# Patient Record
Sex: MALE | Race: BLACK OR AFRICAN AMERICAN
[De-identification: names, ages, dates, MRNs, and addresses within clinical notes are randomized per-mention and may not be internally consistent; named-entity substitution may affect disease eponyms.]

---

## 2018-08-22 ENCOUNTER — HOSPITAL ENCOUNTER (OUTPATIENT)
Dept: HOSPITAL 47 - EC | Age: 43
Setting detail: OBSERVATION
LOS: 2 days | Discharge: LEFT BEFORE DISCHARGE | End: 2018-08-24
Attending: HOSPITALIST | Admitting: HOSPITALIST
Payer: COMMERCIAL

## 2018-08-22 DIAGNOSIS — Z86.03: ICD-10-CM

## 2018-08-22 DIAGNOSIS — Z79.899: ICD-10-CM

## 2018-08-22 DIAGNOSIS — Z88.5: ICD-10-CM

## 2018-08-22 DIAGNOSIS — B19.20: ICD-10-CM

## 2018-08-22 DIAGNOSIS — Z88.8: ICD-10-CM

## 2018-08-22 DIAGNOSIS — T45.516A: ICD-10-CM

## 2018-08-22 DIAGNOSIS — Z79.51: ICD-10-CM

## 2018-08-22 DIAGNOSIS — Z86.711: ICD-10-CM

## 2018-08-22 DIAGNOSIS — Z99.89: ICD-10-CM

## 2018-08-22 DIAGNOSIS — Z86.718: ICD-10-CM

## 2018-08-22 DIAGNOSIS — B18.1: ICD-10-CM

## 2018-08-22 DIAGNOSIS — R07.9: Primary | ICD-10-CM

## 2018-08-22 DIAGNOSIS — G47.33: ICD-10-CM

## 2018-08-22 DIAGNOSIS — Z79.1: ICD-10-CM

## 2018-08-22 DIAGNOSIS — G40.909: ICD-10-CM

## 2018-08-22 DIAGNOSIS — Z79.01: ICD-10-CM

## 2018-08-22 DIAGNOSIS — I25.2: ICD-10-CM

## 2018-08-22 DIAGNOSIS — Z79.02: ICD-10-CM

## 2018-08-22 DIAGNOSIS — Z79.84: ICD-10-CM

## 2018-08-22 DIAGNOSIS — J45.909: ICD-10-CM

## 2018-08-22 DIAGNOSIS — D50.9: ICD-10-CM

## 2018-08-22 DIAGNOSIS — Z79.891: ICD-10-CM

## 2018-08-22 DIAGNOSIS — I50.9: ICD-10-CM

## 2018-08-22 DIAGNOSIS — Z91.048: ICD-10-CM

## 2018-08-22 DIAGNOSIS — D68.51: ICD-10-CM

## 2018-08-22 DIAGNOSIS — Z91.018: ICD-10-CM

## 2018-08-22 DIAGNOSIS — Z92.21: ICD-10-CM

## 2018-08-22 DIAGNOSIS — J44.9: ICD-10-CM

## 2018-08-22 DIAGNOSIS — Z53.21: ICD-10-CM

## 2018-08-22 DIAGNOSIS — G89.4: ICD-10-CM

## 2018-08-22 DIAGNOSIS — Z21: ICD-10-CM

## 2018-08-22 DIAGNOSIS — Z82.49: ICD-10-CM

## 2018-08-22 DIAGNOSIS — J96.01: ICD-10-CM

## 2018-08-22 DIAGNOSIS — Z92.3: ICD-10-CM

## 2018-08-22 DIAGNOSIS — Z91.19: ICD-10-CM

## 2018-08-22 DIAGNOSIS — F41.9: ICD-10-CM

## 2018-08-22 DIAGNOSIS — E09.65: ICD-10-CM

## 2018-08-22 DIAGNOSIS — Z79.82: ICD-10-CM

## 2018-08-22 DIAGNOSIS — R29.6: ICD-10-CM

## 2018-08-22 DIAGNOSIS — Z88.2: ICD-10-CM

## 2018-08-22 DIAGNOSIS — Z91.041: ICD-10-CM

## 2018-08-22 LAB
ALBUMIN SERPL-MCNC: 3.7 G/DL (ref 3.5–5)
ALP SERPL-CCNC: 73 U/L (ref 38–126)
ALT SERPL-CCNC: 32 U/L (ref 21–72)
ANION GAP SERPL CALC-SCNC: 8 MMOL/L
AST SERPL-CCNC: 21 U/L (ref 17–59)
BASOPHILS # BLD AUTO: 0 K/UL (ref 0–0.2)
BASOPHILS NFR BLD AUTO: 0 %
BUN SERPL-SCNC: 16 MG/DL (ref 9–20)
CALCIUM SPEC-MCNC: 8.9 MG/DL (ref 8.4–10.2)
CHLORIDE SERPL-SCNC: 113 MMOL/L (ref 98–107)
CK SERPL-CCNC: 104 U/L (ref 55–170)
CO2 SERPL-SCNC: 21 MMOL/L (ref 22–30)
EOSINOPHIL # BLD AUTO: 0.2 K/UL (ref 0–0.7)
EOSINOPHIL NFR BLD AUTO: 2 %
ERYTHROCYTE [DISTWIDTH] IN BLOOD BY AUTOMATED COUNT: 5.29 M/UL (ref 4.3–5.9)
ERYTHROCYTE [DISTWIDTH] IN BLOOD: 17.4 % (ref 11.5–15.5)
GLUCOSE BLD-MCNC: 77 MG/DL (ref 75–99)
GLUCOSE SERPL-MCNC: 82 MG/DL (ref 74–99)
HCT VFR BLD AUTO: 38.2 % (ref 39–53)
HGB BLD-MCNC: 11.4 GM/DL (ref 13–17.5)
LYMPHOCYTES # SPEC AUTO: 1.5 K/UL (ref 1–4.8)
LYMPHOCYTES NFR SPEC AUTO: 23 %
MAGNESIUM SPEC-SCNC: 1.8 MG/DL (ref 1.6–2.3)
MCH RBC QN AUTO: 21.6 PG (ref 25–35)
MCHC RBC AUTO-ENTMCNC: 29.9 G/DL (ref 31–37)
MCV RBC AUTO: 72.3 FL (ref 80–100)
MONOCYTES # BLD AUTO: 0.3 K/UL (ref 0–1)
MONOCYTES NFR BLD AUTO: 5 %
NEUTROPHILS # BLD AUTO: 4.4 K/UL (ref 1.3–7.7)
NEUTROPHILS NFR BLD AUTO: 69 %
PLATELET # BLD AUTO: 208 K/UL (ref 150–450)
POTASSIUM SERPL-SCNC: 3.9 MMOL/L (ref 3.5–5.1)
PROT SERPL-MCNC: 6.4 G/DL (ref 6.3–8.2)
SODIUM SERPL-SCNC: 142 MMOL/L (ref 137–145)
TROPONIN I SERPL-MCNC: <0.012 NG/ML (ref 0–0.03)
WBC # BLD AUTO: 6.4 K/UL (ref 3.8–10.6)

## 2018-08-22 PROCEDURE — 80048 BASIC METABOLIC PNL TOTAL CA: CPT

## 2018-08-22 PROCEDURE — 85610 PROTHROMBIN TIME: CPT

## 2018-08-22 PROCEDURE — 80053 COMPREHEN METABOLIC PANEL: CPT

## 2018-08-22 PROCEDURE — 86001 ALLERGEN SPECIFIC IGG: CPT

## 2018-08-22 PROCEDURE — 86606 ASPERGILLUS ANTIBODY: CPT

## 2018-08-22 PROCEDURE — 5A09457 ASSISTANCE WITH RESPIRATORY VENTILATION, 24-96 CONSECUTIVE HOURS, CONTINUOUS POSITIVE AIRWAY PRESSURE: ICD-10-PCS

## 2018-08-22 PROCEDURE — 83540 ASSAY OF IRON: CPT

## 2018-08-22 PROCEDURE — 82607 VITAMIN B-12: CPT

## 2018-08-22 PROCEDURE — 85025 COMPLETE CBC W/AUTO DIFF WBC: CPT

## 2018-08-22 PROCEDURE — 87340 HEPATITIS B SURFACE AG IA: CPT

## 2018-08-22 PROCEDURE — 86003 ALLG SPEC IGE CRUDE XTRC EA: CPT

## 2018-08-22 PROCEDURE — 96376 TX/PRO/DX INJ SAME DRUG ADON: CPT

## 2018-08-22 PROCEDURE — 99291 CRITICAL CARE FIRST HOUR: CPT

## 2018-08-22 PROCEDURE — 82104 ALPHA-1-ANTITRYPSIN PHENO: CPT

## 2018-08-22 PROCEDURE — 82103 ALPHA-1-ANTITRYPSIN TOTAL: CPT

## 2018-08-22 PROCEDURE — 86704 HEP B CORE ANTIBODY TOTAL: CPT

## 2018-08-22 PROCEDURE — 96361 HYDRATE IV INFUSION ADD-ON: CPT

## 2018-08-22 PROCEDURE — 94640 AIRWAY INHALATION TREATMENT: CPT

## 2018-08-22 PROCEDURE — 87390 HIV-1 AG IA: CPT

## 2018-08-22 PROCEDURE — 36415 COLL VENOUS BLD VENIPUNCTURE: CPT

## 2018-08-22 PROCEDURE — 85730 THROMBOPLASTIN TIME PARTIAL: CPT

## 2018-08-22 PROCEDURE — 96374 THER/PROPH/DIAG INJ IV PUSH: CPT

## 2018-08-22 PROCEDURE — 83735 ASSAY OF MAGNESIUM: CPT

## 2018-08-22 PROCEDURE — 82785 ASSAY OF IGE: CPT

## 2018-08-22 PROCEDURE — 86609 BACTERIUM ANTIBODY: CPT

## 2018-08-22 PROCEDURE — 84484 ASSAY OF TROPONIN QUANT: CPT

## 2018-08-22 PROCEDURE — 71045 X-RAY EXAM CHEST 1 VIEW: CPT

## 2018-08-22 PROCEDURE — 82550 ASSAY OF CK (CPK): CPT

## 2018-08-22 PROCEDURE — 83036 HEMOGLOBIN GLYCOSYLATED A1C: CPT

## 2018-08-22 PROCEDURE — 83550 IRON BINDING TEST: CPT

## 2018-08-22 PROCEDURE — 96375 TX/PRO/DX INJ NEW DRUG ADDON: CPT

## 2018-08-22 PROCEDURE — 97163 PT EVAL HIGH COMPLEX 45 MIN: CPT

## 2018-08-22 PROCEDURE — 82553 CREATINE MB FRACTION: CPT

## 2018-08-22 PROCEDURE — 93005 ELECTROCARDIOGRAM TRACING: CPT

## 2018-08-22 PROCEDURE — 83880 ASSAY OF NATRIURETIC PEPTIDE: CPT

## 2018-08-22 PROCEDURE — 82746 ASSAY OF FOLIC ACID SERUM: CPT

## 2018-08-22 NOTE — XR
EXAMINATION: XR chest 1V portable

 

DATE AND TIME:  8/22/2018 8:19 PM

 

CLINICAL INDICATION: Pain

 

TECHNIQUE: AP upright portable

 

COMPARISON: None.

 

FINDINGS: The hemidiaphragms are prominently elevated consistent with low lung inflation at the momen
t of x-ray exposure. This precludes visualization of the lower lobes. However, the mediastinal and di
aphragmatic pleural reflections are not silhouetted.

 

The lungs appear to be clear. 

 

The pleural spaces are negative.

 

The cardiac silhouette appears mildly enlarged, stable.

 

The skeletal structures and soft tissues are negative for acute findings.

 

IMPRESSION:

NO ACUTE PROCESS.
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
## 2018-08-22 NOTE — HP
HISTORY AND PHYSICAL



DATE OF SERVICE:

08/22/2018



CHIEF COMPLAINT:

Shortness of breath.



HISTORY OF PRESENT ILLNESS:

This 43-year-old gentleman with a past medical history of multiple medical problems,

including asthma, COPD, history of CHF, hemodialysis, history of DVT, history of

pulmonary embolus, seizure disorder, sleep apnea, history of factor V Leiden

deficiency, history of pancreatic tumor with chemo and radiation, history of pulmonary

embolus, history of seizure disorder, history of cardiac catheterization, being

followed by a primary physician elsewhere, was recently relocated to the area,

according to him, and the patient was complaining of increasing shortness of breath and

chest pains.  The patient came to [facility] and was admitted for further

evaluation and treatment.  Patient was given a BiPAP at 10/5, 50% FiO2.  Patient was

admitted for further evaluation treatment. There is no history of any fever or rigors.

No history of headache, loss of consciousness. Initial chest x-ray did not show any

acute abnormality.



PAST MEDICAL HISTORY:

1. History of asthma.

2. History of CHF.

3. History of hemodialysis.

4. History of DVT.

5. Pulmonary embolus.

6. History of sleep apnea.

7. Seizure disorder.

8. Factor V Leiden deficiency.

9. History of pancreatic tumor apparently with chemoradiation.

10.History of seizure disorder.

11.History of anxiety.



HOME MEDICATIONS:

1. Oxycodone 30 mg p.o. t.i.d.

2. Benadryl 25 mg p.o. daily.

3. Catapres 0.1 mg at bedtime.

4. Coumadin 5 mg at bedtime.

5. Omeprazole 40 mg b.i.d.

6. Multivitamins 1 p.o. daily.

7. Singulair 10 mg at bedtime.

8. Dulera 2 puffs b.i.d.

9. Claritin 10 mg p.o. daily.

10.Breo Ellipta 1 puff daily.

11.Colace 100 mg p.o. b.i.d.

12.Bentyl 10 mg p.o. t.i.d.

13.Colchicine 0.6 daily.

14.Plavix 75 mg p.o. daily.

15.Lipitor 10 mg daily.

16.Aspirin 81 mg p.o. daily.

17.Lac-Hydrin 1 application daily.

18.Proventil 1 puff q.4 p.r.n.

19.Ventolin 2.5 q.4 p.r.n.



ALLERGIES:

MULTIPLE ALLERGIES:

1. APIXABAN.

2. ASPARAGUS.

3. CAT DANDER.

4. CATS CLAWS.

5. CELEXA.

6. PRADAXA.

7. FRAGMIN.

8. LOVENOX.

9. FLUPHENAZINE.

10.ARIXTRA.

11.HALDOL.

12.ATARAX.

13.MOTRIN.

14.ATROVENT.

15.TORADOL.

16.LANOLIN.

17.SKELAXIN.

18.ROBAXIN.

19.ANTIBIOTICS.

20.SULFA.

21.TRAMADOL.



FAMILY HISTORY:

History of myocardial infarction, coronary artery disease in the family.



SOCIAL HISTORY:

No history of smoking.  No history of alcohol intake.



REVIEW OF SYSTEMS:

ENT: No diminished hearing. No diminished vision.

CARDIOVASCULAR SYSTEM: As mentioned earlier.

RESPIRATORY SYSTEM: As mentioned earlier.

GI: As mentioned earlier.

: No dysuria or retention.

NERVOUS SYSTEM: No numbness, weakness.

ALLERGY/IMMUNOLOGY: As mentioned earlier.

MUSCULOSKELETAL: As mentioned earlier.

HEMATOLOGY/ONCOLOGY: No history of anemia.

ENDOCRINE: As mentioned earlier.

CONSTITUTIONAL: As mentioned earlier.

DERMATOLOGY: Negative.

RHEUMATOLOGY: Negative.

PSYCHIATRY: As mentioned earlier.



PHYSICAL EXAMINATION:

Patient is alert and oriented x3. The pulse is 81, blood pressure 120/75, respiration

20, temperature 97.9, pulse ox 100% on BiPAP.  Patient is extremely short of breath.

HEENT: Conjunctivae normal. Oral mucosa moist.

NECK: No jugular venous distention. No carotid bruit. No lymph node enlargement.

CARDIOVASCULAR SYSTEM:  S1, S2 muffled.

RESPIRATORY SYSTEM: Breath sounds diminished at the bases.  A few scattered rhonchi and

crackles. Expiratory wheezing also present.

ABDOMEN: Soft, non-tender. No mass palpable.

LEGS: No edema. No swelling.

NERVOUS SYSTEM: Higher functions as mentioned earlier. Moves all 4 limbs. No focal

motor or sensory deficit.

LYMPHATICS: No lymph node palpable in neck, axillae or groin.

SKIN: No ulcer, rash, bleeding.



LABS:

WBC 6.2, hemoglobin 11.4. Sodium 142, potassium 3.9.



ASSESSMENT:

1. Shortness of breath; possibly asthma or chronic obstructive pulmonary disease,

    acute exacerbation, with acute hypoxic respiratory failure, on BiPAP.

2. Chest pain for evaluation.

3. Anemia, microcytic.

4. History of congestive heart failure.

5. History of hemodialysis.

6. History of deep venous thrombosis.

7. History of pulmonary embolism.

8. History of seizures.

9. History of sleep apnea.

10.History of Factor V Leiden deficiency.

11.History of pancreatic tumor with chemoradiation apparently.

12.History of cardiac catheterization.

13.History of anxiety.



RECOMMENDATIONS AND DISCUSSION:

In this 43-year-old gentleman who presented with multiple complex medical issues, we

will monitor the patient closely, continue the current medications, continue with

symptomatic treatment. Will obtain pulmonary consultations. Continue the patient with

BiPAP, empiric antibiotics, bronchodilators, steroids. Continue the rest of the home

medications.  Guarded prognosis because of multiple complex medical issues. Further

recommendations to follow. I also recommend that the patient follow up with the primary

physician closely.





MMJIML / JOANNAN: [id number] / Job#: [id number]

## 2018-08-23 VITALS — RESPIRATION RATE: 16 BRPM

## 2018-08-23 LAB
ANION GAP SERPL CALC-SCNC: 13 MMOL/L
APTT BLD: 21.4 SEC (ref 22–30)
BASOPHILS # BLD AUTO: 0 K/UL (ref 0–0.2)
BASOPHILS NFR BLD AUTO: 0 %
BUN SERPL-SCNC: 13 MG/DL (ref 9–20)
CALCIUM SPEC-MCNC: 9.1 MG/DL (ref 8.4–10.2)
CHLORIDE SERPL-SCNC: 109 MMOL/L (ref 98–107)
CO2 SERPL-SCNC: 19 MMOL/L (ref 22–30)
EOSINOPHIL # BLD AUTO: 0 K/UL (ref 0–0.7)
EOSINOPHIL NFR BLD AUTO: 1 %
ERYTHROCYTE [DISTWIDTH] IN BLOOD BY AUTOMATED COUNT: 5.08 M/UL (ref 4.3–5.9)
ERYTHROCYTE [DISTWIDTH] IN BLOOD: 17.6 % (ref 11.5–15.5)
GLUCOSE BLD-MCNC: 172 MG/DL (ref 75–99)
GLUCOSE BLD-MCNC: 256 MG/DL (ref 75–99)
GLUCOSE SERPL-MCNC: 365 MG/DL (ref 74–99)
HBA1C MFR BLD: 6.4 % (ref 4–6)
HCT VFR BLD AUTO: 38 % (ref 39–53)
HGB BLD-MCNC: 11.3 GM/DL (ref 13–17.5)
INR PPP: 1.1 (ref ?–1.2)
LYMPHOCYTES # SPEC AUTO: 0.3 K/UL (ref 1–4.8)
LYMPHOCYTES NFR SPEC AUTO: 6 %
MCH RBC QN AUTO: 22.1 PG (ref 25–35)
MCHC RBC AUTO-ENTMCNC: 29.6 G/DL (ref 31–37)
MCV RBC AUTO: 74.8 FL (ref 80–100)
MONOCYTES # BLD AUTO: 0.1 K/UL (ref 0–1)
MONOCYTES NFR BLD AUTO: 2 %
NEUTROPHILS # BLD AUTO: 4.6 K/UL (ref 1.3–7.7)
NEUTROPHILS NFR BLD AUTO: 92 %
PLATELET # BLD AUTO: 251 K/UL (ref 150–450)
POTASSIUM SERPL-SCNC: 4 MMOL/L (ref 3.5–5.1)
PT BLD: 10.5 SEC (ref 9–12)
SODIUM SERPL-SCNC: 141 MMOL/L (ref 137–145)
WBC # BLD AUTO: 5 K/UL (ref 3.8–10.6)

## 2018-08-23 RX ADMIN — DIVALPROEX SODIUM SCH MG: 500 TABLET, FILM COATED, EXTENDED RELEASE ORAL at 16:44

## 2018-08-23 RX ADMIN — PANTOPRAZOLE SODIUM SCH MG: 40 TABLET, DELAYED RELEASE ORAL at 08:01

## 2018-08-23 RX ADMIN — INSULIN ASPART SCH UNIT: 100 INJECTION, SOLUTION INTRAVENOUS; SUBCUTANEOUS at 22:11

## 2018-08-23 RX ADMIN — INSULIN ASPART SCH UNIT: 100 INJECTION, SOLUTION INTRAVENOUS; SUBCUTANEOUS at 18:03

## 2018-08-23 RX ADMIN — METHYLPREDNISOLONE SODIUM SUCCINATE SCH MG: 125 INJECTION, POWDER, FOR SOLUTION INTRAMUSCULAR; INTRAVENOUS at 00:09

## 2018-08-23 RX ADMIN — MORPHINE SULFATE PRN MG: 2 INJECTION, SOLUTION INTRAMUSCULAR; INTRAVENOUS at 00:10

## 2018-08-23 RX ADMIN — IPRATROPIUM BROMIDE AND ALBUTEROL SULFATE SCH: .5; 3 SOLUTION RESPIRATORY (INHALATION) at 23:32

## 2018-08-23 RX ADMIN — DICYCLOMINE HYDROCHLORIDE SCH MG: 10 CAPSULE ORAL at 16:44

## 2018-08-23 RX ADMIN — BUDESONIDE SCH MG: 1 SUSPENSION RESPIRATORY (INHALATION) at 19:35

## 2018-08-23 RX ADMIN — MORPHINE SULFATE PRN MG: 2 INJECTION, SOLUTION INTRAMUSCULAR; INTRAVENOUS at 12:30

## 2018-08-23 RX ADMIN — MORPHINE SULFATE PRN MG: 2 INJECTION, SOLUTION INTRAMUSCULAR; INTRAVENOUS at 16:42

## 2018-08-23 RX ADMIN — IPRATROPIUM BROMIDE AND ALBUTEROL SULFATE SCH ML: .5; 3 SOLUTION RESPIRATORY (INHALATION) at 19:35

## 2018-08-23 RX ADMIN — CEFTRIAXONE SODIUM SCH: 1 INJECTION, POWDER, FOR SOLUTION INTRAMUSCULAR; INTRAVENOUS at 22:06

## 2018-08-23 RX ADMIN — METHYLPREDNISOLONE SODIUM SUCCINATE SCH MG: 125 INJECTION, POWDER, FOR SOLUTION INTRAMUSCULAR; INTRAVENOUS at 05:09

## 2018-08-23 RX ADMIN — DICYCLOMINE HYDROCHLORIDE SCH MG: 10 CAPSULE ORAL at 08:01

## 2018-08-23 RX ADMIN — ATORVASTATIN CALCIUM SCH MG: 10 TABLET, FILM COATED ORAL at 08:01

## 2018-08-23 RX ADMIN — GABAPENTIN SCH: 400 CAPSULE ORAL at 16:44

## 2018-08-23 RX ADMIN — DIVALPROEX SODIUM SCH MG: 500 TABLET, FILM COATED, EXTENDED RELEASE ORAL at 22:05

## 2018-08-23 RX ADMIN — DICYCLOMINE HYDROCHLORIDE SCH MG: 10 CAPSULE ORAL at 22:04

## 2018-08-23 RX ADMIN — DOCUSATE SODIUM SCH MG: 100 CAPSULE, LIQUID FILLED ORAL at 08:01

## 2018-08-23 RX ADMIN — DOCUSATE SODIUM SCH MG: 100 CAPSULE, LIQUID FILLED ORAL at 22:04

## 2018-08-23 RX ADMIN — MORPHINE SULFATE PRN MG: 2 INJECTION, SOLUTION INTRAMUSCULAR; INTRAVENOUS at 03:55

## 2018-08-23 RX ADMIN — ISODIUM CHLORIDE SCH MG: 0.03 SOLUTION RESPIRATORY (INHALATION) at 11:11

## 2018-08-23 RX ADMIN — ASPIRIN 81 MG CHEWABLE TABLET SCH MG: 81 TABLET CHEWABLE at 08:01

## 2018-08-23 RX ADMIN — METHYLPREDNISOLONE SODIUM SUCCINATE SCH MG: 125 INJECTION, POWDER, FOR SOLUTION INTRAMUSCULAR; INTRAVENOUS at 18:03

## 2018-08-23 RX ADMIN — GABAPENTIN SCH MG: 400 CAPSULE ORAL at 22:05

## 2018-08-23 RX ADMIN — MORPHINE SULFATE PRN MG: 2 INJECTION, SOLUTION INTRAMUSCULAR; INTRAVENOUS at 08:05

## 2018-08-23 RX ADMIN — METHYLPREDNISOLONE SODIUM SUCCINATE SCH MG: 125 INJECTION, POWDER, FOR SOLUTION INTRAMUSCULAR; INTRAVENOUS at 13:13

## 2018-08-23 RX ADMIN — ISODIUM CHLORIDE SCH MG: 0.03 SOLUTION RESPIRATORY (INHALATION) at 07:24

## 2018-08-23 RX ADMIN — IPRATROPIUM BROMIDE AND ALBUTEROL SULFATE SCH ML: .5; 3 SOLUTION RESPIRATORY (INHALATION) at 15:51

## 2018-08-23 RX ADMIN — BUDESONIDE SCH MG: 1 SUSPENSION RESPIRATORY (INHALATION) at 07:24

## 2018-08-23 RX ADMIN — HEPARIN SODIUM SCH: 5000 INJECTION, SOLUTION INTRAVENOUS; SUBCUTANEOUS at 16:50

## 2018-08-23 RX ADMIN — CEFTRIAXONE SODIUM SCH MG: 1 INJECTION, POWDER, FOR SOLUTION INTRAMUSCULAR; INTRAVENOUS at 00:10
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
## 2018-08-24 VITALS — DIASTOLIC BLOOD PRESSURE: 62 MMHG | SYSTOLIC BLOOD PRESSURE: 103 MMHG | TEMPERATURE: 97.9 F | HEART RATE: 91 BPM

## 2018-08-24 LAB
GLUCOSE BLD-MCNC: 149 MG/DL (ref 75–99)
VIT B12 SERPL-MCNC: 512 PG/ML (ref 200–944)

## 2018-08-24 RX ADMIN — INSULIN ASPART SCH UNIT: 100 INJECTION, SOLUTION INTRAVENOUS; SUBCUTANEOUS at 07:59

## 2018-08-24 RX ADMIN — ATORVASTATIN CALCIUM SCH MG: 10 TABLET, FILM COATED ORAL at 07:58

## 2018-08-24 RX ADMIN — BUDESONIDE SCH: 1 SUSPENSION RESPIRATORY (INHALATION) at 08:32

## 2018-08-24 RX ADMIN — PANTOPRAZOLE SODIUM SCH MG: 40 TABLET, DELAYED RELEASE ORAL at 07:58

## 2018-08-24 RX ADMIN — IPRATROPIUM BROMIDE AND ALBUTEROL SULFATE SCH: .5; 3 SOLUTION RESPIRATORY (INHALATION) at 08:33

## 2018-08-24 RX ADMIN — ASPIRIN 81 MG CHEWABLE TABLET SCH MG: 81 TABLET CHEWABLE at 07:59

## 2018-08-24 RX ADMIN — IPRATROPIUM BROMIDE AND ALBUTEROL SULFATE SCH: .5; 3 SOLUTION RESPIRATORY (INHALATION) at 02:46

## 2018-08-24 RX ADMIN — METHYLPREDNISOLONE SODIUM SUCCINATE SCH: 125 INJECTION, POWDER, FOR SOLUTION INTRAMUSCULAR; INTRAVENOUS at 05:25

## 2018-08-24 RX ADMIN — METHYLPREDNISOLONE SODIUM SUCCINATE SCH: 125 INJECTION, POWDER, FOR SOLUTION INTRAMUSCULAR; INTRAVENOUS at 01:12

## 2018-08-24 RX ADMIN — HEPARIN SODIUM SCH: 5000 INJECTION, SOLUTION INTRAVENOUS; SUBCUTANEOUS at 01:12

## 2018-08-24 NOTE — DS
DISCHARGE SUMMARY



FINAL DIAGNOSES:

1. Shortness of breath, possible chronic obstructive pulmonary disease acute

    exacerbation with acute ventricular bronchitis, acute hypoxic respiratory failure,

    status post BiPAP.

2. Chest pain for evaluation.

3. Chronic pain syndrome.

4. Diabetes mellitus type 2, steroid induced.

5. Anemia, microcytic.

6. HIV.

7. Hepatitis.

8. History of congestive heart failure.

9. History of multiple medical problems.



DISCHARGE DISPOSITION:

The patient left the hospital AGAINST MEDICAL ADVICE.



HISTORY OF PRESENT ILLNESS:

This 43-year-old gentleman admitted with multiple medical problems as mentioned

earlier.  The patient is being evaluated by Pulmonology but however the patient left

the hospital AGAINST MEDICAL ADVICE.  Prognosis is extremely guarded.  Please refer to

the previous history and physical dictation and staff notes for further information.





MMODL / IJN: [id number] / Job#: [id number]

## 2018-08-24 NOTE — CONS
CONSULTATION



REASON FOR CONSULTATION:

1. HIV.

2. Hepatitis B, chronic.



HISTORY OF PRESENT ILLNESS:

The patient is a 43-year-old,  male who has been brought into the ER

for the chief complaints of difficulty breathing.  These symptom has been going on for

2 days prior to presentation to hospital.  The patient has been complaining of

shortness of breath with exertion.  Denies having any chest pain, orthopnea or PND or

any active wheezing.  The patient denies having any URI symptoms.  No high-grade fever,

rigors or any chills.  The patient has been complaining of having frequent falls as

well.  With these symptoms, the patient was evaluated by the ER physician.  The patient

on arrival to the ER has been afebrile.  The patient's white count was normal at 5.0.

His liver enzymes are normal.  Lymphocyte count is normal.  The patient did have a

chest x-ray, which was negative for any acute pulmonary process.  The patient has been

to the hospital for possible asthma/COPD exacerbation.  The patient also reports a

history of HIV and chronic hepatitis B for which the patient is currently on anti-

retroviral medication.  However, looking at the medication profile does not make sense

as the patient is on ______ in addition ______ when asked specifically for who is his

HIV physician he states that he is in [place], but is not able to give me a clear

name of it.  When asked specifically for how long he has HIV, he 16 years.  Apparently,

the patient was admitted to this facility in 2016 and there was no mention of HIV in

any of the consults or the admitting physician notes.  The patient is not very clear

when he is asked about his HIV status, his CD4 count or viral load.  He has no idea.

The patient seems to be confabulating and ______ when I asked specifically about his

HIV and hepatitis B status.  Likely this history to be unreliable at the moment.



REVIEW OF SYSTEMS:

CONSTITUTIONAL: Positive for weakness, no fever.

EYES:  No complaint.

ENT:  No complaint.

RESPIRATORY:  As per HPI.

CARDIOVASCULAR: No complaint.

GENITOURINARY:  No complaint.

GASTROINTESTINAL: No complaint.

MUSCULOSKELETAL: No complaint.

INTEGUMENTARY: No complaint.

PSYCHOLOGICAL: No complaint.

ENDOCRINE:  No complaint.

NEUROLOGIC:  No complaint.



PAST MEDICAL HISTORY:

MI, pulmonary embolism, seizure disorder, sleep apnea, DVT, COPD, heart failure, factor

V Leiden deficiency, history of pancreatic tumor with chemoradiation, PE _______, sleep

apnea, history of chronic hepatitis B and HIV, which I am not very clear about.



PAST SURGICAL HISTORY:

Appendectomy, heart catheterization, tonsillectomy, left leg fasciotomy, spinal cord

stimulator placement and removal, ______ repair surgery on the left leg.



SOCIAL HISTORY:

No history of smoking, drinking or drug use.



FAMILY HISTORY:

Father has colon disease and MI.



ALLERGIES:

To multiple medication including APIXABAN _______.



MEDICATIONS:

Currently include, the patient is on Tylenol, Ventolin, DuoNeb, Xanax, aspirin,

Lipitor, Pulmicort, Rocephin, Thorazine, Catapres, Plavix, colchicine, Bentyl,

Benadryl, Depakote, Colace, Truvada, Solu-Medrol _______ and Norvasc.



EXAMINATION:

Blood pressure 141/76 with a pulse of 84, temperature 97.1.  He has been 100% ______

when he presented to the hospital.  General description is a middle-aged male lying in

bed in no distress.  No tachypnea or accessory muscle of respiration use.

HEENT:  Shows slight pallor.  No scleral icterus.  Oral mucous membranes moist. No

pharyngeal erythema or thrush.

NECK:  Trachea central, no thyromegaly.

LUNGS: Unlabored breathing, clear to auscultation anteriorly.  No wheeze or crackle.

HEART:  S1, S2.  Regular rate and rhythm.

ABDOMEN:  Soft, no tenderness.  No guarding or rigidity.

EXTREMITIES:  No edema of the feet.

SKIN EXAMINATION:  No rash or mass palpable.

NEUROLOGICAL:  The patient is awake, alert, oriented.  Mood and affect normal.



LABS:

Hemoglobin is 11.8, white count 5.0.  INR was 1.1 with a BUN of 13, creatinine 0.91.

Liver enzymes are normal.  Chest x-ray report negative for pneumonia.



DIAGNOSTIC IMPRESSION AND PLAN:

1. Patient was currently giving a history of HIV,  however, the patient not very clear

    about the status of his HIV or the physician was prescribing these medication and

    medication and the patient is currently on does not make sense.  We will try to

    contact his ID physician in [place].  However, when the RN went to the room to

    get his signature on release of his medical record, the patient apparently freaked

    out and would not sign it.  We will have to confirm his HIV status before

    recommending any specific treatment.

2. The patient with chronic hepatitis B.  The diagnosis still not full in view of

    normal liver enzymes and the patient's ______ history.



PLAN:

1. We will check an HIV 1 and 2 antibodies on this patient for antigen.

2. Check _______ and reason.

3. Recommend to hold anti-retroviral therapy at this point until we have a

    confirmation of his HIV and hepatitis B status and get records from his ID

    physician in [place].

4. We will follow on his clinical condition.  Further adjust medication if needed.

    Thank you for this consultation.  We will follow this patient along with you.





MMJIML / IJN: [id number] / Job#: [id number]

## 2018-08-27 LAB
A ALTERNATA IGE QN: <0.35 KU/L (ref ?–0.35)
A ALTERNATA IGE RAST: (no result)
A FUMIGATUS IGE QN: <0.35 KU/L (ref ?–0.35)
A FUMIGATUS IGE RAST: (no result)
C HERBARUM IGE QN: <0.35 KU/L (ref ?–0.35)
CAT DANDER IGE QN: (no result)
CAT DANDER IGE QN: <0.35 KU/L (ref ?–0.35)
D FARINAE IGE QN: (no result)
D FARINAE IGE QN: <0.35 KU/L (ref ?–0.35)
D PTERONYSS IGE QN: (no result) KU/L
MAPLE IGE RAST: (no result)
MARSH ELDER IGE QN: (no result)
MARSH ELDER IGE QN: (no result) KU/L
MOUSE URINE PROT IGE RAST: (no result)
MT JUNIPER IGE QN: (no result)
NETTLE IGE RAST: (no result)
P NOTATUM IGE QN: (no result)
SILVER BIRCH IGE QN: <0.35 KU/L (ref ?–0.35)
SILVER BIRCH IGE RAST: (no result)
WHITE ASH IGE RAST: (no result)

## 2018-08-28 LAB — A1AT SERPL-MCNC: 148 MG/DL (ref 90–200)

## 2018-08-29 LAB
A ALTERNATA IGE QN: < 2 MCG/ML (ref ?–13.6)
C HERBARUM IGG SER-MCNC: < 2 MCG/ML (ref ?–14.7)
PHOMA IGG QN: < 2 MCG/ML (ref ?–6.6)

## 2019-01-10 ENCOUNTER — HOSPITAL ENCOUNTER (INPATIENT)
Dept: HOSPITAL 47 - EC | Age: 44
LOS: 3 days | Discharge: LEFT BEFORE DISCHARGE | DRG: 190 | End: 2019-01-13
Attending: INTERNAL MEDICINE | Admitting: INTERNAL MEDICINE
Payer: COMMERCIAL

## 2019-01-10 VITALS — BODY MASS INDEX: 28.8 KG/M2

## 2019-01-10 DIAGNOSIS — Z66: ICD-10-CM

## 2019-01-10 DIAGNOSIS — Z99.81: ICD-10-CM

## 2019-01-10 DIAGNOSIS — I11.0: ICD-10-CM

## 2019-01-10 DIAGNOSIS — J44.1: Primary | ICD-10-CM

## 2019-01-10 DIAGNOSIS — G40.909: ICD-10-CM

## 2019-01-10 DIAGNOSIS — Z92.21: ICD-10-CM

## 2019-01-10 DIAGNOSIS — J96.01: ICD-10-CM

## 2019-01-10 DIAGNOSIS — G47.33: ICD-10-CM

## 2019-01-10 DIAGNOSIS — E87.6: ICD-10-CM

## 2019-01-10 DIAGNOSIS — Z79.899: ICD-10-CM

## 2019-01-10 DIAGNOSIS — J45.51: ICD-10-CM

## 2019-01-10 DIAGNOSIS — Z87.891: ICD-10-CM

## 2019-01-10 DIAGNOSIS — Z91.19: ICD-10-CM

## 2019-01-10 DIAGNOSIS — D68.51: ICD-10-CM

## 2019-01-10 DIAGNOSIS — Z21: ICD-10-CM

## 2019-01-10 DIAGNOSIS — T45.515A: ICD-10-CM

## 2019-01-10 DIAGNOSIS — Z88.6: ICD-10-CM

## 2019-01-10 DIAGNOSIS — B19.10: ICD-10-CM

## 2019-01-10 DIAGNOSIS — I25.2: ICD-10-CM

## 2019-01-10 DIAGNOSIS — F41.9: ICD-10-CM

## 2019-01-10 DIAGNOSIS — Z88.2: ICD-10-CM

## 2019-01-10 DIAGNOSIS — Z91.041: ICD-10-CM

## 2019-01-10 DIAGNOSIS — Z79.51: ICD-10-CM

## 2019-01-10 DIAGNOSIS — Z79.01: ICD-10-CM

## 2019-01-10 DIAGNOSIS — Z79.82: ICD-10-CM

## 2019-01-10 DIAGNOSIS — Z79.02: ICD-10-CM

## 2019-01-10 DIAGNOSIS — Z86.711: ICD-10-CM

## 2019-01-10 DIAGNOSIS — Z82.49: ICD-10-CM

## 2019-01-10 DIAGNOSIS — Z88.8: ICD-10-CM

## 2019-01-10 DIAGNOSIS — Z86.718: ICD-10-CM

## 2019-01-10 DIAGNOSIS — Z85.07: ICD-10-CM

## 2019-01-10 DIAGNOSIS — Z92.3: ICD-10-CM

## 2019-01-10 DIAGNOSIS — I50.9: ICD-10-CM

## 2019-01-10 LAB
ALBUMIN SERPL-MCNC: 3.8 G/DL (ref 3.5–5)
ALP SERPL-CCNC: 66 U/L (ref 38–126)
ALT SERPL-CCNC: 27 U/L (ref 21–72)
ANION GAP SERPL CALC-SCNC: 5 MMOL/L
APTT BLD: 20.8 SEC (ref 22–30)
AST SERPL-CCNC: 21 U/L (ref 17–59)
BASOPHILS # BLD AUTO: 0 K/UL (ref 0–0.2)
BASOPHILS NFR BLD AUTO: 0 %
BUN SERPL-SCNC: 15 MG/DL (ref 9–20)
CALCIUM SPEC-MCNC: 8.9 MG/DL (ref 8.4–10.2)
CHLORIDE SERPL-SCNC: 109 MMOL/L (ref 98–107)
CK SERPL-CCNC: 89 U/L (ref 55–170)
CO2 SERPL-SCNC: 25 MMOL/L (ref 22–30)
EOSINOPHIL # BLD AUTO: 0.1 K/UL (ref 0–0.7)
EOSINOPHIL NFR BLD AUTO: 2 %
ERYTHROCYTE [DISTWIDTH] IN BLOOD BY AUTOMATED COUNT: 5.07 M/UL (ref 4.3–5.9)
ERYTHROCYTE [DISTWIDTH] IN BLOOD: 17.4 % (ref 11.5–15.5)
GLUCOSE SERPL-MCNC: 207 MG/DL (ref 74–99)
HCT VFR BLD AUTO: 38.8 % (ref 39–53)
HGB BLD-MCNC: 12.6 GM/DL (ref 13–17.5)
INR PPP: 1.1 (ref ?–1.2)
LYMPHOCYTES # SPEC AUTO: 1.5 K/UL (ref 1–4.8)
LYMPHOCYTES NFR SPEC AUTO: 34 %
MAGNESIUM SPEC-SCNC: 1.6 MG/DL (ref 1.6–2.3)
MCH RBC QN AUTO: 24.8 PG (ref 25–35)
MCHC RBC AUTO-ENTMCNC: 32.5 G/DL (ref 31–37)
MCV RBC AUTO: 76.5 FL (ref 80–100)
MONOCYTES # BLD AUTO: 0.3 K/UL (ref 0–1)
MONOCYTES NFR BLD AUTO: 7 %
NEUTROPHILS # BLD AUTO: 2.5 K/UL (ref 1.3–7.7)
NEUTROPHILS NFR BLD AUTO: 55 %
PLATELET # BLD AUTO: 215 K/UL (ref 150–450)
POTASSIUM SERPL-SCNC: 3.3 MMOL/L (ref 3.5–5.1)
PROT SERPL-MCNC: 6.1 G/DL (ref 6.3–8.2)
PT BLD: 11.5 SEC (ref 9–12)
SODIUM SERPL-SCNC: 139 MMOL/L (ref 137–145)
TROPONIN I SERPL-MCNC: 0.02 NG/ML (ref 0–0.03)
WBC # BLD AUTO: 4.6 K/UL (ref 3.8–10.6)

## 2019-01-10 PROCEDURE — 71046 X-RAY EXAM CHEST 2 VIEWS: CPT

## 2019-01-10 PROCEDURE — 85027 COMPLETE CBC AUTOMATED: CPT

## 2019-01-10 PROCEDURE — 96375 TX/PRO/DX INJ NEW DRUG ADDON: CPT

## 2019-01-10 PROCEDURE — 84484 ASSAY OF TROPONIN QUANT: CPT

## 2019-01-10 PROCEDURE — 96372 THER/PROPH/DIAG INJ SC/IM: CPT

## 2019-01-10 PROCEDURE — 85730 THROMBOPLASTIN TIME PARTIAL: CPT

## 2019-01-10 PROCEDURE — 83036 HEMOGLOBIN GLYCOSYLATED A1C: CPT

## 2019-01-10 PROCEDURE — 82553 CREATINE MB FRACTION: CPT

## 2019-01-10 PROCEDURE — 96361 HYDRATE IV INFUSION ADD-ON: CPT

## 2019-01-10 PROCEDURE — 82550 ASSAY OF CK (CPK): CPT

## 2019-01-10 PROCEDURE — 96374 THER/PROPH/DIAG INJ IV PUSH: CPT

## 2019-01-10 PROCEDURE — 86001 ALLERGEN SPECIFIC IGG: CPT

## 2019-01-10 PROCEDURE — 82785 ASSAY OF IGE: CPT

## 2019-01-10 PROCEDURE — 80053 COMPREHEN METABOLIC PANEL: CPT

## 2019-01-10 PROCEDURE — 83735 ASSAY OF MAGNESIUM: CPT

## 2019-01-10 PROCEDURE — 85025 COMPLETE CBC W/AUTO DIFF WBC: CPT

## 2019-01-10 PROCEDURE — 99291 CRITICAL CARE FIRST HOUR: CPT

## 2019-01-10 PROCEDURE — 86609 BACTERIUM ANTIBODY: CPT

## 2019-01-10 PROCEDURE — 93970 EXTREMITY STUDY: CPT

## 2019-01-10 PROCEDURE — 94644 CONT INHLJ TX 1ST HOUR: CPT

## 2019-01-10 PROCEDURE — 82103 ALPHA-1-ANTITRYPSIN TOTAL: CPT

## 2019-01-10 PROCEDURE — 93005 ELECTROCARDIOGRAM TRACING: CPT

## 2019-01-10 PROCEDURE — 82104 ALPHA-1-ANTITRYPSIN PHENO: CPT

## 2019-01-10 PROCEDURE — 80048 BASIC METABOLIC PNL TOTAL CA: CPT

## 2019-01-10 PROCEDURE — 85610 PROTHROMBIN TIME: CPT

## 2019-01-10 PROCEDURE — 86606 ASPERGILLUS ANTIBODY: CPT

## 2019-01-10 PROCEDURE — 36415 COLL VENOUS BLD VENIPUNCTURE: CPT

## 2019-01-10 PROCEDURE — 94640 AIRWAY INHALATION TREATMENT: CPT

## 2019-01-10 PROCEDURE — 86003 ALLG SPEC IGE CRUDE XTRC EA: CPT

## 2019-01-10 RX ADMIN — WARFARIN SODIUM SCH MG: 5 TABLET ORAL at 22:23

## 2019-01-10 RX ADMIN — DIVALPROEX SODIUM SCH: 500 TABLET, FILM COATED, EXTENDED RELEASE ORAL at 22:12

## 2019-01-10 RX ADMIN — GABAPENTIN SCH MG: 400 CAPSULE ORAL at 22:10
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
## 2019-01-10 NOTE — XR
EXAMINATION TYPE: XR chest 2V

 

DATE OF EXAM: 1/10/2019

 

COMPARISON: 8/22/2018

 

HISTORY: Difficulty breathing

 

TECHNIQUE:  Frontal and lateral views of the chest are obtained.

 

FINDINGS:  Heart and mediastinum are normal. Lungs are clear of consolidation. There is no pleural ef
fusion. There is slight coarsening of the lung markings in the lower lobes.

 

IMPRESSION:  Increased lung markings in the lower lobes mostly on the left side compared to old exam.
 Poor inspiration. No pulmonary consolidation.

## 2019-01-11 LAB
ANION GAP SERPL CALC-SCNC: 16 MMOL/L
BUN SERPL-SCNC: 12 MG/DL (ref 9–20)
CALCIUM SPEC-MCNC: 9.1 MG/DL (ref 8.4–10.2)
CHLORIDE SERPL-SCNC: 105 MMOL/L (ref 98–107)
CO2 SERPL-SCNC: 18 MMOL/L (ref 22–30)
ERYTHROCYTE [DISTWIDTH] IN BLOOD BY AUTOMATED COUNT: 4.82 M/UL (ref 4.3–5.9)
ERYTHROCYTE [DISTWIDTH] IN BLOOD: 17.4 % (ref 11.5–15.5)
GLUCOSE BLD-MCNC: 200 MG/DL (ref 75–99)
GLUCOSE BLD-MCNC: 219 MG/DL (ref 75–99)
GLUCOSE BLD-MCNC: 277 MG/DL (ref 75–99)
GLUCOSE BLD-MCNC: 356 MG/DL (ref 75–99)
GLUCOSE BLD-MCNC: 367 MG/DL (ref 75–99)
GLUCOSE SERPL-MCNC: 400 MG/DL (ref 74–99)
HCT VFR BLD AUTO: 36.8 % (ref 39–53)
HGB BLD-MCNC: 11.9 GM/DL (ref 13–17.5)
INR PPP: 1.1 (ref ?–1.2)
MCH RBC QN AUTO: 24.7 PG (ref 25–35)
MCHC RBC AUTO-ENTMCNC: 32.4 G/DL (ref 31–37)
MCV RBC AUTO: 76.4 FL (ref 80–100)
PLATELET # BLD AUTO: 256 K/UL (ref 150–450)
POTASSIUM SERPL-SCNC: 4.4 MMOL/L (ref 3.5–5.1)
PT BLD: 11.4 SEC (ref 9–12)
SODIUM SERPL-SCNC: 139 MMOL/L (ref 137–145)
WBC # BLD AUTO: 8.4 K/UL (ref 3.8–10.6)

## 2019-01-11 RX ADMIN — CLOPIDOGREL BISULFATE SCH MG: 75 TABLET ORAL at 09:49

## 2019-01-11 RX ADMIN — RITONAVIR SCH: 100 CAPSULE ORAL at 12:49

## 2019-01-11 RX ADMIN — HEPARIN SODIUM SCH MLS/HR: 10000 INJECTION, SOLUTION INTRAVENOUS at 18:00

## 2019-01-11 RX ADMIN — LORATADINE SCH MG: 10 TABLET ORAL at 09:49

## 2019-01-11 RX ADMIN — RITONAVIR SCH MG: 100 CAPSULE ORAL at 09:51

## 2019-01-11 RX ADMIN — GABAPENTIN SCH MG: 400 CAPSULE ORAL at 16:44

## 2019-01-11 RX ADMIN — METHYLPREDNISOLONE SODIUM SUCCINATE SCH MG: 125 INJECTION, POWDER, FOR SOLUTION INTRAMUSCULAR; INTRAVENOUS at 05:56

## 2019-01-11 RX ADMIN — GABAPENTIN SCH MG: 400 CAPSULE ORAL at 09:48

## 2019-01-11 RX ADMIN — COLCHICINE SCH: 0.6 TABLET, FILM COATED ORAL at 12:49

## 2019-01-11 RX ADMIN — METHYLPREDNISOLONE SODIUM SUCCINATE SCH MG: 125 INJECTION, POWDER, FOR SOLUTION INTRAMUSCULAR; INTRAVENOUS at 01:34

## 2019-01-11 RX ADMIN — INSULIN ASPART SCH: 100 INJECTION, SOLUTION INTRAVENOUS; SUBCUTANEOUS at 12:50

## 2019-01-11 RX ADMIN — GABAPENTIN SCH MG: 400 CAPSULE ORAL at 20:37

## 2019-01-11 RX ADMIN — ASPIRIN SCH: 325 TABLET ORAL at 20:41

## 2019-01-11 RX ADMIN — DICYCLOMINE HYDROCHLORIDE SCH MG: 10 CAPSULE ORAL at 20:38

## 2019-01-11 RX ADMIN — ATORVASTATIN CALCIUM SCH MG: 10 TABLET, FILM COATED ORAL at 09:49

## 2019-01-11 RX ADMIN — DOCUSATE SODIUM SCH MG: 100 CAPSULE, LIQUID FILLED ORAL at 20:36

## 2019-01-11 RX ADMIN — MONTELUKAST SODIUM SCH MG: 10 TABLET, FILM COATED ORAL at 20:37

## 2019-01-11 RX ADMIN — DIVALPROEX SODIUM SCH MG: 500 TABLET, FILM COATED, EXTENDED RELEASE ORAL at 20:37

## 2019-01-11 RX ADMIN — IPRATROPIUM BROMIDE AND ALBUTEROL SULFATE PRN ML: .5; 3 SOLUTION RESPIRATORY (INHALATION) at 11:05

## 2019-01-11 RX ADMIN — PANTOPRAZOLE SODIUM SCH MG: 40 TABLET, DELAYED RELEASE ORAL at 18:19

## 2019-01-11 RX ADMIN — INSULIN ASPART SCH UNIT: 100 INJECTION, SOLUTION INTRAVENOUS; SUBCUTANEOUS at 05:55

## 2019-01-11 RX ADMIN — IPRATROPIUM BROMIDE AND ALBUTEROL SULFATE SCH ML: .5; 3 SOLUTION RESPIRATORY (INHALATION) at 21:41

## 2019-01-11 RX ADMIN — ATORVASTATIN CALCIUM SCH: 10 TABLET, FILM COATED ORAL at 10:04

## 2019-01-11 RX ADMIN — DOCUSATE SODIUM SCH MG: 100 CAPSULE, LIQUID FILLED ORAL at 09:49

## 2019-01-11 RX ADMIN — COLCHICINE SCH MG: 0.6 TABLET, FILM COATED ORAL at 09:51

## 2019-01-11 RX ADMIN — WARFARIN SODIUM SCH MG: 5 TABLET ORAL at 18:19

## 2019-01-11 RX ADMIN — CLOPIDOGREL BISULFATE SCH: 75 TABLET ORAL at 12:49

## 2019-01-11 RX ADMIN — PANTOPRAZOLE SODIUM SCH MG: 40 TABLET, DELAYED RELEASE ORAL at 12:49

## 2019-01-11 RX ADMIN — IPRATROPIUM BROMIDE AND ALBUTEROL SULFATE SCH ML: .5; 3 SOLUTION RESPIRATORY (INHALATION) at 16:53

## 2019-01-11 RX ADMIN — METHYLPREDNISOLONE SODIUM SUCCINATE SCH MG: 125 INJECTION, POWDER, FOR SOLUTION INTRAMUSCULAR; INTRAVENOUS at 18:19

## 2019-01-11 RX ADMIN — POTASSIUM CHLORIDE SCH MEQ: 20 TABLET, EXTENDED RELEASE ORAL at 04:58

## 2019-01-11 RX ADMIN — METHYLPREDNISOLONE SODIUM SUCCINATE SCH MG: 125 INJECTION, POWDER, FOR SOLUTION INTRAMUSCULAR; INTRAVENOUS at 12:50

## 2019-01-11 RX ADMIN — INSULIN ASPART SCH UNIT: 100 INJECTION, SOLUTION INTRAVENOUS; SUBCUTANEOUS at 21:09

## 2019-01-11 RX ADMIN — POTASSIUM CHLORIDE SCH MEQ: 20 TABLET, EXTENDED RELEASE ORAL at 03:32

## 2019-01-11 RX ADMIN — INSULIN ASPART SCH UNIT: 100 INJECTION, SOLUTION INTRAVENOUS; SUBCUTANEOUS at 18:18

## 2019-01-11 RX ADMIN — MELOXICAM SCH MG: 7.5 TABLET ORAL at 09:49

## 2019-01-11 RX ADMIN — THEOPHYLLINE ANHYDROUS SCH MG: 300 CAPSULE, EXTENDED RELEASE ORAL at 09:51

## 2019-01-11 RX ADMIN — DIVALPROEX SODIUM SCH MG: 500 TABLET, FILM COATED, EXTENDED RELEASE ORAL at 09:49

## 2019-01-11 RX ADMIN — IPRATROPIUM BROMIDE AND ALBUTEROL SULFATE PRN ML: .5; 3 SOLUTION RESPIRATORY (INHALATION) at 07:07

## 2019-01-11 RX ADMIN — DICYCLOMINE HYDROCHLORIDE SCH MG: 10 CAPSULE ORAL at 09:49

## 2019-01-11 RX ADMIN — DICYCLOMINE HYDROCHLORIDE SCH MG: 10 CAPSULE ORAL at 16:43

## 2019-01-11 RX ADMIN — MAGNESIUM SULFATE IN DEXTROSE SCH MLS/HR: 10 INJECTION, SOLUTION INTRAVENOUS at 03:32

## 2019-01-11 RX ADMIN — MAGNESIUM SULFATE IN DEXTROSE SCH MLS/HR: 10 INJECTION, SOLUTION INTRAVENOUS at 04:58

## 2019-01-11 RX ADMIN — IPRATROPIUM BROMIDE AND ALBUTEROL SULFATE PRN ML: .5; 3 SOLUTION RESPIRATORY (INHALATION) at 03:51

## 2019-01-11 RX ADMIN — METHYLPREDNISOLONE SODIUM SUCCINATE SCH MG: 125 INJECTION, POWDER, FOR SOLUTION INTRAMUSCULAR; INTRAVENOUS at 23:33

## 2019-01-11 RX ADMIN — DIVALPROEX SODIUM SCH MG: 500 TABLET, FILM COATED, EXTENDED RELEASE ORAL at 16:44

## 2019-01-11 NOTE — US
EXAMINATION TYPE: US venous doppler duplex LE 

 

DATE OF EXAM: 1/11/2019 5:43 PM

 

COMPARISON: NONE

 

CLINICAL HISTORY: DVT. Cramping and edema bilateral legs, worse on left. SOB, history of PE. Patient 
states he is on Coumadin. History of factor V. Left leg faschiotomy. Nerve repair surgery left leg 

 

SIDE PERFORMED: bilateral   

 

TECHNIQUE:  The lower extremity deep venous system is examined utilizing real time linear array sonog
tapan with graded compression, doppler sonography and color-flow sonography.

 

VESSELS IMAGED:

External Iliac Vein (EIV)

Common Femoral Vein

Deep Femoral Vein

Greater Saphenous Vein *

Femoral Vein

Popliteal Vein

Small Saphenous Vein *

Proximal Calf Veins

(* superficial vessels)

 

**Technical limitations, patient unable to tolerate exam, unable to hold still

 

Right Leg:  No evidence of DVT

 

Left Leg:  **Extreme technical limitations due to post surgical changes - large scar/indentation left
 upper and mid thigh making it difficult to evaluate vessels in this area. Unable to evaluate poplite
al vein due to patient  refusing to continue with exam. No evidence of DVT as visualized  

 

 

 

IMPRESSION:

1. No evidence of deep venous thrombosis.

2. Left leg is extremely limited given physical changes in the examination region. Please see above.
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
## 2019-01-12 LAB
ANION GAP SERPL CALC-SCNC: 14 MMOL/L
BASOPHILS # BLD AUTO: 0 K/UL (ref 0–0.2)
BASOPHILS NFR BLD AUTO: 0 %
BUN SERPL-SCNC: 16 MG/DL (ref 9–20)
CALCIUM SPEC-MCNC: 9.6 MG/DL (ref 8.4–10.2)
CHLORIDE SERPL-SCNC: 107 MMOL/L (ref 98–107)
CO2 SERPL-SCNC: 19 MMOL/L (ref 22–30)
EOSINOPHIL # BLD AUTO: 0.1 K/UL (ref 0–0.7)
EOSINOPHIL NFR BLD AUTO: 0 %
ERYTHROCYTE [DISTWIDTH] IN BLOOD BY AUTOMATED COUNT: 5.04 M/UL (ref 4.3–5.9)
ERYTHROCYTE [DISTWIDTH] IN BLOOD: 17.6 % (ref 11.5–15.5)
GLUCOSE BLD-MCNC: 164 MG/DL (ref 75–99)
GLUCOSE BLD-MCNC: 192 MG/DL (ref 75–99)
GLUCOSE BLD-MCNC: 205 MG/DL (ref 75–99)
GLUCOSE BLD-MCNC: 295 MG/DL (ref 75–99)
GLUCOSE BLD-MCNC: 306 MG/DL (ref 75–99)
GLUCOSE BLD-MCNC: 316 MG/DL (ref 75–99)
GLUCOSE BLD-MCNC: 340 MG/DL (ref 75–99)
GLUCOSE SERPL-MCNC: 296 MG/DL (ref 74–99)
HBA1C MFR BLD: 7 % (ref 4–6)
HCT VFR BLD AUTO: 39.1 % (ref 39–53)
HGB BLD-MCNC: 12.3 GM/DL (ref 13–17.5)
INR PPP: 1.1 (ref ?–1.2)
LYMPHOCYTES # SPEC AUTO: 0.5 K/UL (ref 1–4.8)
LYMPHOCYTES NFR SPEC AUTO: 3 %
MAGNESIUM SPEC-SCNC: 2 MG/DL (ref 1.6–2.3)
MCH RBC QN AUTO: 24.4 PG (ref 25–35)
MCHC RBC AUTO-ENTMCNC: 31.3 G/DL (ref 31–37)
MCV RBC AUTO: 77.7 FL (ref 80–100)
MONOCYTES # BLD AUTO: 0.5 K/UL (ref 0–1)
MONOCYTES NFR BLD AUTO: 3 %
NEUTROPHILS # BLD AUTO: 17.4 K/UL (ref 1.3–7.7)
NEUTROPHILS NFR BLD AUTO: 94 %
PLATELET # BLD AUTO: 242 K/UL (ref 150–450)
POTASSIUM SERPL-SCNC: 4.6 MMOL/L (ref 3.5–5.1)
PT BLD: 11.7 SEC (ref 9–12)
SODIUM SERPL-SCNC: 140 MMOL/L (ref 137–145)
WBC # BLD AUTO: 18.5 K/UL (ref 3.8–10.6)

## 2019-01-12 RX ADMIN — GABAPENTIN SCH MG: 400 CAPSULE ORAL at 21:54

## 2019-01-12 RX ADMIN — INSULIN HUMAN SCH MLS/HR: 100 INJECTION, SOLUTION PARENTERAL at 16:49

## 2019-01-12 RX ADMIN — HEPARIN SODIUM PRN UNIT: 5000 INJECTION, SOLUTION INTRAVENOUS; SUBCUTANEOUS at 01:04

## 2019-01-12 RX ADMIN — DOCUSATE SODIUM SCH MG: 100 CAPSULE, LIQUID FILLED ORAL at 20:22

## 2019-01-12 RX ADMIN — IPRATROPIUM BROMIDE AND ALBUTEROL SULFATE SCH: .5; 3 SOLUTION RESPIRATORY (INHALATION) at 12:23

## 2019-01-12 RX ADMIN — IPRATROPIUM BROMIDE AND ALBUTEROL SULFATE SCH: .5; 3 SOLUTION RESPIRATORY (INHALATION) at 16:20

## 2019-01-12 RX ADMIN — PANTOPRAZOLE SODIUM SCH MG: 40 TABLET, DELAYED RELEASE ORAL at 06:57

## 2019-01-12 RX ADMIN — THERA TABS SCH EACH: TAB at 08:52

## 2019-01-12 RX ADMIN — METHYLPREDNISOLONE SODIUM SUCCINATE SCH MG: 125 INJECTION, POWDER, FOR SOLUTION INTRAMUSCULAR; INTRAVENOUS at 13:57

## 2019-01-12 RX ADMIN — CLOPIDOGREL BISULFATE SCH MG: 75 TABLET ORAL at 08:51

## 2019-01-12 RX ADMIN — AMMONIUM LACTATE SCH APPLIC: 12 LOTION TOPICAL at 08:55

## 2019-01-12 RX ADMIN — PANTOPRAZOLE SODIUM SCH MG: 40 TABLET, DELAYED RELEASE ORAL at 17:59

## 2019-01-12 RX ADMIN — GABAPENTIN SCH MG: 400 CAPSULE ORAL at 08:52

## 2019-01-12 RX ADMIN — METHYLPREDNISOLONE SODIUM SUCCINATE SCH MG: 125 INJECTION, POWDER, FOR SOLUTION INTRAMUSCULAR; INTRAVENOUS at 17:58

## 2019-01-12 RX ADMIN — BUDESONIDE SCH MG: 0.5 INHALANT ORAL at 20:26

## 2019-01-12 RX ADMIN — INSULIN ASPART SCH UNIT: 100 INJECTION, SOLUTION INTRAVENOUS; SUBCUTANEOUS at 13:57

## 2019-01-12 RX ADMIN — ASPIRIN 81 MG CHEWABLE TABLET SCH MG: 81 TABLET CHEWABLE at 08:52

## 2019-01-12 RX ADMIN — ASPIRIN SCH: 325 TABLET ORAL at 08:54

## 2019-01-12 RX ADMIN — HEPARIN SODIUM SCH MLS/HR: 10000 INJECTION, SOLUTION INTRAVENOUS at 20:30

## 2019-01-12 RX ADMIN — RITONAVIR SCH MG: 100 CAPSULE ORAL at 08:54

## 2019-01-12 RX ADMIN — GABAPENTIN SCH MG: 400 CAPSULE ORAL at 17:58

## 2019-01-12 RX ADMIN — HEPARIN SODIUM PRN UNIT: 5000 INJECTION, SOLUTION INTRAVENOUS; SUBCUTANEOUS at 08:59

## 2019-01-12 RX ADMIN — INSULIN ASPART SCH UNIT: 100 INJECTION, SOLUTION INTRAVENOUS; SUBCUTANEOUS at 06:58

## 2019-01-12 RX ADMIN — COLCHICINE SCH MG: 0.6 TABLET, FILM COATED ORAL at 08:53

## 2019-01-12 RX ADMIN — THEOPHYLLINE ANHYDROUS SCH: 300 CAPSULE, EXTENDED RELEASE ORAL at 08:54

## 2019-01-12 RX ADMIN — DICYCLOMINE HYDROCHLORIDE SCH MG: 10 CAPSULE ORAL at 17:58

## 2019-01-12 RX ADMIN — WARFARIN SODIUM SCH MG: 5 TABLET ORAL at 17:59

## 2019-01-12 RX ADMIN — ATORVASTATIN CALCIUM SCH: 10 TABLET, FILM COATED ORAL at 08:52

## 2019-01-12 RX ADMIN — DICYCLOMINE HYDROCHLORIDE SCH MG: 10 CAPSULE ORAL at 08:55

## 2019-01-12 RX ADMIN — DIVALPROEX SODIUM SCH MG: 500 TABLET, FILM COATED, EXTENDED RELEASE ORAL at 08:52

## 2019-01-12 RX ADMIN — DICYCLOMINE HYDROCHLORIDE SCH MG: 10 CAPSULE ORAL at 21:53

## 2019-01-12 RX ADMIN — HEPARIN SODIUM SCH: 10000 INJECTION, SOLUTION INTRAVENOUS at 16:57

## 2019-01-12 RX ADMIN — IPRATROPIUM BROMIDE AND ALBUTEROL SULFATE SCH ML: .5; 3 SOLUTION RESPIRATORY (INHALATION) at 07:24

## 2019-01-12 RX ADMIN — MELOXICAM SCH MG: 7.5 TABLET ORAL at 08:53

## 2019-01-12 RX ADMIN — IPRATROPIUM BROMIDE AND ALBUTEROL SULFATE SCH ML: .5; 3 SOLUTION RESPIRATORY (INHALATION) at 20:26

## 2019-01-12 RX ADMIN — LORATADINE SCH MG: 10 TABLET ORAL at 08:51

## 2019-01-12 RX ADMIN — ASPIRIN SCH: 325 TABLET ORAL at 08:55

## 2019-01-12 RX ADMIN — DOCUSATE SODIUM SCH MG: 100 CAPSULE, LIQUID FILLED ORAL at 08:51

## 2019-01-12 RX ADMIN — DIVALPROEX SODIUM SCH MG: 500 TABLET, FILM COATED, EXTENDED RELEASE ORAL at 17:58

## 2019-01-12 RX ADMIN — MONTELUKAST SODIUM SCH MG: 10 TABLET, FILM COATED ORAL at 20:22

## 2019-01-12 RX ADMIN — ASPIRIN SCH: 325 TABLET ORAL at 21:51

## 2019-01-12 RX ADMIN — METHYLPREDNISOLONE SODIUM SUCCINATE SCH MG: 125 INJECTION, POWDER, FOR SOLUTION INTRAMUSCULAR; INTRAVENOUS at 06:57

## 2019-01-12 RX ADMIN — DIVALPROEX SODIUM SCH MG: 500 TABLET, FILM COATED, EXTENDED RELEASE ORAL at 21:54

## 2019-01-12 NOTE — P.PN
Subjective


Progress Note Date: 01/12/19


Principal diagnosis: 





Acute hypoxic respiratory failure requiring supplemental oxygen


Acute exacerbation of asthma and COPD, severe persistent


Hypoxia rule out PE/ subtherapeutic anticoagulation











This is a 43 year old male who presented to the emergency department 

complaining of headache and shortness of breath.  The patient states he was 

also having intermittent chest pains.  He notes a history of PE and DVT.  He 

states he taking Coumadin at home and has missed some doses.  INR is  1.1. He 

has a history of asthma since childhood.  He is on albuterol nebulized, pro-air

, Advair, spiriva and prednisone. He states his pulmonologist retired last 

month out of [place].  However he states he hardly ever uses these 

medications.   He denies coughing, fevers, chills.  He has multiple drug 

ALLERGIES.   The patient states he was also diagnosed with COPD several years 

ago.  He notes that he used to smoke 3 packs per day since the age of 13 years 

old.  He did quit 3 years ago.  He also has a history of obstructive sleep 

apnea and is supposed to wear CPAP.  On examination he is resting up in bed on 

3 L of supplemental oxygen via nasal cannula.  The patient denies any home 

oxygen use.





01/12/2019


Patient is seen and evaluated in the room at bedside; patient relates that he 

is not improving and wants to be transferred to [facility] 

under care of his primary care physician Dr. Wiley; patient has been refusing 

CTA chest to rule out PE; remains on IV heparin drip for possible PE and 

bridged to Coumadin; I personally called and spoke to patient's primary care 

physician who is agreeable to accept the patient in transfer; case management 

relates receiving is not going to be able to except patient today due to 

multiple patient hold in the emergency department and also patient will need 

prior authorization from insurance prior to transfer; case management is 

working with the patient to make transfer possible





Objective





- Vital Signs


Vital signs: 


 Vital Signs











Temp  97.8 F   01/12/19 08:00


 


Pulse  94   01/12/19 08:00


 


Resp  22   01/12/19 08:00


 


BP  128/72   01/12/19 08:00


 


Pulse Ox  97   01/12/19 08:00








 Intake & Output











 01/11/19 01/12/19 01/12/19





 18:59 06:59 18:59


 


Intake Total 350 1166.167 99.513


 


Balance 350 1166.167 99.513


 


Weight  91.4 kg 


 


Intake:   


 


    


 


    Magnesium Sulfate-D5w Pmx 100  





    1 gm In Dextrose/Water 1   





    100ml.bag @ 100 mls/hr   





    IVPB Q1H LEEANNE Rx#:   





    [id number]   


 


  Intake, IV Titration  71.167 99.513





  Amount   


 


    Heparin Sod,Pork in 0.45%  71.167 99.513





    NaCl 25,000 unit In 0.45   





    % NaCl 1 250ml.bag @ 11.3   





    UNITS/KG/HR 10 mls/hr IV   





    .Q24H LEEANNE Rx#:[id number]   


 


  Oral 250 1095 


 


Other:   


 


  Voiding Method Toilet Toilet 


 


  # Voids 3 1 


 


  # Bowel Movements 2  














- Exam


- Constitutional


General appearance: Present: average body habitus, cooperative, no acute 

distress


- EENT


Eyes: Present: anicteric sclerae, EOMI, PERRLA, normal appearance


ENT: Present: hearing grossly normal, normal oropharynx


Ears: bilateral: normal


- Neck


Neck: Present: normal ROM.  Absent: lymphadenopathy, rigidity, thyromegaly


Carotids: negative: bruit present


Thyroid: bilateral: normal size, negative: enlarged, nodule


- Respiratory


Respiratory: bilateral: CTA, negative: rales, rhonchi, wheezing


- Cardiovascular


Rhythm: regular


Heart sounds: normal: S1, S2


Abnormal Heart Sounds: Absent: systolic murmur, diastolic murmur


- Gastrointestinal


General gastrointestinal: Present: normal bowel sounds, soft.  Absent: distended

, organomegaly, tenderness


- Genitourinary


Genitourinary Comment(s): deferred


- Integumentary


Integumentary: Present: normal turgor.  Absent: jaundiced, rash, ulcer


- Neurologic


Neurologic: Present: CNII-XII intact.  Absent: focal deficits


- Musculoskeletal


Musculoskeletal: Present: gait normal, strength equal bilaterally


- Psychiatric


Psychiatric: Present: A&O x's 3, appropriate affect, intact judgment & insight











- Labs


CBC & Chem 7: 


 01/12/19 06:43





 01/12/19 06:43


Labs: 


 Abnormal Lab Results - Last 24 Hours (Table)











  01/11/19 01/11/19 01/12/19 Range/Units





  18:04 20:53 06:32 


 


WBC     (3.8-10.6)  k/uL


 


Hgb     (13.0-17.5)  gm/dL


 


MCV     (80.0-100.0)  fL


 


MCH     (25.0-35.0)  pg


 


RDW     (11.5-15.5)  %


 


Neutrophils #     (1.3-7.7)  k/uL


 


Lymphocytes #     (1.0-4.8)  k/uL


 


APTT     (22.0-30.0)  sec


 


Carbon Dioxide     (22-30)  mmol/L


 


Glucose     (74-99)  mg/dL


 


POC Glucose (mg/dL)  200 H  219 H  316 H  (75-99)  mg/dL














  01/12/19 01/12/19 01/12/19 Range/Units





  06:43 06:43 06:43 


 


WBC   18.5 H   (3.8-10.6)  k/uL


 


Hgb   12.3 L   (13.0-17.5)  gm/dL


 


MCV   77.7 L   (80.0-100.0)  fL


 


MCH   24.4 L   (25.0-35.0)  pg


 


RDW   17.6 H   (11.5-15.5)  %


 


Neutrophils #   17.4 H   (1.3-7.7)  k/uL


 


Lymphocytes #   0.5 L   (1.0-4.8)  k/uL


 


APTT    46.1 H  (22.0-30.0)  sec


 


Carbon Dioxide  19 L    (22-30)  mmol/L


 


Glucose  296 H    (74-99)  mg/dL


 


POC Glucose (mg/dL)     (75-99)  mg/dL














  01/12/19 Range/Units





  11:29 


 


WBC   (3.8-10.6)  k/uL


 


Hgb   (13.0-17.5)  gm/dL


 


MCV   (80.0-100.0)  fL


 


MCH   (25.0-35.0)  pg


 


RDW   (11.5-15.5)  %


 


Neutrophils #   (1.3-7.7)  k/uL


 


Lymphocytes #   (1.0-4.8)  k/uL


 


APTT   (22.0-30.0)  sec


 


Carbon Dioxide   (22-30)  mmol/L


 


Glucose   (74-99)  mg/dL


 


POC Glucose (mg/dL)  295 H  (75-99)  mg/dL














Assessment and Plan


Assessment: 


1.  Acute exacerbation COPD/ asthma


- Patient started on bronchodilator therapy as nebulizer


- IV Solu-Medrol continue at current dose and tapered to oral prednisone once 

clinically stable





2.  Electrolyte imbalance/hypokalemia


- Supplemented with oral potassium and monitor electrolytes closely





3.  History of DVT/PE


- Anticoagulation therapy with Coumadin; subtherapeutic secondary to 

noncompliance


- We will start patient on IV heparin drip as a bridge to Coumadin therapy; we 

will discontinue IV heparin once INR is therapeutic


- Restart home dose of Coumadin and monitor daily PT/INR


- Patient continues to refuse CTA chest recommended by pulmonary service





4.  Hypertension 





5.  Seizure disorder





6.  Sleep apnea/CPAP





7.  DVT prophylaxis; systemic anticoagulation with IV heparin





For all above mentioned chronic medical conditions home medications will be 

resumed





CODE STATUS; DO NOT RESUSCITATE


Time with Patient: Greater than 30

## 2019-01-12 NOTE — PN
PROGRESS NOTE



DATE OF SERVICE:

01/12/2019



He has been hemodynamically stable.  He refused his V/Q scan and lower extremity

duplex.  He continues to have some wheezing.



On physical examination, blood pressure 128/72, respiratory rate of 22, pulse of 94,

temperature 97.8, O2 SAT on room air is 97%.

HEENT is unremarkable.  Chest reveals stridorous sounds with decreased breath sounds.

Cardiovascular system reveals an S1, S2.  Abdomen is soft.  There is no edema.



Patient's labs were reviewed and meds were reviewed.



IMPRESSION:

1. Severe asthma with acute exacerbation.

2. History of deep venous thrombosis, pulmonary embolism, and factor 5 [place] for

    which his anticoagulation will need to be optimized on Coumadin.

3. HIV and history of hepatitis B, continue antiviral treatment.

Check an allergy profile.  Consider ENT evaluation for possible vocal cord dysfunction.

Will follow him closely during his hospital stay to follow his condition.





MMODL / JOANNAN: [id number] / Job#: [id number]

## 2019-01-13 VITALS — SYSTOLIC BLOOD PRESSURE: 128 MMHG | TEMPERATURE: 97.8 F | DIASTOLIC BLOOD PRESSURE: 80 MMHG | RESPIRATION RATE: 20 BRPM

## 2019-01-13 VITALS — HEART RATE: 84 BPM

## 2019-01-13 LAB
ANION GAP SERPL CALC-SCNC: 8 MMOL/L
APTT BLD: 77.2 SEC (ref 22–30)
BASOPHILS # BLD AUTO: 0 K/UL (ref 0–0.2)
BASOPHILS NFR BLD AUTO: 0 %
BUN SERPL-SCNC: 20 MG/DL (ref 9–20)
CALCIUM SPEC-MCNC: 9.9 MG/DL (ref 8.4–10.2)
CHLORIDE SERPL-SCNC: 120 MMOL/L (ref 98–107)
CO2 SERPL-SCNC: 18 MMOL/L (ref 22–30)
EOSINOPHIL # BLD AUTO: 0.1 K/UL (ref 0–0.7)
EOSINOPHIL NFR BLD AUTO: 1 %
ERYTHROCYTE [DISTWIDTH] IN BLOOD BY AUTOMATED COUNT: 4.39 M/UL (ref 4.3–5.9)
ERYTHROCYTE [DISTWIDTH] IN BLOOD: 17.6 % (ref 11.5–15.5)
GLUCOSE BLD-MCNC: 206 MG/DL (ref 75–99)
GLUCOSE BLD-MCNC: 225 MG/DL (ref 75–99)
GLUCOSE BLD-MCNC: 259 MG/DL (ref 75–99)
GLUCOSE BLD-MCNC: 280 MG/DL (ref 75–99)
GLUCOSE BLD-MCNC: 296 MG/DL (ref 75–99)
GLUCOSE BLD-MCNC: 312 MG/DL (ref 75–99)
GLUCOSE BLD-MCNC: 318 MG/DL (ref 75–99)
GLUCOSE SERPL-MCNC: 235 MG/DL (ref 74–99)
HCT VFR BLD AUTO: 35.2 % (ref 39–53)
HGB BLD-MCNC: 10.6 GM/DL (ref 13–17.5)
INR PPP: 1.6 (ref ?–1.2)
LYMPHOCYTES # SPEC AUTO: 0.3 K/UL (ref 1–4.8)
LYMPHOCYTES NFR SPEC AUTO: 3 %
MCH RBC QN AUTO: 24.2 PG (ref 25–35)
MCHC RBC AUTO-ENTMCNC: 30.2 G/DL (ref 31–37)
MCV RBC AUTO: 80.2 FL (ref 80–100)
MONOCYTES # BLD AUTO: 0.4 K/UL (ref 0–1)
MONOCYTES NFR BLD AUTO: 3 %
NEUTROPHILS # BLD AUTO: 11.6 K/UL (ref 1.3–7.7)
NEUTROPHILS NFR BLD AUTO: 94 %
PLATELET # BLD AUTO: 218 K/UL (ref 150–450)
POTASSIUM SERPL-SCNC: 4.4 MMOL/L (ref 3.5–5.1)
PT BLD: 15.8 SEC (ref 9–12)
SODIUM SERPL-SCNC: 146 MMOL/L (ref 137–145)
WBC # BLD AUTO: 12.4 K/UL (ref 3.8–10.6)

## 2019-01-13 RX ADMIN — MELOXICAM SCH MG: 7.5 TABLET ORAL at 08:42

## 2019-01-13 RX ADMIN — PANTOPRAZOLE SODIUM SCH MG: 40 TABLET, DELAYED RELEASE ORAL at 06:09

## 2019-01-13 RX ADMIN — ASPIRIN SCH: 325 TABLET ORAL at 08:43

## 2019-01-13 RX ADMIN — ATORVASTATIN CALCIUM SCH MG: 10 TABLET, FILM COATED ORAL at 08:43

## 2019-01-13 RX ADMIN — BUDESONIDE SCH: 0.5 INHALANT ORAL at 08:31

## 2019-01-13 RX ADMIN — ASPIRIN 81 MG CHEWABLE TABLET SCH MG: 81 TABLET CHEWABLE at 08:43

## 2019-01-13 RX ADMIN — IPRATROPIUM BROMIDE AND ALBUTEROL SULFATE SCH: .5; 3 SOLUTION RESPIRATORY (INHALATION) at 08:31

## 2019-01-13 RX ADMIN — ASPIRIN SCH: 325 TABLET ORAL at 08:42

## 2019-01-13 RX ADMIN — IPRATROPIUM BROMIDE AND ALBUTEROL SULFATE SCH ML: .5; 3 SOLUTION RESPIRATORY (INHALATION) at 12:05

## 2019-01-13 RX ADMIN — THEOPHYLLINE ANHYDROUS SCH MG: 300 CAPSULE, EXTENDED RELEASE ORAL at 08:42

## 2019-01-13 RX ADMIN — METHYLPREDNISOLONE SODIUM SUCCINATE SCH MG: 125 INJECTION, POWDER, FOR SOLUTION INTRAMUSCULAR; INTRAVENOUS at 00:10

## 2019-01-13 RX ADMIN — INSULIN HUMAN SCH MLS/HR: 100 INJECTION, SOLUTION PARENTERAL at 03:38

## 2019-01-13 RX ADMIN — DOCUSATE SODIUM SCH MG: 100 CAPSULE, LIQUID FILLED ORAL at 08:43

## 2019-01-13 RX ADMIN — AMMONIUM LACTATE SCH APPLIC: 12 LOTION TOPICAL at 08:42

## 2019-01-13 RX ADMIN — COLCHICINE SCH MG: 0.6 TABLET, FILM COATED ORAL at 08:43

## 2019-01-13 RX ADMIN — THERA TABS SCH EACH: TAB at 08:44

## 2019-01-13 RX ADMIN — LORATADINE SCH MG: 10 TABLET ORAL at 08:42

## 2019-01-13 RX ADMIN — DICYCLOMINE HYDROCHLORIDE SCH MG: 10 CAPSULE ORAL at 08:42

## 2019-01-13 RX ADMIN — GABAPENTIN SCH MG: 400 CAPSULE ORAL at 08:42

## 2019-01-13 RX ADMIN — CLOPIDOGREL BISULFATE SCH MG: 75 TABLET ORAL at 08:43

## 2019-01-13 RX ADMIN — METHYLPREDNISOLONE SODIUM SUCCINATE SCH MG: 125 INJECTION, POWDER, FOR SOLUTION INTRAMUSCULAR; INTRAVENOUS at 06:05

## 2019-01-13 RX ADMIN — RITONAVIR SCH MG: 100 CAPSULE ORAL at 08:42

## 2019-01-13 RX ADMIN — DIVALPROEX SODIUM SCH MG: 500 TABLET, FILM COATED, EXTENDED RELEASE ORAL at 08:42

## 2019-01-13 NOTE — P.PN
Subjective


Progress Note Date: 01/13/19


Principal diagnosis: 





Acute hypoxic respiratory failure requiring supplemental oxygen


Acute exacerbation of asthma and COPD, severe persistent


Hypoxia rule out PE/ subtherapeutic anticoagulation











This is a 43 year old male who presented to the emergency department 

complaining of headache and shortness of breath.  The patient states he was 

also having intermittent chest pains.  He notes a history of PE and DVT.  He 

states he taking Coumadin at home and has missed some doses.  INR is  1.1. He 

has a history of asthma since childhood.  He is on albuterol nebulized, pro-air

, Advair, spiriva and prednisone. He states his pulmonologist retired last 

month out of [place].  However he states he hardly ever uses these 

medications.   He denies coughing, fevers, chills.  He has multiple drug 

ALLERGIES.   The patient states he was also diagnosed with COPD several years 

ago.  He notes that he used to smoke 3 packs per day since the age of 13 years 

old.  He did quit 3 years ago.  He also has a history of obstructive sleep 

apnea and is supposed to wear CPAP.  On examination he is resting up in bed on 

3 L of supplemental oxygen via nasal cannula.  The patient denies any home 

oxygen use.





01/12/2019


Patient is seen and evaluated in the room at bedside; patient relates that he 

is not improving and wants to be transferred to [facility] 

under care of his primary care physician Dr. Wiley; patient has been refusing 

CTA chest to rule out PE; remains on IV heparin drip for possible PE and 

bridged to Coumadin; I personally called and spoke to patient's primary care 

physician who is agreeable to accept the patient in transfer; case management 

relates receiving is not going to be able to except patient today due to 

multiple patient hold in the emergency department and also patient will need 

prior authorization from insurance prior to transfer; case management is 

working with the patient to make transfer possible





01/13/2019


Patient is seen and evaluated in the room with nursing staff at bedside; 

patient demanding to have scheduled IV Dilaudid every 6 hours since oral 

Dilaudid as an effective; patient has had episodes of increased somnolence and 

decreased responsiveness which has improved with discontinuing IV Dilaudid; 

patient became verbally abusive and Dilaudid once refused IV Dilaudid; patient'

s INR is improved to 1.6 this morning and plan is to discontinue IV heparin in 

next 24-48 hours once INR is therapeutic


Patient's breathing is improved; we will plan to start Solu-Medrol taper to 40 

mg IV every 12 hours


Patient may be discharged home in next 24 hours once INR is therapeutic





Objective





- Vital Signs


Vital signs: 


 Vital Signs











Temp  97.7 F   01/13/19 08:40


 


Pulse  89   01/13/19 08:40


 


Resp  17   01/13/19 08:40


 


BP  141/74   01/13/19 08:40


 


Pulse Ox  97   01/13/19 08:40








 Intake & Output











 01/12/19 01/13/19 01/13/19





 18:59 06:59 18:59


 


Intake Total 176.827 7940.829 142.837


 


Balance 304.714 8808.829 142.837


 


Weight  93.5 kg 


 


Intake:   


 


  IV  220 


 


    normal saline  220 


 


  Intake, IV Titration 123.846 141.829 142.837





  Amount   


 


    Heparin Sod,Pork in 0.45% 99.513 79.32 142.837





    NaCl 25,000 unit In 0.45   





    % NaCl 1 250ml.bag @ 11.3   





    UNITS/KG/HR 10 mls/hr IV   





    .Q24H LEEANNE Rx#:[id number]   


 


    Insulin Regular 100 unit 24.333 62.509 





    In Sodium Chloride 0.9%   





    100 ml @ Titrate IV .Q0M   





    LEEANNE Rx#:[id number]   


 


  Oral 240 1000 


 


Other:   


 


  Voiding Method  Toilet 


 


  # Voids 1 2 














- Exam


- Constitutional


General appearance: Present: average body habitus, cooperative, no acute 

distress


- EENT


Eyes: Present: anicteric sclerae, EOMI, PERRLA, normal appearance


ENT: Present: hearing grossly normal, normal oropharynx


Ears: bilateral: normal


- Neck


Neck: Present: normal ROM.  Absent: lymphadenopathy, rigidity, thyromegaly


Carotids: negative: bruit present


Thyroid: bilateral: normal size, negative: enlarged, nodule


- Respiratory


Respiratory: bilateral: CTA, negative: rales, rhonchi, wheezing


- Cardiovascular


Rhythm: regular


Heart sounds: normal: S1, S2


Abnormal Heart Sounds: Absent: systolic murmur, diastolic murmur


- Gastrointestinal


General gastrointestinal: Present: normal bowel sounds, soft.  Absent: distended

, organomegaly, tenderness


- Genitourinary


Genitourinary Comment(s): deferred


- Integumentary


Integumentary: Present: normal turgor.  Absent: jaundiced, rash, ulcer


- Neurologic


Neurologic: Present: CNII-XII intact.  Absent: focal deficits


- Musculoskeletal


Musculoskeletal: Present: gait normal, strength equal bilaterally


- Psychiatric


Psychiatric: Present: A&O x's 3, appropriate affect, intact judgment & insight











- Labs


CBC & Chem 7: 


 01/13/19 06:09





 01/13/19 06:09


Labs: 


 Abnormal Lab Results - Last 24 Hours (Table)











  01/12/19 01/12/19 01/12/19 Range/Units





  06:43 11:29 14:09 


 


WBC     (3.8-10.6)  k/uL


 


Hgb     (13.0-17.5)  gm/dL


 


Hct     (39.0-53.0)  %


 


MCH     (25.0-35.0)  pg


 


MCHC     (31.0-37.0)  g/dL


 


RDW     (11.5-15.5)  %


 


Neutrophils #     (1.3-7.7)  k/uL


 


Lymphocytes #     (1.0-4.8)  k/uL


 


PT     (9.0-12.0)  sec


 


INR     (<1.2)  


 


APTT    113.1 H*  (22.0-30.0)  sec


 


Sodium     (137-145)  mmol/L


 


Chloride     ()  mmol/L


 


Carbon Dioxide     (22-30)  mmol/L


 


Glucose     (74-99)  mg/dL


 


POC Glucose (mg/dL)   295 H   (75-99)  mg/dL


 


Hemoglobin A1c  7.0 H    (4.0-6.0)  %














  01/12/19 01/12/19 01/12/19 Range/Units





  15:31 17:13 17:30 


 


WBC     (3.8-10.6)  k/uL


 


Hgb     (13.0-17.5)  gm/dL


 


Hct     (39.0-53.0)  %


 


MCH     (25.0-35.0)  pg


 


MCHC     (31.0-37.0)  g/dL


 


RDW     (11.5-15.5)  %


 


Neutrophils #     (1.3-7.7)  k/uL


 


Lymphocytes #     (1.0-4.8)  k/uL


 


PT     (9.0-12.0)  sec


 


INR     (<1.2)  


 


APTT    59.7 H  (22.0-30.0)  sec


 


Sodium     (137-145)  mmol/L


 


Chloride     ()  mmol/L


 


Carbon Dioxide     (22-30)  mmol/L


 


Glucose     (74-99)  mg/dL


 


POC Glucose (mg/dL)  340 H  306 H   (75-99)  mg/dL


 


Hemoglobin A1c     (4.0-6.0)  %














  01/12/19 01/12/19 01/12/19 Range/Units





  17:56 19:58 21:59 


 


WBC     (3.8-10.6)  k/uL


 


Hgb     (13.0-17.5)  gm/dL


 


Hct     (39.0-53.0)  %


 


MCH     (25.0-35.0)  pg


 


MCHC     (31.0-37.0)  g/dL


 


RDW     (11.5-15.5)  %


 


Neutrophils #     (1.3-7.7)  k/uL


 


Lymphocytes #     (1.0-4.8)  k/uL


 


PT     (9.0-12.0)  sec


 


INR     (<1.2)  


 


APTT     (22.0-30.0)  sec


 


Sodium     (137-145)  mmol/L


 


Chloride     ()  mmol/L


 


Carbon Dioxide     (22-30)  mmol/L


 


Glucose     (74-99)  mg/dL


 


POC Glucose (mg/dL)  205 H  192 H  164 H  (75-99)  mg/dL


 


Hemoglobin A1c     (4.0-6.0)  %














  01/12/19 01/13/19 01/13/19 Range/Units





  23:58 02:03 04:05 


 


WBC     (3.8-10.6)  k/uL


 


Hgb     (13.0-17.5)  gm/dL


 


Hct     (39.0-53.0)  %


 


MCH     (25.0-35.0)  pg


 


MCHC     (31.0-37.0)  g/dL


 


RDW     (11.5-15.5)  %


 


Neutrophils #     (1.3-7.7)  k/uL


 


Lymphocytes #     (1.0-4.8)  k/uL


 


PT     (9.0-12.0)  sec


 


INR     (<1.2)  


 


APTT     (22.0-30.0)  sec


 


Sodium     (137-145)  mmol/L


 


Chloride     ()  mmol/L


 


Carbon Dioxide     (22-30)  mmol/L


 


Glucose     (74-99)  mg/dL


 


POC Glucose (mg/dL)  259 H  296 H  318 H  (75-99)  mg/dL


 


Hemoglobin A1c     (4.0-6.0)  %














  01/13/19 01/13/19 01/13/19 Range/Units





  05:59 06:09 06:09 


 


WBC   12.4 H   (3.8-10.6)  k/uL


 


Hgb   10.6 L   (13.0-17.5)  gm/dL


 


Hct   35.2 L   (39.0-53.0)  %


 


MCH   24.2 L   (25.0-35.0)  pg


 


MCHC   30.2 L   (31.0-37.0)  g/dL


 


RDW   17.6 H   (11.5-15.5)  %


 


Neutrophils #   11.6 H   (1.3-7.7)  k/uL


 


Lymphocytes #   0.3 L   (1.0-4.8)  k/uL


 


PT    15.8 H  (9.0-12.0)  sec


 


INR    1.6 H  (<1.2)  


 


APTT    77.2 H  (22.0-30.0)  sec


 


Sodium     (137-145)  mmol/L


 


Chloride     ()  mmol/L


 


Carbon Dioxide     (22-30)  mmol/L


 


Glucose     (74-99)  mg/dL


 


POC Glucose (mg/dL)  225 H    (75-99)  mg/dL


 


Hemoglobin A1c     (4.0-6.0)  %














  01/13/19 01/13/19 Range/Units





  06:09 08:01 


 


WBC    (3.8-10.6)  k/uL


 


Hgb    (13.0-17.5)  gm/dL


 


Hct    (39.0-53.0)  %


 


MCH    (25.0-35.0)  pg


 


MCHC    (31.0-37.0)  g/dL


 


RDW    (11.5-15.5)  %


 


Neutrophils #    (1.3-7.7)  k/uL


 


Lymphocytes #    (1.0-4.8)  k/uL


 


PT    (9.0-12.0)  sec


 


INR    (<1.2)  


 


APTT    (22.0-30.0)  sec


 


Sodium  146 H   (137-145)  mmol/L


 


Chloride  120 H   ()  mmol/L


 


Carbon Dioxide  18 L   (22-30)  mmol/L


 


Glucose  235 H   (74-99)  mg/dL


 


POC Glucose (mg/dL)   206 H  (75-99)  mg/dL


 


Hemoglobin A1c    (4.0-6.0)  %














Assessment and Plan


Assessment: 


1.  Acute exacerbation COPD/ asthma


- Patient started on bronchodilator therapy as nebulizer


- IV Solu-Medrol continue at current dose and tapered to oral prednisone once 

clinically stable





2.  Electrolyte imbalance/hypokalemia


- Supplemented with oral potassium and monitor electrolytes closely





3.  History of DVT/PE


- Anticoagulation therapy with Coumadin; subtherapeutic secondary to 

noncompliance


- We will start patient on IV heparin drip as a bridge to Coumadin therapy; we 

will discontinue IV heparin once INR is therapeutic


- Restart home dose of Coumadin and monitor daily PT/INR


- Patient continues to refuse CTA chest recommended by pulmonary service





4.  Hypertension 





5.  Seizure disorder





6.  Sleep apnea/CPAP





7.  DVT prophylaxis; systemic anticoagulation with IV heparin





For all above mentioned chronic medical conditions home medications will be 

resumed





CODE STATUS; DO NOT RESUSCITATE


Time with Patient: Greater than 30

## 2019-01-13 NOTE — PN
PROGRESS NOTE



DATE OF SERVICE:

01/13/2019





He continues to have some shortness of breath, but seems to be doing somewhat better

overall.



On physical examination, vital signs are stable.  He is afebrile.  Chest reveals

decreased breath sounds with an expiratory wheeze.  Cardiovascular system reveals an

S1, S2.  Abdomen is soft.  There is no edema.



IMPRESSION:

1. Severe asthma with acute exacerbation.

2. Possible vocal cord dysfunction.

3. HIV positive.

4. Hepatitis B.

Continue IV steroids, bronchodilators.  Increase his activity levels.





MMODL / IJN: [id number] / Job#: [id number]

## 2019-01-15 LAB — A1AT SERPL-MCNC: 107 MG/DL (ref 90–200)

## 2019-03-04 ENCOUNTER — HOSPITAL ENCOUNTER (INPATIENT)
Dept: HOSPITAL 47 - EC | Age: 44
LOS: 4 days | Discharge: HOME HEALTH | DRG: 202 | End: 2019-03-08
Attending: HOSPITALIST | Admitting: HOSPITALIST
Payer: COMMERCIAL

## 2019-03-04 VITALS — BODY MASS INDEX: 32.1 KG/M2

## 2019-03-04 DIAGNOSIS — Z79.82: ICD-10-CM

## 2019-03-04 DIAGNOSIS — G40.909: ICD-10-CM

## 2019-03-04 DIAGNOSIS — M54.9: ICD-10-CM

## 2019-03-04 DIAGNOSIS — I25.2: ICD-10-CM

## 2019-03-04 DIAGNOSIS — Z91.041: ICD-10-CM

## 2019-03-04 DIAGNOSIS — D50.9: ICD-10-CM

## 2019-03-04 DIAGNOSIS — Z86.718: ICD-10-CM

## 2019-03-04 DIAGNOSIS — I50.9: ICD-10-CM

## 2019-03-04 DIAGNOSIS — Z79.51: ICD-10-CM

## 2019-03-04 DIAGNOSIS — Z87.891: ICD-10-CM

## 2019-03-04 DIAGNOSIS — F41.9: ICD-10-CM

## 2019-03-04 DIAGNOSIS — I48.91: ICD-10-CM

## 2019-03-04 DIAGNOSIS — M94.0: ICD-10-CM

## 2019-03-04 DIAGNOSIS — Z86.711: ICD-10-CM

## 2019-03-04 DIAGNOSIS — I69.954: ICD-10-CM

## 2019-03-04 DIAGNOSIS — Z88.5: ICD-10-CM

## 2019-03-04 DIAGNOSIS — Z79.01: ICD-10-CM

## 2019-03-04 DIAGNOSIS — Z92.3: ICD-10-CM

## 2019-03-04 DIAGNOSIS — Z91.19: ICD-10-CM

## 2019-03-04 DIAGNOSIS — Z79.02: ICD-10-CM

## 2019-03-04 DIAGNOSIS — G47.33: ICD-10-CM

## 2019-03-04 DIAGNOSIS — D68.2: ICD-10-CM

## 2019-03-04 DIAGNOSIS — B18.1: ICD-10-CM

## 2019-03-04 DIAGNOSIS — Z84.89: ICD-10-CM

## 2019-03-04 DIAGNOSIS — J44.1: ICD-10-CM

## 2019-03-04 DIAGNOSIS — Z80.3: ICD-10-CM

## 2019-03-04 DIAGNOSIS — J98.11: ICD-10-CM
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
## 2019-03-04 DIAGNOSIS — R79.1: ICD-10-CM

## 2019-03-04 DIAGNOSIS — Z80.1: ICD-10-CM

## 2019-03-04 DIAGNOSIS — Z90.49: ICD-10-CM

## 2019-03-04 DIAGNOSIS — Z88.2: ICD-10-CM

## 2019-03-04 DIAGNOSIS — I25.10: ICD-10-CM

## 2019-03-04 DIAGNOSIS — D72.829: ICD-10-CM

## 2019-03-04 DIAGNOSIS — E11.65: ICD-10-CM

## 2019-03-04 DIAGNOSIS — J45.51: Primary | ICD-10-CM

## 2019-03-04 DIAGNOSIS — Z79.899: ICD-10-CM

## 2019-03-04 DIAGNOSIS — Z82.49: ICD-10-CM

## 2019-03-04 DIAGNOSIS — Z85.9: ICD-10-CM

## 2019-03-04 DIAGNOSIS — I11.0: ICD-10-CM

## 2019-03-04 DIAGNOSIS — R26.9: ICD-10-CM

## 2019-03-04 DIAGNOSIS — T38.0X5A: ICD-10-CM

## 2019-03-04 DIAGNOSIS — Z88.8: ICD-10-CM

## 2019-03-04 DIAGNOSIS — Z92.21: ICD-10-CM

## 2019-03-04 LAB
ALBUMIN SERPL-MCNC: 3.8 G/DL (ref 3.5–5)
ALP SERPL-CCNC: 89 U/L (ref 38–126)
ALT SERPL-CCNC: 35 U/L (ref 21–72)
AMYLASE SERPL-CCNC: 70 U/L (ref 30–110)
ANION GAP SERPL CALC-SCNC: 9 MMOL/L
APTT BLD: 16.8 SEC (ref 22–30)
AST SERPL-CCNC: 22 U/L (ref 17–59)
BASOPHILS # BLD AUTO: 0 K/UL (ref 0–0.2)
BASOPHILS NFR BLD AUTO: 0 %
BUN SERPL-SCNC: 22 MG/DL (ref 9–20)
CALCIUM SPEC-MCNC: 9.4 MG/DL (ref 8.4–10.2)
CHLORIDE SERPL-SCNC: 104 MMOL/L (ref 98–107)
CO2 SERPL-SCNC: 23 MMOL/L (ref 22–30)
EOSINOPHIL # BLD AUTO: 0.1 K/UL (ref 0–0.7)
EOSINOPHIL NFR BLD AUTO: 1 %
ERYTHROCYTE [DISTWIDTH] IN BLOOD BY AUTOMATED COUNT: 5.03 M/UL (ref 4.3–5.9)
ERYTHROCYTE [DISTWIDTH] IN BLOOD: 17.4 % (ref 11.5–15.5)
GLUCOSE SERPL-MCNC: 390 MG/DL (ref 74–99)
HCT VFR BLD AUTO: 38.6 % (ref 39–53)
HGB BLD-MCNC: 12.1 GM/DL (ref 13–17.5)
INR PPP: 1 (ref ?–1.2)
LIPASE SERPL-CCNC: 408 U/L (ref 23–300)
LYMPHOCYTES # SPEC AUTO: 0.7 K/UL (ref 1–4.8)
LYMPHOCYTES NFR SPEC AUTO: 11 %
MAGNESIUM SPEC-SCNC: 1.5 MG/DL (ref 1.6–2.3)
MCH RBC QN AUTO: 24 PG (ref 25–35)
MCHC RBC AUTO-ENTMCNC: 31.3 G/DL (ref 31–37)
MCV RBC AUTO: 76.7 FL (ref 80–100)
MONOCYTES # BLD AUTO: 0.2 K/UL (ref 0–1)
MONOCYTES NFR BLD AUTO: 2 %
NEUTROPHILS # BLD AUTO: 5.9 K/UL (ref 1.3–7.7)
NEUTROPHILS NFR BLD AUTO: 85 %
PLATELET # BLD AUTO: 267 K/UL (ref 150–450)
POTASSIUM SERPL-SCNC: 4.7 MMOL/L (ref 3.5–5.1)
PROT SERPL-MCNC: 6.2 G/DL (ref 6.3–8.2)
PT BLD: 10.9 SEC (ref 9–12)
SODIUM SERPL-SCNC: 136 MMOL/L (ref 137–145)
WBC # BLD AUTO: 7 K/UL (ref 3.8–10.6)

## 2019-03-04 PROCEDURE — 36415 COLL VENOUS BLD VENIPUNCTURE: CPT

## 2019-03-04 PROCEDURE — 85730 THROMBOPLASTIN TIME PARTIAL: CPT

## 2019-03-04 PROCEDURE — 93005 ELECTROCARDIOGRAM TRACING: CPT

## 2019-03-04 PROCEDURE — 71250 CT THORAX DX C-: CPT

## 2019-03-04 PROCEDURE — 94760 N-INVAS EAR/PLS OXIMETRY 1: CPT

## 2019-03-04 PROCEDURE — 86803 HEPATITIS C AB TEST: CPT

## 2019-03-04 PROCEDURE — 87517 HEPATITIS B DNA QUANT: CPT

## 2019-03-04 PROCEDURE — 83735 ASSAY OF MAGNESIUM: CPT

## 2019-03-04 PROCEDURE — 80053 COMPREHEN METABOLIC PANEL: CPT

## 2019-03-04 PROCEDURE — 70450 CT HEAD/BRAIN W/O DYE: CPT

## 2019-03-04 PROCEDURE — 74176 CT ABD & PELVIS W/O CONTRAST: CPT

## 2019-03-04 PROCEDURE — 94640 AIRWAY INHALATION TREATMENT: CPT

## 2019-03-04 PROCEDURE — 71046 X-RAY EXAM CHEST 2 VIEWS: CPT

## 2019-03-04 PROCEDURE — 96375 TX/PRO/DX INJ NEW DRUG ADDON: CPT

## 2019-03-04 PROCEDURE — 85025 COMPLETE CBC W/AUTO DIFF WBC: CPT

## 2019-03-04 PROCEDURE — 87040 BLOOD CULTURE FOR BACTERIA: CPT

## 2019-03-04 PROCEDURE — 83036 HEMOGLOBIN GLYCOSYLATED A1C: CPT

## 2019-03-04 PROCEDURE — 83690 ASSAY OF LIPASE: CPT

## 2019-03-04 PROCEDURE — 81003 URINALYSIS AUTO W/O SCOPE: CPT

## 2019-03-04 PROCEDURE — 86704 HEP B CORE ANTIBODY TOTAL: CPT

## 2019-03-04 PROCEDURE — 87350 HEPATITIS BE AG IA: CPT

## 2019-03-04 PROCEDURE — 76937 US GUIDE VASCULAR ACCESS: CPT

## 2019-03-04 PROCEDURE — 86706 HEP B SURFACE ANTIBODY: CPT

## 2019-03-04 PROCEDURE — 96361 HYDRATE IV INFUSION ADD-ON: CPT

## 2019-03-04 PROCEDURE — 83880 ASSAY OF NATRIURETIC PEPTIDE: CPT

## 2019-03-04 PROCEDURE — 85610 PROTHROMBIN TIME: CPT

## 2019-03-04 PROCEDURE — 82150 ASSAY OF AMYLASE: CPT

## 2019-03-04 PROCEDURE — 96374 THER/PROPH/DIAG INJ IV PUSH: CPT

## 2019-03-04 PROCEDURE — 36410 VNPNXR 3YR/> PHY/QHP DX/THER: CPT

## 2019-03-04 PROCEDURE — 87340 HEPATITIS B SURFACE AG IA: CPT

## 2019-03-04 PROCEDURE — 99285 EMERGENCY DEPT VISIT HI MDM: CPT

## 2019-03-04 PROCEDURE — 87390 HIV-1 AG IA: CPT

## 2019-03-04 PROCEDURE — 83605 ASSAY OF LACTIC ACID: CPT

## 2019-03-04 PROCEDURE — 85379 FIBRIN DEGRADATION QUANT: CPT

## 2019-03-04 PROCEDURE — 84484 ASSAY OF TROPONIN QUANT: CPT

## 2019-03-04 NOTE — XR
EXAMINATION TYPE: XR chest 2V

 

DATE OF EXAM: 3/4/2019

 

COMPARISON: Chest x-ray January 10, 2019

 

HISTORY: History of pancreatic cancer with chest pain and shortness of breath

 

TECHNIQUE:  Frontal and lateral views of the chest are obtained.

 

FINDINGS:  There is redemonstration of low lung volumes with bibasilar opacities and central vascular
 congestion. No pleural effusion or pneumothorax is seen bilaterally The cardiac silhouette size is w
ithin normal limits.   The osseous structures are intact.

 

IMPRESSION:  Low lung volumes and cardiomegaly with mild to moderate central vascular congestion rede
monstrated. Correlate for fluid overload state and/or CHF exacerbation.

## 2019-03-04 NOTE — CT
EXAM:

  CT Head Without Intravenous Contrast

 

CLINICAL HISTORY:

  ITS.REASON CT Reason: Pain

 

TECHNIQUE:

  Axial computed tomography images of the head/brain without intravenous 

contrast.  DLP is 1099.4 mGy-cm.  This CT exam was performed using one or 

more of the following dose reduction techniques: automated exposure 

control, adjustment of the mA and/or kV according to patient size, and/or 

use of iterative reconstruction technique.

 

COMPARISON:

  CT head 3/11/2016.

 

FINDINGS:

  Brain:  Unremarkable.  No hemorrhage.  No significant white matter 

disease.  No edema.

  Ventricles:  Unremarkable.  No ventriculomegaly.

  Bones/joints:  Unremarkable.  No acute fracture.

  Soft tissues:  Unremarkable.

  Sinuses:  Unremarkable as visualized.  No acute sinusitis.

  Mastoid air cells:  Unremarkable as visualized.  No mastoid effusion.

 

IMPRESSION:     

  No intracranial hemorrhage or other acute intracranial abnormality.

## 2019-03-04 NOTE — CT
EXAM:

  CT Chest Without Intravenous Contrast

 

CLINICAL HISTORY:

  ITS.REASON CT Reason: Pain

 

TECHNIQUE:

  Axial computed tomography images of the chest without intravenous 

contrast.  DLP is 885.5 mGy-cm.  This CT exam was performed using one or 

more of the following dose reduction techniques: automated exposure 

control, adjustment of the mA and/or kV according to patient size, and/or 

use of iterative reconstruction technique.

 

COMPARISON:

  None.

 

FINDINGS:

  Lungs:  Bilateral dependent atelectasis.

  Pleural space:  Unremarkable.  No pneumothorax.  No significant 

effusion.

  Heart:  Trace pericardial effusion.

  Bones/joints:  Unremarkable.  No acute fracture.  No dislocation.

  Soft tissues:  Unremarkable.

  Vasculature:  Unremarkable.  No thoracic aortic aneurysm.

  Lymph nodes:  Unremarkable.  No enlarged lymph nodes.

 

IMPRESSION:     

  No acute traumatic abnormality in the chest.

 

_______________________________________________

 

EXAM:

  CT Abdomen and Pelvis Without Intravenous Contrast

 

CLINICAL HISTORY:

  ITS.REASON CT Reason: Pain

 

TECHNIQUE:

  Axial computed tomography images of the abdomen and pelvis without 

intravenous contrast.  DLP is 885.5 mGy-cm.  This CT exam was performed 

using one or more of the following dose reduction techniques: automated 

exposure control, adjustment of the mA and/or kV according to patient 

size, and/or use of iterative reconstruction technique.

 

COMPARISON:

  None.

 

FINDINGS:

  Lung bases:  Unremarkable.  No mass.  No consolidation.

 

 ABDOMEN:

  Liver:  Unremarkable.

  Gallbladder and bile ducts:  Unremarkable.  No calcified stones.  No 

ductal dilation.

  Pancreas:  Unremarkable.  No ductal dilation.

  Spleen:  Unremarkable.  No splenomegaly.

  Adrenals:  Unremarkable.  No mass.

  Kidneys and ureters:  Unremarkable.  No obstructing stones.  No 

hydronephrosis.

  Stomach and bowel:  Sigmoid colon diverticulosis without evidence of 

diverticulitis.  Moderate colonic stool burden.  No obstruction.

 

 PELVIS:

  Appendix:  No findings to suggest acute appendicitis.

  Bladder:  Urinary bladder is decompressed.  No stones.

  Reproductive:  Unremarkable as visualized.

 

 ABDOMEN and PELVIS:

  Intraperitoneal space:  Unremarkable.  No free air.  No significant 

fluid collection.

  Bones/joints:  Serpiginous sclerosis of the bilateral femoral heads 

suggestive of avascular necrosis.  No acute fracture.  No dislocation.

  Soft tissues:  Left iliopsoas tendon calcification.  Metallic density 

in the left abductor musculature may represent foreign body.  Recommend 

clinical correlation.

  Vasculature:  Infrarenal inferior vena cava filter.  No abdominal 

aortic aneurysm.

  Lymph nodes:  Unremarkable.  No enlarged lymph nodes.

 

IMPRESSION:     

1.  No acute traumatic abnormality in the abdomen or pelvis.

2.  Serpiginous sclerosis of the bilateral femoral heads suggestive of 

avascular necrosis.
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
## 2019-03-05 LAB
ALBUMIN SERPL-MCNC: 3.2 G/DL (ref 3.5–5)
ALP SERPL-CCNC: 52 U/L (ref 38–126)
ALT SERPL-CCNC: 31 U/L (ref 21–72)
ANION GAP SERPL CALC-SCNC: 5 MMOL/L
AST SERPL-CCNC: 19 U/L (ref 17–59)
BASOPHILS # BLD AUTO: 0 K/UL (ref 0–0.2)
BASOPHILS NFR BLD AUTO: 0 %
BUN SERPL-SCNC: 18 MG/DL (ref 9–20)
CALCIUM SPEC-MCNC: 8.8 MG/DL (ref 8.4–10.2)
CHLORIDE SERPL-SCNC: 107 MMOL/L (ref 98–107)
CO2 SERPL-SCNC: 24 MMOL/L (ref 22–30)
EOSINOPHIL # BLD AUTO: 0.1 K/UL (ref 0–0.7)
EOSINOPHIL NFR BLD AUTO: 1 %
ERYTHROCYTE [DISTWIDTH] IN BLOOD BY AUTOMATED COUNT: 4.69 M/UL (ref 4.3–5.9)
ERYTHROCYTE [DISTWIDTH] IN BLOOD: 17.4 % (ref 11.5–15.5)
GLUCOSE BLD-MCNC: 200 MG/DL (ref 75–99)
GLUCOSE BLD-MCNC: 249 MG/DL (ref 75–99)
GLUCOSE BLD-MCNC: 263 MG/DL (ref 75–99)
GLUCOSE BLD-MCNC: 273 MG/DL (ref 75–99)
GLUCOSE BLD-MCNC: 291 MG/DL (ref 75–99)
GLUCOSE BLD-MCNC: 294 MG/DL (ref 75–99)
GLUCOSE BLD-MCNC: 304 MG/DL (ref 75–99)
GLUCOSE SERPL-MCNC: 253 MG/DL (ref 74–99)
GLUCOSE UR QL: (no result)
HBA1C MFR BLD: 8.4 % (ref 4–6)
HCT VFR BLD AUTO: 35.4 % (ref 39–53)
HGB BLD-MCNC: 11.2 GM/DL (ref 13–17.5)
INR PPP: 1.1 (ref ?–1.2)
LYMPHOCYTES # SPEC AUTO: 0.6 K/UL (ref 1–4.8)
LYMPHOCYTES NFR SPEC AUTO: 6 %
MCH RBC QN AUTO: 23.8 PG (ref 25–35)
MCHC RBC AUTO-ENTMCNC: 31.6 G/DL (ref 31–37)
MCV RBC AUTO: 75.5 FL (ref 80–100)
MONOCYTES # BLD AUTO: 0.1 K/UL (ref 0–1)
MONOCYTES NFR BLD AUTO: 2 %
NEUTROPHILS # BLD AUTO: 8.6 K/UL (ref 1.3–7.7)
NEUTROPHILS NFR BLD AUTO: 91 %
PH UR: 5.5 [PH] (ref 5–8)
PLATELET # BLD AUTO: 162 K/UL (ref 150–450)
POTASSIUM SERPL-SCNC: 4.7 MMOL/L (ref 3.5–5.1)
PROT SERPL-MCNC: 5.5 G/DL (ref 6.3–8.2)
PT BLD: 11.8 SEC (ref 9–12)
SODIUM SERPL-SCNC: 136 MMOL/L (ref 137–145)
SP GR UR: 1.02 (ref 1–1.03)
UROBILINOGEN UR QL STRIP: <2 MG/DL (ref ?–2)
WBC # BLD AUTO: 9.4 K/UL (ref 3.8–10.6)

## 2019-03-05 RX ADMIN — DIVALPROEX SODIUM SCH MG: 500 TABLET, FILM COATED, EXTENDED RELEASE ORAL at 14:58

## 2019-03-05 RX ADMIN — ISODIUM CHLORIDE SCH MG: 0.03 SOLUTION RESPIRATORY (INHALATION) at 08:19

## 2019-03-05 RX ADMIN — INSULIN ASPART SCH UNIT: 100 INJECTION, SOLUTION INTRAVENOUS; SUBCUTANEOUS at 17:21

## 2019-03-05 RX ADMIN — HYDROMORPHONE HYDROCHLORIDE PRN MG: 1 INJECTION, SOLUTION INTRAMUSCULAR; INTRAVENOUS; SUBCUTANEOUS at 18:30

## 2019-03-05 RX ADMIN — HYDROMORPHONE HYDROCHLORIDE PRN MG: 1 INJECTION, SOLUTION INTRAMUSCULAR; INTRAVENOUS; SUBCUTANEOUS at 21:51

## 2019-03-05 RX ADMIN — ISODIUM CHLORIDE SCH MG: 0.03 SOLUTION RESPIRATORY (INHALATION) at 16:03

## 2019-03-05 RX ADMIN — DOCUSATE SODIUM SCH MG: 100 CAPSULE, LIQUID FILLED ORAL at 22:00

## 2019-03-05 RX ADMIN — METHYLPREDNISOLONE SODIUM SUCCINATE SCH MG: 125 INJECTION, POWDER, FOR SOLUTION INTRAMUSCULAR; INTRAVENOUS at 12:29

## 2019-03-05 RX ADMIN — INSULIN ASPART SCH UNIT: 100 INJECTION, SOLUTION INTRAVENOUS; SUBCUTANEOUS at 12:29

## 2019-03-05 RX ADMIN — MONTELUKAST SODIUM SCH MG: 10 TABLET, FILM COATED ORAL at 22:00

## 2019-03-05 RX ADMIN — ISODIUM CHLORIDE SCH MG: 0.03 SOLUTION RESPIRATORY (INHALATION) at 11:37

## 2019-03-05 RX ADMIN — ONDANSETRON PRN MG: 2 INJECTION INTRAMUSCULAR; INTRAVENOUS at 06:44

## 2019-03-05 RX ADMIN — ISODIUM CHLORIDE SCH MG: 0.03 SOLUTION RESPIRATORY (INHALATION) at 19:54

## 2019-03-05 RX ADMIN — CEFAZOLIN SCH MLS/HR: 330 INJECTION, POWDER, FOR SOLUTION INTRAMUSCULAR; INTRAVENOUS at 12:30

## 2019-03-05 RX ADMIN — DICYCLOMINE HYDROCHLORIDE SCH MG: 10 CAPSULE ORAL at 14:58

## 2019-03-05 RX ADMIN — CEFAZOLIN SCH: 330 INJECTION, POWDER, FOR SOLUTION INTRAMUSCULAR; INTRAVENOUS at 02:22

## 2019-03-05 RX ADMIN — HYDROMORPHONE HYDROCHLORIDE PRN MG: 1 INJECTION, SOLUTION INTRAMUSCULAR; INTRAVENOUS; SUBCUTANEOUS at 10:24

## 2019-03-05 RX ADMIN — HEPARIN SODIUM SCH MLS/HR: 10000 INJECTION, SOLUTION INTRAVENOUS at 01:18

## 2019-03-05 RX ADMIN — CLOPIDOGREL BISULFATE SCH MG: 75 TABLET ORAL at 15:00

## 2019-03-05 RX ADMIN — HYDROMORPHONE HYDROCHLORIDE PRN MG: 1 INJECTION, SOLUTION INTRAMUSCULAR; INTRAVENOUS; SUBCUTANEOUS at 14:57

## 2019-03-05 RX ADMIN — FORMOTEROL FUMARATE DIHYDRATE SCH MCG: 20 SOLUTION RESPIRATORY (INHALATION) at 20:02

## 2019-03-05 RX ADMIN — HEPARIN SODIUM SCH MLS/HR: 10000 INJECTION, SOLUTION INTRAVENOUS at 15:30

## 2019-03-05 RX ADMIN — METHYLPREDNISOLONE SODIUM SUCCINATE SCH MG: 125 INJECTION, POWDER, FOR SOLUTION INTRAMUSCULAR; INTRAVENOUS at 17:21

## 2019-03-05 RX ADMIN — ISODIUM CHLORIDE SCH MG: 0.03 SOLUTION RESPIRATORY (INHALATION) at 23:43

## 2019-03-05 RX ADMIN — ISODIUM CHLORIDE SCH MG: 0.03 SOLUTION RESPIRATORY (INHALATION) at 04:47

## 2019-03-05 RX ADMIN — DICYCLOMINE HYDROCHLORIDE SCH MG: 10 CAPSULE ORAL at 22:00

## 2019-03-05 RX ADMIN — DIVALPROEX SODIUM SCH MG: 500 TABLET, FILM COATED, EXTENDED RELEASE ORAL at 22:00

## 2019-03-05 RX ADMIN — HYDROMORPHONE HYDROCHLORIDE PRN MG: 1 INJECTION, SOLUTION INTRAMUSCULAR; INTRAVENOUS; SUBCUTANEOUS at 02:35

## 2019-03-05 RX ADMIN — PANTOPRAZOLE SODIUM SCH MG: 40 TABLET, DELAYED RELEASE ORAL at 14:58

## 2019-03-05 RX ADMIN — INSULIN ASPART SCH UNIT: 100 INJECTION, SOLUTION INTRAVENOUS; SUBCUTANEOUS at 06:33

## 2019-03-05 RX ADMIN — HYDROMORPHONE HYDROCHLORIDE PRN MG: 1 INJECTION, SOLUTION INTRAMUSCULAR; INTRAVENOUS; SUBCUTANEOUS at 06:31

## 2019-03-05 RX ADMIN — INSULIN ASPART SCH UNIT: 100 INJECTION, SOLUTION INTRAVENOUS; SUBCUTANEOUS at 22:00
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
## 2019-03-06 LAB
APTT BLD: 92.8 SEC (ref 22–30)
BASOPHILS # BLD AUTO: 0 K/UL (ref 0–0.2)
BASOPHILS NFR BLD AUTO: 0 %
EOSINOPHIL # BLD AUTO: 0 K/UL (ref 0–0.7)
EOSINOPHIL NFR BLD AUTO: 0 %
ERYTHROCYTE [DISTWIDTH] IN BLOOD BY AUTOMATED COUNT: 4.14 M/UL (ref 4.3–5.9)
ERYTHROCYTE [DISTWIDTH] IN BLOOD: 17 % (ref 11.5–15.5)
GLUCOSE BLD-MCNC: 286 MG/DL (ref 75–99)
GLUCOSE BLD-MCNC: 338 MG/DL (ref 75–99)
GLUCOSE BLD-MCNC: 387 MG/DL (ref 75–99)
GLUCOSE BLD-MCNC: 398 MG/DL (ref 75–99)
GLUCOSE BLD-MCNC: 409 MG/DL (ref 75–99)
HBV SURFACE AB SERPL IA-ACNC: 3.5 MIU/ML
HCT VFR BLD AUTO: 32.7 % (ref 39–53)
HGB BLD-MCNC: 10.1 GM/DL (ref 13–17.5)
INR PPP: 1.1 (ref ?–1.2)
LYMPHOCYTES # SPEC AUTO: 0.5 K/UL (ref 1–4.8)
LYMPHOCYTES NFR SPEC AUTO: 5 %
MCH RBC QN AUTO: 24.3 PG (ref 25–35)
MCHC RBC AUTO-ENTMCNC: 30.8 G/DL (ref 31–37)
MCV RBC AUTO: 79 FL (ref 80–100)
MONOCYTES # BLD AUTO: 0.3 K/UL (ref 0–1)
MONOCYTES NFR BLD AUTO: 3 %
NEUTROPHILS # BLD AUTO: 8.4 K/UL (ref 1.3–7.7)
NEUTROPHILS NFR BLD AUTO: 91 %
PLATELET # BLD AUTO: 216 K/UL (ref 150–450)
PT BLD: 11.8 SEC (ref 9–12)
WBC # BLD AUTO: 9.3 K/UL (ref 3.8–10.6)

## 2019-03-06 RX ADMIN — HEPARIN SODIUM SCH MLS/HR: 10000 INJECTION, SOLUTION INTRAVENOUS at 07:54

## 2019-03-06 RX ADMIN — ASPIRIN 81 MG CHEWABLE TABLET SCH MG: 81 TABLET CHEWABLE at 09:05

## 2019-03-06 RX ADMIN — MONTELUKAST SODIUM SCH MG: 10 TABLET, FILM COATED ORAL at 21:37

## 2019-03-06 RX ADMIN — METHYLPREDNISOLONE SODIUM SUCCINATE SCH MG: 125 INJECTION, POWDER, FOR SOLUTION INTRAMUSCULAR; INTRAVENOUS at 23:59

## 2019-03-06 RX ADMIN — DOCUSATE SODIUM SCH MG: 100 CAPSULE, LIQUID FILLED ORAL at 21:38

## 2019-03-06 RX ADMIN — AMMONIUM LACTATE SCH APPLIC: 12 LOTION TOPICAL at 11:26

## 2019-03-06 RX ADMIN — DICYCLOMINE HYDROCHLORIDE SCH MG: 10 CAPSULE ORAL at 18:30

## 2019-03-06 RX ADMIN — PANTOPRAZOLE SODIUM SCH MG: 40 TABLET, DELAYED RELEASE ORAL at 17:48

## 2019-03-06 RX ADMIN — ISODIUM CHLORIDE SCH MG: 0.03 SOLUTION RESPIRATORY (INHALATION) at 19:54

## 2019-03-06 RX ADMIN — ISODIUM CHLORIDE SCH: 0.03 SOLUTION RESPIRATORY (INHALATION) at 12:43

## 2019-03-06 RX ADMIN — INSULIN ASPART SCH: 100 INJECTION, SOLUTION INTRAVENOUS; SUBCUTANEOUS at 21:19

## 2019-03-06 RX ADMIN — METHYLPREDNISOLONE SODIUM SUCCINATE SCH MG: 125 INJECTION, POWDER, FOR SOLUTION INTRAMUSCULAR; INTRAVENOUS at 05:01

## 2019-03-06 RX ADMIN — INSULIN ASPART SCH UNIT: 100 INJECTION, SOLUTION INTRAVENOUS; SUBCUTANEOUS at 07:06

## 2019-03-06 RX ADMIN — LINAGLIPTIN SCH MG: 5 TABLET, FILM COATED ORAL at 11:27

## 2019-03-06 RX ADMIN — ONDANSETRON PRN MG: 2 INJECTION INTRAMUSCULAR; INTRAVENOUS at 05:06

## 2019-03-06 RX ADMIN — ISODIUM CHLORIDE SCH MG: 0.03 SOLUTION RESPIRATORY (INHALATION) at 08:53

## 2019-03-06 RX ADMIN — INSULIN ASPART SCH UNIT: 100 INJECTION, SOLUTION INTRAVENOUS; SUBCUTANEOUS at 17:52

## 2019-03-06 RX ADMIN — HEPARIN SODIUM SCH MLS/HR: 10000 INJECTION, SOLUTION INTRAVENOUS at 23:58

## 2019-03-06 RX ADMIN — ASPIRIN SCH: 325 TABLET ORAL at 11:36

## 2019-03-06 RX ADMIN — DIVALPROEX SODIUM SCH MG: 500 TABLET, FILM COATED, EXTENDED RELEASE ORAL at 21:36

## 2019-03-06 RX ADMIN — DOCUSATE SODIUM SCH MG: 100 CAPSULE, LIQUID FILLED ORAL at 09:05

## 2019-03-06 RX ADMIN — THERA TABS SCH EACH: TAB at 11:26

## 2019-03-06 RX ADMIN — ISODIUM CHLORIDE SCH MG: 0.03 SOLUTION RESPIRATORY (INHALATION) at 15:23

## 2019-03-06 RX ADMIN — DICYCLOMINE HYDROCHLORIDE SCH MG: 10 CAPSULE ORAL at 21:36

## 2019-03-06 RX ADMIN — DIVALPROEX SODIUM SCH MG: 500 TABLET, FILM COATED, EXTENDED RELEASE ORAL at 09:24

## 2019-03-06 RX ADMIN — LORATADINE SCH MG: 10 TABLET ORAL at 11:26

## 2019-03-06 RX ADMIN — METHYLPREDNISOLONE SODIUM SUCCINATE SCH MG: 125 INJECTION, POWDER, FOR SOLUTION INTRAMUSCULAR; INTRAVENOUS at 01:36

## 2019-03-06 RX ADMIN — DICYCLOMINE HYDROCHLORIDE SCH MG: 10 CAPSULE ORAL at 09:06

## 2019-03-06 RX ADMIN — HYDROMORPHONE HYDROCHLORIDE PRN MG: 1 INJECTION, SOLUTION INTRAMUSCULAR; INTRAVENOUS; SUBCUTANEOUS at 01:36

## 2019-03-06 RX ADMIN — FORMOTEROL FUMARATE DIHYDRATE SCH MCG: 20 SOLUTION RESPIRATORY (INHALATION) at 19:54

## 2019-03-06 RX ADMIN — METHYLPREDNISOLONE SODIUM SUCCINATE SCH MG: 125 INJECTION, POWDER, FOR SOLUTION INTRAMUSCULAR; INTRAVENOUS at 11:24

## 2019-03-06 RX ADMIN — CLOPIDOGREL BISULFATE SCH MG: 75 TABLET ORAL at 11:26

## 2019-03-06 RX ADMIN — PANTOPRAZOLE SODIUM SCH MG: 40 TABLET, DELAYED RELEASE ORAL at 07:06

## 2019-03-06 RX ADMIN — HYDROMORPHONE HYDROCHLORIDE PRN MG: 1 INJECTION, SOLUTION INTRAMUSCULAR; INTRAVENOUS; SUBCUTANEOUS at 20:13

## 2019-03-06 RX ADMIN — FORMOTEROL FUMARATE DIHYDRATE SCH MCG: 20 SOLUTION RESPIRATORY (INHALATION) at 08:53

## 2019-03-06 RX ADMIN — THEOPHYLLINE ANHYDROUS SCH MG: 300 CAPSULE, EXTENDED RELEASE ORAL at 09:05

## 2019-03-06 RX ADMIN — CEFAZOLIN SCH MLS/HR: 330 INJECTION, POWDER, FOR SOLUTION INTRAMUSCULAR; INTRAVENOUS at 21:42

## 2019-03-06 RX ADMIN — CEFAZOLIN SCH MLS/HR: 330 INJECTION, POWDER, FOR SOLUTION INTRAMUSCULAR; INTRAVENOUS at 11:28

## 2019-03-06 RX ADMIN — HYDROMORPHONE HYDROCHLORIDE PRN MG: 1 INJECTION, SOLUTION INTRAMUSCULAR; INTRAVENOUS; SUBCUTANEOUS at 12:57

## 2019-03-06 RX ADMIN — ISODIUM CHLORIDE SCH MG: 0.03 SOLUTION RESPIRATORY (INHALATION) at 03:46

## 2019-03-06 RX ADMIN — RITONAVIR SCH: 100 CAPSULE ORAL at 09:08

## 2019-03-06 RX ADMIN — HYDROMORPHONE HYDROCHLORIDE PRN MG: 1 INJECTION, SOLUTION INTRAMUSCULAR; INTRAVENOUS; SUBCUTANEOUS at 05:02

## 2019-03-06 RX ADMIN — DIVALPROEX SODIUM SCH MG: 500 TABLET, FILM COATED, EXTENDED RELEASE ORAL at 18:30

## 2019-03-06 RX ADMIN — METHYLPREDNISOLONE SODIUM SUCCINATE SCH MG: 125 INJECTION, POWDER, FOR SOLUTION INTRAMUSCULAR; INTRAVENOUS at 17:49

## 2019-03-06 RX ADMIN — CEFAZOLIN SCH MLS/HR: 330 INJECTION, POWDER, FOR SOLUTION INTRAMUSCULAR; INTRAVENOUS at 00:48

## 2019-03-06 RX ADMIN — INSULIN ASPART SCH UNIT: 100 INJECTION, SOLUTION INTRAVENOUS; SUBCUTANEOUS at 12:17
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
## 2019-03-06 NOTE — PN
PROGRESS NOTE



DATE OF SERVICE:

03/06/2019.



REASON FOR FOLLOWUP:

Chronic hepatitis B and question of HIV.



INTERVAL HISTORY:

The patient is afebrile.  The patient complains of shortness of breath and cough, not

bringing up any sputum.  No nausea, no vomiting.  No abdominal pain and no diarrhea.



PHYSICAL EXAMINATION:

Blood pressure 113/70 with a pulse of 103, temperature 97.7, he is 95% on room air.

GENERAL DESCRIPTION: A middle aged male up in the bed in no distress.

RESPIRATORY SYSTEM:  Unlabored breathing with decreased breath sounds in the bases,

with no wheeze.

HEART: S1, S2.  Regular rate and rhythm.

ABDOMEN: Soft.



LABS:

Hemoglobin 10.1, white count 9.3.  HIV test is pending.  Hepatitis B surface antigen

and core antibody positive, likely indicating chronic hepatitis B.



DIAGNOSTIC IMPRESSION AND PLAN:

Patient with a questionable history of HIV and hepatitis B, ______ has been confirmed.

HIV testing is pending. We will check hepatitis B DNA by PCR as well as _____ antigen.

Currently on _____ now which will be continued while monitoring his clinical course

closely.  Continue supportive care.





MMODL / IJN: [id number] / Job#: [id number]

## 2019-03-07 VITALS — RESPIRATION RATE: 16 BRPM

## 2019-03-07 LAB
BASOPHILS # BLD AUTO: 0 K/UL (ref 0–0.2)
BASOPHILS NFR BLD AUTO: 0 %
EOSINOPHIL # BLD AUTO: 0 K/UL (ref 0–0.7)
EOSINOPHIL NFR BLD AUTO: 0 %
ERYTHROCYTE [DISTWIDTH] IN BLOOD BY AUTOMATED COUNT: 4.4 M/UL (ref 4.3–5.9)
ERYTHROCYTE [DISTWIDTH] IN BLOOD: 17.5 % (ref 11.5–15.5)
GLUCOSE BLD-MCNC: 239 MG/DL (ref 75–99)
GLUCOSE BLD-MCNC: 305 MG/DL (ref 75–99)
GLUCOSE BLD-MCNC: 325 MG/DL (ref 75–99)
GLUCOSE BLD-MCNC: 340 MG/DL (ref 75–99)
GLUCOSE BLD-MCNC: 340 MG/DL (ref 75–99)
GLUCOSE BLD-MCNC: 362 MG/DL (ref 75–99)
HBV DNA SERPL NAA+PROBE-LOG IU: 3.68 {LOG_IU}/ML (ref ?–1)
HCT VFR BLD AUTO: 34.1 % (ref 39–53)
HGB BLD-MCNC: 10.7 GM/DL (ref 13–17.5)
INR PPP: 1.4 (ref ?–1.2)
LYMPHOCYTES # SPEC AUTO: 0.5 K/UL (ref 1–4.8)
LYMPHOCYTES NFR SPEC AUTO: 4 %
MCH RBC QN AUTO: 24.3 PG (ref 25–35)
MCHC RBC AUTO-ENTMCNC: 31.3 G/DL (ref 31–37)
MCV RBC AUTO: 77.5 FL (ref 80–100)
MONOCYTES # BLD AUTO: 0.5 K/UL (ref 0–1)
MONOCYTES NFR BLD AUTO: 3 %
NEUTROPHILS # BLD AUTO: 12.6 K/UL (ref 1.3–7.7)
NEUTROPHILS NFR BLD AUTO: 92 %
PLATELET # BLD AUTO: 221 K/UL (ref 150–450)
PT BLD: 13.8 SEC (ref 9–12)
WBC # BLD AUTO: 13.7 K/UL (ref 3.8–10.6)

## 2019-03-07 RX ADMIN — LORATADINE SCH MG: 10 TABLET ORAL at 08:11

## 2019-03-07 RX ADMIN — ASPIRIN 81 MG CHEWABLE TABLET SCH MG: 81 TABLET CHEWABLE at 08:11

## 2019-03-07 RX ADMIN — ISODIUM CHLORIDE SCH MG: 0.03 SOLUTION RESPIRATORY (INHALATION) at 20:05

## 2019-03-07 RX ADMIN — ONDANSETRON PRN MG: 2 INJECTION INTRAMUSCULAR; INTRAVENOUS at 00:33

## 2019-03-07 RX ADMIN — AMMONIUM LACTATE SCH APPLIC: 12 LOTION TOPICAL at 12:37

## 2019-03-07 RX ADMIN — CEFAZOLIN SCH MLS/HR: 330 INJECTION, POWDER, FOR SOLUTION INTRAMUSCULAR; INTRAVENOUS at 12:46

## 2019-03-07 RX ADMIN — ISODIUM CHLORIDE SCH MG: 0.03 SOLUTION RESPIRATORY (INHALATION) at 02:53

## 2019-03-07 RX ADMIN — DIVALPROEX SODIUM SCH MG: 500 TABLET, FILM COATED, EXTENDED RELEASE ORAL at 08:12

## 2019-03-07 RX ADMIN — INSULIN ASPART SCH UNIT: 100 INJECTION, SOLUTION INTRAVENOUS; SUBCUTANEOUS at 08:11

## 2019-03-07 RX ADMIN — ISODIUM CHLORIDE SCH MG: 0.03 SOLUTION RESPIRATORY (INHALATION) at 00:05

## 2019-03-07 RX ADMIN — HYDROMORPHONE HYDROCHLORIDE PRN MG: 1 INJECTION, SOLUTION INTRAMUSCULAR; INTRAVENOUS; SUBCUTANEOUS at 20:06

## 2019-03-07 RX ADMIN — INSULIN ASPART SCH UNIT: 100 INJECTION, SOLUTION INTRAVENOUS; SUBCUTANEOUS at 12:45

## 2019-03-07 RX ADMIN — DICYCLOMINE HYDROCHLORIDE SCH MG: 10 CAPSULE ORAL at 08:12

## 2019-03-07 RX ADMIN — PANTOPRAZOLE SODIUM SCH MG: 40 TABLET, DELAYED RELEASE ORAL at 17:20

## 2019-03-07 RX ADMIN — HYDROMORPHONE HYDROCHLORIDE PRN MG: 1 INJECTION, SOLUTION INTRAMUSCULAR; INTRAVENOUS; SUBCUTANEOUS at 08:23

## 2019-03-07 RX ADMIN — ISODIUM CHLORIDE SCH MG: 0.03 SOLUTION RESPIRATORY (INHALATION) at 07:34

## 2019-03-07 RX ADMIN — MONTELUKAST SODIUM SCH MG: 10 TABLET, FILM COATED ORAL at 21:30

## 2019-03-07 RX ADMIN — BUDESONIDE SCH MG: 0.5 INHALANT ORAL at 20:05

## 2019-03-07 RX ADMIN — METHYLPREDNISOLONE SODIUM SUCCINATE SCH: 125 INJECTION, POWDER, FOR SOLUTION INTRAMUSCULAR; INTRAVENOUS at 06:26

## 2019-03-07 RX ADMIN — DOCUSATE SODIUM SCH MG: 100 CAPSULE, LIQUID FILLED ORAL at 08:11

## 2019-03-07 RX ADMIN — CLOPIDOGREL BISULFATE SCH MG: 75 TABLET ORAL at 08:11

## 2019-03-07 RX ADMIN — DICYCLOMINE HYDROCHLORIDE SCH MG: 10 CAPSULE ORAL at 17:19

## 2019-03-07 RX ADMIN — HYDROMORPHONE HYDROCHLORIDE PRN MG: 1 INJECTION, SOLUTION INTRAMUSCULAR; INTRAVENOUS; SUBCUTANEOUS at 14:03

## 2019-03-07 RX ADMIN — RITONAVIR SCH MG: 100 CAPSULE ORAL at 08:12

## 2019-03-07 RX ADMIN — DIVALPROEX SODIUM SCH MG: 500 TABLET, FILM COATED, EXTENDED RELEASE ORAL at 17:19

## 2019-03-07 RX ADMIN — THEOPHYLLINE ANHYDROUS SCH MG: 300 CAPSULE, EXTENDED RELEASE ORAL at 08:12

## 2019-03-07 RX ADMIN — PANTOPRAZOLE SODIUM SCH MG: 40 TABLET, DELAYED RELEASE ORAL at 08:11

## 2019-03-07 RX ADMIN — DOCUSATE SODIUM SCH MG: 100 CAPSULE, LIQUID FILLED ORAL at 21:30

## 2019-03-07 RX ADMIN — INSULIN ASPART SCH UNIT: 100 INJECTION, SOLUTION INTRAVENOUS; SUBCUTANEOUS at 21:31

## 2019-03-07 RX ADMIN — INSULIN ASPART SCH UNIT: 100 INJECTION, SOLUTION INTRAVENOUS; SUBCUTANEOUS at 17:52

## 2019-03-07 RX ADMIN — ISODIUM CHLORIDE SCH MG: 0.03 SOLUTION RESPIRATORY (INHALATION) at 10:50

## 2019-03-07 RX ADMIN — THERA TABS SCH EACH: TAB at 08:11

## 2019-03-07 RX ADMIN — DIVALPROEX SODIUM SCH MG: 500 TABLET, FILM COATED, EXTENDED RELEASE ORAL at 21:30

## 2019-03-07 RX ADMIN — DICYCLOMINE HYDROCHLORIDE SCH MG: 10 CAPSULE ORAL at 21:30

## 2019-03-07 RX ADMIN — HEPARIN SODIUM SCH MLS/HR: 10000 INJECTION, SOLUTION INTRAVENOUS at 17:23

## 2019-03-07 RX ADMIN — FORMOTEROL FUMARATE DIHYDRATE SCH MCG: 20 SOLUTION RESPIRATORY (INHALATION) at 20:05

## 2019-03-07 RX ADMIN — ISODIUM CHLORIDE SCH MG: 0.03 SOLUTION RESPIRATORY (INHALATION) at 16:27

## 2019-03-07 RX ADMIN — HYDROMORPHONE HYDROCHLORIDE PRN MG: 1 INJECTION, SOLUTION INTRAMUSCULAR; INTRAVENOUS; SUBCUTANEOUS at 02:07

## 2019-03-07 RX ADMIN — FORMOTEROL FUMARATE DIHYDRATE SCH MCG: 20 SOLUTION RESPIRATORY (INHALATION) at 07:37

## 2019-03-07 RX ADMIN — LINAGLIPTIN SCH MG: 5 TABLET, FILM COATED ORAL at 08:12

## 2019-03-07 RX ADMIN — ASPIRIN SCH: 325 TABLET ORAL at 12:37

## 2019-03-07 RX ADMIN — ONDANSETRON PRN MG: 2 INJECTION INTRAMUSCULAR; INTRAVENOUS at 08:30
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
## 2019-03-07 NOTE — PN
PROGRESS NOTE



DATE OF SERVICE:

03/07/2019



REASON FOR FOLLOWUP:

Chronic hepatitis B.



INTERVAL HISTORY:

The patient is currently afebrile.  The patient is breathing comfortably.  The patient

denies having any chest pain or any cough.  No abdominal pain. No diarrhea.



PHYSICAL EXAMINATION:

On examination, blood pressure 120/73 with a pulse of 75,  temperature 97.3. He is 96%

on room air.

General description is a middle-aged male lying in bed in no distress.

RESPIRATORY SYSTEM:  Unlabored breathing with decreased breath sounds at the bases. No

wheeze.

HEART: S1, S2.  Regular rate and rhythm.

ABDOMEN:  Soft, no tenderness.



LABS:

Hemoglobin 10.7, white count 13.7.  HIV testing came back negative.



DIAGNOSTIC IMPRESSION AND PLAN:

Patient with chronic hepatitis B for which apparently the patient is on tenofovir for 6

months now. When asked specifically for the patient's previous ID physician so we can

get a baseline hepatitis B DNA levels and _____ status status the patient is not clear

about it.  The patient denies he has been on Norvir for his hepatitis B.  The patient

at this time will continue on the tenofovir, currently receiving at 300 mg daily.  We

are waiting for the hepatitis B DNA and the antigen and the patient will be monitored

in the outpatient setting closely. All his questions  and concerns were answered.





MMODL / IJN: [id number] / Job#: [id number]

## 2019-03-08 VITALS — HEART RATE: 84 BPM

## 2019-03-08 VITALS — SYSTOLIC BLOOD PRESSURE: 112 MMHG | TEMPERATURE: 97.6 F | DIASTOLIC BLOOD PRESSURE: 68 MMHG

## 2019-03-08 LAB
APTT BLD: 154.9 SEC (ref 22–30)
BASOPHILS # BLD AUTO: 0 K/UL (ref 0–0.2)
BASOPHILS NFR BLD AUTO: 0 %
EOSINOPHIL # BLD AUTO: 0.1 K/UL (ref 0–0.7)
EOSINOPHIL NFR BLD AUTO: 0 %
ERYTHROCYTE [DISTWIDTH] IN BLOOD BY AUTOMATED COUNT: 4.31 M/UL (ref 4.3–5.9)
ERYTHROCYTE [DISTWIDTH] IN BLOOD: 18.2 % (ref 11.5–15.5)
GLUCOSE BLD-MCNC: 181 MG/DL (ref 75–99)
GLUCOSE BLD-MCNC: 291 MG/DL (ref 75–99)
HCT VFR BLD AUTO: 32.3 % (ref 39–53)
HGB BLD-MCNC: 10.4 GM/DL (ref 13–17.5)
INR PPP: 2 (ref ?–1.2)
LYMPHOCYTES # SPEC AUTO: 1 K/UL (ref 1–4.8)
LYMPHOCYTES NFR SPEC AUTO: 7 %
MCH RBC QN AUTO: 24.1 PG (ref 25–35)
MCHC RBC AUTO-ENTMCNC: 32.1 G/DL (ref 31–37)
MCV RBC AUTO: 75 FL (ref 80–100)
MONOCYTES # BLD AUTO: 0.8 K/UL (ref 0–1)
MONOCYTES NFR BLD AUTO: 6 %
NEUTROPHILS # BLD AUTO: 11.7 K/UL (ref 1.3–7.7)
NEUTROPHILS NFR BLD AUTO: 86 %
PLATELET # BLD AUTO: 178 K/UL (ref 150–450)
PT BLD: 19.4 SEC (ref 9–12)
WBC # BLD AUTO: 13.6 K/UL (ref 3.8–10.6)

## 2019-03-08 RX ADMIN — ASPIRIN 81 MG CHEWABLE TABLET SCH: 81 TABLET CHEWABLE at 09:06

## 2019-03-08 RX ADMIN — ASPIRIN SCH: 325 TABLET ORAL at 08:13

## 2019-03-08 RX ADMIN — FORMOTEROL FUMARATE DIHYDRATE SCH MCG: 20 SOLUTION RESPIRATORY (INHALATION) at 08:16

## 2019-03-08 RX ADMIN — AMMONIUM LACTATE SCH APPLIC: 12 LOTION TOPICAL at 08:09

## 2019-03-08 RX ADMIN — INSULIN ASPART SCH UNIT: 100 INJECTION, SOLUTION INTRAVENOUS; SUBCUTANEOUS at 07:51

## 2019-03-08 RX ADMIN — HYDROMORPHONE HYDROCHLORIDE PRN MG: 1 INJECTION, SOLUTION INTRAMUSCULAR; INTRAVENOUS; SUBCUTANEOUS at 07:55

## 2019-03-08 RX ADMIN — LINAGLIPTIN SCH: 5 TABLET, FILM COATED ORAL at 09:06

## 2019-03-08 RX ADMIN — DIVALPROEX SODIUM SCH: 500 TABLET, FILM COATED, EXTENDED RELEASE ORAL at 09:06

## 2019-03-08 RX ADMIN — BUDESONIDE SCH MG: 0.5 INHALANT ORAL at 08:17

## 2019-03-08 RX ADMIN — DICYCLOMINE HYDROCHLORIDE SCH: 10 CAPSULE ORAL at 09:06

## 2019-03-08 RX ADMIN — ISODIUM CHLORIDE SCH: 0.03 SOLUTION RESPIRATORY (INHALATION) at 00:50

## 2019-03-08 RX ADMIN — DOCUSATE SODIUM SCH: 100 CAPSULE, LIQUID FILLED ORAL at 09:07

## 2019-03-08 RX ADMIN — PANTOPRAZOLE SODIUM SCH: 40 TABLET, DELAYED RELEASE ORAL at 09:06

## 2019-03-08 RX ADMIN — LINAGLIPTIN SCH MG: 5 TABLET, FILM COATED ORAL at 07:54

## 2019-03-08 RX ADMIN — DIVALPROEX SODIUM SCH MG: 500 TABLET, FILM COATED, EXTENDED RELEASE ORAL at 07:54

## 2019-03-08 RX ADMIN — HEPARIN SODIUM SCH MLS/HR: 10000 INJECTION, SOLUTION INTRAVENOUS at 07:51

## 2019-03-08 RX ADMIN — THERA TABS SCH: TAB at 11:34

## 2019-03-08 RX ADMIN — THEOPHYLLINE ANHYDROUS SCH MG: 300 CAPSULE, EXTENDED RELEASE ORAL at 07:54

## 2019-03-08 RX ADMIN — DOCUSATE SODIUM SCH MG: 100 CAPSULE, LIQUID FILLED ORAL at 07:53

## 2019-03-08 RX ADMIN — INSULIN ASPART SCH UNIT: 100 INJECTION, SOLUTION INTRAVENOUS; SUBCUTANEOUS at 12:31

## 2019-03-08 RX ADMIN — HYDROMORPHONE HYDROCHLORIDE PRN MG: 1 INJECTION, SOLUTION INTRAMUSCULAR; INTRAVENOUS; SUBCUTANEOUS at 02:15

## 2019-03-08 RX ADMIN — PANTOPRAZOLE SODIUM SCH MG: 40 TABLET, DELAYED RELEASE ORAL at 07:52

## 2019-03-08 RX ADMIN — CLOPIDOGREL BISULFATE SCH: 75 TABLET ORAL at 09:06

## 2019-03-08 RX ADMIN — CLOPIDOGREL BISULFATE SCH MG: 75 TABLET ORAL at 07:52

## 2019-03-08 RX ADMIN — DICYCLOMINE HYDROCHLORIDE SCH MG: 10 CAPSULE ORAL at 07:54

## 2019-03-08 RX ADMIN — ISODIUM CHLORIDE SCH MG: 0.03 SOLUTION RESPIRATORY (INHALATION) at 04:10

## 2019-03-08 RX ADMIN — ISODIUM CHLORIDE SCH MG: 0.03 SOLUTION RESPIRATORY (INHALATION) at 12:47

## 2019-03-08 RX ADMIN — CEFAZOLIN SCH: 330 INJECTION, POWDER, FOR SOLUTION INTRAMUSCULAR; INTRAVENOUS at 03:31

## 2019-03-08 RX ADMIN — ASPIRIN 81 MG CHEWABLE TABLET SCH MG: 81 TABLET CHEWABLE at 07:52

## 2019-03-08 RX ADMIN — ISODIUM CHLORIDE SCH MG: 0.03 SOLUTION RESPIRATORY (INHALATION) at 08:16

## 2019-03-08 RX ADMIN — LORATADINE SCH MG: 10 TABLET ORAL at 07:52

## 2019-03-08 NOTE — P.PN
Progress Note - Text





Issues with the prescription system and EMR.  Patient's Breo, Singulair, 

Albuterol, and Prednisone have been prescribed electronically through our office

to [facility].

## 2019-03-08 NOTE — PN
PROGRESS NOTE



DATE OF SERVICE:

03/08/2019



REASON FOR FOLLOWUP:

Chronic hepatitis B.



INTERVAL HISTORY:

The patient is currently afebrile.  The patient is breathing comfortably.  Denies

having any chest pain or any cough.  No abdominal pain, no diarrhea.



PHYSICAL EXAMINATION:

Blood pressure 112/68 with a pulse of 94, temperature is 97.6.  He is 98% on room air.

General description is a middle-aged male, lying in bed in no distress.

RESPIRATORY SYSTEM:  Unlabored breathing, clear to auscultation.

HEART:  S1, S2.  Regular rate and rhythm.

ABDOMEN:  Soft, no tenderness.



LABS:

Hemoglobin 10.4, white count 13.6.  Hepatitis B, DNA 4750, core antibody reactive. ENT

is currently pending.  HIV negative.  Hepatitis C negative.  ALT normal at 31.



DIAGNOSTIC IMPRESSION/PLAN:

1. Patient with chronic hepatitis B for the patient has been on _____ for more than 6

    months now.

2. To get a baseline, hepatitis B DNA has been repeated.  His ENT chest studies are

    pending.  The patient will be continued on 10 of over 25 mg daily prescription sent

    to pharmacy with close outpatient followup question:





HARRIETT / NITHYA: [id number] / Job#: [id number]
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
## 2019-07-13 ENCOUNTER — HOSPITAL ENCOUNTER (INPATIENT)
Dept: HOSPITAL 47 - EC | Age: 44
LOS: 3 days | Discharge: HOME | DRG: 202 | End: 2019-07-16
Attending: HOSPITALIST | Admitting: HOSPITALIST
Payer: COMMERCIAL

## 2019-07-13 VITALS — BODY MASS INDEX: 28.5 KG/M2

## 2019-07-13 DIAGNOSIS — Z99.89: ICD-10-CM

## 2019-07-13 DIAGNOSIS — Z99.81: ICD-10-CM

## 2019-07-13 DIAGNOSIS — B20: ICD-10-CM

## 2019-07-13 DIAGNOSIS — E83.42: ICD-10-CM

## 2019-07-13 DIAGNOSIS — Z88.5: ICD-10-CM

## 2019-07-13 DIAGNOSIS — Z86.718: ICD-10-CM
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
## 2019-07-13 DIAGNOSIS — A08.4: ICD-10-CM

## 2019-07-13 DIAGNOSIS — J98.11: ICD-10-CM

## 2019-07-13 DIAGNOSIS — Z86.711: ICD-10-CM

## 2019-07-13 DIAGNOSIS — G40.909: ICD-10-CM

## 2019-07-13 DIAGNOSIS — Z92.3: ICD-10-CM

## 2019-07-13 DIAGNOSIS — I48.2: ICD-10-CM

## 2019-07-13 DIAGNOSIS — Z91.81: ICD-10-CM

## 2019-07-13 DIAGNOSIS — Z79.82: ICD-10-CM

## 2019-07-13 DIAGNOSIS — G47.33: ICD-10-CM

## 2019-07-13 DIAGNOSIS — Z91.041: ICD-10-CM

## 2019-07-13 DIAGNOSIS — Z79.02: ICD-10-CM

## 2019-07-13 DIAGNOSIS — Z88.2: ICD-10-CM

## 2019-07-13 DIAGNOSIS — F41.9: ICD-10-CM

## 2019-07-13 DIAGNOSIS — I50.9: ICD-10-CM

## 2019-07-13 DIAGNOSIS — Z79.01: ICD-10-CM

## 2019-07-13 DIAGNOSIS — T38.0X5A: ICD-10-CM

## 2019-07-13 DIAGNOSIS — G89.4: ICD-10-CM

## 2019-07-13 DIAGNOSIS — Z80.1: ICD-10-CM

## 2019-07-13 DIAGNOSIS — Z88.8: ICD-10-CM

## 2019-07-13 DIAGNOSIS — D68.59: ICD-10-CM

## 2019-07-13 DIAGNOSIS — J45.51: Primary | ICD-10-CM

## 2019-07-13 DIAGNOSIS — J44.1: ICD-10-CM

## 2019-07-13 DIAGNOSIS — S09.90XA: ICD-10-CM

## 2019-07-13 DIAGNOSIS — Z83.2: ICD-10-CM

## 2019-07-13 DIAGNOSIS — Z92.21: ICD-10-CM

## 2019-07-13 DIAGNOSIS — Z87.891: ICD-10-CM

## 2019-07-13 DIAGNOSIS — Z80.3: ICD-10-CM

## 2019-07-13 DIAGNOSIS — B19.10: ICD-10-CM

## 2019-07-13 DIAGNOSIS — D49.0: ICD-10-CM

## 2019-07-13 DIAGNOSIS — D68.51: ICD-10-CM

## 2019-07-13 DIAGNOSIS — R09.02: ICD-10-CM

## 2019-07-13 DIAGNOSIS — Z82.49: ICD-10-CM

## 2019-07-13 DIAGNOSIS — F31.9: ICD-10-CM

## 2019-07-13 DIAGNOSIS — E11.65: ICD-10-CM

## 2019-07-13 DIAGNOSIS — Z79.899: ICD-10-CM

## 2019-07-13 DIAGNOSIS — I25.2: ICD-10-CM

## 2019-07-13 LAB
ANION GAP SERPL CALC-SCNC: 9 MMOL/L
BASOPHILS # BLD AUTO: 0 K/UL (ref 0–0.2)
BASOPHILS NFR BLD AUTO: 0 %
BUN SERPL-SCNC: 20 MG/DL (ref 9–20)
CALCIUM SPEC-MCNC: 8.7 MG/DL (ref 8.4–10.2)
CHLORIDE SERPL-SCNC: 105 MMOL/L (ref 98–107)
CO2 BLDA-SCNC: 23 MMOL/L (ref 19–24)
CO2 SERPL-SCNC: 23 MMOL/L (ref 22–30)
EOSINOPHIL # BLD AUTO: 0.1 K/UL (ref 0–0.7)
EOSINOPHIL NFR BLD AUTO: 1 %
ERYTHROCYTE [DISTWIDTH] IN BLOOD BY AUTOMATED COUNT: 4.78 M/UL (ref 4.3–5.9)
ERYTHROCYTE [DISTWIDTH] IN BLOOD: 18.3 % (ref 11.5–15.5)
GLUCOSE BLD-MCNC: 299 MG/DL (ref 75–99)
GLUCOSE SERPL-MCNC: 206 MG/DL (ref 74–99)
HCO3 BLDA-SCNC: 22 MMOL/L (ref 21–25)
HCT VFR BLD AUTO: 39.5 % (ref 39–53)
HGB BLD-MCNC: 12.9 GM/DL (ref 13–17.5)
INR PPP: 1 (ref ?–1.2)
LYMPHOCYTES # SPEC AUTO: 1.3 K/UL (ref 1–4.8)
LYMPHOCYTES NFR SPEC AUTO: 19 %
MAGNESIUM SPEC-SCNC: 1.5 MG/DL (ref 1.6–2.3)
MCH RBC QN AUTO: 27 PG (ref 25–35)
MCHC RBC AUTO-ENTMCNC: 32.7 G/DL (ref 31–37)
MCV RBC AUTO: 82.6 FL (ref 80–100)
MONOCYTES # BLD AUTO: 0.3 K/UL (ref 0–1)
MONOCYTES NFR BLD AUTO: 5 %
NEUTROPHILS # BLD AUTO: 5 K/UL (ref 1.3–7.7)
NEUTROPHILS NFR BLD AUTO: 74 %
PCO2 BLDA: 36 MMHG (ref 35–45)
PH BLDA: 7.4 [PH] (ref 7.35–7.45)
PLATELET # BLD AUTO: 199 K/UL (ref 150–450)
PO2 BLDA: 186 MMHG (ref 83–108)
POTASSIUM SERPL-SCNC: 4.3 MMOL/L (ref 3.5–5.1)
PT BLD: 10.4 SEC (ref 9–12)
SODIUM SERPL-SCNC: 137 MMOL/L (ref 137–145)
WBC # BLD AUTO: 6.8 K/UL (ref 3.8–10.6)

## 2019-07-13 PROCEDURE — 94645 CONT INHLJ TX EACH ADDL HOUR: CPT

## 2019-07-13 PROCEDURE — 85610 PROTHROMBIN TIME: CPT

## 2019-07-13 PROCEDURE — 99285 EMERGENCY DEPT VISIT HI MDM: CPT

## 2019-07-13 PROCEDURE — 36600 WITHDRAWAL OF ARTERIAL BLOOD: CPT

## 2019-07-13 PROCEDURE — 36415 COLL VENOUS BLD VENIPUNCTURE: CPT

## 2019-07-13 PROCEDURE — 96365 THER/PROPH/DIAG IV INF INIT: CPT

## 2019-07-13 PROCEDURE — 83036 HEMOGLOBIN GLYCOSYLATED A1C: CPT

## 2019-07-13 PROCEDURE — 94640 AIRWAY INHALATION TREATMENT: CPT

## 2019-07-13 PROCEDURE — 5A09357 ASSISTANCE WITH RESPIRATORY VENTILATION, LESS THAN 24 CONSECUTIVE HOURS, CONTINUOUS POSITIVE AIRWAY PRESSURE: ICD-10-PCS

## 2019-07-13 PROCEDURE — 83735 ASSAY OF MAGNESIUM: CPT

## 2019-07-13 PROCEDURE — 70450 CT HEAD/BRAIN W/O DYE: CPT

## 2019-07-13 PROCEDURE — 74018 RADEX ABDOMEN 1 VIEW: CPT

## 2019-07-13 PROCEDURE — 71046 X-RAY EXAM CHEST 2 VIEWS: CPT

## 2019-07-13 PROCEDURE — 96375 TX/PRO/DX INJ NEW DRUG ADDON: CPT

## 2019-07-13 PROCEDURE — 80048 BASIC METABOLIC PNL TOTAL CA: CPT

## 2019-07-13 PROCEDURE — 82805 BLOOD GASES W/O2 SATURATION: CPT

## 2019-07-13 PROCEDURE — 96366 THER/PROPH/DIAG IV INF ADDON: CPT

## 2019-07-13 PROCEDURE — 94660 CPAP INITIATION&MGMT: CPT

## 2019-07-13 PROCEDURE — 85025 COMPLETE CBC W/AUTO DIFF WBC: CPT

## 2019-07-13 RX ADMIN — IPRATROPIUM BROMIDE STA MG: 0.5 SOLUTION RESPIRATORY (INHALATION) at 18:14

## 2019-07-13 RX ADMIN — MAGNESIUM SULFATE IN DEXTROSE SCH MLS/HR: 10 INJECTION, SOLUTION INTRAVENOUS at 19:36

## 2019-07-13 RX ADMIN — INSULIN ASPART SCH UNIT: 100 INJECTION, SOLUTION INTRAVENOUS; SUBCUTANEOUS at 22:04

## 2019-07-13 RX ADMIN — MAGNESIUM SULFATE IN DEXTROSE SCH MLS/HR: 10 INJECTION, SOLUTION INTRAVENOUS at 18:09

## 2019-07-13 RX ADMIN — IPRATROPIUM BROMIDE STA MG: 0.5 SOLUTION RESPIRATORY (INHALATION) at 18:03

## 2019-07-13 RX ADMIN — ISODIUM CHLORIDE SCH: 0.03 SOLUTION RESPIRATORY (INHALATION) at 21:39

## 2019-07-13 RX ADMIN — DIVALPROEX SODIUM SCH MG: 500 TABLET, FILM COATED, EXTENDED RELEASE ORAL at 22:04
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
## 2019-07-13 NOTE — CT
EXAMINATION TYPE: CT brain wo con

 

DATE OF EXAM: 7/13/2019

 

COMPARISON: 3/4/2019

 

INDICATION: Patient poor historian

 

DLP: 1142.4 mGycm, Automated exposure control for dose reduction was used.

 

CONTRAST: None

 

CT of the brain is performed utilizing 3 mm thick sections through the posterior fossa and 3 mm thick
 sections through the remaining calvarium.  Study is performed within 24 hours of arrival to the hosp
ital. 

 

No abnormal hyperdensity is present to suggest an acute intracranial hemorrhage.

No mass lesion is evident.

No acute infarcts are evident.

Ventricles and sulci are appropriate for the patient age.  

Paranasal sinuses and mastoid air cells within the field-of-view are clear.

 

IMPRESSIONS:

1.   Normal CT Brain

## 2019-07-13 NOTE — XR
EXAMINATION TYPE: XR chest 2V

 

DATE OF EXAM: 7/13/2019

 

COMPARISON: 3/4/2019

 

INDICATION: Pain short of breath

 

TECHNIQUE:  Frontal and lateral views of the chest are obtained.

 

FINDINGS:  

The heart size is normal.  

The pulmonary vasculature is normal.

Mild bibasilar atelectasis appears to be present. This may be greater at the posterior left lung base
. This is exacerbated by poor inspiration on these images.

 

IMPRESSION:  

1. Left basilar subsegmental atelectasis may be due to limited inspiration.

## 2019-07-14 LAB
GLUCOSE BLD-MCNC: 276 MG/DL (ref 75–99)
GLUCOSE BLD-MCNC: 317 MG/DL (ref 75–99)
GLUCOSE BLD-MCNC: 331 MG/DL (ref 75–99)
GLUCOSE BLD-MCNC: 337 MG/DL (ref 75–99)
GLUCOSE BLD-MCNC: 367 MG/DL (ref 75–99)
GLUCOSE BLD-MCNC: 377 MG/DL (ref 75–99)
INR PPP: 1 (ref ?–1.2)
PT BLD: 10.8 SEC (ref 9–12)

## 2019-07-14 RX ADMIN — METHYLPREDNISOLONE SODIUM SUCCINATE SCH: 125 INJECTION, POWDER, FOR SOLUTION INTRAMUSCULAR; INTRAVENOUS at 18:29

## 2019-07-14 RX ADMIN — ISODIUM CHLORIDE PRN MG: 0.03 SOLUTION RESPIRATORY (INHALATION) at 23:24

## 2019-07-14 RX ADMIN — ASPIRIN 81 MG CHEWABLE TABLET SCH MG: 81 TABLET CHEWABLE at 07:16

## 2019-07-14 RX ADMIN — INSULIN ASPART SCH UNIT: 100 INJECTION, SOLUTION INTRAVENOUS; SUBCUTANEOUS at 21:28

## 2019-07-14 RX ADMIN — INSULIN ASPART SCH UNIT: 100 INJECTION, SOLUTION INTRAVENOUS; SUBCUTANEOUS at 07:13

## 2019-07-14 RX ADMIN — ISODIUM CHLORIDE SCH MG: 0.03 SOLUTION RESPIRATORY (INHALATION) at 09:00

## 2019-07-14 RX ADMIN — INSULIN ASPART SCH UNIT: 100 INJECTION, SOLUTION INTRAVENOUS; SUBCUTANEOUS at 17:50

## 2019-07-14 RX ADMIN — ISODIUM CHLORIDE SCH MG: 0.03 SOLUTION RESPIRATORY (INHALATION) at 16:48

## 2019-07-14 RX ADMIN — INSULIN ASPART SCH UNIT: 100 INJECTION, SOLUTION INTRAVENOUS; SUBCUTANEOUS at 12:49

## 2019-07-14 RX ADMIN — ISODIUM CHLORIDE PRN MG: 0.03 SOLUTION RESPIRATORY (INHALATION) at 00:43

## 2019-07-14 RX ADMIN — RITONAVIR SCH MG: 100 CAPSULE ORAL at 07:17

## 2019-07-14 RX ADMIN — MONTELUKAST SODIUM SCH MG: 10 TABLET, FILM COATED ORAL at 21:28

## 2019-07-14 RX ADMIN — MAGNESIUM SULFATE IN DEXTROSE SCH MLS/HR: 10 INJECTION, SOLUTION INTRAVENOUS at 15:10

## 2019-07-14 RX ADMIN — METHYLPREDNISOLONE SODIUM SUCCINATE SCH: 125 INJECTION, POWDER, FOR SOLUTION INTRAMUSCULAR; INTRAVENOUS at 12:31

## 2019-07-14 RX ADMIN — MAGNESIUM SULFATE IN DEXTROSE SCH MLS/HR: 10 INJECTION, SOLUTION INTRAVENOUS at 12:48

## 2019-07-14 RX ADMIN — DIVALPROEX SODIUM SCH MG: 500 TABLET, FILM COATED, EXTENDED RELEASE ORAL at 21:29

## 2019-07-14 RX ADMIN — DOCUSATE SODIUM SCH: 100 CAPSULE, LIQUID FILLED ORAL at 21:28

## 2019-07-14 RX ADMIN — DIVALPROEX SODIUM SCH MG: 500 TABLET, FILM COATED, EXTENDED RELEASE ORAL at 07:17

## 2019-07-14 RX ADMIN — ASPIRIN SCH: 325 TABLET ORAL at 12:29

## 2019-07-14 RX ADMIN — ISODIUM CHLORIDE SCH MG: 0.03 SOLUTION RESPIRATORY (INHALATION) at 20:16

## 2019-07-14 RX ADMIN — ISODIUM CHLORIDE PRN MG: 0.03 SOLUTION RESPIRATORY (INHALATION) at 03:57

## 2019-07-14 RX ADMIN — DIVALPROEX SODIUM SCH MG: 500 TABLET, FILM COATED, EXTENDED RELEASE ORAL at 18:17

## 2019-07-14 RX ADMIN — BUDESONIDE AND FORMOTEROL FUMARATE DIHYDRATE SCH: 160; 4.5 AEROSOL RESPIRATORY (INHALATION) at 20:19

## 2019-07-14 RX ADMIN — DICYCLOMINE HYDROCHLORIDE SCH MG: 10 CAPSULE ORAL at 21:29

## 2019-07-14 RX ADMIN — DICYCLOMINE HYDROCHLORIDE SCH MG: 10 CAPSULE ORAL at 18:17

## 2019-07-14 RX ADMIN — METHYLPREDNISOLONE SODIUM SUCCINATE SCH: 125 INJECTION, POWDER, FOR SOLUTION INTRAMUSCULAR; INTRAVENOUS at 12:46

## 2019-07-14 RX ADMIN — ISODIUM CHLORIDE SCH MG: 0.03 SOLUTION RESPIRATORY (INHALATION) at 12:21

## 2019-07-14 NOTE — P.PN
Subjective


Progress Note Date: 07/14/19





Patient here for acute asthma exacerbation that presented with conversational 

dyspnea and was on BiPAP overnight, now improved breathing is not as dyspneic.  

Serum magnesium 1.5 will be replaced today, no blood sugars continue to be 

elevated to correction scale coverage.  CT head normal without any acute 

intracranial pathology.  





Objective





- Vital Signs


Vital signs: 


                                   Vital Signs











Temp  97.9 F   07/14/19 07:00


 


Pulse  92   07/14/19 09:20


 


Resp  16   07/14/19 07:00


 


BP  132/75   07/14/19 07:00


 


Pulse Ox  98   07/14/19 07:00








                                 Intake & Output











 07/13/19 07/14/19 07/14/19





 18:59 06:59 18:59


 


Weight 90.265 kg  


 


Other:   


 


  Voiding Method  Urinal 


 


  # Voids  0 














- Exam





Constitutional: No acute distress, conversant, pleasant





Eyes: Anicteric sclerae, moist conjunctiva, no lid-lag, PERRLA





ENMT: NC/AT,Oropharynx clear, no erythema, exudates





Neck:Supple, FROM, no masses, or JVD, No carotid bruits; No thyromegaly





Lungs: Diminished in the bases, with mild expiratory wheezes





Cardiovascular: Heart regular in rate and rhythm,  No murmurs, gallops, or rubs 

no peripheral edema





Abdominal: Soft Nontender, nom distended, no guarding, no rebound or  rigidity, 

Normoactive bowel sounds No hepatomegaly, No splenomegaly,  No palpable mass No 

abdominal wall hernia noted 





Skin: Normal temperature, tone, texture, turgor, No induration No subcutaneous 

nodules, No rash, lesions, No ulcers





Extremities:No digital cyanosis No clubbing, Pedal pulses intact and  

symmetrical Radial pulses intact and symmetrical Normal gait and station, No 

calf tenderness


 


Psychiatric: Alert and oriented to person, place and time, Appropriate affect 

Intact judgement   


      


Neuro: Muscles Strength 5/5 in all 4 extremities, Sensation to light touch 

grossly present throughout, Cranial nerves II-XII grossly intact. No focal 

sensory deficits








- Labs


CBC & Chem 7: 


                                 07/13/19 17:25





                                 07/13/19 17:25


Labs: 


                  Abnormal Lab Results - Last 24 Hours (Table)











  07/13/19 07/13/19 07/13/19 Range/Units





  17:25 17:25 21:22 


 


Hgb   12.9 L   (13.0-17.5)  gm/dL


 


RDW   18.3 H   (11.5-15.5)  %


 


ABG pO2    186 H  ()  mmHg


 


ABG O2 Saturation    99.8 H  (94-97)  %


 


Glucose  206 H    (74-99)  mg/dL


 


POC Glucose (mg/dL)     (75-99)  mg/dL


 


Magnesium  1.5 L    (1.6-2.3)  mg/dL














  07/13/19 07/14/19 Range/Units





  21:25 06:48 


 


Hgb    (13.0-17.5)  gm/dL


 


RDW    (11.5-15.5)  %


 


ABG pO2    ()  mmHg


 


ABG O2 Saturation    (94-97)  %


 


Glucose    (74-99)  mg/dL


 


POC Glucose (mg/dL)  299 H  317 H  (75-99)  mg/dL


 


Magnesium    (1.6-2.3)  mg/dL














Assessment and Plan


Assessment: 





Acute asthma exacerbation


-C/w Albuterol, Daily peak-flows, Solu-Medrol, and Perforomist


-Bipap as needed will plan to consult pulmonary








Hypomagnesemia


- We'll replete and recheck labs in the morning





Factor V leiden, Afib, Hx of DVT, on Coumadin (held due to recent head trauma)


-CT of the head negative resumed home medications including Coumadin and Plavix


* INR currently subtherapeutic at 1.  Continue to monitor





DM


-JARVIS with FS


-Hold oral hypoglycemics





HIV, on therapy


-C/w home medications





Abdominal pain w/ nausea and vomiting, no diarrhea or fever, soft abdomen


-Possibly viral enteritis


-Anti-emetics and pain control prn





Seizure disorder


-C/w home meds





DVT prophylaxis


-Coumadin bridging therapy








Disposition


- Patient is breathing is much improved, anticipated discharge tomorrow
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
## 2019-07-15 LAB
GLUCOSE BLD-MCNC: 162 MG/DL (ref 75–99)
GLUCOSE BLD-MCNC: 211 MG/DL (ref 75–99)
GLUCOSE BLD-MCNC: 264 MG/DL (ref 75–99)
GLUCOSE BLD-MCNC: 268 MG/DL (ref 75–99)
GLUCOSE BLD-MCNC: 273 MG/DL (ref 75–99)
GLUCOSE BLD-MCNC: 275 MG/DL (ref 75–99)
GLUCOSE BLD-MCNC: 286 MG/DL (ref 75–99)
GLUCOSE BLD-MCNC: 338 MG/DL (ref 75–99)
GLUCOSE BLD-MCNC: 340 MG/DL (ref 75–99)
GLUCOSE BLD-MCNC: 341 MG/DL (ref 75–99)
GLUCOSE BLD-MCNC: 350 MG/DL (ref 75–99)
GLUCOSE BLD-MCNC: 353 MG/DL (ref 75–99)
GLUCOSE BLD-MCNC: 364 MG/DL (ref 75–99)
GLUCOSE BLD-MCNC: 409 MG/DL (ref 75–99)
HBA1C MFR BLD: 8 % (ref 4–6)
INR PPP: 1.5 (ref ?–1.2)
PT BLD: 15.2 SEC (ref 9–12)

## 2019-07-15 RX ADMIN — INSULIN HUMAN SCH MLS/HR: 100 INJECTION, SOLUTION PARENTERAL at 01:26

## 2019-07-15 RX ADMIN — INSULIN HUMAN SCH MLS/HR: 100 INJECTION, SOLUTION PARENTERAL at 21:19

## 2019-07-15 RX ADMIN — DICYCLOMINE HYDROCHLORIDE SCH MG: 10 CAPSULE ORAL at 08:35

## 2019-07-15 RX ADMIN — INSULIN HUMAN SCH MLS/HR: 100 INJECTION, SOLUTION PARENTERAL at 01:53

## 2019-07-15 RX ADMIN — LORATADINE SCH MG: 10 TABLET ORAL at 08:36

## 2019-07-15 RX ADMIN — ISODIUM CHLORIDE PRN MG: 0.03 SOLUTION RESPIRATORY (INHALATION) at 03:42

## 2019-07-15 RX ADMIN — ISODIUM CHLORIDE SCH: 0.03 SOLUTION RESPIRATORY (INHALATION) at 15:38

## 2019-07-15 RX ADMIN — METHYLPREDNISOLONE SODIUM SUCCINATE SCH MG: 125 INJECTION, POWDER, FOR SOLUTION INTRAMUSCULAR; INTRAVENOUS at 11:08

## 2019-07-15 RX ADMIN — THERA TABS SCH EACH: TAB at 08:35

## 2019-07-15 RX ADMIN — DOCUSATE SODIUM SCH: 100 CAPSULE, LIQUID FILLED ORAL at 20:32

## 2019-07-15 RX ADMIN — INSULIN HUMAN SCH MLS/HR: 100 INJECTION, SOLUTION PARENTERAL at 05:14

## 2019-07-15 RX ADMIN — ISODIUM CHLORIDE SCH MG: 0.03 SOLUTION RESPIRATORY (INHALATION) at 20:12

## 2019-07-15 RX ADMIN — BUDESONIDE AND FORMOTEROL FUMARATE DIHYDRATE SCH PUFF: 160; 4.5 AEROSOL RESPIRATORY (INHALATION) at 08:08

## 2019-07-15 RX ADMIN — METHYLPREDNISOLONE SODIUM SUCCINATE SCH MG: 125 INJECTION, POWDER, FOR SOLUTION INTRAMUSCULAR; INTRAVENOUS at 23:06

## 2019-07-15 RX ADMIN — BUDESONIDE AND FORMOTEROL FUMARATE DIHYDRATE SCH: 160; 4.5 AEROSOL RESPIRATORY (INHALATION) at 20:13

## 2019-07-15 RX ADMIN — CLOPIDOGREL BISULFATE SCH MG: 75 TABLET ORAL at 08:35

## 2019-07-15 RX ADMIN — METHYLPREDNISOLONE SODIUM SUCCINATE SCH MG: 125 INJECTION, POWDER, FOR SOLUTION INTRAMUSCULAR; INTRAVENOUS at 17:01

## 2019-07-15 RX ADMIN — MONTELUKAST SODIUM SCH MG: 10 TABLET, FILM COATED ORAL at 20:29

## 2019-07-15 RX ADMIN — METHYLPREDNISOLONE SODIUM SUCCINATE SCH MG: 125 INJECTION, POWDER, FOR SOLUTION INTRAMUSCULAR; INTRAVENOUS at 00:06

## 2019-07-15 RX ADMIN — INSULIN HUMAN SCH MLS/HR: 100 INJECTION, SOLUTION PARENTERAL at 03:02

## 2019-07-15 RX ADMIN — DOCUSATE SODIUM SCH: 100 CAPSULE, LIQUID FILLED ORAL at 08:35

## 2019-07-15 RX ADMIN — INSULIN HUMAN SCH MLS/HR: 100 INJECTION, SOLUTION PARENTERAL at 02:26

## 2019-07-15 RX ADMIN — DIVALPROEX SODIUM SCH MG: 500 TABLET, FILM COATED, EXTENDED RELEASE ORAL at 08:35

## 2019-07-15 RX ADMIN — ISODIUM CHLORIDE SCH MG: 0.03 SOLUTION RESPIRATORY (INHALATION) at 11:09

## 2019-07-15 RX ADMIN — DIVALPROEX SODIUM SCH MG: 500 TABLET, FILM COATED, EXTENDED RELEASE ORAL at 15:14

## 2019-07-15 RX ADMIN — ASPIRIN SCH: 325 TABLET ORAL at 08:37

## 2019-07-15 RX ADMIN — DIVALPROEX SODIUM SCH: 500 TABLET, FILM COATED, EXTENDED RELEASE ORAL at 21:21

## 2019-07-15 RX ADMIN — AMMONIUM LACTATE SCH APPLIC: 12 LOTION TOPICAL at 08:41

## 2019-07-15 RX ADMIN — RITONAVIR SCH: 100 CAPSULE ORAL at 08:36

## 2019-07-15 RX ADMIN — ISODIUM CHLORIDE SCH MG: 0.03 SOLUTION RESPIRATORY (INHALATION) at 08:08

## 2019-07-15 RX ADMIN — METHYLPREDNISOLONE SODIUM SUCCINATE SCH MG: 125 INJECTION, POWDER, FOR SOLUTION INTRAMUSCULAR; INTRAVENOUS at 05:12

## 2019-07-15 RX ADMIN — ASPIRIN 81 MG CHEWABLE TABLET SCH MG: 81 TABLET CHEWABLE at 08:36

## 2019-07-15 RX ADMIN — DICYCLOMINE HYDROCHLORIDE SCH: 10 CAPSULE ORAL at 21:21

## 2019-07-15 RX ADMIN — DICYCLOMINE HYDROCHLORIDE SCH MG: 10 CAPSULE ORAL at 15:14

## 2019-07-15 RX ADMIN — THEOPHYLLINE ANHYDROUS SCH MG: 300 CAPSULE, EXTENDED RELEASE ORAL at 08:35

## 2019-07-15 NOTE — P.PN
Subjective


Progress Note Date: 07/15/19


Principal diagnosis: 





Asthma exacerbation





Patient was seen and examined.  No acute events overnight.  Patient reports 

improvement in his breathing.  Continues to complain of chest tightness.  States

that he was able to ambulate to the bathroom and back without much difficulty 

today.  States that he needs a nebulizer machine at home.  Currently saturating 

91% on room air.  Vitals are stable except for tachycardia with heart rate 

greater than 100.  He denies any chest pain or palpitations.  No nausea or 

vomiting.  No fever or chills.





Objective





- Vital Signs


Vital signs: 


                                   Vital Signs











Temp  98.2 F   07/15/19 07:00


 


Pulse  94   07/15/19 11:20


 


Resp  16   07/15/19 07:00


 


BP  116/68   07/15/19 07:00


 


Pulse Ox  99   07/15/19 07:00








                                 Intake & Output











 07/14/19 07/15/19 07/15/19





 18:59 06:59 18:59


 


Intake Total  29.033 297.100


 


Output Total 700  


 


Balance -700 29.033 297.100


 


Weight 90.265 kg  


 


Intake:   


 


  Intake, IV Titration  29.033 47.100





  Amount   


 


    Insulin Regular 100 unit  29.033 47.100





    In Sodium Chloride 0.9%   





    100 ml @ Titrate IV .Q0M   





    [facility] Rx#:[id number]   


 


  Oral   250


 


Output:   


 


  Urine 700  


 


Other:   


 


  # Voids 2 1 2














- Exam





General: [non toxic], [no distress], [appears at stated age]


Derm: [warm], [dry]


Head: [atraumatic], [normocephalic], [symmetric]


Eyes: [EOMI], [no lid lag], [anicteric sclera]


Mouth: [no lip lesion], [mucus membranes moist]


Cardiovascular: [S1S2 reg], [tachycardia], [positive DP pulse bilateral], 


Lungs: [Decreased breath sounds bilateral], [no rhonchi, no rales] , [no 

accessory muscle use]


Abdominal: [soft], [ nontender to palpation], [no guarding], [no appreciable 

organomegaly]


Ext: [no gross muscle atrophy], [no edema], [no contractures]


Neuro:  [no focal neuro deficits]


Psych: [Alert], [oriented], [appropriate affect] 





- Labs


CBC & Chem 7: 


                                 07/13/19 17:25





                                 07/13/19 17:25


Labs: 


                  Abnormal Lab Results - Last 24 Hours (Table)











  07/14/19 07/14/19 07/14/19 Range/Units





  11:54 16:49 20:30 


 


PT     (9.0-12.0)  sec


 


INR     (<1.2)  


 


POC Glucose (mg/dL)  276 H  337 H  331 H  (75-99)  mg/dL














  07/14/19 07/14/19 07/15/19 Range/Units





  21:13 23:26 01:20 


 


PT     (9.0-12.0)  sec


 


INR     (<1.2)  


 


POC Glucose (mg/dL)  367 H  377 H  338 H  (75-99)  mg/dL














  07/15/19 07/15/19 07/15/19 Range/Units





  01:53 02:26 02:57 


 


PT     (9.0-12.0)  sec


 


INR     (<1.2)  


 


POC Glucose (mg/dL)  273 H  264 H  162 H  (75-99)  mg/dL














  07/15/19 07/15/19 07/15/19 Range/Units





  05:07 07:06 07:48 


 


PT    15.2 H  (9.0-12.0)  sec


 


INR    1.5 H  (<1.2)  


 


POC Glucose (mg/dL)  286 H  268 H   (75-99)  mg/dL














  07/15/19 07/15/19 Range/Units





  09:05 10:52 


 


PT    (9.0-12.0)  sec


 


INR    (<1.2)  


 


POC Glucose (mg/dL)  364 H  275 H  (75-99)  mg/dL














Assessment and Plan


Assessment: 





Acute asthma exacerbation


Steroid-induced hyperglycemia with history of diabetes mellitus


Factor V Leyden on Coumadin


Atrial fibrillation


Seizure disorder


Obstructive sleep apnea


HIV 


Bipolar disorder








Plans: Albuterol neb 4 times a day scheduled and as needed for shortness of 

breath and wheezing.  Continue Symbicort.  Continue Singulair.  Continue Solu-

Medrol 60 mg IV every 6 hours.  O2 per NC to maintain O2 saturation greater than

92%.  Follow pulmonology recommendations. Social work for nebulizer machine.





Point-of-care glucose 275.  Plans: Insulin drip.  Regular Accu-Cheks.  

Hypoglycemic precautions.  Diabetic diet.  Follow A1c.





CT of the brain shows no acute findings.  Plans:  Continue Coumadin dose per 

pharmacy to maintain INR 2-3.  Continue aspirin and Plavix.





Stable.  Plans: Continue theophylline.  Continue Coumadin dose per pharmacy to 

maintain INR 2-3.





Stable.  Plans: Resume Depakote.





Plans: Proceed BiPAP at nighttime.





Plans: Resume ritonavir, tenofovir.  Needs adequate outpatient follow-up.





Plans: Does not follow psychiatry on a regular basis.  Follow psychiatry 

consultation.  Needs adequate outpatient follow-up.





DVT prophylaxis: [SCD, Coumadin]


Discussed with: [Patient]


Anticipated discharge: [1-2 days]


Anticipated discharge place: [Home]


A total of [30] minutes was spent on the care of this complex patient more than 

50% of the time was spent in counseling and care coordination.

## 2019-07-15 NOTE — P.CN
Psychiatric Consult





- .


Consult date: 07/15/19


Consult:: 





07/15/19 15:48


Identification: Patient is a 44-year-old male who presented to the emergency 

room with complaint of shortness of breath





Reason for Consult: History of bipolar disorder





History of Present Illness: Patient's chart was reviewed the patient was seen 

and interviewed in his room no family members were present.  Patient states that

he's been treated for bipolar disorder, patient is currently taking Depakote for

both a seizure disorder and for his bipolar disorder per the patient.  Patient's

speech was quite pressured he was rambling and difficult to obtain history from.

 Patient states that he was in [facility] recently and at [facility] twice

this year and is supposed to follow with [facility] 

but has not followed up and is unaware of what his medication should be other 

than Depakote.  Patient states that in February was on Thorazine, Zyprexa and 

Effexor and has been on lithium in his teens.  Patient's speech was pressured, 

he was tangential and then asked me to leave the room because I did not answer a

question.  Patient was unable to complete the interview, he asked me to leave 

because he was getting agitated because I had not answered a question that he 

had asked.





Past Psychiatric History: Patient states he's been in [facility] twice this 

year was in [facility] recently and is to be followed by [facility] but doesn't go and is unsure of what his current psychiatric meds 

are.  Patient is currently on Depakote and also has a seizure disorder.  He 

states he was on lithium in the past.





Past Medical/Surgical History: Per the chart the patient has asthma, DVT, atrial

fibrillation, seizure disorder, status post pulmonary embolism, status post 

myocardial infarction, sleep apnea has had a pancreatic tumor and is status post

chemotherapy and radiation therapy as well has a clotting disorder and is status

post appendectomy.





Family History: Unable to obtain





Social History: Unable to obtain





Substance Use History: Unable to obtain





Legal History: Unable to obtain





Mental status: Appearance/Attitude: Patient is sitting in a hospital bed, he 

constantly clicks a pen during the entire interview and taps his foot, patient 

makes eye contact and is superficially cooperative


Behavior: Patient does not exhibit any psychomotor retardation, but is 

constantly moving his feet, clicking a pen during the interview and is easily 

agitated and irritable


Speech/Language: Patient's speech is pressured, I was unable to redirect him 

verbally


Thought Process: Patient exhibits racing thoughts


Thought Content: Patient has exhibited paranoid ideation, stating that I didn't 

answer a question that he asked, and so he refused to continue speaking with me 

because he wasn't sure what my motives were and asked me to leave the room and 

ended the interview


Suicidal/Homicidal Ideation: Unable to obtain


Sensorium/Cognition: Patient is alert and oriented to person further cognitive 

testing was not able to be performed


Mood/Affect: Patient's mood is irritable, guarded and his affect is appropriate 

to his mood


Insight/Judgment: Unable to assess





Assessment: Patient has a history of bipolar disorder and has recently been 

admitted twice to [facility] this year however the patient's unable to tell me 

any information regarding his admissions and states that he doesn't follow-up 

with care at [facility] after release from the hospital 

which is where he is supposed to be seen.  Patient is currently on Depakote for 

seizure disorder and states also when he stay clean to control his bipolar 

disorder.  Patient became upset during the interview stating that I hadn't 

answered a question, that he had not asked and asked me to leave the room and 

ended the interview.  Patient has been taking Depakote 500 mg extended release 3

times a day, stating for his seizure disorder and his bipolar disorder however 

the patient has been complaining of shortness of breath and asthma and received 

treatments in an urgent care center and is currently on Solu-Medrol and it is 

unclear to me how well controlled his bipolar disorder is on Depakote alone as 

the Solu-Medrol can certainly cause a manic episode as well as paranoid 

ideation.  I was not able to complete my evaluation of the patient as he became 

increasingly agitated during the interview and asked me to leave the room.





Diagnosis: Bipolar disorder unspecified 





Plan: Would continue the Depakote 500 mg extended-release 3 times a day and 

consider decreasing his steroids as soon as is medically feasible, should 

patient become increasingly agitated or paranoid an antipsychotic may need to be

used should he become agitated, threatening.  At this time I will not write for 

any antipsychotic medication as the patient has been compliant with his 

medications.  I will follow the patient to see if he is more amenable to being 

interviewed.  Will order a Depakote level to check if his Depakote is within 

therapeutic range.  I have no information regarding the patient's use of 

substances and/or alcohol and no drug screen is available for review.


07/15/19 15:55





07/15/19 15:57
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
## 2019-07-16 VITALS — RESPIRATION RATE: 16 BRPM

## 2019-07-16 VITALS — DIASTOLIC BLOOD PRESSURE: 71 MMHG | SYSTOLIC BLOOD PRESSURE: 114 MMHG | TEMPERATURE: 98.7 F

## 2019-07-16 VITALS — HEART RATE: 96 BPM

## 2019-07-16 LAB
GLUCOSE BLD-MCNC: 210 MG/DL (ref 75–99)
GLUCOSE BLD-MCNC: 237 MG/DL (ref 75–99)
GLUCOSE BLD-MCNC: 239 MG/DL (ref 75–99)
GLUCOSE BLD-MCNC: 241 MG/DL (ref 75–99)
GLUCOSE BLD-MCNC: 274 MG/DL (ref 75–99)
GLUCOSE BLD-MCNC: 278 MG/DL (ref 75–99)
GLUCOSE BLD-MCNC: 379 MG/DL (ref 75–99)
GLUCOSE BLD-MCNC: 380 MG/DL (ref 75–99)
GLUCOSE BLD-MCNC: 416 MG/DL (ref 75–99)
GLUCOSE BLD-MCNC: 440 MG/DL (ref 75–99)

## 2019-07-16 RX ADMIN — ISODIUM CHLORIDE SCH MG: 0.03 SOLUTION RESPIRATORY (INHALATION) at 11:11

## 2019-07-16 RX ADMIN — THERA TABS SCH EACH: TAB at 09:05

## 2019-07-16 RX ADMIN — RITONAVIR SCH: 100 CAPSULE ORAL at 08:55

## 2019-07-16 RX ADMIN — CLOPIDOGREL BISULFATE SCH MG: 75 TABLET ORAL at 09:05

## 2019-07-16 RX ADMIN — METHYLPREDNISOLONE SODIUM SUCCINATE SCH: 125 INJECTION, POWDER, FOR SOLUTION INTRAMUSCULAR; INTRAVENOUS at 11:43

## 2019-07-16 RX ADMIN — ISODIUM CHLORIDE SCH MG: 0.03 SOLUTION RESPIRATORY (INHALATION) at 08:09

## 2019-07-16 RX ADMIN — DIVALPROEX SODIUM SCH MG: 500 TABLET, FILM COATED, EXTENDED RELEASE ORAL at 09:05

## 2019-07-16 RX ADMIN — METHYLPREDNISOLONE SODIUM SUCCINATE SCH: 125 INJECTION, POWDER, FOR SOLUTION INTRAMUSCULAR; INTRAVENOUS at 05:12

## 2019-07-16 RX ADMIN — ISODIUM CHLORIDE SCH MG: 0.03 SOLUTION RESPIRATORY (INHALATION) at 15:53

## 2019-07-16 RX ADMIN — LORATADINE SCH MG: 10 TABLET ORAL at 09:05

## 2019-07-16 RX ADMIN — THEOPHYLLINE ANHYDROUS SCH MG: 300 CAPSULE, EXTENDED RELEASE ORAL at 09:06

## 2019-07-16 RX ADMIN — ASPIRIN SCH: 325 TABLET ORAL at 08:50

## 2019-07-16 RX ADMIN — INSULIN HUMAN SCH MLS/HR: 100 INJECTION, SOLUTION PARENTERAL at 13:36

## 2019-07-16 RX ADMIN — DOCUSATE SODIUM SCH MG: 100 CAPSULE, LIQUID FILLED ORAL at 09:05

## 2019-07-16 RX ADMIN — ASPIRIN 81 MG CHEWABLE TABLET SCH MG: 81 TABLET CHEWABLE at 09:06

## 2019-07-16 RX ADMIN — BUDESONIDE AND FORMOTEROL FUMARATE DIHYDRATE SCH: 160; 4.5 AEROSOL RESPIRATORY (INHALATION) at 08:09

## 2019-07-16 RX ADMIN — AMMONIUM LACTATE SCH APPLIC: 12 LOTION TOPICAL at 09:08

## 2019-07-16 RX ADMIN — DICYCLOMINE HYDROCHLORIDE SCH MG: 10 CAPSULE ORAL at 09:05
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
## 2019-07-16 NOTE — P.PN
Progress Note - Text


Progress Note Date: 07/16/19





I did not see the patient today is yesterday when I spoke with him he became 

quite agitated but he spoke with nursing staff who report that the patient has 

been compliant with most of his medications, he refused his IV steroids today 

due to his blood sugar being elevated.  Patient refused to speak with me 

yesterday and asked me to leave the room.  Patient has not had any episodes of 

agitation, assaultive behavior and so I will not see the patient today, patient 

may possibly be discharged today.  There are any further questions or concerns 

please don't hesitate to reconsult me otherwise I will sign off the case.

## 2019-07-16 NOTE — P.PN
Subjective


Progress Note Date: 07/16/19


Principal diagnosis: 





Abdominal pain





Patient was seen and examined.  No acute events overnight.  Patient reports 

abdominal pain that got exacerbated yesterday.  Pain was left-sided, squeezing 

in nature, 10 out of 10 in severity.  Patient reports having one episode of 

emesis, along with bowel movement.  He denies any chest pain, shortness of 

breath or palpitations.  No fever or chills.





Objective





- Vital Signs


Vital signs: 


                                   Vital Signs











Temp  98.0 F   07/16/19 07:00


 


Pulse  88   07/16/19 11:21


 


Resp  16   07/16/19 07:00


 


BP  134/70   07/16/19 07:00


 


Pulse Ox  96   07/16/19 07:00








                                 Intake & Output











 07/15/19 07/16/19 07/16/19





 18:59 06:59 18:59


 


Intake Total 920.500 552.140 741.360


 


Balance 920.500 552.140 741.360


 


Intake:   


 


  Intake, IV Titration 90.500 72.140 39.360





  Amount   


 


    Insulin Regular 100 unit 90.500 72.140 39.360





    In Sodium Chloride 0.9%   





    100 ml @ Titrate IV .Q0M   





    [facility] Rx#:[id number]   


 


  Oral [phone]


 


Other:   


 


  # Voids 3 2 














- Exam





General: [non toxic], [no distress], [appears at stated age]


Derm: [warm], [dry]


Head: [atraumatic], [normocephalic], [symmetric]


Eyes: [EOMI], [no lid lag], [anicteric sclera]


Mouth: [no lip lesion], [mucus membranes moist]


Cardiovascular: [S1S2 reg], [no murmur], [positive DP pulse bilateral], 


Lungs: [Decreased breath sounds bilateral], [no rhonchi, no rales] , [no 

accessory muscle use]


Abdominal: [soft], [tenderness to palpation left lower quadrant without 

rebound], [no guarding], [no appreciable organomegaly]


Ext: [no gross muscle atrophy], [no edema], [no contractures]


Neuro:  [no focal neuro deficits]


Psych: [Alert], [oriented], [appropriate affect] 





- Labs


CBC & Chem 7: 


                                 07/13/19 17:25





                                 07/13/19 17:25


Labs: 


                  Abnormal Lab Results - Last 24 Hours (Table)











  07/14/19 07/15/19 07/15/19 Range/Units





  06:40 15:10 16:43 


 


POC Glucose (mg/dL)   409 H  340 H  (75-99)  mg/dL


 


Hemoglobin A1c  8.0 H    (4.0-6.0)  %














  07/15/19 07/15/19 07/15/19 Range/Units





  19:01 21:14 23:04 


 


POC Glucose (mg/dL)  341 H  350 H  353 H  (75-99)  mg/dL


 


Hemoglobin A1c     (4.0-6.0)  %














  07/16/19 07/16/19 07/16/19 Range/Units





  00:58 03:02 05:04 


 


POC Glucose (mg/dL)  274 H  237 H  241 H  (75-99)  mg/dL


 


Hemoglobin A1c     (4.0-6.0)  %














  07/16/19 07/16/19 07/16/19 Range/Units





  07:03 09:00 09:36 


 


POC Glucose (mg/dL)  239 H  416 H  440 H  (75-99)  mg/dL


 


Hemoglobin A1c     (4.0-6.0)  %














  07/16/19 07/16/19 Range/Units





  11:12 12:55 


 


POC Glucose (mg/dL)  278 H  210 H  (75-99)  mg/dL


 


Hemoglobin A1c    (4.0-6.0)  %














Assessment and Plan


Assessment: 





Acute asthma exacerbation


Abdominal pain


Steroid-induced hyperglycemia with history of diabetes mellitus


Factor V [place] on Coumadin


Atrial fibrillation


Seizure disorder


Obstructive sleep apnea


HIV 


Bipolar disorder








Plans: Albuterol neb 4 times a day scheduled and as needed for shortness of 

breath and wheezing.  Continue Symbicort.  Continue Singulair.  Continue Solu-

Medrol 60 mg IV every 6 hours.  O2 per NC to maintain O2 saturation greater than

92%.  Follow pulmonology recommendations. Social work for nebulizer machine.





Concerns for obstruction given history of previous abdominal surgery.  States he

had bowel movement yesterday.  Plans: Morphine onetime injection.  Zofran as 

needed for nausea or vomiting.  Follow KUB.





Point-of-care glucose 210.  Plans: Insulin drip.  Regular Accu-Cheks.  

Hypoglycemic precautions.  Diabetic diet.  Follow A1c.





CT of the brain shows no acute findings.  Plans:  Continue Coumadin dose per 

pharmacy to maintain INR 2-3.  Continue aspirin and Plavix.





Stable.  Plans: Continue theophylline.  Continue Coumadin dose per pharmacy to 

maintain INR 2-3.





Stable.  Plans: Resume Depakote.





Plans: Proceed BiPAP at nighttime.





Plans: Resume ritonavir, tenofovir.  Needs adequate outpatient follow-up.





Plans: Does not follow psychiatry on a regular basis.  Follow psychiatry 

consultation.  Needs adequate outpatient follow-up.





Asthma exacerbation is greatly improved.  Patient complaining of abdominal pain,

workup underway.  DC later today if workup benign.
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
## 2019-11-05 NOTE — XR
EXAMINATION TYPE: XR chest 1V portable

 

DATE OF EXAM: 11/5/2019

 

COMPARISON: Chest x-ray July 13, 2019. CT chest March 4, 2019

 

HISTORY: Pain after fall injury.

 

TECHNIQUE: Single frontal view of the chest is obtained.

 

FINDINGS: Improved inspiration on current study. There is no focal air space opacity, pleural effusio
n, or pneumothorax seen.  The cardiac silhouette size is within normal limits.   The osseous structur
es are intact.

 

IMPRESSION:  No acute cardiopulmonary process.
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
## 2019-11-05 NOTE — CT
EXAMINATION TYPE: CT brain wo con

 

DATE OF EXAM: 11/5/2019

 

COMPARISON: 7/13/2019

 

HISTORY: Trauma, fall. Head pain.

 

CT DLP: 1090.4 mGycm.  Automated Exposure Control for Dose Reduction was Utilized.

 

 

TECHNIQUE: CT scan of the head is performed without contrast.

 

FINDINGS:   There is no acute intracranial hemorrhage, mass effect, or midline shift identified.  The
 ventricles and sulci are within normal limits in size. No suspicious extra-axial fluid collection.  
The globes are intact and the visualized sinuses are clear. Soft tissue contusion without hematoma is
 seen of the occiput scalp near the high vertex.

 

IMPRESSION: 

1. No acute intracranial hemorrhage, mass effect, or midline shift is seen.

2. Scalp contusion without well-formed hematoma of the soft tissues near the high vertex.

## 2019-11-05 NOTE — CT
EXAMINATION TYPE: CT lumbar spine wo con

 

DATE OF EXAM: 11/5/2019

 

COMPARISON: CT 3/4/2019

 

HISTORY: Trauma, weakness, fall

 

CT DLP: 842.1 mGycm

Automated exposure control for dose reduction was used.

 

An unenhanced CT of the lumbar spine was performed.  Bone and soft tissue window settings are submitt
ed as well as coronal and sagittal reconstructions. 

 

FINDINGS:

Loss of disc height with vacuum phenomenon L5-S1 is again noted, is endplate sclerosis consistent wit
h degenerative disc disease, associated spondylosis. Lumbar vertebral bodies show stable height, alig
nment, bone mineralization. Inferior vena cava filter is noted incidentally, structures are noted ext
raluminally of questionable clinical significance. Lumbar vertebral bodies are intact. Diverticular c
hanges noted incidentally in the sigmoid colon level.

 

L1-L2: Normal disc space height.  No disc herniation protrusion or central stenosis.  No facet joint 
arthropathy.  No evidence for foraminal encroachment.

 

L2-L3: Normal disc space height.  No disc herniation protrusion or central stenosis.  No facet joint 
arthropathy.  No evidence for foraminal encroachment.

 

L3-L4: Normal disc space height.  No disc herniation protrusion or central stenosis.  No facet joint 
arthropathy.  No evidence for foraminal encroachment.

 

L4-L5: Minimal posterior broad-based disc bulge causes only slight contact the anterior thecal sac. N
o significant spinal stenosis or foraminal encroachment.

 

L5-S1: Small right posterior paracentral disc herniation causes anterolateral mass effect on the thec
al sac and likely contact with the proximal right S1 nerve root. No significant spinal stenosis. Diff
erential extension endplate disc complex encroaches on the neural foramina.

 

IMPRESSION:

Mild degenerative disc disease. No acute fracture or subluxation. Inferior vena cava filter as descri
bed.
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
## 2019-11-06 NOTE — P.OP
Date of Procedure: 11/06/19


Preoperative Diagnosis: 


GI bleed


Postoperative Diagnosis: 


Mild antral gastritis





External hemorrhoids


Procedure(s) Performed: 


EGD





Colonoscopy


Anesthesia: MAC


Surgeon: Isaias Bravo


Pathology: other (Antrum)


Condition: stable


Disposition: PACU


Description of Procedure: 


The patient's placed on the endoscopy table lateral position.  He received IV 

sedation.  The gastric was placed oropharynx and passed in the esophagus and 

stomach.  Scope was then placed through the pylorus.  The first and second port

ion of duodenum appeared normal.  Scope was then brought back the antrum was 

mildly inflamed.  A biopsies performed.  Scope was unretroflexed and remainder 

of the stomach appeared normal.  The GE junction was at 47 is.  The distal 

esophagus appeared normal.  The proximal esophagus..  There is no evidence of 

upper GI bleed.  Scope was withdrawn for patient.





Next digital rectal exam was performed which revealed external hemorrhoids.  

Flexible colonoscope was then placed patient anus passed throughout the entire 

colon.  The ileocecal valve sutures.  The cecum, ascending and transverse colon 

appeared normal.  The descending and; appeared normal.  Scope was then brought 

back the rectum and this was normal.  Scope was brought back through the anus 

and external hemorrhoids are noted.





There is no incision any active GI bleed.  It was presumed that his rectal 

bleeding was due to his external hemorrhoids.

## 2019-11-07 NOTE — P.PN
Subjective


Progress Note Date: 11/07/19


Principal diagnosis: 





Lower and upper GI bleed is an episode





Doubt hemoptysis





History of DVT PE and factor V Leyden deficiency, on anticoagulation with 

Coumadin





Chronic paroxysmal atrial fibrillation





COPD stable not in exacerbation





Generalized weakness





Near syncopal episode due to above


11/7/2019,, patient seen eval examined during the rounds labs reviewed 

medications reviewed, he is status post endoscopy upper and lower no significant

source of bleeding has been identified, patient has some hemorrhoids bleeding 

have his stopped now, patient can be resumed on Coumadin was restarted, patient 

will be started on heparin drip to bridge because of his concern with recurrent 

DVT PE once INR in therapeutic range somewhere around 2 he can be discharged





Patient is a pleasant 44-year-old male, while he was in [place] at [facility] he has a

near syncopal episode probably lost his consciousness from 1-2 minutes bumped 

his head he refused to go to the emergency department for further evaluation he 

was and Route to [place] over here he had hematochezia and met Mrs. eventualariel

y presented to the emergency Department with several complaints.  Patient's main

complaint is rectal bleeding.  Patient has had 2 episodes today of gross blood 

when he thought he was going to have a bowel movement.  Patient does feel 

somewhat generally weak and short of breath, especially with exertion.  Patient 

does have history of COPD which appears to be stable.  





Patient adds that he had one episode today where he did cough up a little bit of

blood streaked with his sputum not clear if he is describing hematemesis.  





  Patient states he did also strike his lower back when he fell.  Patient denies

any confusion or speech problems.  No upper shoulder problems.  No history of 

previous back problems or leg weakness.  Patient does have history of pancreatic

and: Tumors.  Patient is on Coumadin for recurrent DVT and PE.








Objective





- Vital Signs


Vital signs: 


                                   Vital Signs











Temp  98.2 F   11/07/19 08:14


 


Pulse  68   11/07/19 08:14


 


Resp  14   11/07/19 08:14


 


BP  143/87   11/07/19 08:14


 


Pulse Ox  98   11/07/19 08:14








                                 Intake & Output











 11/06/19 11/07/19 11/07/19





 18:59 06:59 18:59


 


Intake Total 1000 1000 


 


Output Total 400 300 


 


Balance 600 700 


 


Weight 90.718 kg  


 


Intake:   


 


  IV 1000  


 


    Sodium Chloride 0.9% 1, 800  





    000 ml @ 100 mls/hr IV .   





    Q10H LEEANNE Rx#:[id number]   


 


  Oral  1000 


 


Output:   


 


  Urine 400 300 


 


Other:   


 


  # Voids  2 














- Exam


- Constitutional


General appearance: average body habitus, cooperative, disheveled





- EENT


Eyes: anicteric sclerae, EOMI, PERRLA, dentition normal, normal appearance


ENT: normal oropharynx


Ears: bilateral: normal





- Neck


Neck: normal ROM


Carotids: bilateral: upstroke normal


Thyroid: bilateral: normal size





- Respiratory


Respiratory: bilateral: CTA





- Cardiovascular


Rhythm: regular


Heart sounds: normal: S1, S2





- Gastrointestinal


General gastrointestinal: normal bowel sounds, soft





- Neurologic


Neurologic: CNII-XII intact





- Musculoskeletal


Musculoskeletal: gait normal, generalized weakness, strength equal bilaterally





- Psychiatric


Psychiatric: A&O x's 3, appropriate affect, intact judgment & insight








- Labs


CBC & Chem 7: 


                                 11/06/19 08:46





                                 11/05/19 09:49


Labs: 


                  Abnormal Lab Results - Last 24 Hours (Table)











  11/06/19 Range/Units





  08:46 


 


MCV  79.3 L  (80.0-100.0)  fL


 


Lymphocytes #  0.9 L  (1.0-4.8)  k/uL














Assessment and Plan


Assessment: 





Lower and upper GI bleed is an episode resolved now no evidence of active





Doubt hemoptysis





History of DVT PE and factor V Leyden deficiency, on anticoagulation with 

Coumadin





Chronic paroxysmal atrial fibrillation





COPD stable not in exacerbation





Generalized weakness





Near syncopal episode due to above





HIV status














Plan: 


Start heparin drip resumed Coumadin once INR is around 2 patient can be 

discharged home





Monitor hemoglobin closely





Gentle rehydration





Status post upper and lower endoscopy with findings as noted above





Agree with bronchodilator as needed





Further management and plan of care as per clinical response of the patient


Time with Patient: Greater than 30

## 2019-11-07 NOTE — P.PN
Subjective


Progress Note Date: 11/07/19





CHIEF COMPLAINT: Rectal bleeding





HISTORY OF PRESENT ILLNESS: Patient is status post EGD and colonoscopy revealing

mild antral gastritis and extra hemorrhoids.  Patient denies any further 

episodes of rectal bleeding.  Hemoglobin 12.5.  Patient's vital signs are st

able.  He is afebrile.  Tolerating diet.  





PHYSICAL EXAM: 


VITAL SIGNS: Reviewed.


GENERAL: Well-developed in no acute distress. 


HEENT:  No sclera icterus. Extraocular movements grossly intact.  Moist buccal 

mucosa. Head is atraumatic, normocephalic. 


ABDOMEN:  Soft.  Nondistended. Nontender. 


NEUROLOGIC: Alert and oriented. Cranial nerves II through XII grossly intact.





ASSESSMENT: 


1.  Rectal bleeding


2.  Recent fall from standing, imaging negative for fractures, CT brain reveals 

scalp contusion


3.  Positive toxicology screen, + for cocaine


4.  History of DVT, PE and factor V Leyden deficiency, on anticoagulation with 

Coumadin, INR subtherapeutic on admission


5.  History of HIV





PLAN: 


Continue diet as tolerated


Patient is stable for discharge from a surgical standpoint when cleared by 

admitting physician





Nurse practitioner note has been reviewed by physician. Signing provider agrees 

with the documented findings, assessment, and plan of care. 








Objective





- Vital Signs


Vital signs: 


                                   Vital Signs











Temp  97.4 F L  11/07/19 12:14


 


Pulse  84   11/07/19 12:14


 


Resp  14   11/07/19 12:14


 


BP  122/75   11/07/19 12:14


 


Pulse Ox  99   11/07/19 12:14








                                 Intake & Output











 11/06/19 11/07/19 11/07/19





 18:59 06:59 18:59


 


Intake Total 1000 1000 


 


Output Total 400 300 


 


Balance 600 700 


 


Weight 90.718 kg  


 


Intake:   


 


  IV 1000  


 


    Sodium Chloride 0.9% 1, 800  





    000 ml @ 100 mls/hr IV .   





    Q10H LEEANNE Rx#:[id number]   


 


  Oral  1000 


 


Output:   


 


  Urine 400 300 


 


Other:   


 


  # Voids  2 2














- Labs


CBC & Chem 7: 


                                 11/07/19 08:49





                                 11/05/19 09:49


Labs: 


                  Abnormal Lab Results - Last 24 Hours (Table)











  11/07/19 Range/Units





  08:49 


 


Hgb  12.5 L  (13.0-17.5)  gm/dL


 


Hct  37.8 L  (39.0-53.0)  %

## 2019-11-08 NOTE — P.PN
Subjective


Progress Note Date: 11/08/19


Principal diagnosis: 





Lower and upper GI bleed is an episode





Doubt hemoptysis





History of DVT PE and factor V Leyden deficiency, on anticoagulation with 

Coumadin





Chronic paroxysmal atrial fibrillation





COPD stable not in exacerbation





Generalized weakness





Near syncopal episode due to above


11/08/2019, patient seen and evaluated examined during the rounds labs reviewed 

medications reviewed patient has been heparin doing well have multiple pain 

related problem issues has been getting more Dilaudid every 3 hour denies any 

chest pain or shortness of breath





11/7/2019,, patient seen eval examined during the rounds labs reviewed 

medications reviewed, he is status post endoscopy upper and lower no significant

source of bleeding has been identified, patient has some hemorrhoids bleeding 

have his stopped now, patient can be resumed on Coumadin was restarted, patient 

will be started on heparin drip to bridge because of his concern with recurrent 

DVT PE once INR in therapeutic range somewhere around 2 he can be discharged





Patient is a pleasant 44-year-old male, while he was in [place] at [facility] he has a

near syncopal episode probably lost his consciousness from 1-2 minutes bumped 

his head he refused to go to the emergency department for further evaluation he 

was and Route to [place] over here he had hematochezia and met Mrs. 

eventually presented to the emergency Department with several complaints.  

Patient's main complaint is rectal bleeding.  Patient has had 2 episodes today 

of gross blood when he thought he was going to have a bowel movement.  Patient 

does feel somewhat generally weak and short of breath, especially with exertion.

 Patient does have history of COPD which appears to be stable.  





Patient adds that he had one episode today where he did cough up a little bit of

blood streaked with his sputum not clear if he is describing hematemesis.  





  Patient states he did also strike his lower back when he fell.  Patient denies

any confusion or speech problems.  No upper shoulder problems.  No history of 

previous back problems or leg weakness.  Patient does have history of pancreatic

and: Tumors.  Patient is on Coumadin for recurrent DVT and PE.








Objective





- Vital Signs


Vital signs: 


                                   Vital Signs











Temp  98.4 F   11/08/19 05:00


 


Pulse  94   11/08/19 05:00


 


Resp  18   11/08/19 05:00


 


BP  103/68   11/08/19 05:00


 


Pulse Ox  97   11/08/19 05:00








                                 Intake & Output











 11/07/19 11/08/19 11/08/19





 18:59 06:59 18:59


 


Intake Total 78.667 671.333 23.742


 


Balance 78.667 671.333 23.742


 


Weight 90.718 kg  


 


Intake:   


 


  Intake, IV Titration 78.667 171.333 23.742





  Amount   


 


    Heparin Sod,Pork in 0.45% 78.667 171.333 23.742





    NaCl 25,000 unit In 0.45   





    % NaCl 1 250ml.bag @ 11.   





    023 UNITS/KG/HR 10 mls/hr   





    IV .Q24H LEEANNE Rx#:   





    [id number]   


 


  Oral  500 


 


Other:   


 


  Voiding Method  Urinal 


 


  # Voids 1 2 2


 


  # Bowel Movements   0














- Exam


- Constitutional


General appearance: average body habitus, cooperative, disheveled





- EENT


Eyes: anicteric sclerae, EOMI, PERRLA, dentition normal, normal appearance


ENT: normal oropharynx


Ears: bilateral: normal





- Neck


Neck: normal ROM


Carotids: bilateral: upstroke normal


Thyroid: bilateral: normal size





- Respiratory


Respiratory: bilateral: CTA





- Cardiovascular


Rhythm: regular


Heart sounds: normal: S1, S2





- Gastrointestinal


General gastrointestinal: normal bowel sounds, soft





- Neurologic


Neurologic: CNII-XII intact





- Musculoskeletal


Musculoskeletal: gait normal, generalized weakness, strength equal bilaterally





- Psychiatric


Psychiatric: A&O x's 3, appropriate affect, intact judgment & insight








- Labs


CBC & Chem 7: 


                                 11/08/19 07:51





                                 11/05/19 09:49


Labs: 


                  Abnormal Lab Results - Last 24 Hours (Table)











  11/07/19 11/08/19 11/08/19 Range/Units





  16:01 01:12 07:51 


 


MCV    78.4 L  (80.0-100.0)  fL


 


Lymphocytes #    0.9 L  (1.0-4.8)  k/uL


 


APTT  37.2 H  73.9 H   (22.0-30.0)  sec














  11/08/19 Range/Units





  11:06 


 


MCV   (80.0-100.0)  fL


 


Lymphocytes #   (1.0-4.8)  k/uL


 


APTT  30.7 H  (22.0-30.0)  sec














Assessment and Plan


Assessment: 





Lower and upper GI bleed is an episode resolved now no evidence of active





Doubt hemoptysis





History of DVT PE and factor V Leyden deficiency, on anticoagulation with 

Coumadin





Chronic paroxysmal atrial fibrillation





COPD stable not in exacerbation





Generalized weakness





Near syncopal episode due to above





HIV status














Plan: 


Continue heparin drip resumed Coumadin once INR is around 2 patient can be 

discharged home





Monitor hemoglobin closely





Gentle rehydration





Status post upper and lower endoscopy with findings as noted above





Agree with bronchodilator as needed





Further management and plan of care as per clinical response of the patient


Time with Patient: Greater than 30
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
## 2020-03-08 NOTE — XR
EXAMINATION TYPE: XR chest 1V portable

 

DATE OF EXAM: 3/8/2020

 

COMPARISON: 11/5/2019

 

HISTORY: Difficulty breathing

 

TECHNIQUE:

 

FINDINGS: There is elevated right diaphragm. There is poor inspiration. There is no heart failure nor
 confluent pneumonic infiltrate. There are chest leads. Heart size is normal.

 

IMPRESSION: Poor inspiration that is decreased compared to last exam. Mild elevation of the right giovanny
phragm could relate to partial paralysis.

## 2020-03-09 NOTE — US
EXAMINATION TYPE: US venous doppler duplex UE BI

 

DATE OF EXAM: 3/9/2020

 

COMPARISON: None.

 

CLINICAL HISTORY: possible DVT. Pain bilateral arms, patient states bilateral DVT's upper arm from [facility] facility. Patient currently on Heparin. IV right neck 

 

SIDE PERFORMED: bilateral 

 

 

 

Right Arm: Unable to visualize right jugular vein due to IV in neck. appears to have DVT right axilla
ry vein. superficial thrombus right basilic vein  

 

Left Arm: appears to be non-occlusive DVT left brachial vein. superficial thrombus left basilic vein 
 

 

Grayscale, color doppler, spectral doppler imaging performed of the deep veins of the bilateral upper
 extremities.  

 

IMPRESSION: Images show acute superficial thrombus in the bilateral basilic veins with incomplete com
pressible right axillary vein consistent with acute DVT at this level as there is absent color flow. 
Additional partial occlusive acute DVT thought present in the left brachial vein where there is hyper
echoic material filling the lumen and incomplete compression noted .

 

 

 

 

A Yellow level critical message alert has been initiated for Yoana Miramontes MD via the [employer] Critical Results System on 3/9/2020 4:41 PM.  This message alert has been sent to Yoana Miramontes MD v
ia the preferences provided by the clinician for the receipt of Radiology Critical Findings. Message 
ID [id number].

## 2020-03-09 NOTE — HP
HISTORY AND PHYSICAL



DATE OF SERVICE:

03/09/2020



CHIEF COMPLAINT:

Shortness of breath and bilateral leg pain and possible DVT.



HISTORY OF PRESENT ILLNESS:

This 45-year-old gentleman with a past medical history of multiple medical problems

including atrial fibrillation, history of asthma, history of blood disorder, history of

CHF, COPD, DVT, history of myocardial infarction, pulmonary embolus, seizure disorder,

history of factor 5 Leiden deficiency, history of multiple DVTs in the bilateral legs,

history of multiple PE, CHF, HIV, hepatitis B, history of pancreatic cancer treated

with chemoradiation and his history of bilateral PE and thrombectomy in [facility] and multiple allergies to multiple anticoagulants, being followed by primary

physician elsewhere also seeing Dr. aLla. Currently the patient was having some

shortness of breath and bilateral arm pain.  The patient went to an urgent care center

and was found to have bilateral upper limb DVTs and the patient presented to [facility] a couple days later with complaints of shortness of breath.  The patient

apparently was off medications for last 3 days.  The patient also has some cough which

is nonproductive.  Patient admitted for further evaluation treatment.  There is no

history of fever, rigors.  No history of headache, loss of consciousness, seizures.



PAST MEDICAL HISTORY:

History of atrial fibrillation, history of asthma, history of COPD, CHF, history of

pulmonary embolism, history of seizure disorder, history of factor V Leiden, history of

multiple DVT, HIV, hepatitis B.



HOME MEDICATIONS:

1. Thorazine 50 mg p.o. t.i.d.

2. Coumadin 5 mg p.o. daily.

3. Spiriva 1 puff daily.

4. Rinku-24 300 mg.

5. Symbyax 12/25 mg p.o. b.i.d.

6. Crestor 40 mg p.o. daily.

7. Norvir 100 mg p.o. daily.

8. Trileptal 300 mg p.o. daily.

9. (  ).

10.Singulair 10 mg p.o. daily.

11.Lidocaine patch.

12.Atrovent 2 puffs q.6h p.r.n.

13.Iron sulfate 325 mg p.o. daily.

14.Pepcid 20 mg p.o. b.i.d.

15.Nexium 40 mg p.o. b.i.d.

16.Epzicom 300, 2 tablets p.o. daily.

17.Truvada 1 tablet p.o. daily.

18.Plavix 75 mg p.o. daily.

19.Ventolin 2.5 q.i.d. p.r.n.

20.Xanax 0 1 mg t.i.d. p.r.n.



ALLERGIES:

Multiple allergies including APIXABAN (  ), CELEXA PRODUCTS, FRAGMIN, LOVENOX, (  )

ARIXTRA, GRASS, POLLEN, HALDOL, ATARAX, MOTRIN, CONTRAST DYES, LANOLIN, SKELAXIN,

ROBAXIN, PORK, SULFA ANTIBIOTICS, ULTRAM.



FAMILY HISTORY:

History of CAD, history of myocardial infarction in the family.



SOCIAL HISTORY:

History of smoking.  No alcohol intake.



REVIEW OF SYSTEMS:

ENT No history of diminished hearing or vision.

CARDIOVASCULAR  As mentioned earlier.

RESPIRATORY As mentioned earlier.

GI No nausea, vomiting, or diarrhea.

 No dysuria.

NERVOUS  No numbness or weakness.

ALLERGY/IMMUNOLOGY No asthma or hayfever.

MUSCULOSKELETAL As mentioned earlier.

HEMATOLOGY/ONCOLOGY Negative.

ENDOCRINE No history of diabetes or hypothyroidism.

SKIN Negative.

CONSTITUTIONAL As mentioned earlier.

PSYCHIATRY As mentioned earlier.



PHYSICAL EXAM:

Patient is alert, oriented x3.  Pulse is 104, blood pressure 130/70, respirations 16,

temperature 98.4, pulse ox 97% on room air.

HEENT:  Conjunctivae normal. Oral mucosa moist.

NECK:  No jugular venous distention.  No lymph node enlargement.

CARDIOVASCULAR:  S1, S2.

RESPIRATORY: Diminished breath sounds at the bases. A few rhonchi, no crackles.

ABDOMEN: Soft, nontender.  No mass palpable.

LEGS: No edema, no swelling.

NERVOUS SYSTEM: Higher functions mentioned earlier. Moves all four limbs. No focal

deficits.

LYMPHATICS: No lymph node in neck or axilla.

SKIN: No rash.

JOINTS: No active deforming arthropathy.

Examination of the hand:  Some tenderness present, local tenderness.



LABS:

At this time shows WBC 4.2, hemoglobin 10.5.  Otherwise, platelets 300 and APTT 19, (

) 64.3.  Potassium 3.4 and creatinine 0.65.



ASSESSMENT:

1. Shortness of breath, rule out acute pulmonary embolism.

2. Bilateral upper limb pains and evidence of acute superficial thrombosis in the

    bilateral basilic veins with right axilla vein acute deep venous thrombosis and

    left brachial vein acute deep venous thrombosis.

3. Microcytic anemia.

4. Hypokalemia.

5. Multiple allergies.

6. History of atrial fibrillation, paroxysmal.

7. History of asthma.

8. Chronic obstructive pulmonary disease acute exacerbation.

9. History of chest pain.

10.History of congestive heart failure.

11.History of deep vein thrombosis.

12.Liver disease.

13.History of migraine.

14.History of pulmonary embolus.

15.Seizure disorder.

16.History of sleep apnea.

17.History of factor V Leiden deficiency.

18.History of multiple deep venous thromboses.

19.History of multiple pulmonary embolisms.

20.History of HIV.

21.History of hepatitis B.

22.History of pancreatic cancer, apparently on chemo radiation.

23.History of chronic hypoxic respiratory failure.

24.History of MRSA.

25.History of appendectomy.

26.History of myxoma, removed.

27.History of nicotine dependence.



RECOMMENDATIONS AND DISCUSSION:

In this 45-year-old gentleman who presented with multiple complex medical issues, we

will monitor the patient closely, continue the current management and symptomatic

treatment.  Continue the bronchodilators. Otherwise, resume the home medications. I

would also recommend a CT angio with preparation because of the patient's allergies.

Otherwise, we will consult Hematology/Oncology, and as well as Pulmonary.  We will

closely monitor.  Otherwise, continue the telemetry.  IV steroids are given.  Optimize

bronchodilators.  Prognosis guarded because of multiple complex medical issues.

Further recommendations to follow.





MMODL / JOANNAN: [id number] / Job#: [id number]

## 2020-03-10 NOTE — CONS
CONSULTATION



Leo Tellez is a 45-year-old male who presented to the ED with shortness of breath.  He

also was having some chest pain and swelling of his left upper extremity.  He said he

was seen at an urgent care where a venous Doppler was done of his extremity.  This

showed apparently evidence of blood clots.  Subsequent ultrasound done showed acute

superficial thrombus in bilateral basilic veins with incomplete compressible right

axillary vein consistent with an acute DVT.  There was also a partial occlusive acute

DVT in the left brachial vein as well.  The patient has a known history of Factor V

Leiden and has had recurrent DVTs.  He also has a history of asthma with COPD and ran

out of his inhalers as well as his Coumadin.  He was started on heparin in the ER and

admitted for further evaluation and management.



PAST MEDICAL HISTORY:

Positive for recurrent DVTs for which he had been on apixaban, but apparently ran out

of it, history of atrial fibrillation, asthma, history of congestive heart failure,

COPD, pancreatic cancer, obstructive sleep apnea with CPAP, HIV positivity, hepatitis

B.



REVIEW OF SYSTEMS:

Noncontributory.



SOCIAL HISTORY:

Patient is an ex-smoker.  Does not drink alcohol excessively.



FAMILY HISTORY:

Positive for coronary artery disease in his family.



MEDICATIONS:

Medications prior to admission were: Thorazine, Coumadin, Spiriva, Rinku-24, Symbyax,

Crestor, Norvir, Trileptal, multivitamin, montelukast, lidocaine cream, ipratropium,

ferrous sulfate, Pepcid, esomeprazole, Epzicom, Truvada, Plavix, Ventolin, and Xanax.



PHYSICAL EXAMINATION:

On physical examination, he was lying in bed.  He was anxious.  He was tachycardic with

a heart rate of 116, respiratory rate of 16, temperature 98.3, blood pressure 140/82,

O2 saturation on room air is 97%.

HEENT reveals pupils are equal.  No jugular venous distention.

Chest reveals decreased breath sounds.  No clear wheeze.

Cardiovascular system reveals an S1, S2.

Abdomen is soft.

There is no pedal edema.



Chest x-ray shows mild elevation of the right hemidiaphragm.



Venous Doppler showed evidence of bilateral basilic vein superficial thrombus along

with right axillary vein incomplete venous thrombosis and partial occlusive acute DVT

in the left brachial vein as well.



LABS:

Labs reveal a white count of 6.4, hemoglobin of 10.4.



IMPRESSION AT THIS TIME:

1. Upper extremity deep venous thrombosis.

2. Possible pulmonary embolism.

3. Asthma.

4. HIV positive.

At this point in time, would keep him on albuterol, IV heparin, IV steroids, Singulair,

and his retroviral medications.  Would add inhaled steroids to his regimen.  His

prognosis is guarded.  He was counseled regarding his condition and this approach.



I would like to thank you for allowing me to participate in his care.



The patient has several allergies which are documented in the chart and were reviewed

by me.





HARRIETT / NITHYA: [id number] / Job#: [id number]

## 2020-03-10 NOTE — PN
PROGRESS NOTE



DATE OF SERVICE:

03/10/2020



This patient has been hemodynamically stable.  He does not complain of chest pain.  He

is less short of breath.



On physical examination, respiratory rate is 18, pulse rate 107, temperature 97.9,

blood pressure 125/85, oxygen saturation on room air 97%.  HEENT is unremarkable.

Chest reveals decreased breath sounds; no clear wheeze. Cardiovascular system reveals

an S1, S2.  Abdomen is soft.  There is no pedal edema.



White count is 10.8, hemoglobin of 9.2.



IMPRESSION AT THIS TIME:

1. Upper extremity deep venous thrombosis.

2. Possible pulmonary embolus.  CT scan of the chest, if it were to be done with

    contrast, may not add to his overall plan of care. He is reluctant to have it, and

    we would agree with that at this time.

3. Asthma.

4. HIV positive.

Continue IV heparin, steroids, anti-retroviral medications, Singulair. Increase his

activity level.  His prognosis is fair.





MMODL / IJN: [id number] / Job#: [id number]

## 2020-03-10 NOTE — US
EXAMINATION TYPE: US venous doppler duplex LE 

 

DATE OF EXAM: 3/10/2020 11:17 AM

 

COMPARISON: US bilateral January 11, 2019

 

CLINICAL HISTORY: subtherapeutic on coumadin, Hx factor V. Bilateral leg cramps

 

SIDE PERFORMED: Bilateral  

 

TECHNIQUE:  The lower extremity deep venous system is examined utilizing real time linear array sonog
tapan with graded compression, doppler sonography and color-flow sonography.

 

VESSELS IMAGED:

External Iliac Vein (EIV)

Common Femoral Vein

Deep Femoral Vein

Greater Saphenous Vein *

Femoral Vein

Popliteal Vein

Small Saphenous Vein *

Proximal Calf Veins

(* superficial vessels)

 

 

 

Right Leg:  Appears negative for DVT

 

Left Leg:  Visualized portions appear negative for DVT, unable to visual mid and distal femoral vein 
due to large indenting scar along medial thigh 

 

Grayscale, color doppler, spectral doppler imaging performed of the deep veins of the bilateral lower
 extremities.  There is normal flow, compressibility, vascular waveforms in the visualized portions.

 

IMPRESSION:  Suboptimal evaluation of entire left lower extremity, visualized portions bilaterally sh
ow no ultrasound evidence for acute DVT.
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
## 2020-03-10 NOTE — PN
PROGRESS NOTE



DATE OF SERVICE:

03/10/2020



This 45-year-old gentleman admitted with shortness of breath and multiple medical

issues, apparently had bilateral left upper limb DVT.  The patient also had significant

left upper limb pain.  Patient is evaluated by PT.  Patient also had a factor V Leiden

deficiency.  The patient is being closely monitored.  The patient has shortness of

breath also.  Multiple consult are following the patient closely.  A venous Doppler was

done and the leg DVT showed normal flow.  The patient apparently had indeterminate V/Q

scan with this with multiple CT scans previously, so because of that reason we are

trying to get a CT angio to rule out the possibility of pulmonary embolism.  There is

no history of fevers or rigors.



PAST MEDICAL HISTORY:

Reviewed.



REVIEW OF SYSTEMS:

CARDIOVASCULAR SYSTEM: No angina.

RESPIRATORY:  As mentioned earlier.

GI:  As mentioned earlier.

:  No dysuria.

NERVOUS SYSTEM:  No numbness or weakness.



CURRENT MEDICATIONS:

1. Ventolin.

2. Lipitor.

3. Pulmicort.

4. Plavix.

5. Benadryl.

6. Iron sulfate.

7. Heparin.

8. Dilaudid.

9. Ativan.

10.Solu-Medrol.

11.Singulair.

12.Multivitamins.

13.Cymbalta.

14.Protonix.

15.Norvir.

16.Rinku-24.



PHYSICAL EXAM:

Patient is alert and oriented x3.  Pulse 107, blood pressure 120/82, respiration 18,

temperature 97.9, pulse ox 97% on room air.

HEENT:  Conjunctivae normal. Oral mucosa moist.

NECK:  No jugular venous distention.  No lymph node enlargement.

CARDIOVASCULAR:  S1, S2.

RESPIRATORY: Diminished breath sounds at the bases. A few scattered rhonchi.

ABDOMEN: Soft, nontender.

LEGS: No edema, no swelling.

NERVOUS SYSTEM: No focal deficits.



LABS:

At this time shows WBC 10.8, hemoglobin 9.2.  APTT noted.  Influenza negative.



ASSESSMENT:

1. Asthma, chronic obstructive pulmonary disease acute exacerbation.

2. Shortness of breath, rule out pulmonary embolism.

3. Bilateral upper limb pains as well as evidence of acute superficial

    thrombophlebitis of the bilateral basilic veins and also right axillary vein acute

    deep venous thrombosis and left brachial vein acute deep venous thrombosis.

4. Microcytic anemia.

5. Hypokalemia.

6. Multiple allergies.

7. History of atrial fibrillation, paroxysmal.

8. History of chest pain.

9. History of congestive heart failure.

10.History of deep vein thrombosis.

11.History of chronic liver disease.

12.History of migraine.

13.History of pulmonary embolism.

14.History of seizure disorder.

15.History of sleep apnea.

16.History of Factor 5 Leiden deficiency.

17.History of multiple pulmonary embolism.

18.History of HIV.

19.History hepatitis B.

20.History of pancreatic cancer, apparently status post chemoradiation.

21.History of chronic hypoxic respiratory failure.

22.History of MRSA.

23.History of appendectomy.

24.History of myxoma, removed.

25.History of nicotine dependence.



RECOMMENDATIONS AND DISCUSSION:

In this 45-year-old gentleman with multiple complex medical issues, we will monitor the

patient closely, continue current medical management, continue symptomatic treatments.

Otherwise, at this time I recommend continue with IV heparin and I would recommend a CT

angio if not possible PE.  The V/Q scan otherwise for PE.  Otherwise, continue rest of

medications.  Guarded prognosis.  Further recommendations to follow.  Will monitor PT,

INR closely.





MMODL / IJN: [id number] / Job#: [id number]

## 2020-03-11 NOTE — PN
PROGRESS NOTE



DATE OF SERVICE:

03/11/2020



This 45-year-old gentleman admitted with multiple complaints, including bilateral upper

leg DVT, also was suspected to have pulmonary embolism; however, the CT angio could not

be done and Dr. BEBE Alvarado recommended continued treatment without further evaluation.

The patient is being closely monitored at this time.  The patient was complaining of

severe pain also, on pain medications.  Please refer to staff notes for further

information.  Patient is being closely monitored. Past medical history reviewed.



REVIEW OF SYSTEMS:

CARDIOVASCULAR SYSTEM: No angina, palpitations.

RESPIRATORY SYSTEM: As mentioned earlier.

GI: As mentioned earlier.

MUSCULOSKELETAL: As mentioned earlier.



CURRENT MEDICATIONS:

Ventolin, Lipitor, Cepacol, Pulmicort, Plavix, iron sulfate, heparin, NovoLog, Ativan,

multivitamins, Norvir p.o., Coumadin.



PHYSICAL EXAMINATION:

Patient is alert and oriented x3.  The pulse is 91, blood pressure 161/84, respiration

18, temperature 97.8, pulse ox 98% on room air.

HEENT: Conjunctivae normal.

NECK: No jugular venous distention.

CARDIOVASCULAR SYSTEM:  S1, S2 muffled.

RESPIRATORY SYSTEM: Breath sounds diminished at the bases.  A few scattered rhonchi.

ABDOMEN: Soft, non-tender. No mass palpable.

LEGS: No edema. No swelling.

NERVOUS SYSTEM: Higher functions as mentioned earlier. Moves all 4 limbs. No focal

motor or sensory deficit.

LYMPHATICS: No lymph node palpable in neck, axillae or groin.

SKIN: No ulcer, rash, bleeding.

JOINTS: No active deforming arthropathy.



LABS:

WBC 10.4, hemoglobin 9.1. Sodium 132, potassium 4.6. Glucose 667 and then subsequently

568.



ASSESSMENT:

1. Asthma, chronic obstructive pulmonary disease, acute exacerbation.

2. Shortness of breath; rule out pulmonary embolism.

3. Bilateral upper limb pains as well as evidence of superficial thrombophlebitis of

    the bilateral basilic veins as well as right axillary vein acute deep venous

    thrombosis with left brachial acute deep venous thrombosis.

4. Microcytic anemia.

5. Hypokalemia.

6. Diabetes mellitus, type 2, uncontrolled.

7. Multiple allergies.

8. History of atrial fibrillation, paroxysmal.

9. History of chest pain.

10.History of congestive heart failure.

11.History of deep vein thrombosis.

12.History of chronic liver disease.

13.History of migraine.

14.History of pulmonary embolism.

15.Seizure disorder.

16.History of sleep apnea.

17.Factor V Leiden deficiency.

18.History of multiple pulmonary emboli.

19.History of HIV.

20.History of hepatitis B.

21.History of pancreatic cancer, apparently post chemoradiation.

22.History of chronic hypoxic respiratory failure.

23.History of methicillin-resistant Staphylococcus aeruginosa.

24.History of appendectomy.

25.History of myxoma, removed.

26.History of nicotine dependence.



RECOMMENDATIONS AND DISCUSSION:

I recommend to continue current medications, continue with the monitoring, symptomatic

treatment. Continue with IV heparin.  Monitor blood sugars closely.  If the blood

sugars are elevated, I recommend serum ketones and recommend insulin drip.  Will check

hemoglobin A1c also.  Guarded prognosis because of multiple complex medical issues.

Further recommendations to follow.





MMODL / IJN: [id number] / Job#: [id number]

## 2020-03-11 NOTE — PN
PROGRESS NOTE



DATE OF SERVICE:

03/11/2020



This patient has been hemodynamically stable.  He does not have chest pain.  He has

walked in the hallway.



On physical examination, his blood pressure is 167/84, respiratory rate of 18, pulse

rate of 108, temperature 97.8. Oxygen saturation on room air is 98%.  HEENT is

unremarkable.  Chest reveals prolonged exhalation.  No wheeze.  Cardiovascular system

reveals an S1, S2.  Abdomen is soft.  There is no edema.



IMPRESSION AT THIS TIME:

1. Pulmonary embolus is likely.

2. Upper extremity deep venous thrombosis.

3. Uncontrolled diabetes mellitus, in part due to steroids.

4. Asthma with exacerbation.

5. HIV disease.

At this point in time, would discontinue his steroids, increase his activity level.

Continue anticoagulation per Hematology.  Depending on how he does, we shall make

further changes to his care.





HARRIETT / NITHYA: [id number] / Job#: [id number]

## 2020-03-11 NOTE — ECHOF
Referral Reason:LV function



MEASUREMENTS

--------

HEIGHT: 172.7 cm

WEIGHT: 88.5 kg

BP: 163/93

IVSd:   0.9 cm     (0.6 - 1.1)

LVIDd:   3.6 cm     (3.9 - 5.3)

LVPWd:   1.0 cm     (0.6 - 1.1)

IVSs:   1.6 cm

LVIDs:   2.3 cm

LVPWs:   1.9 cm

LAESV Index (A-L):   23.80 ml/m

Ao Diam:   3.1 cm     (2.0 - 3.7)

AV Cusp:   1.8 cm     (1.5 - 2.6)

LA Diam:   4.0 cm     (2.7 - 3.8)

MV EXCURSION:   16.659 mm     (> 18.000)

MV EF SLOPE:   79 mm/s     (70 - 150)

EPSS:   0.4 cm

MV E Patrick:   0.74 m/s

MV DecT:   129 ms

MV A Patrick:   0.97 m/s

MV E/A Ratio:   0.76 

RAP:   15.00 mmHg

RVSP:   33.58 mmHg







FINDINGS

--------

Sinus rhythm.

This was a technically good study.

The left ventricular size is normal.   Left ventricular wall thickness is normal.   Overall left vent
ricular systolic function is normal with, an EF between 55 - 60 %.   The diastolic filling pattern is
 normal for the age of the patient 6.31.

The right ventricle is normal in size.

The left atrial size is normal.

The right atrial size is normal.

The aortic valve is trileaflet and appears structurally normal.

The mitral valve is normal.   There is trace mitral regurgitation.

The tricuspid valve appears structurally normal.   Mild tricuspid regurgitation present.   Right vent
ricular systolic pressure is normal at < 35 mmHg.

There is no pulmonic regurgitation present.

The aortic root size is normal.

The inferior vena cava is mildly dilated.

There is no pericardial effusion.



CONCLUSIONS

--------

1. Sinus rhythm.

2. This was a technically good study.

3. The left ventricular size is normal.

4. Left ventricular wall thickness is normal.

5. Overall left ventricular systolic function is normal with, an EF between 55 - 60 %.

6. The diastolic filling pattern is normal for the age of the patient 6.31

7. The right ventricle is normal in size.

8. The left atrial size is normal.

9. The right atrial size is normal.

10. The aortic valve is trileaflet and appears structurally normal.

11. The mitral valve is normal.

12. There is trace mitral regurgitation.

13. The tricuspid valve appears structurally normal.

14. Mild tricuspid regurgitation present.

15. Right ventricular systolic pressure is normal at < 35 mmHg.

16. There is no pulmonic regurgitation present.

17. The aortic root size is normal.

18. The inferior vena cava is mildly dilated.

19. There is no pericardial effusion.





SONOGRAPHER: Minnie Rush RDCS

## 2020-03-12 NOTE — PN
PROGRESS NOTE



DATE OF SERVICE:

03/12/2020



This 45-year-old gentleman who was admitted with multiple medical problems 
including

history of asthma, shortness of breath also had pulmonary embolism.  The patient
on IV

heparin and the patient is also on Coumadin also INR is improved to 1.6 today.

The patient had multiple other issues including acute on chronic pain syndrome 
and as

well as diabetes mellitus also.  The patient is off insulin drip.  The patient 
is

refusing the Accu-Cheks at this time.  A 2D echo with Doppler was done which was
read

by Cardiology and showed ejection fraction about 50-60 percent and otherwise 
normal

findings.



PAST MEDICAL HISTORY:

Reviewed.



REVIEW OF SYSTEMS:

CARDIOVASCULAR SYSTEM: No angina or palpitations.

RESPIRATIONS: As mentioned earlier.

GASTROINTESTINAL: As mentioned earlier.

Nervous system: As mentioned earlier.

Musculoskeletal: As mentioned earlier.



CURRENT MEDICATIONS:

Reviewed and include:

1. Ventolin p.r.n.

2. Lipitor 40 mg.

3. Cepacol. Pulmicort.

4. Plavix.

5. Benadryl.

6. Iron sulfate.

7. Heparin IV.

8. Dilaudid.

9. NovoLog.

10.Ativan.

11.P.r.n. medications.

12.Medication noted.



PHYSICAL EXAM:

Patient is alert, oriented x3.  Pulse 79. Blood pressure 124/80, respirations 
16,

temperature 98 degrees, pulse ox 92 percent on room air.

HEENT: Conjunctivae normal.

NECK: No JVD.

CARDIOVASCULAR: S1, S2 muffled.

RESPIRATIONS: Breath sounds diminished in the bases. A few scattered rhonchi and

crackles.

ABDOMEN:  Soft, nontender.  No mass palpable.

LEGS:  No edema. No swelling.

Nervous System:  No focal deficits.



LABS:

INR 1.7.  Otherwise Accu-Cheks are noted.



ASSESSMENT:

1. Asthma, chronic obstructive pulmonary disease acute exacerbation.

2. Shortness of breath, possible pulmonary embolism.

3. Bilateral upper limb pains as well as evidence of superficial 
thrombophlebitis of

    the bilateral and as well as the right axillary vein, acute deep vein

    thrombosis with left brachial DVT.

4. Microcytic anemia.

5. Hypokalemia.

6. Diabetes type 2 uncontrolled.

7. Multiple allergies.

8. History of atrial fibrillation, paroxysmal.

9. History of chest pain.

10.History of congestive heart failure.

11.History of deep vein thrombosis.

12.History of chronic liver disease.

13.History of migraine.

14.History of pulmonary embolism.

15.History of seizure disorder.

16.History of sleep apnea.

17.History of Factor V Leiden deficiency.

18.History of multiple pulmonary emboli.

19.History of HIV.

21.History of pancreatic cancer, apparently post chemo radiation.

22.History of chronic hypoxic respiratory failure.

23.History of MRSA.

24.History of appendectomy.

25.History of myxoma removed.

26.History of nicotine dependence.



RECOMMENDATIONS AND DISCUSSION:

Recommend to continue current medications, monitoring, management and 
symptomatic

treatment. Otherwise, at this time, I recommend to continue with IV heparin and 
monitor

PT, INR closely and continue with Accu-Cheks a.c. and q.h.s.  Closely follow 
with

multiple consultants and Coumadin 7.5 mg today.  Prognosis guarded because of 
multiple

complex medical issues.  Further recommendations to follow.



MMJIML / IJN: [id number] / Job#: [id number]

MTDD

## 2020-03-13 NOTE — DS
DISCHARGE SUMMARY



DATE OF SERVICE:

03/12/2020



FINAL DIAGNOSES:

1. Asthma, chronic obstructive pulmonary disease exacerbation.

2. Shortness of breath, possible acute pulmonary embolism.

3. Bilateral upper limb pain as well as evidence of superficial thrombophlebitis of

    the bilateral basilic veins as well as right axillary vein with acute DVT of the

    left brachial acute deep venous thrombosis.

4. Microcytic anemia.

5. Hypokalemia.

6. Diabetes type 2, uncontrolled.

7. Multiple allergies.

8. History of atrial fibrillation, paroxysmal.

9. History of chest pain.

10.History of congestive heart failure.

11.History of deep vein thrombosis.

12.History of chronic liver disease.

13.History of migraine.

14.History of pulmonary embolism.

15.History of seizure disorder.

16.History of sleep apnea.

17.History of factor V Leiden deficiency.

18.History of multiple pulmonary emboli.

19.History HIV.

20.History of hepatitis B.

21.History of pancreatic cancer, apparently for chemoradiation, currently post chemo

    radiation.

22.Chronic hypoxic respiratory failure.

23.History of MRSA.

24.History of myxoma, removed.

25.History of nicotine dependence.



DISCHARGE DISPOSITION:

The patient LEFT THE HOSPITAL AGAINST MEDICAL ADVICE.



HISTORY OF PRESENT ILLNESS:

This is a 45-year-old gentleman with the past medical history of multiple medical

problems was admitted with features of shortness of breath.  The patient was thought to

have acute pulmonary embolism.  The CT chest was not done because of various reasons.

Please refer to the progress notes and staff notes for further details.  Otherwise, the

patient was treated symptomatically. Patient was seen. The patient had some PVCs, but

however the patient is not willing to stay and the patient LEFT THE HOSPITAL AGAINST

MEDICAL ADVICE.  As mentioned earlier, the overall prognosis is extremely guarded.

Discussed this with the patient on multiple occasions.  Please refer to staff notes and

consultation for further details.





MMODL / IJN: [id number] / Job#: [id number]
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
## 2020-12-30 NOTE — ED
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  k/uL


 


Poikilocytosis     


 


PT     (9.0-12.0)  sec


 


INR     (<1.2)  


 


APTT     (22.0-30.0)  sec


 


Sodium     (137-145)  mmol/L


 


Potassium     (3.5-5.1)  mmol/L


 


Chloride     ()  mmol/L


 


Carbon Dioxide     (22-30)  mmol/L


 


Anion Gap     mmol/L


 


BUN     (9-20)  mg/dL


 


Creatinine     (0.66-1.25)  mg/dL


 


Est GFR (CKD-EPI)AfAm     (>60 ml/min/1.73 sqM)  


 


Est GFR (CKD-EPI)NonAf     (>60 ml/min/1.73 sqM)  


 


Glucose     (74-99)  mg/dL


 


POC Glucose (mg/dL)  338 H   149 H  (75-99)  mg/dL


 


POC Glu Operater Jyoti Simms Katelin  


 


Estimated Ave Glu mg/dL     


 


Hemoglobin A1c     (4.0-6.0)  %


 


Calcium     (8.4-10.2)  mg/dL


 


Total Bilirubin     (0.2-1.3)  mg/dL


 


AST     (17-59)  U/L


 


ALT     (4-49)  U/L


 


Alkaline Phosphatase     ()  U/L


 


Total Protein     (6.3-8.2)  g/dL


 


Albumin     (3.5-5.0)  g/dL


 


Urine Color   Light Yellow   


 


Urine Appearance   Clear   (Clear)  


 


Urine pH   5.5   (5.0-8.0)  


 


Ur Specific Gravity   1.013   (1.001-1.035)  


 


Urine Protein   Negative   (Negative)  


 


Urine Glucose (UA)   Negative   (Negative)  


 


Urine Ketones   Negative   (Negative)  


 


Urine Blood   Negative   (Negative)  


 


Urine Nitrite   Negative   (Negative)  


 


Urine Bilirubin   Negative   (Negative)  


 


Urine Urobilinogen   <2.0   (<2.0)  mg/dL


 


Ur Leukocyte Esterase   Negative   (Negative)  


 


Urine Opiates Screen   Not Detected   (NotDetected)  


 


Ur Oxycodone Screen   Not Detected   (NotDetected)  


 


Urine Methadone Screen   Not Detected   (NotDetected)  


 


Ur Propoxyphene Screen   Not Detected   (NotDetected)  


 


Ur Barbiturates Screen   Not Detected   (NotDetected)  


 


U Tricyclic Antidepress   Not Detected   (NotDetected)  


 


Ur Phencyclidine Scrn   Not Detected   (NotDetected)  


 


Ur Amphetamines Screen   Not Detected   (NotDetected)  


 


U Methamphetamines Scrn   Not Detected   (NotDetected)  


 


U Benzodiazepines Scrn   Not Detected   (NotDetected)  


 


Urine Cocaine Screen   Not Detected   (NotDetected)  


 


U Marijuana (THC) Screen   Not Detected   (NotDetected)  














Disposition


Is patient prescribed a controlled substance at d/c from ED?: No


Time of Disposition: 18:07





<Mitchell Zapata P - Last Filed: 20 18:03>





<Shobha Pepe - Last Filed: 21 20:43>


Clinical Impression: 


 History of stroke





Disposition: ADMITTED AS IP TO THIS HOSP
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
## 2022-03-08 PROBLEM — I82.401 DVT, LOWER EXTREMITY, RECURRENT, RIGHT (HCC): Status: ACTIVE | Noted: 2022-03-08

## 2022-03-13 PROBLEM — I26.09 ACUTE PULMONARY EMBOLISM WITH ACUTE COR PULMONALE (HCC): Status: ACTIVE | Noted: 2022-03-13

## 2022-04-04 PROBLEM — K52.9 GASTROENTERITIS: Status: ACTIVE | Noted: 2022-04-04

## 2022-04-09 ENCOUNTER — APPOINTMENT (OUTPATIENT)
Dept: NUCLEAR MEDICINE | Age: 47
DRG: 661 | End: 2022-04-09
Payer: MEDICAID

## 2022-04-09 ENCOUNTER — APPOINTMENT (OUTPATIENT)
Dept: GENERAL RADIOLOGY | Age: 47
DRG: 661 | End: 2022-04-09
Payer: MEDICAID

## 2022-04-09 ENCOUNTER — HOSPITAL ENCOUNTER (INPATIENT)
Age: 47
LOS: 5 days | Discharge: HOME | DRG: 661 | End: 2022-04-14
Attending: EMERGENCY MEDICINE | Admitting: INTERNAL MEDICINE
Payer: MEDICAID

## 2022-04-09 DIAGNOSIS — G89.29 CHRONIC PAIN OF BOTH HIPS: ICD-10-CM

## 2022-04-09 DIAGNOSIS — R07.9 CHEST PAIN, UNSPECIFIED TYPE: Primary | ICD-10-CM

## 2022-04-09 DIAGNOSIS — G62.9 NEUROPATHY: ICD-10-CM

## 2022-04-09 DIAGNOSIS — Z86.718 HISTORY OF DVT (DEEP VEIN THROMBOSIS): ICD-10-CM

## 2022-04-09 DIAGNOSIS — M25.552 CHRONIC PAIN OF BOTH HIPS: ICD-10-CM

## 2022-04-09 DIAGNOSIS — D68.59 HYPERCOAGULABLE STATE (HCC): ICD-10-CM

## 2022-04-09 DIAGNOSIS — M87.9 OSTEONECROSIS (HCC): ICD-10-CM

## 2022-04-09 DIAGNOSIS — M25.551 CHRONIC PAIN OF BOTH HIPS: ICD-10-CM

## 2022-04-09 LAB
ALBUMIN SERPL-MCNC: 4 G/DL (ref 3.5–4.6)
ALP BLD-CCNC: 77 U/L (ref 35–104)
ALT SERPL-CCNC: 30 U/L (ref 0–41)
ANION GAP SERPL CALCULATED.3IONS-SCNC: 15 MEQ/L (ref 9–15)
ANTI-XA UNFRAC HEPARIN: 0.36 IU/ML
APTT: 50.2 SEC (ref 24.4–36.8)
APTT: >150 SEC (ref 24.4–36.8)
AST SERPL-CCNC: 31 U/L (ref 0–40)
BASOPHILS ABSOLUTE: 0.1 K/UL (ref 0–0.2)
BASOPHILS RELATIVE PERCENT: 1 %
BILIRUB SERPL-MCNC: <0.2 MG/DL (ref 0.2–0.7)
BILIRUBIN URINE: NEGATIVE
BLOOD, URINE: NEGATIVE
BUN BLDV-MCNC: 17 MG/DL (ref 6–20)
CALCIUM SERPL-MCNC: 9.1 MG/DL (ref 8.5–9.9)
CHLORIDE BLD-SCNC: 103 MEQ/L (ref 95–107)
CLARITY: CLEAR
CO2: 22 MEQ/L (ref 20–31)
COLOR: YELLOW
CREAT SERPL-MCNC: 0.82 MG/DL (ref 0.7–1.2)
EOSINOPHILS ABSOLUTE: 0 K/UL (ref 0–0.7)
EOSINOPHILS RELATIVE PERCENT: 0 %
GFR AFRICAN AMERICAN: >60
GFR NON-AFRICAN AMERICAN: >60
GLOBULIN: 2.5 G/DL (ref 2.3–3.5)
GLUCOSE BLD-MCNC: 238 MG/DL (ref 70–99)
GLUCOSE URINE: 100 MG/DL
HCT VFR BLD CALC: 32.3 % (ref 42–52)
HEMOGLOBIN: 10.5 G/DL (ref 14–18)
INR BLD: 1.2
KETONES, URINE: ABNORMAL MG/DL
LACTIC ACID: 2.7 MMOL/L (ref 0.5–2.2)
LEUKOCYTE ESTERASE, URINE: NEGATIVE
LYMPHOCYTES ABSOLUTE: 1.1 K/UL (ref 1–4.8)
LYMPHOCYTES RELATIVE PERCENT: 11.8 %
MCH RBC QN AUTO: 24.4 PG (ref 27–31.3)
MCHC RBC AUTO-ENTMCNC: 32.5 % (ref 33–37)
MCV RBC AUTO: 75.1 FL (ref 80–100)
MONOCYTES ABSOLUTE: 0.8 K/UL (ref 0.2–0.8)
MONOCYTES RELATIVE PERCENT: 9.2 %
NEUTROPHILS ABSOLUTE: 7 K/UL (ref 1.4–6.5)
NEUTROPHILS RELATIVE PERCENT: 78 %
NITRITE, URINE: NEGATIVE
PDW BLD-RTO: 17.3 % (ref 11.5–14.5)
PH UA: 5.5 (ref 5–9)
PLATELET # BLD: 256 K/UL (ref 130–400)
POC CREATININE WHOLE BLOOD: 0.8
POTASSIUM SERPL-SCNC: 4.6 MEQ/L (ref 3.4–4.9)
PROTEIN UA: NEGATIVE MG/DL
PROTHROMBIN TIME: 15.5 SEC (ref 12.3–14.9)
RBC # BLD: 4.31 M/UL (ref 4.7–6.1)
REASON FOR REJECTION: NORMAL
REJECTED TEST: NORMAL
SODIUM BLD-SCNC: 140 MEQ/L (ref 135–144)
SPECIFIC GRAVITY UA: 1.02 (ref 1–1.03)
TOTAL PROTEIN: 6.5 G/DL (ref 6.3–8)
TROPONIN: <0.01 NG/ML (ref 0–0.01)
URINE REFLEX TO CULTURE: ABNORMAL
UROBILINOGEN, URINE: 0.2 E.U./DL
WBC # BLD: 9 K/UL (ref 4.8–10.8)

## 2022-04-09 PROCEDURE — 3430000000 HC RX DIAGNOSTIC RADIOPHARMACEUTICAL: Performed by: EMERGENCY MEDICINE

## 2022-04-09 PROCEDURE — 96374 THER/PROPH/DIAG INJ IV PUSH: CPT

## 2022-04-09 PROCEDURE — 85610 PROTHROMBIN TIME: CPT

## 2022-04-09 PROCEDURE — 6360000002 HC RX W HCPCS: Performed by: EMERGENCY MEDICINE

## 2022-04-09 PROCEDURE — A9540 TC99M MAA: HCPCS | Performed by: EMERGENCY MEDICINE

## 2022-04-09 PROCEDURE — 78582 LUNG VENTILAT&PERFUS IMAGING: CPT

## 2022-04-09 PROCEDURE — 96376 TX/PRO/DX INJ SAME DRUG ADON: CPT

## 2022-04-09 PROCEDURE — 94640 AIRWAY INHALATION TREATMENT: CPT

## 2022-04-09 PROCEDURE — 85025 COMPLETE CBC W/AUTO DIFF WBC: CPT

## 2022-04-09 PROCEDURE — 85730 THROMBOPLASTIN TIME PARTIAL: CPT

## 2022-04-09 PROCEDURE — 83605 ASSAY OF LACTIC ACID: CPT

## 2022-04-09 PROCEDURE — 96375 TX/PRO/DX INJ NEW DRUG ADDON: CPT

## 2022-04-09 PROCEDURE — 99284 EMERGENCY DEPT VISIT MOD MDM: CPT

## 2022-04-09 PROCEDURE — A9539 TC99M PENTETATE: HCPCS | Performed by: EMERGENCY MEDICINE

## 2022-04-09 PROCEDURE — 93005 ELECTROCARDIOGRAM TRACING: CPT | Performed by: EMERGENCY MEDICINE

## 2022-04-09 PROCEDURE — 81003 URINALYSIS AUTO W/O SCOPE: CPT

## 2022-04-09 PROCEDURE — 1210000000 HC MED SURG R&B

## 2022-04-09 PROCEDURE — 2580000003 HC RX 258: Performed by: EMERGENCY MEDICINE

## 2022-04-09 PROCEDURE — 71045 X-RAY EXAM CHEST 1 VIEW: CPT

## 2022-04-09 PROCEDURE — 6360000002 HC RX W HCPCS: Performed by: STUDENT IN AN ORGANIZED HEALTH CARE EDUCATION/TRAINING PROGRAM

## 2022-04-09 PROCEDURE — 99285 EMERGENCY DEPT VISIT HI MDM: CPT

## 2022-04-09 PROCEDURE — 36415 COLL VENOUS BLD VENIPUNCTURE: CPT

## 2022-04-09 PROCEDURE — 84484 ASSAY OF TROPONIN QUANT: CPT

## 2022-04-09 PROCEDURE — 80053 COMPREHEN METABOLIC PANEL: CPT

## 2022-04-09 PROCEDURE — 85520 HEPARIN ASSAY: CPT

## 2022-04-09 RX ORDER — MORPHINE SULFATE 4 MG/ML
4 INJECTION, SOLUTION INTRAMUSCULAR; INTRAVENOUS ONCE
Status: DISCONTINUED | OUTPATIENT
Start: 2022-04-09 | End: 2022-04-14 | Stop reason: HOSPADM

## 2022-04-09 RX ORDER — MORPHINE SULFATE 4 MG/ML
4 INJECTION, SOLUTION INTRAMUSCULAR; INTRAVENOUS
Status: COMPLETED | OUTPATIENT
Start: 2022-04-09 | End: 2022-04-09

## 2022-04-09 RX ORDER — DIPHENHYDRAMINE HYDROCHLORIDE 50 MG/ML
25 INJECTION INTRAMUSCULAR; INTRAVENOUS ONCE
Status: COMPLETED | OUTPATIENT
Start: 2022-04-09 | End: 2022-04-09

## 2022-04-09 RX ORDER — ONDANSETRON 2 MG/ML
4 INJECTION INTRAMUSCULAR; INTRAVENOUS ONCE
Status: COMPLETED | OUTPATIENT
Start: 2022-04-09 | End: 2022-04-09

## 2022-04-09 RX ORDER — HEPARIN SODIUM 1000 [USP'U]/ML
40 INJECTION, SOLUTION INTRAVENOUS; SUBCUTANEOUS PRN
Status: DISCONTINUED | OUTPATIENT
Start: 2022-04-09 | End: 2022-04-10 | Stop reason: SDUPTHER

## 2022-04-09 RX ORDER — ALBUTEROL SULFATE 2.5 MG/3ML
2.5 SOLUTION RESPIRATORY (INHALATION) EVERY 6 HOURS PRN
Status: DISCONTINUED | OUTPATIENT
Start: 2022-04-09 | End: 2022-04-10

## 2022-04-09 RX ORDER — SODIUM CHLORIDE 0.9 % (FLUSH) 0.9 %
10 SYRINGE (ML) INJECTION PRN
Status: DISCONTINUED | OUTPATIENT
Start: 2022-04-09 | End: 2022-04-10

## 2022-04-09 RX ORDER — KIT FOR THE PREPARATION OF TECHNETIUM TC 99M PENTETATE 20 MG/1
1 INJECTION, POWDER, LYOPHILIZED, FOR SOLUTION INTRAVENOUS; RESPIRATORY (INHALATION)
Status: COMPLETED | OUTPATIENT
Start: 2022-04-09 | End: 2022-04-09

## 2022-04-09 RX ORDER — SODIUM CHLORIDE 9 MG/ML
INJECTION, SOLUTION INTRAVENOUS CONTINUOUS
Status: DISCONTINUED | OUTPATIENT
Start: 2022-04-09 | End: 2022-04-10

## 2022-04-09 RX ORDER — HEPARIN SODIUM 1000 [USP'U]/ML
80 INJECTION, SOLUTION INTRAVENOUS; SUBCUTANEOUS ONCE
Status: COMPLETED | OUTPATIENT
Start: 2022-04-09 | End: 2022-04-09

## 2022-04-09 RX ORDER — DIPHENHYDRAMINE HCL 25 MG
50 TABLET ORAL ONCE
Status: DISCONTINUED | OUTPATIENT
Start: 2022-04-09 | End: 2022-04-09

## 2022-04-09 RX ORDER — HEPARIN SODIUM 1000 [USP'U]/ML
80 INJECTION, SOLUTION INTRAVENOUS; SUBCUTANEOUS PRN
Status: DISCONTINUED | OUTPATIENT
Start: 2022-04-09 | End: 2022-04-10 | Stop reason: SDUPTHER

## 2022-04-09 RX ADMIN — SODIUM CHLORIDE, PRESERVATIVE FREE 10 ML: 5 INJECTION INTRAVENOUS at 18:12

## 2022-04-09 RX ADMIN — HEPARIN SODIUM 7260 UNITS: 1000 INJECTION INTRAVENOUS; SUBCUTANEOUS at 15:46

## 2022-04-09 RX ADMIN — DIPHENHYDRAMINE HYDROCHLORIDE 25 MG: 50 INJECTION, SOLUTION INTRAMUSCULAR; INTRAVENOUS at 20:03

## 2022-04-09 RX ADMIN — HYDROMORPHONE HYDROCHLORIDE 0.5 MG: 1 INJECTION, SOLUTION INTRAMUSCULAR; INTRAVENOUS; SUBCUTANEOUS at 23:40

## 2022-04-09 RX ADMIN — HEPARIN SODIUM 18 UNITS/KG/HR: 10000 INJECTION INTRAVENOUS; SUBCUTANEOUS at 15:44

## 2022-04-09 RX ADMIN — MORPHINE SULFATE 4 MG: 4 INJECTION, SOLUTION INTRAMUSCULAR; INTRAVENOUS at 15:17

## 2022-04-09 RX ADMIN — KIT FOR THE PREPARATION OF TECHNETIUM TC 99M PENTETATE 1 MILLICURIE: 20 INJECTION, POWDER, LYOPHILIZED, FOR SOLUTION INTRAVENOUS; RESPIRATORY (INHALATION) at 17:50

## 2022-04-09 RX ADMIN — DIPHENHYDRAMINE HYDROCHLORIDE 25 MG: 50 INJECTION, SOLUTION INTRAMUSCULAR; INTRAVENOUS at 15:16

## 2022-04-09 RX ADMIN — SODIUM CHLORIDE: 9 INJECTION, SOLUTION INTRAVENOUS at 15:17

## 2022-04-09 RX ADMIN — HYDROMORPHONE HYDROCHLORIDE 0.5 MG: 1 INJECTION, SOLUTION INTRAMUSCULAR; INTRAVENOUS; SUBCUTANEOUS at 19:18

## 2022-04-09 RX ADMIN — ALBUTEROL SULFATE 2.5 MG: 2.5 SOLUTION RESPIRATORY (INHALATION) at 14:49

## 2022-04-09 RX ADMIN — MORPHINE SULFATE 4 MG: 4 INJECTION, SOLUTION INTRAMUSCULAR; INTRAVENOUS at 17:21

## 2022-04-09 RX ADMIN — ONDANSETRON 4 MG: 2 INJECTION INTRAMUSCULAR; INTRAVENOUS at 15:17

## 2022-04-09 RX ADMIN — Medication 4.9 MILLICURIE: at 18:12

## 2022-04-09 ASSESSMENT — PAIN DESCRIPTION - PAIN TYPE
TYPE: ACUTE PAIN

## 2022-04-09 ASSESSMENT — PAIN DESCRIPTION - LOCATION
LOCATION: CHEST
LOCATION: CHEST

## 2022-04-09 ASSESSMENT — ENCOUNTER SYMPTOMS
RHINORRHEA: 0
VOMITING: 0
WHEEZING: 0
CHEST TIGHTNESS: 1
ALLERGIC/IMMUNOLOGIC NEGATIVE: 1
TROUBLE SWALLOWING: 0
NAUSEA: 0
EYES NEGATIVE: 1
STRIDOR: 0
ABDOMINAL PAIN: 0
SHORTNESS OF BREATH: 1

## 2022-04-09 ASSESSMENT — PAIN SCALES - GENERAL
PAINLEVEL_OUTOF10: 7
PAINLEVEL_OUTOF10: 4
PAINLEVEL_OUTOF10: 8
PAINLEVEL_OUTOF10: 7
PAINLEVEL_OUTOF10: 9
PAINLEVEL_OUTOF10: 7
PAINLEVEL_OUTOF10: 5

## 2022-04-09 ASSESSMENT — PAIN - FUNCTIONAL ASSESSMENT: PAIN_FUNCTIONAL_ASSESSMENT: 0-10

## 2022-04-09 ASSESSMENT — PAIN DESCRIPTION - DESCRIPTORS
DESCRIPTORS: CRUSHING
DESCRIPTORS: CRUSHING

## 2022-04-09 ASSESSMENT — PAIN DESCRIPTION - FREQUENCY: FREQUENCY: CONTINUOUS

## 2022-04-09 NOTE — ED NOTES
Patient states that he was seen in a hospital in [facility] last night. States that he was diagnosed with both DVT's and PE's. Patient states that they attempted to transfer him here yesterday but no rooms were available. Patient states that they sent him home. States that he has been without his coumadin for 7 to 10 days.        Zan Peña RN  04/09/22 [id number]

## 2022-04-09 NOTE — ED NOTES
PTT greater than 150. Dr Donya Henderson notified. Verbal order to hold heparin drip for one and then redraw labs.       Dwaine Simpson RN  04/09/22 1911

## 2022-04-09 NOTE — ED NOTES
Patient medicated as ordered. EKG completed and given to Dr Patrick London. Radiology obtained portable chest x-ray. Call light in reach.       Dora Aguayo RN  04/09/22 [id number]

## 2022-04-09 NOTE — ED TRIAGE NOTES
Pt c/o chest pain and sob. Pt has known blood clots in legs and lungs and hasn't been taking his coumadin for 1 week.

## 2022-04-09 NOTE — ED NOTES
Pt placed on 3 liters nasal canula in triage. Pt tearful and audible wheezes noted during triage.       Kendra Johnson RN  04/09/22 [id number]

## 2022-04-09 NOTE — ED NOTES
IV placed and blood collected. Patient on bedside monitor. Patient complains of shortness of breath with exertion. Blood sent to lab.       Denise Cesar RN  04/09/22 2048

## 2022-04-09 NOTE — ED NOTES
Patient refusing PO Benadryl at this time. States he must receive IV Benadryl when receiving IV Heparin. States the only way he will take PO is if the Heparin gtt is stopped and he is given SQ Heparin. Patient aware of Heparin gtt is currently stopped due to increased PTT. MD Thakur aware and states will speak with patient.        Kobi Kirkpatrick RN  04/09/22 1947

## 2022-04-10 ENCOUNTER — APPOINTMENT (OUTPATIENT)
Dept: GENERAL RADIOLOGY | Age: 47
DRG: 661 | End: 2022-04-10
Payer: MEDICAID

## 2022-04-10 LAB
ANION GAP SERPL CALCULATED.3IONS-SCNC: 11 MEQ/L (ref 9–15)
ANTI-XA UNFRAC HEPARIN: 0.22 IU/ML
ANTI-XA UNFRAC HEPARIN: 0.39 IU/ML
ANTI-XA UNFRAC HEPARIN: 0.8 IU/ML
APTT: 33.1 SEC (ref 24.4–36.8)
BUN BLDV-MCNC: 18 MG/DL (ref 6–20)
CALCIUM SERPL-MCNC: 8.9 MG/DL (ref 8.5–9.9)
CHLORIDE BLD-SCNC: 107 MEQ/L (ref 95–107)
CO2: 25 MEQ/L (ref 20–31)
CREAT SERPL-MCNC: 0.87 MG/DL (ref 0.7–1.2)
FERRITIN: 17 NG/ML (ref 30–400)
GFR AFRICAN AMERICAN: >60
GFR AFRICAN AMERICAN: >60
GFR NON-AFRICAN AMERICAN: >60
GFR NON-AFRICAN AMERICAN: >60
GLUCOSE BLD-MCNC: 159 MG/DL (ref 70–99)
GLUCOSE BLD-MCNC: 190 MG/DL (ref 70–99)
GLUCOSE BLD-MCNC: 199 MG/DL (ref 70–99)
GLUCOSE BLD-MCNC: 258 MG/DL (ref 70–99)
HBA1C MFR BLD: 7 % (ref 4.8–5.9)
HCT VFR BLD CALC: 30.3 % (ref 42–52)
HEMOGLOBIN: 10.1 G/DL (ref 14–18)
INR BLD: 1
IRON SATURATION: 11 % (ref 20–55)
IRON: 38 UG/DL (ref 59–158)
LACTIC ACID: 2.1 MMOL/L (ref 0.5–2.2)
MCH RBC QN AUTO: 24.7 PG (ref 27–31.3)
MCHC RBC AUTO-ENTMCNC: 33.3 % (ref 33–37)
MCV RBC AUTO: 74.1 FL (ref 80–100)
PDW BLD-RTO: 17.4 % (ref 11.5–14.5)
PERFORMED ON: ABNORMAL
PERFORMED ON: NORMAL
PLATELET # BLD: 254 K/UL (ref 130–400)
POC CREATININE: 0.8 MG/DL (ref 0.8–1.3)
POC SAMPLE TYPE: NORMAL
POTASSIUM REFLEX MAGNESIUM: 4.1 MEQ/L (ref 3.4–4.9)
PROTHROMBIN TIME: 13.6 SEC (ref 12.3–14.9)
RBC # BLD: 4.08 M/UL (ref 4.7–6.1)
REASON FOR REJECTION: NORMAL
REJECTED TEST: NORMAL
SODIUM BLD-SCNC: 143 MEQ/L (ref 135–144)
TOTAL IRON BINDING CAPACITY: 361 UG/DL (ref 250–450)
TROPONIN: <0.01 NG/ML (ref 0–0.01)
TROPONIN: <0.01 NG/ML (ref 0–0.01)
UNSATURATED IRON BINDING CAPACITY: 323 UG/DL (ref 112–347)
WBC # BLD: 5.1 K/UL (ref 4.8–10.8)

## 2022-04-10 PROCEDURE — 85730 THROMBOPLASTIN TIME PARTIAL: CPT

## 2022-04-10 PROCEDURE — 85610 PROTHROMBIN TIME: CPT

## 2022-04-10 PROCEDURE — 2060000000 HC ICU INTERMEDIATE R&B

## 2022-04-10 PROCEDURE — 83605 ASSAY OF LACTIC ACID: CPT

## 2022-04-10 PROCEDURE — 6360000002 HC RX W HCPCS: Performed by: INTERNAL MEDICINE

## 2022-04-10 PROCEDURE — 2580000003 HC RX 258: Performed by: INTERNAL MEDICINE

## 2022-04-10 PROCEDURE — 6370000000 HC RX 637 (ALT 250 FOR IP): Performed by: INTERNAL MEDICINE

## 2022-04-10 PROCEDURE — 83540 ASSAY OF IRON: CPT

## 2022-04-10 PROCEDURE — 84484 ASSAY OF TROPONIN QUANT: CPT

## 2022-04-10 PROCEDURE — 82728 ASSAY OF FERRITIN: CPT

## 2022-04-10 PROCEDURE — 73610 X-RAY EXAM OF ANKLE: CPT

## 2022-04-10 PROCEDURE — 83036 HEMOGLOBIN GLYCOSYLATED A1C: CPT

## 2022-04-10 PROCEDURE — 73620 X-RAY EXAM OF FOOT: CPT

## 2022-04-10 PROCEDURE — 85027 COMPLETE CBC AUTOMATED: CPT

## 2022-04-10 PROCEDURE — 83550 IRON BINDING TEST: CPT

## 2022-04-10 PROCEDURE — 94664 DEMO&/EVAL PT USE INHALER: CPT

## 2022-04-10 PROCEDURE — 85520 HEPARIN ASSAY: CPT

## 2022-04-10 PROCEDURE — 1210000000 HC MED SURG R&B

## 2022-04-10 PROCEDURE — 36415 COLL VENOUS BLD VENIPUNCTURE: CPT

## 2022-04-10 PROCEDURE — 80048 BASIC METABOLIC PNL TOTAL CA: CPT

## 2022-04-10 RX ORDER — SODIUM CHLORIDE 0.9 % (FLUSH) 0.9 %
10 SYRINGE (ML) INJECTION EVERY 12 HOURS SCHEDULED
Status: DISCONTINUED | OUTPATIENT
Start: 2022-04-10 | End: 2022-04-14 | Stop reason: HOSPADM

## 2022-04-10 RX ORDER — HEPARIN SODIUM 1000 [USP'U]/ML
40 INJECTION, SOLUTION INTRAVENOUS; SUBCUTANEOUS PRN
Status: DISCONTINUED | OUTPATIENT
Start: 2022-04-10 | End: 2022-04-13

## 2022-04-10 RX ORDER — ALBUTEROL SULFATE 90 UG/1
2 AEROSOL, METERED RESPIRATORY (INHALATION) EVERY 4 HOURS PRN
Status: DISCONTINUED | OUTPATIENT
Start: 2022-04-10 | End: 2022-04-14 | Stop reason: HOSPADM

## 2022-04-10 RX ORDER — ONDANSETRON 2 MG/ML
4 INJECTION INTRAMUSCULAR; INTRAVENOUS EVERY 6 HOURS PRN
Status: DISCONTINUED | OUTPATIENT
Start: 2022-04-10 | End: 2022-04-14 | Stop reason: HOSPADM

## 2022-04-10 RX ORDER — NICOTINE POLACRILEX 4 MG
15 LOZENGE BUCCAL PRN
Status: DISCONTINUED | OUTPATIENT
Start: 2022-04-10 | End: 2022-04-14 | Stop reason: HOSPADM

## 2022-04-10 RX ORDER — DIPHENHYDRAMINE HYDROCHLORIDE 50 MG/ML
50 INJECTION INTRAMUSCULAR; INTRAVENOUS EVERY 6 HOURS PRN
Status: DISCONTINUED | OUTPATIENT
Start: 2022-04-10 | End: 2022-04-14 | Stop reason: HOSPADM

## 2022-04-10 RX ORDER — OMEPRAZOLE 20 MG/1
20 CAPSULE, DELAYED RELEASE ORAL DAILY
Status: DISCONTINUED | OUTPATIENT
Start: 2022-04-10 | End: 2022-04-10

## 2022-04-10 RX ORDER — ALBUTEROL SULFATE 2.5 MG/3ML
2.5 SOLUTION RESPIRATORY (INHALATION) EVERY 6 HOURS PRN
Status: DISCONTINUED | OUTPATIENT
Start: 2022-04-10 | End: 2022-04-14 | Stop reason: HOSPADM

## 2022-04-10 RX ORDER — MORPHINE SULFATE 4 MG/ML
4 INJECTION, SOLUTION INTRAMUSCULAR; INTRAVENOUS EVERY 4 HOURS PRN
Status: DISCONTINUED | OUTPATIENT
Start: 2022-04-10 | End: 2022-04-11

## 2022-04-10 RX ORDER — CLOPIDOGREL BISULFATE 75 MG/1
75 TABLET ORAL DAILY
Status: DISCONTINUED | OUTPATIENT
Start: 2022-04-10 | End: 2022-04-14 | Stop reason: HOSPADM

## 2022-04-10 RX ORDER — ASPIRIN 81 MG/1
81 TABLET, CHEWABLE ORAL DAILY
Status: DISCONTINUED | OUTPATIENT
Start: 2022-04-10 | End: 2022-04-14 | Stop reason: HOSPADM

## 2022-04-10 RX ORDER — INSULIN GLARGINE 100 [IU]/ML
40 INJECTION, SOLUTION SUBCUTANEOUS NIGHTLY
Status: DISCONTINUED | OUTPATIENT
Start: 2022-04-10 | End: 2022-04-14 | Stop reason: HOSPADM

## 2022-04-10 RX ORDER — ACETAMINOPHEN 325 MG/1
650 TABLET ORAL EVERY 6 HOURS PRN
Status: DISCONTINUED | OUTPATIENT
Start: 2022-04-10 | End: 2022-04-12

## 2022-04-10 RX ORDER — PANTOPRAZOLE SODIUM 40 MG/1
40 TABLET, DELAYED RELEASE ORAL
Status: DISCONTINUED | OUTPATIENT
Start: 2022-04-10 | End: 2022-04-14 | Stop reason: HOSPADM

## 2022-04-10 RX ORDER — ONDANSETRON 4 MG/1
4 TABLET, ORALLY DISINTEGRATING ORAL EVERY 8 HOURS PRN
Status: DISCONTINUED | OUTPATIENT
Start: 2022-04-10 | End: 2022-04-14 | Stop reason: HOSPADM

## 2022-04-10 RX ORDER — DIPHENHYDRAMINE HCL 25 MG
25 TABLET ORAL EVERY 6 HOURS PRN
Status: DISCONTINUED | OUTPATIENT
Start: 2022-04-10 | End: 2022-04-10

## 2022-04-10 RX ORDER — SODIUM CHLORIDE 0.9 % (FLUSH) 0.9 %
10 SYRINGE (ML) INJECTION PRN
Status: DISCONTINUED | OUTPATIENT
Start: 2022-04-10 | End: 2022-04-14 | Stop reason: HOSPADM

## 2022-04-10 RX ORDER — DIPHENHYDRAMINE HYDROCHLORIDE 50 MG/ML
25 INJECTION INTRAMUSCULAR; INTRAVENOUS ONCE
Status: COMPLETED | OUTPATIENT
Start: 2022-04-10 | End: 2022-04-10

## 2022-04-10 RX ORDER — DIVALPROEX SODIUM 500 MG/1
1 TABLET, DELAYED RELEASE ORAL 2 TIMES DAILY
COMMUNITY
Start: 2022-03-23 | End: 2022-05-17

## 2022-04-10 RX ORDER — SODIUM CHLORIDE 9 MG/ML
INJECTION, SOLUTION INTRAVENOUS PRN
Status: DISCONTINUED | OUTPATIENT
Start: 2022-04-10 | End: 2022-04-14 | Stop reason: HOSPADM

## 2022-04-10 RX ORDER — HEPARIN SODIUM 1000 [USP'U]/ML
80 INJECTION, SOLUTION INTRAVENOUS; SUBCUTANEOUS PRN
Status: DISCONTINUED | OUTPATIENT
Start: 2022-04-10 | End: 2022-04-13

## 2022-04-10 RX ORDER — ACETAMINOPHEN 650 MG/1
650 SUPPOSITORY RECTAL EVERY 6 HOURS PRN
Status: DISCONTINUED | OUTPATIENT
Start: 2022-04-10 | End: 2022-04-12

## 2022-04-10 RX ORDER — DIVALPROEX SODIUM 250 MG/1
500 TABLET, DELAYED RELEASE ORAL 2 TIMES DAILY
Status: DISCONTINUED | OUTPATIENT
Start: 2022-04-10 | End: 2022-04-14 | Stop reason: HOSPADM

## 2022-04-10 RX ORDER — DIPHENHYDRAMINE HYDROCHLORIDE 50 MG/ML
25 INJECTION INTRAMUSCULAR; INTRAVENOUS EVERY 6 HOURS PRN
Status: DISCONTINUED | OUTPATIENT
Start: 2022-04-10 | End: 2022-04-10

## 2022-04-10 RX ORDER — INSULIN GLARGINE 100 [IU]/ML
40 INJECTION, SOLUTION SUBCUTANEOUS NIGHTLY
Status: ON HOLD | COMMUNITY
End: 2022-04-13 | Stop reason: SDUPTHER

## 2022-04-10 RX ORDER — HEPARIN SODIUM 1000 [USP'U]/ML
80 INJECTION, SOLUTION INTRAVENOUS; SUBCUTANEOUS ONCE
Status: DISCONTINUED | OUTPATIENT
Start: 2022-04-10 | End: 2022-04-10

## 2022-04-10 RX ORDER — DEXTROSE MONOHYDRATE 50 MG/ML
100 INJECTION, SOLUTION INTRAVENOUS PRN
Status: DISCONTINUED | OUTPATIENT
Start: 2022-04-10 | End: 2022-04-14 | Stop reason: HOSPADM

## 2022-04-10 RX ORDER — LORATADINE 10 MG/1
10 TABLET ORAL DAILY
Status: DISCONTINUED | OUTPATIENT
Start: 2022-04-10 | End: 2022-04-10

## 2022-04-10 RX ORDER — MONTELUKAST SODIUM 10 MG/1
10 TABLET ORAL DAILY
Status: DISCONTINUED | OUTPATIENT
Start: 2022-04-10 | End: 2022-04-14 | Stop reason: HOSPADM

## 2022-04-10 RX ORDER — POLYETHYLENE GLYCOL 3350 17 G/17G
17 POWDER, FOR SOLUTION ORAL DAILY PRN
Status: DISCONTINUED | OUTPATIENT
Start: 2022-04-10 | End: 2022-04-14 | Stop reason: HOSPADM

## 2022-04-10 RX ORDER — CETIRIZINE HYDROCHLORIDE 10 MG/1
10 TABLET ORAL DAILY
Status: DISCONTINUED | OUTPATIENT
Start: 2022-04-10 | End: 2022-04-14 | Stop reason: HOSPADM

## 2022-04-10 RX ORDER — HYDROXYZINE HYDROCHLORIDE 25 MG/1
50 TABLET, FILM COATED ORAL ONCE
Status: DISCONTINUED | OUTPATIENT
Start: 2022-04-10 | End: 2022-04-10

## 2022-04-10 RX ORDER — WARFARIN SODIUM 2.5 MG/1
7.5 TABLET ORAL
Status: COMPLETED | OUTPATIENT
Start: 2022-04-10 | End: 2022-04-10

## 2022-04-10 RX ORDER — DEXTROSE MONOHYDRATE 25 G/50ML
12.5 INJECTION, SOLUTION INTRAVENOUS PRN
Status: DISCONTINUED | OUTPATIENT
Start: 2022-04-10 | End: 2022-04-14 | Stop reason: HOSPADM

## 2022-04-10 RX ORDER — WARFARIN SODIUM 5 MG/1
5 TABLET ORAL
Status: COMPLETED | OUTPATIENT
Start: 2022-04-10 | End: 2022-04-10

## 2022-04-10 RX ADMIN — DIPHENHYDRAMINE HYDROCHLORIDE 25 MG: 50 INJECTION, SOLUTION INTRAMUSCULAR; INTRAVENOUS at 10:29

## 2022-04-10 RX ADMIN — SODIUM CHLORIDE, PRESERVATIVE FREE 10 ML: 5 INJECTION INTRAVENOUS at 22:04

## 2022-04-10 RX ADMIN — WARFARIN SODIUM 7.5 MG: 2.5 TABLET ORAL at 18:35

## 2022-04-10 RX ADMIN — DIPHENHYDRAMINE HYDROCHLORIDE 25 MG: 50 INJECTION, SOLUTION INTRAMUSCULAR; INTRAVENOUS at 02:20

## 2022-04-10 RX ADMIN — CLOPIDOGREL BISULFATE 75 MG: 75 TABLET ORAL at 10:29

## 2022-04-10 RX ADMIN — DIVALPROEX SODIUM 500 MG: 250 TABLET, DELAYED RELEASE ORAL at 22:03

## 2022-04-10 RX ADMIN — INSULIN GLARGINE 40 UNITS: 100 INJECTION, SOLUTION SUBCUTANEOUS at 22:09

## 2022-04-10 RX ADMIN — HEPARIN SODIUM 17.97 UNITS/KG/HR: 10000 INJECTION INTRAVENOUS; SUBCUTANEOUS at 12:58

## 2022-04-10 RX ADMIN — DIPHENHYDRAMINE HYDROCHLORIDE 50 MG: 50 INJECTION, SOLUTION INTRAMUSCULAR; INTRAVENOUS at 22:04

## 2022-04-10 RX ADMIN — WARFARIN SODIUM 5 MG: 5 TABLET ORAL at 01:23

## 2022-04-10 RX ADMIN — MONTELUKAST 10 MG: 10 TABLET, FILM COATED ORAL at 10:29

## 2022-04-10 RX ADMIN — MORPHINE SULFATE 4 MG: 4 INJECTION INTRAVENOUS at 19:40

## 2022-04-10 RX ADMIN — DIPHENHYDRAMINE HYDROCHLORIDE 50 MG: 50 INJECTION, SOLUTION INTRAMUSCULAR; INTRAVENOUS at 15:16

## 2022-04-10 RX ADMIN — HEPARIN SODIUM 3630 UNITS: 1000 INJECTION INTRAVENOUS; SUBCUTANEOUS at 15:11

## 2022-04-10 RX ADMIN — MORPHINE SULFATE 4 MG: 4 INJECTION INTRAVENOUS at 13:54

## 2022-04-10 RX ADMIN — Medication 10 ML: at 19:41

## 2022-04-10 RX ADMIN — INSULIN LISPRO 3 UNITS: 100 INJECTION, SOLUTION INTRAVENOUS; SUBCUTANEOUS at 18:38

## 2022-04-10 RX ADMIN — Medication 10 ML: at 08:52

## 2022-04-10 RX ADMIN — DIVALPROEX SODIUM 500 MG: 250 TABLET, DELAYED RELEASE ORAL at 10:29

## 2022-04-10 RX ADMIN — DIPHENHYDRAMINE HYDROCHLORIDE 25 MG: 50 INJECTION, SOLUTION INTRAMUSCULAR; INTRAVENOUS at 08:46

## 2022-04-10 RX ADMIN — PANTOPRAZOLE SODIUM 40 MG: 40 TABLET, DELAYED RELEASE ORAL at 13:02

## 2022-04-10 RX ADMIN — ASPIRIN 81 MG 81 MG: 81 TABLET ORAL at 10:29

## 2022-04-10 ASSESSMENT — PAIN SCALES - GENERAL
PAINLEVEL_OUTOF10: 0
PAINLEVEL_OUTOF10: 4
PAINLEVEL_OUTOF10: 10
PAINLEVEL_OUTOF10: 9
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
## 2022-04-10 NOTE — ED NOTES
Bed assignment of 264 received. Nursing shift report given to [facility].      Beatriz Gutierrez RN  04/09/22 [id number]

## 2022-04-10 NOTE — PROGRESS NOTES
Pt noted in hallway calling out for nurse to bring him a snack. Pt noted unhooked from heparin drip. States that he unhooked himself to walk to door. Re-educated pt that he should not be unhooking his IV, that only the nurse or MD should be doing this. Pt again voiced understanding, but seems to need reinforcements. Pt also disconnected telemetry machine x5 this shift, was also re-educated that he should not disconnect telemetry box and to call for assistance prior to ambulation.

## 2022-04-10 NOTE — PROGRESS NOTES
Warfarin Dosing - Pharmacy Consult Note  Consulting Provider: Dr Carey Collado  Indication:  History of  VTE/PE  Warfarin Dose prior to admission: possibly 7.5mg daily (off > 1 week, multiple AMA from various facilities)   Concurrent anticoagulants/antiplatelets: heparin infusion (non-compliance for over 1 week)  Significant Drug Interactions: No obvious interactions  Recent Labs     04/09/22  1500 04/09/22  1610 04/09/22  1724 04/10/22  0829 04/10/22  1331   INR  --   --  1.2 1.0  --    HGB  --  10.5*  --   --  10.1*   PLT  --  256  --   --  254   LABALBU 4.0  --   --   --   --      Recent warfarin administrations                     warfarin (COUMADIN) tablet 5 mg (mg) 5 mg Given 04/10/22 0123                   Date   INR    Dose  04/9/22   1.2       5 mg  4/10/22   1.0       7.5 mg    Assessment/Plan  (Goal INR: 2 - 3)  INR is subtherapeutic for goal of 2-3 and has decreased since yesterday. Therefore, will administer a dose of 7.5 mg today. Active problem list reviewed. INR orders are placed. Chart reviewed for pertinent labs, drug/diet interactions, and past doses. Documentation of patient's clinical condition was reviewed. Pharmacy Dosing:  Pharmacy will continue to follow.      Mal PowersD  PGY-1 Pharmacy Resident  4/10/2022 4:30 PM

## 2022-04-10 NOTE — PROGRESS NOTES
Pt states that he has a PMH of HIV infection and Hep B. Not listed on PMH list will pass along for dayshift nurse to communicated with attending to add dx to medical record.
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
## 2022-04-10 NOTE — ONCOLOGY
Hematology/Oncology Consult  Encounter Date: 4/10/2022 7:54 AM    Mr. Damian Stephenson is a 49 y.o. male  : 1975  MRN: [id number]  Acct Number: [de-identified]  Requesting Provider: Justus Chong DO    Reason for request: thrombophilia      CONSULTANT: Eugenia Ortiz DO    HPI: Patient has a long history of DVT's, PE's, IVC placement, and thrombectomy. Patient now lives in [facility] and has been off Coumadin. Patient Active Problem List   Diagnosis    DVT, lower extremity, recurrent, right ([facility]    Chest pain    Acute pulmonary embolism with acute cor pulmonale ([facility]    Gastroenteritis    Hypercoagulable state ([facility]     Past Medical History:   Diagnosis Date    Asthma     COPD (chronic obstructive pulmonary disease) ([facility]     [facility]) [facility]     Factor V Leiden ([facility]     Protein S deficiency ([facility]     Pulmonary emboli ([facility]      @[facility]@  No family history on file. Social History     Socioeconomic History    Marital status: Single     Spouse name: Not on file    Number of children: Not on file    Years of education: Not on file    Highest education level: Not on file   Occupational History    Not on file   Tobacco Use    Smoking status: Former Smoker    Smokeless tobacco: Never Used   Vaping Use    Vaping Use: Never used   Substance and Sexual Activity    Alcohol use: Not Currently    Drug use: Never    Sexual activity: Not on file   Other Topics Concern    Not on file   Social History Narrative    Not on file     Social Determinants of Health     Financial Resource Strain:     Difficulty of Paying Living Expenses: Not on file   Food Insecurity:     Worried About [address] in the Last Year: Not on file    [address] in the Last Year: Not on file   Transportation Needs:     Lack of Transportation (Medical): Not on file    Lack of Transportation (Non-Medical):  Not on file   Physical Activity:     Days of Exercise per Week: Not on file    Minutes of Exercise per Session: Not on file   Stress:     Feeling of Stress : Not on file   Social Connections:     Frequency of Communication with Friends and Family: Not on file    Frequency of Social Gatherings with Friends and Family: Not on file    Attends Scientologist Services: Not on file    Active Member of Clubs or Organizations: Not on file    Attends Club or Organization Meetings: Not on file    Marital Status: Not on file   Intimate Partner Violence:     Fear of Current or Ex-Partner: Not on file    Emotionally Abused: Not on file    Physically Abused: Not on file    Sexually Abused: Not on file   Housing Stability:     Unable to Pay for Housing in the Last Year: Not on file    Number of Places Lived in the Last Year: Not on file    Unstable Housing in the Last Year: Not on file     Current Facility-Administered Medications   Medication Dose Route Frequency Provider Last Rate Last Admin    sodium chloride flush 0.9 % injection 10 mL  10 mL IntraVENous 2 times per day Chipper Lesches D Sedar, DO        sodium chloride flush 0.9 % injection 10 mL  10 mL IntraVENous PRN Rivas Juana Sedar, DO        0.9 % sodium chloride infusion   IntraVENous PRN Rivas Juana Sedar, DO        ondansetron (ZOFRAN-ODT) disintegrating tablet 4 mg  4 mg Oral Q8H PRN Rivas Juana Sedar, DO        Or    ondansetron (ZOFRAN) injection 4 mg  4 mg IntraVENous Q6H PRN Rivas Juana Sedar, DO        polyethylene glycol (GLYCOLAX) packet 17 g  17 g Oral Daily PRN Rivas Juana Sedar, DO        acetaminophen (TYLENOL) tablet 650 mg  650 mg Oral Q6H PRN Rivas Juana Sedar, DO        Or    acetaminophen (TYLENOL) suppository 650 mg  650 mg Rectal Q6H PRN Rivas Juana Sedar, DO        heparin (porcine) injection 7,260 Units  80 Units/kg IntraVENous PRN Rivas Juana Sedar, DO        heparin (porcine) injection 3,630 Units  40 Units/kg IntraVENous PRN Rivas Juana Sedar, DO        heparin (porcine) 25,000 Units in dextrose 5 % 250 mL infusion  5-30 Units/kg/hr IntraVENous Continuous Rivas Juana Sedar, DO 16.3 mL/hr at 04/10/22 0225 18 Units/kg/hr at 04/10/22 0225    warfarin placeholder: dosing by pharmacy   Other RX Placeholder Pop Mabry Sedar, DO        diphenhydrAMINE (BENADRYL) injection 25 mg  25 mg IntraVENous Q6H PRN Pop Mabry Janiar, DO   25 mg at 04/10/22 0220    0.9 % sodium chloride infusion   IntraVENous Continuous Dipeshebnandini Childs  mL/hr at 04/09/22 1517 New Bag at 04/09/22 1517    morphine sulfate (PF) injection 4 mg  4 mg IntraVENous Once Destinee Rushing MD         [unfilled]  Allergies   Allergen Reactions    Lovenox [Enoxaparin Sodium] Anaphylaxis     Patient reports anaphylaxis to Lovenox    Arixtra [Fondaparinux]     Dye [Iodides]     Eliquis [Apixaban]     Fragmin [Dalteparin]     Iodine     Ipratropium     Motrin [Ibuprofen]     Robaxin [Methocarbamol]     Skelaxin [Metaxalone]     Toradol [Ketorolac Tromethamine]     Tramadol     Xarelto [Rivaroxaban]       Review of Systems  SOB and pain are better. No nausea or emesis. No bleeding. Appetite is good. No SOB. No fever, chills, or night sweats. BM ok. Otherwise ROS are negative. PHYSICAL EXAMINATION:   VITAL SIGNS: /89   Pulse 82   Temp 97.4 °F (36.3 °C) (Oral)   Resp 18   Ht 5' 7.01\" (1.702 m)   Wt 203 lb (92.1 kg)   SpO2 96%   BMI 31.79 kg/m²     GENERAL: In no acute distress, well- nourished, well- developed,alert and oriented to person place and time. SKIN: Warm and dry, withoutjaundice, ecchymoses, or petechiae. HEENT: Normocephalic, sclera anicteric, oral mucosa moist without lesion or exudate in the visible oral cavity or oropharynx, tongue mid-line with good mobility and no deviation with extension. NODES: No palpable adenopathy in the neck Levels I-V, bilateral   Supraclavicular fossae, axillary chains, or inguinal regions. LUNGS: Good inspiratory effort, no accessory muscle use, clear bilaterally, no focal wheeze, rales or rhonchi.    CARDIAC: Regular rate and rhythm, without murmurs, rubs or gallops. ABDOMINAL: Normal bowel soundspresent, soft, non-tender, no mass or  organomegaly. MUSKL: Full ROM. EXTREMITIES:Defect left inner thigh and inner calf from remote trauma. NEUROLOGIC: Gait normal. No grossly apparent focal deficits.     LAB RESULTS:  Recent Results (from the past 24 hour(s))   Lactic Acid    Collection Time: 04/09/22  2:45 PM   Result Value Ref Range    Lactic Acid 2.7 (H) 0.5 - 2.2 mmol/L   Urinalysis with Reflex to Culture    Collection Time: 04/09/22  2:45 PM    Specimen: Urine   Result Value Ref Range    Color, UA Yellow Straw/Yellow    Clarity, UA Clear Clear    Glucose, Ur 100 (A) Negative mg/dL    Bilirubin Urine Negative Negative    Ketones, Urine TRACE (A) Negative mg/dL    Specific Gravity, UA 1.024 1.005 - 1.030    Blood, Urine Negative Negative    pH, UA 5.5 5.0 - 9.0    Protein, UA Negative Negative mg/dL    Urobilinogen, Urine 0.2 <2.0 E.U./dL    Nitrite, Urine Negative Negative    Leukocyte Esterase, Urine Negative Negative    Urine Reflex to Culture Not Indicated    Comprehensive Metabolic Panel    Collection Time: 04/09/22  3:00 PM   Result Value Ref Range    Sodium 140 135 - 144 mEq/L    Potassium 4.6 3.4 - 4.9 mEq/L    Chloride 103 95 - 107 mEq/L    CO2 22 20 - 31 mEq/L    Anion Gap 15 9 - 15 mEq/L    Glucose 238 (H) 70 - 99 mg/dL    BUN 17 6 - 20 mg/dL    CREATININE 0.82 0.70 - 1.20 mg/dL    GFR Non-African American >60.0 >60    GFR  >60.0 >60    Calcium 9.1 8.5 - 9.9 mg/dL    Total Protein 6.5 6.3 - 8.0 g/dL    Albumin 4.0 3.5 - 4.6 g/dL    Total Bilirubin <0.2 0.2 - 0.7 mg/dL    Alkaline Phosphatase 77 35 - 104 U/L    ALT 30 0 - 41 U/L    AST 31 0 - 40 U/L    Globulin 2.5 2.3 - 3.5 g/dL   Troponin    Collection Time: 04/09/22  3:00 PM   Result Value Ref Range    Troponin <0.010 0.000 - 0.010 ng/mL   EKG 12 Lead - Chest Pain    Collection Time: 04/09/22  3:24 PM   Result Value Ref Range    Ventricular Rate 102 BPM    Atrial Rate 102 BPM    P-R Interval 168 ms    QRS Duration 82 ms    Q-T Interval 350 ms    QTc Calculation (Bazett) 456 ms    P Axis 44 degrees    R Axis 3 degrees    T Axis 18 degrees   SPECIMEN REJECTION    Collection Time: 04/09/22  3:59 PM   Result Value Ref Range    Rejected Test CBCWD     Reason for Rejection see below    POCT Venous    Collection Time: 04/09/22  4:04 PM   Result Value Ref Range    POC Creatinine 0.8 0.8 - 1.3 mg/dL    GFR Non-African American >60 >60    GFR  >60 >60    Sample Type TEODORO     Performed on SEE BELOW    CBC with Auto Differential    Collection Time: 04/09/22  4:10 PM   Result Value Ref Range    WBC 9.0 4.8 - 10.8 K/uL    RBC 4.31 (L) 4.70 - 6.10 M/uL    Hemoglobin 10.5 (L) 14.0 - 18.0 g/dL    Hematocrit 32.3 (L) 42.0 - 52.0 %    MCV 75.1 (L) 80.0 - 100.0 fL    MCH 24.4 (L) 27.0 - 31.3 pg    MCHC 32.5 (L) 33.0 - 37.0 %    RDW 17.3 (H) 11.5 - 14.5 %    Platelets 989 712 - 163 K/uL    Neutrophils % 78.0 %    Lymphocytes % 11.8 %    Monocytes % 9.2 %    Eosinophils % 0.0 %    Basophils % 1.0 %    Neutrophils Absolute 7.0 (H) 1.4 - 6.5 K/uL    Lymphocytes Absolute 1.1 1.0 - 4.8 K/uL    Monocytes Absolute 0.8 0.2 - 0.8 K/uL    Eosinophils Absolute 0.0 0.0 - 0.7 K/uL    Basophils Absolute 0.1 0.0 - 0.2 K/uL   POCT CREATININE    Collection Time: 04/09/22  4:11 PM   Result Value Ref Range    POC CREATININE WHOLE BLOOD 0.8    Protime-INR    Collection Time: 04/09/22  5:24 PM   Result Value Ref Range    Protime 15.5 (H) 12.3 - 14.9 sec    INR 1.2    APTT    Collection Time: 04/09/22  5:24 PM   Result Value Ref Range    aPTT >150.0 (HH) 24.4 - 36.8 sec   APTT    Collection Time: 04/09/22  8:31 PM   Result Value Ref Range    aPTT 50.2 (H) 24.4 - 36.8 sec   Anti-Xa, Unfractionated Heparin    Collection Time: 04/09/22 10:02 PM   Result Value Ref Range    Anti-XA Unfrac Heparin 0.36 IU/mL   Lactic Acid    Collection Time: 04/10/22  2:38 AM   Result Value Ref Range    Lactic Acid 2.1 0.5 - 2.2 mmol/L SPECIMEN REJECTION    Collection Time: 04/10/22  3:12 AM   Result Value Ref Range    Rejected Test a1c CBCND     Reason for Rejection see below      Recent Labs     04/09/22  1445 04/09/22  1500   COLORU Yellow  --    PHUR 5.5  --    CLARITYU Clear  --    SPECGRAV 1.024  --    LEUKOCYTESUR Negative  --    UROBILINOGEN 0.2  --    BILIRUBINUR Negative  --    BLOODU Negative  --    GLUCOSE  --  238*       RADIOLOGY RESULTS:  NM LUNG VENT/PERFUSION (VQ)    Result Date: 4/9/2022  Patient: Li Bledsoe  Time Out: 19:46 Exam(s): NM VQ  EXAM:   NM Lung Perfusion and Ventilation Scan  FINDINGS:   Ventilation:  Unremarkable. No ventilation defects. Perfusion:  Unremarkable. No perfusion defects. Electronically signed by Celi Ureña D.O. on 04-09-22 at 1946      No findings to suggest pulmonary embolism. XR CHEST PORTABLE    Result Date: 4/9/2022  XR CHEST PORTABLE Clinical History:  Worsening shortness of breath. Comparison:  None  RESULT: Shallow inspiration. Within these limits, no distinct consolidation. No pleural effusion. No pneumothorax. Normal cardiomediastinal silhouette. No acute osseous findings. No acute radiographic abnormality within limits of shallow inspiration. XR CHEST PORTABLE    Result Date: 4/4/2022  Patient Name:                Li Bledsoe MRN:                         [id number] Acct#:                       [id number] Diagnostic Radiology ACCESSION                 EXAM [place] [facility] -[id number]             4/4/2022 03:27 EDT       CR Chest Portable         SANJUANITAJESPAMELA CPT code 64801 Reason For Exam (CR Chest Portable) chest pain Report CHEST SINGLE VIEW CLINICAL INFORMATION:  Chest pain A frontal view of the chest was obtained. No acute pulmonary disease is noted. The cardiovascular silhouette is within normal limits. IMPRESSION: No acute pulmonary disease with hypoinflation.  Report Dictated on Workstation: [id number] ---** Final ---** Dictated: 04/04/2022 3:46 am Dictating Physician: Raphael Chauhan MD Signed Date and Time: 04/04/2022 3:47 am Signed by: Raphael Chauhan MD Transcribed Date and Time: 04/04/2022 3:46    XR CHEST PORTABLE    Result Date: 3/13/2022  Patient Name:                Zoie Patel MRN:                         [id number] Acct#:                       [id number] Diagnostic Radiology ACCESSION                 [id number]             3/13/2022 11:53 EDT      CR Chest Portable         LEONARD ROBERTS CPT code 02155 Reason For Exam (CR Chest Portable) sob Report PORTABLE CHEST: INDICATION: Shortness of breath COMPARISON:  3/8/2022 Obtained at 1146 hours. A single portable AP radiograph of the chest was obtained. The heart is normal in size. The mediastinal silhouette is normal.  There is bibasal atelectasis. No effusions are seen. There is no pleural thickening. The osseous structures are unremarkable. IMPRESSION:  Bibasal atelectasis. No acute process. Report Dictated on Workstation: [id number] ---** Final ---** Dictated: 03/13/2022 12:26 pm Dictating Physician: Fariha Dent Signed Date and Time: 03/13/2022 12:37 pm Signed by: Fariha Dent Transcribed Date and Time: 03/13/2022 12:28    CT Abdomen Pelvis Wo Contrast    Result Date: 4/4/2022  Patient Name:                Zoie Patel MRN:                         [id number] Acct#:                       [id number] Computed Tomography ACCESSION                 EXAM DATE/TIME           PROCEDURE                 ORDERING PROVIDER -[id number]             4/4/2022 06:20 EDT       CT Abdomen/Pelvis (No     VORHIES, PAMELA PO, No IV) CPT code 63907 Reason For Exam (CT Abdomen/Pelvis (No PO, No IV)) Abdominal pain, elevated lactic acid, diarrhea Report CT ABDOMEN AND PELVIS WITHOUT IV CONTRAST CLINICAL INDICATION: Abdominal pain with elevated lactic acid. Diarrhea.  TECHNIQUE: IMPRESSION: 1. Few prominent loops of small bowel with air-fluid without evidence of obstruction. Findings could reflect low-grade ileus or low-grade enteritis though there is no small bowel wall thickening or adjacent stranding. Air and fluid is also noted in the colon. Rectal tube in place. No pneumatosis or pneumoperitoneum. 2. IVC filter in place. 3. Early osteonecrosis involving the right greater than left femoral head. Report Dictated on Workstation: [id number] ---** Final ---** Dictated: 04/04/2022 9:54 am Dictating Physician: Jacques Walker MD, PARVIZ Signed Date and Time: 04/04/2022 10:01 am Signed by: Jacques Walker MD, VLADIMIR Transcribed Date and Time: 04/04/2022 9:54    ASSESSMENT AND PLAN:  Patient with hereditary thrombophilia (factor 5 Leiden and protein S deficiency by report). Multiple DVT's, PE's, IVC filter, and thrombectomy previously. Apparently takes Coumadin 7.5mg daily for \"years\". Off treatment recently because moved to [facility]. Goal INR 2-3. Follow at cancer center.     Electronically signed by Carla Meyer DO on 4/10/2022 at 7:54 AM

## 2022-04-10 NOTE — PROGRESS NOTES
Pt states he is still having itching- order received for 1x dose 25mg IV benadryl and given. Pt complaining of R foot pain, agreeable to xray orders, transport up to get patient now and he is refusing. PCA went in to check patients blood sugar, he asked Ryder Oseguera is that for? \" When I explained to him according to his med rec he is supposed to take insulin at home so we need to check his blood sugar. Pt states \"this is what happens when you people don't listen, you get misinformation\" and proceeded to yell.  I said \"sir, you need to calm down\" he states \"you are so disrespectful\"

## 2022-04-10 NOTE — ED NOTES
Dr. Idalmis Landon aware of PTT result of 50.2. Per MD, heparin gtt can be restarted at previous rate.        Juan Dudley RN  04/09/22 5280

## 2022-04-10 NOTE — PROGRESS NOTES
[facility] EMERGENCY [facility] AT [facility] Respiratory Therapy Evaluation   Current Order:   Alb mdi / aero      Home Regimen: y      Ordering Physician: radha  Re-evaluation Date:  eval done     Diagnosis: pain      Patient Status: Stable / Unstable + Physician notified    The following MDI Criteria must be met in order to convert aerosol to MDI with spacer. If unable to meet, MDI will be converted to aerosol:  []  Patient able to demonstrate the ability to use MDI effectively  []  Patient alert and cooperative  []  Patient able to take deep breath with 5-10 second hold  []  Medication(s) available in this delivery method   []  Peak flow greater than or equal to 200 ml/min            Current Order Substituted To  (same drug, same frequency)   Aerosol to MDI [] Albuterol Sulfate 0.083% unit dose by aerosol Albuterol Sulfate MDI 2 puffs by inhalation with spacer    [] Levalbuterol 1.25 mg unit dose by aerosol Levalbuterol MDI 2 puffs by inhalation with spacer    [] Levalbuterol 0.63 mg unit dose by aerosol Levalbuterol MDI 2 puffs by inhalation with spacer    [] Ipratropium Bromide 0.02% unit dose by aerosol Ipratropium Bromide MDI 2 puffs by inhalation with spacer    [] Duoneb (Ipratropium + Albuterol) unit dose by aerosol Ipratropium MDI + Albuterol MDI 2 puffs by inhalation w/spacer   MDI to Aerosol [] Albuterol Sulfate MDI Albuterol Sulfate 0.083% unit dose by aerosol    [] Levalbuterol MDI 2 puffs by inhalation Levalbuterol 1.25 mg unit dose by aerosol    [] Ipratropium Bromide MDI by inhalation Ipratropium Bromide 0.02% unit dose by aerosol    [] Combivent (Ipratropium + Albuterol) MDI by inhalation Duoneb (Ipratropium + Albuterol) unit dose by aerosol       Treatment Assessment [Frequency/Schedule]:  Change frequency to: __________no change_______________________________________per Protocol, P&T, MEC      Points 0 1 2 3 4   Pulmonary Status  Non-Smoker  []   Smoking history   < 20 pack years  []   Smoking history  ?  20 pack years  [] Pulmonary Disorder  (acute or chronic)  [x]   Severe or Chronic w/ Exacerbation  []     Surgical Status No [x]   Surgeries     General []   Surgery Lower []   Abdominal Thoracic or []   Upper Abdominal Thoracic with  PulmonaryDisorder  []     Chest X-ray Clear/Not  Ordered     [x]  Chronic Changes  Results Pending  []  Infiltrates, atelectasis, pleural effusion, or edema  []  Infiltrates in more than one lobe []  Infiltrate + Atelectasis, &/or pleural effusion  []    Respiratory Pattern Regular,  RR = 12-20 [x]  Increased,  RR = 21-25 []  ACEVEDO, irregular,  or RR = 26-30 []  Decreased FEV1  or RR = 31-35 []  Severe SOB, use  of accessory muscles, or RR ? 35  []    Mental Status Alert, oriented,  Cooperative [x]  Confused but Follows commands []  Lethargic or unable to follow commands []  Obtunded  []  Comatose  []    Breath Sounds Clear to  auscultation  [x]  Decreased unilaterally or  in bases only []  Decreased  bilaterally  []  Crackles or intermittent wheezes []  Wheezes []    Cough Strong, Spontan., & nonproductive [x]  Strong,  spontaneous, &  productive []  Weak,  Nonproductive []  Weak, productive or  with wheezes []  No spontaneous  cough or may require suctioning []    Level of Activity Ambulatory [x]  Ambulatory w/ Assist  []  Non-ambulatory []  Paraplegic []  Quadriplegic []    Total    Score:____3  5___     Triage Score:________      Tri       Triage:     1. (>20) Freq: Q3    2. (16-20) Freq: Q4   3. (11-15) Freq: QID & Albuterol Q2 PRN    4. (6-10) Freq: TID & Albuterol Q2 PRN    5. (0-5) Freq Q4prn

## 2022-04-10 NOTE — PROGRESS NOTES
Patient was seen and examined. Discussed with RN. Agree with documentation as detailed in my colleagues H&P from today. Patient is complaining of right foot pain. He reports history of orthopedic surgery. Right foot x-ray and right ankle x-rays ordered. He is asking for his Benadryl to be increased due to itching with heparin. Benadryl increased to 50 mg IV every 6. I educated the patient on the importance of completing his treatment and having a therapeutic INR of 2-3 to prevent further thromboembolism I also educated him on the importance of following up with a primary care provider, he needs to establish one. He is already given a list of primary care providers. He also needs to establish care with anticoagulation clinic. I encouraged him not to leave before his treatment is completed. He verbalized understanding. During my interview with the patient, patient made a remark that he is being treated differently because \"I am is black\". I reassured him that all patients are treated equally regardless of the race, gender or age. He reports that he had a disagreement with the staff from overnight. While I was leaving the room, patient says \" I am about to leave this f** hospital\". Not clear to me why patient feels that he is being treated differently at this point. I discussed this with staff on the floor and there is no clear reason at this point where he feels this way. We will continue to monitor closely until his INR is therapeutic.     Keesha Altman, [facility]

## 2022-04-10 NOTE — PROGRESS NOTES
Spoke to the patient again about the importance of getting testing done (xray ordered, xa labs for heparin gtt). Pain meds ordered. Pt agreeable to testing.      Ned Yates, [facility]

## 2022-04-10 NOTE — PROGRESS NOTES
830 am Xa 0.39- no adjustment needed. Next Xa to be drawn at 230pm.    230pm xa was 0.22- Due to pt acuity drip was adjusted at 3pm. Next Xa to be drawn at 9pm. Will notify night shift nurse.
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
## 2022-04-10 NOTE — ED NOTES
6 hour Xa result 0.36. Per heparin protocol, no bolus or change needed at this time. Heparin gtt remains at 16.3 ml/hr.      Oni Townsend RN  04/09/22 [id number]

## 2022-04-11 LAB
ANION GAP SERPL CALCULATED.3IONS-SCNC: 10 MEQ/L (ref 9–15)
ANISOCYTOSIS: ABNORMAL
ANTI-XA UNFRAC HEPARIN: 0.13 IU/ML
ANTI-XA UNFRAC HEPARIN: 0.55 IU/ML
ANTI-XA UNFRAC HEPARIN: 0.62 IU/ML
ANTI-XA UNFRAC HEPARIN: 1.2 IU/ML
BANDED NEUTROPHILS RELATIVE PERCENT: 1 %
BASOPHILS ABSOLUTE: 0 K/UL (ref 0–0.2)
BASOPHILS RELATIVE PERCENT: 0.9 %
BUN BLDV-MCNC: 14 MG/DL (ref 6–20)
CALCIUM SERPL-MCNC: 8.3 MG/DL (ref 8.5–9.9)
CHLORIDE BLD-SCNC: 104 MEQ/L (ref 95–107)
CO2: 22 MEQ/L (ref 20–31)
CREAT SERPL-MCNC: 0.75 MG/DL (ref 0.7–1.2)
EKG ATRIAL RATE: 102 BPM
EKG P AXIS: 44 DEGREES
EKG P-R INTERVAL: 168 MS
EKG Q-T INTERVAL: 350 MS
EKG QRS DURATION: 82 MS
EKG QTC CALCULATION (BAZETT): 456 MS
EKG R AXIS: 3 DEGREES
EKG T AXIS: 18 DEGREES
EKG VENTRICULAR RATE: 102 BPM
EOSINOPHILS ABSOLUTE: 0.1 K/UL (ref 0–0.7)
EOSINOPHILS RELATIVE PERCENT: 3 %
GFR AFRICAN AMERICAN: >60
GFR NON-AFRICAN AMERICAN: >60
GLUCOSE BLD-MCNC: 132 MG/DL (ref 70–99)
GLUCOSE BLD-MCNC: 138 MG/DL (ref 70–99)
GLUCOSE BLD-MCNC: 151 MG/DL (ref 70–99)
GLUCOSE BLD-MCNC: 213 MG/DL (ref 70–99)
GLUCOSE BLD-MCNC: 88 MG/DL (ref 70–99)
HCT VFR BLD CALC: 30.7 % (ref 42–52)
HEMOGLOBIN: 10.2 G/DL (ref 14–18)
HEPATITIS B SURFACE ANTIGEN INTERPRETATION: REACTIVE
HEPATITIS C ANTIBODY: NONREACTIVE
INR BLD: 1.2
LYMPHOCYTES ABSOLUTE: 1.1 K/UL (ref 1–4.8)
LYMPHOCYTES RELATIVE PERCENT: 26 %
MCH RBC QN AUTO: 24.6 PG (ref 27–31.3)
MCHC RBC AUTO-ENTMCNC: 33.1 % (ref 33–37)
MCV RBC AUTO: 74.1 FL (ref 80–100)
METAMYELOCYTES RELATIVE PERCENT: 3 %
MONOCYTES ABSOLUTE: 0.3 K/UL (ref 0.2–0.8)
MONOCYTES RELATIVE PERCENT: 7.6 %
MYELOCYTE PERCENT: 1 %
NEUTROPHILS ABSOLUTE: 2.7 K/UL (ref 1.4–6.5)
NEUTROPHILS RELATIVE PERCENT: 59 %
PDW BLD-RTO: 17.3 % (ref 11.5–14.5)
PERFORMED ON: ABNORMAL
PERFORMED ON: NORMAL
PLATELET # BLD: 219 K/UL (ref 130–400)
PLATELET SLIDE REVIEW: NORMAL
POIKILOCYTES: ABNORMAL
POTASSIUM REFLEX MAGNESIUM: 4 MEQ/L (ref 3.4–4.9)
PROTHROMBIN TIME: 15.6 SEC (ref 12.3–14.9)
RAPID HIV 1&2: NEGATIVE
RBC # BLD: 4.14 M/UL (ref 4.7–6.1)
SCHISTOCYTES: ABNORMAL
SODIUM BLD-SCNC: 136 MEQ/L (ref 135–144)
TEAR DROP CELLS: ABNORMAL
WBC # BLD: 4.2 K/UL (ref 4.8–10.8)

## 2022-04-11 PROCEDURE — 36415 COLL VENOUS BLD VENIPUNCTURE: CPT

## 2022-04-11 PROCEDURE — 90792 PSYCH DIAG EVAL W/MED SRVCS: CPT | Performed by: PSYCHIATRY & NEUROLOGY

## 2022-04-11 PROCEDURE — 2060000000 HC ICU INTERMEDIATE R&B

## 2022-04-11 PROCEDURE — 1210000000 HC MED SURG R&B

## 2022-04-11 PROCEDURE — 85520 HEPARIN ASSAY: CPT

## 2022-04-11 PROCEDURE — 6370000000 HC RX 637 (ALT 250 FOR IP): Performed by: INTERNAL MEDICINE

## 2022-04-11 PROCEDURE — 80048 BASIC METABOLIC PNL TOTAL CA: CPT

## 2022-04-11 PROCEDURE — 85025 COMPLETE CBC W/AUTO DIFF WBC: CPT

## 2022-04-11 PROCEDURE — 99251 PR INITL INPATIENT CONSULT NEW/ESTAB PT 20 MIN: CPT | Performed by: ORTHOPAEDIC SURGERY

## 2022-04-11 PROCEDURE — 85610 PROTHROMBIN TIME: CPT

## 2022-04-11 PROCEDURE — 6360000002 HC RX W HCPCS: Performed by: INTERNAL MEDICINE

## 2022-04-11 PROCEDURE — 2580000003 HC RX 258: Performed by: INTERNAL MEDICINE

## 2022-04-11 RX ORDER — WARFARIN SODIUM 2.5 MG/1
10 TABLET ORAL
Status: COMPLETED | OUTPATIENT
Start: 2022-04-11 | End: 2022-04-11

## 2022-04-11 RX ORDER — OXYCODONE HYDROCHLORIDE 5 MG/1
10 TABLET ORAL EVERY 6 HOURS PRN
Status: DISCONTINUED | OUTPATIENT
Start: 2022-04-11 | End: 2022-04-12

## 2022-04-11 RX ORDER — DIPHENHYDRAMINE HCL 25 MG
50 TABLET ORAL 4 TIMES DAILY
Status: DISCONTINUED | OUTPATIENT
Start: 2022-04-11 | End: 2022-04-14 | Stop reason: HOSPADM

## 2022-04-11 RX ORDER — MORPHINE SULFATE 2 MG/ML
2 INJECTION, SOLUTION INTRAMUSCULAR; INTRAVENOUS EVERY 4 HOURS PRN
Status: DISCONTINUED | OUTPATIENT
Start: 2022-04-11 | End: 2022-04-11

## 2022-04-11 RX ADMIN — DIVALPROEX SODIUM 500 MG: 250 TABLET, DELAYED RELEASE ORAL at 07:58

## 2022-04-11 RX ADMIN — DIPHENHYDRAMINE HYDROCHLORIDE 50 MG: 50 INJECTION, SOLUTION INTRAMUSCULAR; INTRAVENOUS at 04:37

## 2022-04-11 RX ADMIN — Medication 10 ML: at 07:58

## 2022-04-11 RX ADMIN — DIPHENHYDRAMINE HCL 50 MG: 25 TABLET ORAL at 21:20

## 2022-04-11 RX ADMIN — MONTELUKAST 10 MG: 10 TABLET, FILM COATED ORAL at 07:58

## 2022-04-11 RX ADMIN — MORPHINE SULFATE 4 MG: 4 INJECTION INTRAVENOUS at 08:52

## 2022-04-11 RX ADMIN — HEPARIN SODIUM 7260 UNITS: 1000 INJECTION, SOLUTION INTRAVENOUS; SUBCUTANEOUS at 02:16

## 2022-04-11 RX ADMIN — HYDROMORPHONE HYDROCHLORIDE 0.5 MG: 1 INJECTION, SOLUTION INTRAMUSCULAR; INTRAVENOUS; SUBCUTANEOUS at 11:00

## 2022-04-11 RX ADMIN — DIPHENHYDRAMINE HCL 50 MG: 25 TABLET ORAL at 17:04

## 2022-04-11 RX ADMIN — HYDROMORPHONE HYDROCHLORIDE 0.5 MG: 1 INJECTION, SOLUTION INTRAMUSCULAR; INTRAVENOUS; SUBCUTANEOUS at 21:20

## 2022-04-11 RX ADMIN — MORPHINE SULFATE 4 MG: 4 INJECTION INTRAVENOUS at 04:37

## 2022-04-11 RX ADMIN — DIVALPROEX SODIUM 500 MG: 250 TABLET, DELAYED RELEASE ORAL at 21:19

## 2022-04-11 RX ADMIN — HYDROMORPHONE HYDROCHLORIDE 0.5 MG: 1 INJECTION, SOLUTION INTRAMUSCULAR; INTRAVENOUS; SUBCUTANEOUS at 17:04

## 2022-04-11 RX ADMIN — OXYCODONE 10 MG: 5 TABLET ORAL at 15:32

## 2022-04-11 RX ADMIN — DIPHENHYDRAMINE HYDROCHLORIDE 50 MG: 50 INJECTION, SOLUTION INTRAMUSCULAR; INTRAVENOUS at 23:32

## 2022-04-11 RX ADMIN — WARFARIN SODIUM 10 MG: 2.5 TABLET ORAL at 17:04

## 2022-04-11 RX ADMIN — INSULIN GLARGINE 40 UNITS: 100 INJECTION, SOLUTION SUBCUTANEOUS at 21:29

## 2022-04-11 RX ADMIN — DIPHENHYDRAMINE HYDROCHLORIDE 50 MG: 50 INJECTION, SOLUTION INTRAMUSCULAR; INTRAVENOUS at 11:00

## 2022-04-11 RX ADMIN — CETIRIZINE HYDROCHLORIDE 10 MG: 10 TABLET, FILM COATED ORAL at 07:58

## 2022-04-11 RX ADMIN — ASPIRIN 81 MG 81 MG: 81 TABLET ORAL at 07:58

## 2022-04-11 RX ADMIN — OXYCODONE 10 MG: 5 TABLET ORAL at 23:32

## 2022-04-11 RX ADMIN — Medication 10 ML: at 21:20

## 2022-04-11 RX ADMIN — CLOPIDOGREL BISULFATE 75 MG: 75 TABLET ORAL at 07:58

## 2022-04-11 RX ADMIN — MORPHINE SULFATE 4 MG: 4 INJECTION INTRAVENOUS at 00:29

## 2022-04-11 RX ADMIN — INSULIN LISPRO 2 UNITS: 100 INJECTION, SOLUTION INTRAVENOUS; SUBCUTANEOUS at 17:03

## 2022-04-11 ASSESSMENT — PAIN SCALES - GENERAL
PAINLEVEL_OUTOF10: 10
PAINLEVEL_OUTOF10: 10
PAINLEVEL_OUTOF10: 9
PAINLEVEL_OUTOF10: 10
PAINLEVEL_OUTOF10: 9

## 2022-04-11 ASSESSMENT — ENCOUNTER SYMPTOMS
DIARRHEA: 0
VOMITING: 0
SHORTNESS OF BREATH: 0
COUGH: 0
NAUSEA: 0

## 2022-04-11 NOTE — PROGRESS NOTES
Progress Note  Date:2022       Room:[facility]/W264-01  Patient Name:Pepe Brantley     YOB: 1975     Age:47 y.o. Subjective    Subjective:  Symptoms:  He reports malaise and weakness. No shortness of breath, cough, chest pain, headache, chest pressure, diarrhea or anxiety. Diet:  No nausea or vomiting. Pain:  He complains of pain that is severe. Review of Systems   Respiratory: Negative for cough and shortness of breath. Cardiovascular: Negative for chest pain. Gastrointestinal: Negative for diarrhea, nausea and vomiting. Neurological: Positive for weakness. Objective         Vitals Last 24 Hours:  TEMPERATURE:  Temp  Av.8 °F (36.6 °C)  Min: 97.5 °F (36.4 °C)  Max: 98.1 °F (36.7 °C)  RESPIRATIONS RANGE: Resp  Av  Min: 18  Max: 18  PULSE OXIMETRY RANGE: SpO2  Av.5 %  Min: 97 %  Max: 100 %  PULSE RANGE: Pulse  Av.3  Min: 77  Max: 90  BLOOD PRESSURE RANGE: Systolic (12CPE), CNA:176 , Min:120 , HH   ; Diastolic (35ELR), VMU:68, Min:67, Max:69    I/O (24Hr): Intake/Output Summary (Last 24 hours) at 2022 1231  Last data filed at 2022 0600  Gross per 24 hour   Intake 450 ml   Output --   Net 450 ml     Objective:  General Appearance:  Comfortable, well-appearing and in no acute distress. Vital signs: (most recent): Blood pressure 120/67, pulse 77, temperature 97.5 °F (36.4 °C), resp. rate 18, height 5' 7.01\" (1.702 m), weight 203 lb (92.1 kg), SpO2 97 %. HEENT: Normal HEENT exam.    Lungs: There are decreased breath sounds. Heart: Normal rate. S1 normal and S2 normal.    Abdomen: Abdomen is soft. Bowel sounds are normal.   There is no epigastric area or suprapubic area tenderness. Neurological: Patient is alert and oriented to person, place and time. Pupils:  Pupils are equal, round, and reactive to light. Skin:  Warm and dry.       Labs/Imaging/Diagnostics    Labs:  CBC:  Recent Labs     04/09/22  1610 04/10/22  1331 04/11/22  0927   WBC 9.0 5.1 4.2*   RBC 4.31* 4.08* 4.14*   HGB 10.5* 10.1* 10.2*   HCT 32.3* 30.3* 30.7*   MCV 75.1* 74.1* 74.1*   RDW 17.3* 17.4* 17.3*    254 219     CHEMISTRIES:  Recent Labs     04/09/22  1500 04/09/22  1604 04/10/22  1331 04/11/22  0926     --  143 136   K 4.6  --  4.1 4.0     --  107 104   CO2 22  --  25 22   BUN 17  --  18 14   CREATININE 0.82 0.8 0.87 0.75   GLUCOSE 238*  --  190* 138*     PT/INR:  Recent Labs     04/09/22  1724 04/10/22  0829   PROTIME 15.5* 13.6   INR 1.2 1.0     APTT:  Recent Labs     04/09/22  1724 04/09/22  2031 04/10/22  1331   APTT >150.0* 50.2* 33.1     LIVER PROFILE:  Recent Labs     04/09/22  1500   AST 31   ALT 30   BILITOT <0.2   ALKPHOS 77       Imaging Last 24 Hours:  XR ANKLE RIGHT (MIN 3 VIEWS)    Result Date: 4/10/2022  EXAMINATION/TECHNIQUE:  XR ANKLE RIGHT (MIN 3 VIEWS), XR FOOT RIGHT (2 VIEWS) HISTORY:  History of recent ankle surgery. Right ankle pain. COMPARISON: None RESULT: 2 partially corticated screws crossing the medial malleolus. 2 corticated screws within the mid foot medially. No acute fracture. No dislocation. Scattered degenerative changes. Vague areas of patchy increased density especially within the distal tibia and calcaneus, nonspecific, may relate to bone infarcts, could be postsurgical. Correlation with prior radiographs and surgical history would be helpful. Diffuse soft tissue edema. No other significant abnormality. No acute findings. XR FOOT RIGHT (2 VIEWS)    Result Date: 4/10/2022  EXAMINATION/TECHNIQUE:  XR ANKLE RIGHT (MIN 3 VIEWS), XR FOOT RIGHT (2 VIEWS) HISTORY:  History of recent ankle surgery. Right ankle pain. COMPARISON: None RESULT: 2 partially corticated screws crossing the medial malleolus. 2 corticated screws within the mid foot medially. No acute fracture. No dislocation. Scattered degenerative changes.  Vague areas of patchy increased density especially within the distal tibia and calcaneus, nonspecific, may relate to bone infarcts, could be postsurgical. Correlation with prior radiographs and surgical history would be helpful. Diffuse soft tissue edema. No other significant abnormality. No acute findings. NM LUNG VENT/PERFUSION (VQ)    Result Date: 4/9/2022  Patient: Jacque Soliz  Time Out: 19:46 Exam(s): NM VQ  EXAM:   NM Lung Perfusion and Ventilation Scan  FINDINGS:   Ventilation:  Unremarkable. No ventilation defects. Perfusion:  Unremarkable. No perfusion defects. Electronically signed by Cortez Lundborg, D.O. on 04-09-22 at 1946      No findings to suggest pulmonary embolism. XR CHEST PORTABLE    Result Date: 4/9/2022  XR CHEST PORTABLE Clinical History:  Worsening shortness of breath. Comparison:  None  RESULT: Shallow inspiration. Within these limits, no distinct consolidation. No pleural effusion. No pneumothorax. Normal cardiomediastinal silhouette. No acute osseous findings. No acute radiographic abnormality within limits of shallow inspiration. Assessment//Plan           Hospital Problems           Last Modified POA    * (Principal) Hypercoagulable state ([address] 4/9/2022 Yes        Assessment & Plan  4/11: Increase pain meds by adding Roxicodone for breakthrough pain. IV pain meds till tomorrow and will DC IV pains tomorrow patient is in agreement. Palliative care for pain management. Continue with monitoring INR and heparin drip. Once INR is greater than 2 patient can be discharged. PT understands that IV dilaudid will be discontinue tomorrow.   Electronically signed by Stefanie Brower MD on 4/11/22 at 12:31 PM EDT

## 2022-04-11 NOTE — PLAN OF CARE
Problem: Falls - Risk of:  Goal: Will remain free from falls  Description: Will remain free from falls  4/11/2022 1047 by Karyna Draper RN  Outcome: Ongoing  4/11/2022 0232 by Nelida Gastelum. Masha Muñoz RN  Outcome: Ongoing     Problem: Pain:  Goal: Pain level will decrease  Description: Pain level will decrease  4/11/2022 1047 by Karyna Draper RN  Outcome: Ongoing  4/11/2022 0232 by Nelida Gastelum.  Masha Muñoz RN  Outcome: Ongoing

## 2022-04-11 NOTE — PROGRESS NOTES
Warfarin Dosing - Pharmacy Consult Note  Consulting Provider: Dr Chey Goddard  Indication:  History of  VTE/PE  Warfarin Dose prior to admission: possibly 7.5mg daily (off > 1 week, multiple AMA from various facilities)   Concurrent anticoagulants/antiplatelets: heparin infusion (non-compliance for over 1 week)  Significant Drug Interactions: No obvious interactions  Recent Labs     04/09/22  1500 04/09/22  1610 04/09/22  1724 04/10/22  0829 04/10/22  1331 04/11/22  0927 04/11/22  1257   INR  --   --  1.2 1.0  --   --  1.2   HGB  --  10.5*  --   --  10.1* 10.2*  --    PLT  --  256  --   --  254 219  --    LABALBU 4.0  --   --   --   --   --   --      Recent warfarin administrations                     warfarin (COUMADIN) tablet 7.5 mg (mg) 7.5 mg Given 04/10/22 1835    warfarin (COUMADIN) tablet 5 mg (mg) 5 mg Given 04/10/22 0123                   Date   INR    Dose  04/9/22   1.2       5 mg  4/10/22   1.0       7.5 mg  4/11/22    1.2      10mg    Assessment/Plan  (Goal INR: 2 - 3)  INR is subtherapeutic for goal of 2-3 at 1.2 since yesterday. Therefore, will administer a dose of  Warfarin 10 mg today. Active problem list reviewed. INR orders are placed. Chart reviewed for pertinent labs, drug/diet interactions, and past doses. Documentation of patient's clinical condition was reviewed. Pharmacy Dosing:  Pharmacy will continue to follow.      [facility]  Staff PharmacistSharif  4/11/2022  3:49 PM

## 2022-04-11 NOTE — FLOWSHEET NOTE
Shift assessment done. A&Ox4 Patient c/o generalized pain 9/10 Morphine IV give. No other needs Call light in reach.  unable to obtain sample. Will have another tech attempt. No adjustment at this time. 1400 Xa was 1.20 Heparin placed on hold. Will resume in 1hr. as per protocol.      1500 Heparin restarted at 19.2ml/hr

## 2022-04-11 NOTE — CONSULTS
[address] [place] [address] Department of Psychiatry  Behavioral Health Consult    REASON FOR CONSULT: Agitation    CONSULTING PHYSICIAN:     History obtained from: patient    HISTORY OF PRESENT ILLNESS:      The patient is a 49 y.o. male with no significant past psychiatric history of mental illness who presents with chest pain and with recent PE and DVT. Pt is not aware about the reason for psych consult. Pt denies getting agitated but was uncomfortable last night which made him irritable with the doctor last night  Pt has been feeling anxious about his situation but believe that as long as he follow the instruction of his doctors he will be fine    Pt denies any suicidal thoughts or HI  Denies any depressive symptoms or manic symptoms  Sleep and appetite has been good  NO AH or VH  Not paranoid    Noticed to be having appropriate conversation through out the psych eval    The patient is not currently receiving care for the above psychiatric illness.       Psychiatric Review of Systems       Depression: denies     Roxanne or Hypomania:  no     Panic Attacks:  no     Phobias:  no     Obsessions and Compulsions:  no     PTSD : no     Hallucinations:  no     Delusions:  no      Substance Abuse History:  ETOH: no  Marijuana: no  Opiates: no  Other Drugs: no      Past Psychiatric History:  Denies any past psych issues    Past Medical History:        Diagnosis Date    Asthma     [facility]chronic [facility]     Deep vein thrombosis (DVT) ([facility]     [facility]iden [facility]     Protein S deficiency ([facility]     Pulmonary emboli ([facility])        Past Surgical History:        Procedure Laterality Date    APPENDECTOMY      VASCULAR SURGERY      july 2021       Medications Prior to Admission:   Medications Prior to Admission: divalproex (DEPAKOTE) 500 MG DR tablet, Take 1 tablet by mouth 2 times daily  insulin glargine (LANTUS) 100 UNIT/ML injection vial, Inject 40 Units into the skin at bedtime  insulin lispro (HUMALOG) 100 UNIT/ML injection vial, Inject 15 Units into the skin 3 times daily (before meals)  albuterol sulfate HFA (VENTOLIN HFA) 108 (90 Base) MCG/ACT inhaler, Inhale 2 puffs into the lungs every 4 hours as needed for Wheezing  montelukast (SINGULAIR) 10 MG tablet, Take 10 mg by mouth daily  omeprazole (PRILOSEC) 20 MG delayed release capsule, Take 20 mg by mouth daily  warfarin (COUMADIN) 5 MG tablet, Take 5 mg by mouth  loratadine (CLARITIN) 10 MG tablet, Take 10 mg by mouth daily  clopidogrel (PLAVIX) 75 MG tablet, Take 75 mg by mouth daily  aspirin 81 MG chewable tablet, Take 81 mg by mouth daily  albuterol (PROVENTIL) (2.5 MG/3ML) 0.083% nebulizer solution, Take 2.5 mg by nebulization every 6 hours as needed for Wheezing    Allergies:  Lovenox [enoxaparin sodium], Arixtra [fondaparinux], Dye [iodides], Eliquis [apixaban], Fragmin [dalteparin], Iodine, Ipratropium, Motrin [ibuprofen], Robaxin [methocarbamol], Skelaxin [metaxalone], Toradol [ketorolac tromethamine], Tramadol, and Xarelto [rivaroxaban]    FAMILY/SOCIAL HISTORY:  No family history on file.   Social History     Socioeconomic History    Marital status: Single     Spouse name: Not on file    Number of children: Not on file    Years of education: Not on file    Highest education level: Not on file   Occupational History    Not on file   Tobacco Use    Smoking status: Former Smoker    Smokeless tobacco: Never Used   Vaping Use    Vaping Use: Never used   Substance and Sexual Activity    Alcohol use: Not Currently    Drug use: Never    Sexual activity: Not on file   Other Topics Concern    Not on file   Social History Narrative    Not on file     Social Determinants of Health     Financial Resource Strain:     Difficulty of Paying Living Expenses: Not on file   Food Insecurity:     Worried About [address] in the Last Year: Not on file    [place] of Food in the Last Year: Not on file   Transportation Needs:     Lack of Transportation (Medical): Not on file    Lack of Transportation (Non-Medical): Not on file   Physical Activity:     Days of Exercise per Week: Not on file    Minutes of Exercise per Session: Not on file   Stress:     Feeling of Stress : Not on file   Social Connections:     Frequency of Communication with Friends and Family: Not on file    Frequency of Social Gatherings with Friends and Family: Not on file    Attends Synagogue Services: Not on file    Active Member of Clubs or Organizations: Not on file    Attends Club or Organization Meetings: Not on file    Marital Status: Not on file   Intimate Partner Violence:     Fear of Current or Ex-Partner: Not on file    Emotionally Abused: Not on file    Physically Abused: Not on file    Sexually Abused: Not on file   Housing Stability:     Unable to Pay for Housing in the Last Year: Not on file    Number of [place] in the Last Year: Not on file    Unstable Housing in the Last Year: Not on file       REVIEW OF SYSTEMS    Constitutional: [] fever  [] chills  [] weight loss  []weakness [] Other:  Eyes:  [] photophobia  [] discharge [] acuity change   [] Diplopia   [] Other:  HENT:  [] sore throat  [] ear pain [] Tinnitus   [] Other  Respiratory:  [] Cough  [] Shortness of breath   [] Sputum   [] Other:   Cardiac: []Chest pain   []Palpitations []Edema  []PND  [] Other:  GI:  []Abdominal pain   []Nausea  []Vomiting  []Diarrhea  [] Other:  :  [] Dysuria   []Frequency  []Hematuria  []Discharge  [] Other:  Possible Pregnancy: []Yes   []No   LMP:   Musculoskeletal:  []Back pain  []Neck pain  []Recent Injury   Skin:  []Rash  [] Itching  [] Other:  Neurologic:  [] Headache  [] Focal weakness  [] Sensory changes []Other:  Endocrine:  [] Polyuria  [] Polydipsia  [] Hair Loss  [] Other:  Lymphatic:   [] Swollen glands   Psychiatric:  As per HPI      All other systems negative except as marked or mentioned/indicated in the HPI. Jenifer Doll      PHYSICAL EXAM:  Vitals:  /67   Pulse 77   Temp 97.5 °F (36.4 °C)   Resp 18   Ht 5' 7.01\" (1.702 m)   Wt 203 lb (92.1 kg)   SpO2 97%   BMI 31.79 kg/m²      Neuro Exam:   Muscle Strength & Tone: full ROM  Gait: normal gait   Involuntary Movements: No    Mental Status Examination:    Level of consciousness:  within normal limits   Appearance:  well-appearing  Behavior/Motor:  no abnormalities noted  Attitude toward examiner:  cooperative  Speech:  normal volume   Mood: anxious  Affect:  mood congruent  Thought processes:  goal directed   Thought content:  Suicidal Ideation:  denies suicidal ideation  Cognition:  oriented to person, place, and time   Concentration intact  Memory intact  Mini Mental Status- normal  Insight good   Judgement fair   Fund of Knowledge adequate      DIAGNOSIS:     Adjustment disorder unspecified        LABS: REVIEWED TODAY:  Recent Labs     04/09/22  1610 04/10/22  1331 04/11/22  0927   WBC 9.0 5.1 4.2*   HGB 10.5* 10.1* 10.2*    254 219     Recent Labs     04/09/22  1500 04/09/22  1604 04/10/22  1331 04/11/22  0926     --  143 136   K 4.6  --  4.1 4.0     --  107 104   CO2 22  --  25 22   BUN 17  --  18 14   CREATININE 0.82 0.8 0.87 0.75   GLUCOSE 238*  --  190* 138*     Recent Labs     04/09/22  1500   BILITOT <0.2   ALKPHOS 77   AST 31   ALT 30     Lab Results   Component Value Date    COCAINESCRN Negative 03/13/2022     No results found for: TSH, FREET4  No results found for: LITHIUM  Lab Results   Component Value Date    VALPROATE <10 (L) 03/08/2022     Lab Results   Component Value Date    VALPROATE <10 03/08/2022       FURTHER LABS ORDERED :      Radiology   XR ANKLE RIGHT (MIN 3 VIEWS)    Result Date: 4/10/2022  EXAMINATION/TECHNIQUE:  XR ANKLE RIGHT (MIN 3 VIEWS), XR FOOT RIGHT (2 VIEWS) HISTORY:  History of recent ankle surgery. Right ankle pain. COMPARISON: None RESULT: 2 partially corticated screws crossing the medial malleolus.  2 corticated screws within the mid foot medially. No acute fracture. No dislocation. Scattered degenerative changes. Vague areas of patchy increased density especially within the distal tibia and calcaneus, nonspecific, may relate to bone infarcts, could be postsurgical. Correlation with prior radiographs and surgical history would be helpful. Diffuse soft tissue edema. No other significant abnormality. No acute findings. XR FOOT RIGHT (2 VIEWS)    Result Date: 4/10/2022  EXAMINATION/TECHNIQUE:  XR ANKLE RIGHT (MIN 3 VIEWS), XR FOOT RIGHT (2 VIEWS) HISTORY:  History of recent ankle surgery. Right ankle pain. COMPARISON: None RESULT: 2 partially corticated screws crossing the medial malleolus. 2 corticated screws within the mid foot medially. No acute fracture. No dislocation. Scattered degenerative changes. Vague areas of patchy increased density especially within the distal tibia and calcaneus, nonspecific, may relate to bone infarcts, could be postsurgical. Correlation with prior radiographs and surgical history would be helpful. Diffuse soft tissue edema. No other significant abnormality. No acute findings. NM LUNG VENT/PERFUSION (VQ)    Result Date: 4/9/2022  Patient: Paco Cesar  Time Out: 19:46 Exam(s): NM VQ  EXAM:   NM Lung Perfusion and Ventilation Scan  FINDINGS:   Ventilation:  Unremarkable. No ventilation defects. Perfusion:  Unremarkable. No perfusion defects. Electronically signed by Roberto Reyes D.O. on 04-09-22 at 1946      No findings to suggest pulmonary embolism. XR CHEST PORTABLE    Result Date: 4/9/2022  XR CHEST PORTABLE Clinical History:  Worsening shortness of breath. Comparison:  None  RESULT: Shallow inspiration. Within these limits, no distinct consolidation. No pleural effusion. No pneumothorax. Normal cardiomediastinal silhouette. No acute osseous findings. No acute radiographic abnormality within limits of shallow inspiration.      XR CHEST PORTABLE    Result Date: 4/4/2022  Patient Name:                Addison Olivares MRN:                         [id number] Acct#:                       [id number] Diagnostic Radiology ACCESSION                 EXAM [place] [address] -[id number]             4/4/2022 03:27 EDT       CR Chest Portable         PAMELA GR CPT code 80552 Reason For Exam (CR Chest Portable) chest pain Report CHEST SINGLE VIEW CLINICAL INFORMATION:  Chest pain A frontal view of the chest was obtained. No acute pulmonary disease is noted. The cardiovascular silhouette is within normal limits. IMPRESSION: No acute pulmonary disease with hypoinflation. Report Dictated on Workstation: [id number] ---** Final ---** Dictated: 04/04/2022 3:46 am Dictating Physician: Salome Emmanuel MD, Mary Edwards Signed Date and Time: 04/04/2022 3:47 am Signed by: Mary Larios MD Transcribed Date and Time: 04/04/2022 3:46    XR CHEST PORTABLE    Result Date: 3/13/2022  Patient Name:                Addison Olivares MRN:                         [id number] Acct#:                       [id number] Diagnostic Radiology ACCESSION                 EXAM [place] [address] [id number]             3/13/2022 11:53 EDT      CR Chest Portable         LEONARD ROBERTS CPT code 76237 Reason For Exam (CR Chest Portable) sob Report PORTABLE CHEST: INDICATION: Shortness of breath COMPARISON:  3/8/2022 Obtained at 1146 hours. A single portable AP radiograph of the chest was obtained. The heart is normal in size. The mediastinal silhouette is normal.  There is bibasal atelectasis. No effusions are seen. There is no pleural thickening. The osseous structures are unremarkable. IMPRESSION:  Bibasal atelectasis. No acute process.  Report Dictated on Workstation: [id number] ---** Final ---** Dictated: 03/13/2022 12:26 pm Dictating Physician: Marcel Barrios Signed Date and Time: 03/13/2022 12:37 pm Signed by: Marcel Barrios Transcribed Date and Time: 03/13/2022 12:28    CT Abdomen Pelvis Wo Contrast    Result Date: 4/4/2022  Patient Name:                Shelby Brody MRN:                         [id number] Acct#:                       [id number] Computed Tomography ACCESSION                 EXAM DATE/TIME           PROCEDURE                 ORDERING PROVIDER -[id number]             4/4/2022 06:20 EDT       CT Abdomen/Pelvis (No     VORHIES, PAMELA PO, No IV) CPT code 40161 Reason For Exam (CT Abdomen/Pelvis (No PO, No IV)) Abdominal pain, elevated lactic acid, diarrhea Report CT ABDOMEN AND PELVIS WITHOUT IV CONTRAST CLINICAL INDICATION: Abdominal pain with elevated lactic acid. Diarrhea. TECHNIQUE: Transaxial sequence through the abdomen and pelvis with 3 mm reconstruction without intravenous intravenous contrast media. Enteric contrast was not administered. Coronal and sagittal reconstructions included. Dose reduction was employed with automated exposure control. COMPARISON:  Correlation is made to chest radiographs obtained earlier the same day FINDINGS: Examination is somewhat limited in evaluation of solid organs and vascular structures due to the lack of intravenous contrast. Additional limitations: No significant Lung base: Mild atelectasis/scarring with possible rounded atelectasis involving the right lung base. Liver: Mild suspected hepatic steatosis. Calcified granuloma in the left hepatic lobe. Gallbladder/Biliary tree:  Gallbladder appears within normal limits. No intra or extrahepatic biliary ductal dilatation Spleen: No significant abnormality detected. Pancreas: Homogeneous without adjacent stranding or other significant abnormality. Adrenals: No discrete mass or nodule detected. Kidneys/pelvic organs: No obstructive uropathy. No nephrolithiasis. Urinary bladder appears within normal limits as does the prostate gland. GI tract: Tiny sliding type hiatal hernia.  A few prominent loops of small bowel with air-fluid without evidence of obstruction. Portions                                           Computed Tomography Report of a normal appendix are identified. A few colonic diverticula without CT evidence of acute diverticulitis. Rectal tube is in place. There is air and some fluid within the colon. Additional findings: No pneumatosis or pneumoperitoneum. Prominent mesenteric lymph nodes without bulky adenopathy. IVC filter in place. No ascites or focal fluid collection. Osseous structures/soft tissues: Degenerative spondylosis predominantly at L5-S1. Early osteonecrosis involving the right greater than left femoral head. Tiny fat-containing periumbilical hernia without bowel involvement IMPRESSION: 1. Few prominent loops of small bowel with air-fluid without evidence of obstruction. Findings could reflect low-grade ileus or low-grade enteritis though there is no small bowel wall thickening or adjacent stranding. Air and fluid is also noted in the colon. Rectal tube in place. No pneumatosis or pneumoperitoneum. 2. IVC filter in place. 3. Early osteonecrosis involving the right greater than left femoral head. Report Dictated on Workstation: [id number] ---** Final ---** Dictated: 04/04/2022 9:54 am Dictating Physician: Miriam Schultz MD, [facility] Signed Date and Time: 04/04/2022 10:01 am Signed by: Miriam Schultz MD, [facility] Transcribed Date and Time: 04/04/2022 9:54      EKG: TRACING REVIEWED    RECOMMENDATIONS    Risk Management:  routine:  no special precautions necessary    Based on my assessment today, patient does not seem to be presenting with any significant mental health symptoms other than being anxious and apprehensive about his physical condition which is understandable in the context of his medical issues. Pt denies any suicidal thoughts or psychotic. No HI.  Want to get better and go home, follow up with care    I am clearing him from psych standpoint for discharge when medically stable      Discussed with the treating physician/ team about the patient and treatment plan  Reviewed the chart    Thanks for the consult. Please call me if needed.     Electronically signed by Cindi Dowd MD on 4/11/2022 at 3:35 PM

## 2022-04-11 NOTE — CARE COORDINATION
PATIENT REFUSING TO SPEAK W/ CM FOR D/C PLANNING. PATIENT TO BE WBAT IN WALKING BOOT, PSYCH ON FOR EVAL. D/C PLAN PENDING PSYCH EVAL. FROM HOME W/ FRIENDS. CM TO FOLLOW FOR ANY D/C NEEDS.

## 2022-04-11 NOTE — CONSULTS
Consult Note  Patient: Coretta Kimble  Unit/Bed: Q615/V564-40  YOB: 1975  MRN: [id number]  Acct: [de-identified]   Admitting Diagnosis: Hypercoagulable state ([facility] [D68.59]  History of DVT (deep vein thrombosis) [Z86.718]  Chest pain, unspecified type [R07.9]  Date:  4/9/2022  Hospital Day: 2    Complaint:  Right ankle and right foot pain     Requesting Physician: Dr. Lawanda Frances     History of Present Illness:  Patient is a 49year old male patient with past medical history of asthma, COPD, DVT, Factor V Leiden, Protein S deficiency, PE admitted for hypercoagulable state and chest pain. During admission patient reports having right medial foot and ankle pain. He underwent ORIF February 21 with a Dr. Jhonatan Johnson from another organization that he did not name. He reports his last follow up with orthopod on 3/15/22 and was placed into a walking boot and instructed to wear boot with ambulation. He reports not having any pain or issues until a few days ago when he took the walking boot off and hit his foot on an object and has had pain since then. He describes the pain as an aching sensation that is waxing and waning that does not radiate, pain is exacerbated by palpation.      PMHx:  Past Medical History:   Diagnosis Date    Asthma     COPD [facility]chronic obstructive pulmonary disease) ([facility]     Deep vein thrombosis (DVT) (HCC)     Factor V Leiden ([facility]     Protein S deficiency ([facility]     Pulmonary emboli (HCC)        PSHx:  Past Surgical History:   Procedure Laterality Date    APPENDECTOMY      VASCULAR SURGERY      july 2021       Social Hx:  Social History     Socioeconomic History    Marital status: Single     Spouse name: None    Number of children: None    Years of education: None    Highest education level: None   Occupational History    None   Tobacco Use    Smoking status: Former Smoker    Smokeless tobacco: Never Used   Vaping Use    Vaping Use: Never used   Substance and Sexual Activity    Alcohol use: Not Currently    Drug use: Never    Sexual activity: None   Other Topics Concern    None   Social History Narrative    None     Social Determinants of Health     Financial Resource Strain:     Difficulty of Paying Living Expenses: Not on file   Food Insecurity:     Worried About Running Out of Food in the Last Year: Not on file    [place] of Food in the Last Year: Not on file   Transportation Needs:     Lack of Transportation (Medical): Not on file    Lack of Transportation (Non-Medical): Not on file   Physical Activity:     Days of Exercise per Week: Not on file    Minutes of Exercise per Session: Not on file   Stress:     Feeling of Stress : Not on file   Social Connections:     Frequency of Communication with Friends and Family: Not on file    Frequency of Social Gatherings with Friends and Family: Not on file    Attends [employer] Services: Not on file    Active Member of [address] [place] [address] [email or web address] or Organizations: Not on file    Attends Club or Organization Meetings: Not on file    Marital Status: Not on file   Intimate Partner Violence:     Fear of Current or Ex-Partner: Not on file    Emotionally Abused: Not on file    Physically Abused: Not on file    Sexually Abused: Not on file   Housing Stability:     Unable to Pay for Housing in the Last Year: Not on file    Number of [place] in the Last Year: Not on file    Unstable Housing in the Last Year: Not on file       Family Hx:  No family history on file. Review of Systems:   Review of Systems      Physical Examination:    /67   Pulse 77   Temp 97.5 °F (36.4 °C)   Resp 18   Ht 5' 7.01\" (1.702 m)   Wt 203 lb (92.1 kg)   SpO2 97%   BMI 31.79 kg/m²    Physical Exam  Vitals and nursing note reviewed. Constitutional:       General: He is not in acute distress. Appearance: Normal appearance. He is well-developed. HENT:      Head: Normocephalic and atraumatic.       Nose: Nose normal.      Mouth/Throat:      Mouth: Mucous membranes are moist.   Eyes:      General:         Right eye: No discharge. Left eye: No discharge. Conjunctiva/sclera: Conjunctivae normal.   Neck:      Vascular: No JVD. Trachea: No tracheal deviation. Cardiovascular:      Rate and Rhythm: Normal rate. Pulmonary:      Effort: Pulmonary effort is normal.   Musculoskeletal:         General: Tenderness (right medial foot) present. No swelling. Normal range of motion. Cervical back: Normal range of motion and neck supple. No rigidity. No muscular tenderness. Lymphadenopathy:      Cervical: No cervical adenopathy. Skin:     General: Skin is warm and dry. Capillary Refill: Capillary refill takes less than 2 seconds. Neurological:      Mental Status: He is alert and oriented to person, place, and time.    Psychiatric:         Mood and Affect: Mood normal.         Behavior: Behavior normal.         LABS:  CBC:   Lab Results   Component Value Date    WBC 4.2 04/11/2022    RBC 4.14 04/11/2022    RBC 5.28 04/04/2022    HGB 10.2 04/11/2022    HCT 30.7 04/11/2022    MCV 74.1 04/11/2022    MCH 24.6 04/11/2022    MCHC 33.1 04/11/2022    RDW 17.3 04/11/2022     04/11/2022    MPV 7.5 04/04/2022     CBC with Differential:    Lab Results   Component Value Date    WBC 4.2 04/11/2022    RBC 4.14 04/11/2022    RBC 5.28 04/04/2022    HGB 10.2 04/11/2022    HCT 30.7 04/11/2022     04/11/2022    MCV 74.1 04/11/2022    MCH 24.6 04/11/2022    MCHC 33.1 04/11/2022    RDW 17.3 04/11/2022    BANDSPCT 1 04/11/2022    METASPCT 3 04/11/2022    LYMPHOPCT 26.0 04/11/2022    LYMPHOPCT 6.6 04/04/2022    MONOPCT 7.6 04/11/2022    MYELOPCT 1 04/11/2022    BASOPCT 0.9 04/11/2022    MONOSABS 0.3 04/11/2022    LYMPHSABS 1.1 04/11/2022    EOSABS 0.1 04/11/2022    BASOSABS 0.0 04/11/2022     CMP:    Lab Results   Component Value Date     04/11/2022    K 4.0 04/11/2022     04/11/2022    CO2 22 04/11/2022    BUN 14 04/11/2022    CREATININE 0.75 04/11/2022 GFRAA >60.0 04/11/2022    LABGLOM >60.0 04/11/2022    GLUCOSE 138 04/11/2022    PROT 6.5 04/09/2022    LABALBU 4.0 04/09/2022    CALCIUM 8.3 04/11/2022    BILITOT <0.2 04/09/2022    ALKPHOS 77 04/09/2022    AST 31 04/09/2022    ALT 30 04/09/2022     BMP:    Lab Results   Component Value Date     04/11/2022    K 4.0 04/11/2022     04/11/2022    CO2 22 04/11/2022    BUN 14 04/11/2022    LABALBU 4.0 04/09/2022    CREATININE 0.75 04/11/2022    CALCIUM 8.3 04/11/2022    GFRAA >60.0 04/11/2022    LABGLOM >60.0 04/11/2022    GLUCOSE 138 04/11/2022     Magnesium:  Lab Results   Component Value Date    MG 1.5 04/04/2022     Troponin:    Lab Results   Component Value Date    TROPONINI <0.010 04/10/2022           Assessment:    Active Hospital Problems    Diagnosis Date Noted    Hypercoagulable state [facility]) [D68.59] 04/09/2022     Patient underwent ORIF 2/21/22 by Dr. Toya Hickey for broken ankle. He reports bumping the inside of his right foot when having his walking boot off a few days ago which has caused him to have pain. He was not having any pain prior to this. Images reviewed showing no acute changes or acute fracture. His assessment is negative. He has some keloid scarring over the surgical incisions on both the medial right foot and medial right ankle. There is no open areas, drainage, warmth, or erythema appreciated. He does endorse pain with palpation to right medial foot. Patient reports to have been homeless but he is going to be staying in [facility] for the next few months and needs an orthopedic doctor for follow up with for further recommendations and evaluation during his healing process. Recommend patient be 50% partial weight bearing in walking boot. He can remove boot when in bed. Elevate and ice to help optimize pain control. Patint    Thank you for this consultation and allowing us to be a part of this patient's care. Plan:  1. PT/OT: WBAT in walking boot   2.  Elevate right lower extremity and apply ice per protocol  3. Follow up with orthopedic surgeon in 3-4 weeks. Continue walking boot until patient is seen by orthopedic surgeon.        Electronically signed by Luther Schaumann, APRN - CNP on 4/11/2022 at 12:40 PM

## 2022-04-11 NOTE — PROGRESS NOTES
On arrival patient was agitated due to asking for pain medication prior and had not received it yet. Introduced myself to patient and medicated per MAR. Reports pain in his ankle from prior surgery. MSPs intact. Patient calm and cooperative after allowing him to express his concerns. A&Ox4. Denies chest pain or SOB.

## 2022-04-11 NOTE — FLOWSHEET NOTE
Awaiting Xa result from lab. Previous delay as phlebotomy had multiple attempts to draw patient without success.
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
## 2022-04-12 LAB
ANION GAP SERPL CALCULATED.3IONS-SCNC: 9 MEQ/L (ref 9–15)
ANISOCYTOSIS: ABNORMAL
ANTI-XA UNFRAC HEPARIN: 0.25 IU/ML
ANTI-XA UNFRAC HEPARIN: 0.31 IU/ML
ANTI-XA UNFRAC HEPARIN: 0.7 IU/ML
ANTI-XA UNFRAC HEPARIN: 1.01 IU/ML
BASOPHILS ABSOLUTE: 0 K/UL (ref 0–0.2)
BASOPHILS RELATIVE PERCENT: 0.4 %
BUN BLDV-MCNC: 17 MG/DL (ref 6–20)
CALCIUM SERPL-MCNC: 8.3 MG/DL (ref 8.5–9.9)
CHLORIDE BLD-SCNC: 99 MEQ/L (ref 95–107)
CO2: 27 MEQ/L (ref 20–31)
CREAT SERPL-MCNC: 0.81 MG/DL (ref 0.7–1.2)
EOSINOPHILS ABSOLUTE: 0.3 K/UL (ref 0–0.7)
EOSINOPHILS RELATIVE PERCENT: 5.6 %
GFR AFRICAN AMERICAN: >60
GFR NON-AFRICAN AMERICAN: >60
GLUCOSE BLD-MCNC: 116 MG/DL (ref 70–99)
GLUCOSE BLD-MCNC: 143 MG/DL (ref 70–99)
GLUCOSE BLD-MCNC: 152 MG/DL (ref 70–99)
GLUCOSE BLD-MCNC: 168 MG/DL (ref 70–99)
GLUCOSE BLD-MCNC: 191 MG/DL (ref 70–99)
HCT VFR BLD CALC: 32 % (ref 42–52)
HEMOGLOBIN: 10.3 G/DL (ref 14–18)
HEPATITIS B SURFACE ANTIGEN CONFIRMATION: POSITIVE
HYPOCHROMIA: ABNORMAL
INR BLD: 1.5
LYMPHOCYTES ABSOLUTE: 1 K/UL (ref 1–4.8)
LYMPHOCYTES RELATIVE PERCENT: 21.7 %
MCH RBC QN AUTO: 24.3 PG (ref 27–31.3)
MCHC RBC AUTO-ENTMCNC: 32.2 % (ref 33–37)
MCV RBC AUTO: 75.6 FL (ref 80–100)
MONOCYTES ABSOLUTE: 0.5 K/UL (ref 0.2–0.8)
MONOCYTES RELATIVE PERCENT: 10.2 %
NEUTROPHILS ABSOLUTE: 3 K/UL (ref 1.4–6.5)
NEUTROPHILS RELATIVE PERCENT: 62.1 %
OVALOCYTES: ABNORMAL
PDW BLD-RTO: 16.9 % (ref 11.5–14.5)
PERFORMED ON: ABNORMAL
PLATELET # BLD: 232 K/UL (ref 130–400)
PLATELET SLIDE REVIEW: NORMAL
POIKILOCYTES: ABNORMAL
POLYCHROMASIA: ABNORMAL
POTASSIUM SERPL-SCNC: 4.1 MEQ/L (ref 3.4–4.9)
PROTHROMBIN TIME: 18 SEC (ref 12.3–14.9)
RBC # BLD: 4.23 M/UL (ref 4.7–6.1)
SCHISTOCYTES: ABNORMAL
SLIDE REVIEW: ABNORMAL
SODIUM BLD-SCNC: 135 MEQ/L (ref 135–144)
TEAR DROP CELLS: ABNORMAL
WBC # BLD: 4.8 K/UL (ref 4.8–10.8)

## 2022-04-12 PROCEDURE — 6370000000 HC RX 637 (ALT 250 FOR IP): Performed by: INTERNAL MEDICINE

## 2022-04-12 PROCEDURE — 6360000002 HC RX W HCPCS: Performed by: INTERNAL MEDICINE

## 2022-04-12 PROCEDURE — 6370000000 HC RX 637 (ALT 250 FOR IP): Performed by: NURSE PRACTITIONER

## 2022-04-12 PROCEDURE — 85610 PROTHROMBIN TIME: CPT

## 2022-04-12 PROCEDURE — 99253 IP/OBS CNSLTJ NEW/EST LOW 45: CPT | Performed by: NURSE PRACTITIONER

## 2022-04-12 PROCEDURE — 2580000003 HC RX 258: Performed by: INTERNAL MEDICINE

## 2022-04-12 PROCEDURE — 2060000000 HC ICU INTERMEDIATE R&B

## 2022-04-12 PROCEDURE — 6370000000 HC RX 637 (ALT 250 FOR IP)

## 2022-04-12 PROCEDURE — 85025 COMPLETE CBC W/AUTO DIFF WBC: CPT

## 2022-04-12 PROCEDURE — 85520 HEPARIN ASSAY: CPT

## 2022-04-12 PROCEDURE — 80048 BASIC METABOLIC PNL TOTAL CA: CPT

## 2022-04-12 PROCEDURE — 36415 COLL VENOUS BLD VENIPUNCTURE: CPT

## 2022-04-12 RX ORDER — OXYCODONE HYDROCHLORIDE 5 MG/1
10 TABLET ORAL ONCE
Status: DISCONTINUED | OUTPATIENT
Start: 2022-04-12 | End: 2022-04-12

## 2022-04-12 RX ORDER — WARFARIN SODIUM 2.5 MG/1
7.5 TABLET ORAL
Status: COMPLETED | OUTPATIENT
Start: 2022-04-12 | End: 2022-04-12

## 2022-04-12 RX ORDER — ACETAMINOPHEN 650 MG/1
650 SUPPOSITORY RECTAL EVERY 6 HOURS
Status: DISCONTINUED | OUTPATIENT
Start: 2022-04-12 | End: 2022-04-14 | Stop reason: HOSPADM

## 2022-04-12 RX ORDER — OXYCODONE HYDROCHLORIDE 5 MG/1
10 TABLET ORAL EVERY 6 HOURS PRN
Status: DISCONTINUED | OUTPATIENT
Start: 2022-04-12 | End: 2022-04-14 | Stop reason: HOSPADM

## 2022-04-12 RX ORDER — OXYCODONE HYDROCHLORIDE 5 MG/1
10 TABLET ORAL EVERY 8 HOURS PRN
Status: DISCONTINUED | OUTPATIENT
Start: 2022-04-12 | End: 2022-04-12

## 2022-04-12 RX ORDER — OXYCODONE HYDROCHLORIDE 5 MG/1
10 TABLET ORAL 3 TIMES DAILY PRN
Status: DISCONTINUED | OUTPATIENT
Start: 2022-04-12 | End: 2022-04-12

## 2022-04-12 RX ORDER — PREGABALIN 75 MG/1
75 CAPSULE ORAL 2 TIMES DAILY
Status: DISCONTINUED | OUTPATIENT
Start: 2022-04-12 | End: 2022-04-14 | Stop reason: HOSPADM

## 2022-04-12 RX ORDER — ACETAMINOPHEN 325 MG/1
650 TABLET ORAL EVERY 6 HOURS
Status: DISCONTINUED | OUTPATIENT
Start: 2022-04-12 | End: 2022-04-14 | Stop reason: HOSPADM

## 2022-04-12 RX ADMIN — HEPARIN SODIUM 3630 UNITS: 1000 INJECTION INTRAVENOUS; SUBCUTANEOUS at 21:43

## 2022-04-12 RX ADMIN — CLOPIDOGREL BISULFATE 75 MG: 75 TABLET ORAL at 09:25

## 2022-04-12 RX ADMIN — DIPHENHYDRAMINE HCL 50 MG: 25 TABLET ORAL at 09:24

## 2022-04-12 RX ADMIN — INSULIN LISPRO 1 UNITS: 100 INJECTION, SOLUTION INTRAVENOUS; SUBCUTANEOUS at 12:34

## 2022-04-12 RX ADMIN — DIVALPROEX SODIUM 500 MG: 250 TABLET, DELAYED RELEASE ORAL at 09:25

## 2022-04-12 RX ADMIN — HYDROMORPHONE HYDROCHLORIDE 1 MG: 1 INJECTION, SOLUTION INTRAMUSCULAR; INTRAVENOUS; SUBCUTANEOUS at 12:35

## 2022-04-12 RX ADMIN — HEPARIN SODIUM 18.19 UNITS/KG/HR: 10000 INJECTION INTRAVENOUS; SUBCUTANEOUS at 12:02

## 2022-04-12 RX ADMIN — ASPIRIN 81 MG 81 MG: 81 TABLET ORAL at 09:25

## 2022-04-12 RX ADMIN — CETIRIZINE HYDROCHLORIDE 10 MG: 10 TABLET, FILM COATED ORAL at 09:25

## 2022-04-12 RX ADMIN — WARFARIN SODIUM 7.5 MG: 2.5 TABLET ORAL at 17:59

## 2022-04-12 RX ADMIN — INSULIN LISPRO 1 UNITS: 100 INJECTION, SOLUTION INTRAVENOUS; SUBCUTANEOUS at 18:00

## 2022-04-12 RX ADMIN — DIPHENHYDRAMINE HYDROCHLORIDE 50 MG: 50 INJECTION, SOLUTION INTRAMUSCULAR; INTRAVENOUS at 05:51

## 2022-04-12 RX ADMIN — HYDROMORPHONE HYDROCHLORIDE 0.5 MG: 1 INJECTION, SOLUTION INTRAMUSCULAR; INTRAVENOUS; SUBCUTANEOUS at 06:57

## 2022-04-12 RX ADMIN — PREGABALIN 75 MG: 75 CAPSULE ORAL at 21:30

## 2022-04-12 RX ADMIN — OXYCODONE 10 MG: 5 TABLET ORAL at 21:36

## 2022-04-12 RX ADMIN — DIPHENHYDRAMINE HCL 50 MG: 25 TABLET ORAL at 21:36

## 2022-04-12 RX ADMIN — INSULIN GLARGINE 40 UNITS: 100 INJECTION, SOLUTION SUBCUTANEOUS at 21:52

## 2022-04-12 RX ADMIN — ACETAMINOPHEN 650 MG: 325 TABLET ORAL at 12:43

## 2022-04-12 RX ADMIN — DIVALPROEX SODIUM 500 MG: 250 TABLET, DELAYED RELEASE ORAL at 21:30

## 2022-04-12 RX ADMIN — DIPHENHYDRAMINE HYDROCHLORIDE 50 MG: 50 INJECTION, SOLUTION INTRAMUSCULAR; INTRAVENOUS at 13:47

## 2022-04-12 RX ADMIN — DIPHENHYDRAMINE HCL 50 MG: 25 TABLET ORAL at 17:59

## 2022-04-12 RX ADMIN — MONTELUKAST 10 MG: 10 TABLET, FILM COATED ORAL at 09:25

## 2022-04-12 RX ADMIN — ACETAMINOPHEN 650 MG: 325 TABLET ORAL at 17:59

## 2022-04-12 RX ADMIN — ACETAMINOPHEN 650 MG: 325 TABLET ORAL at 23:35

## 2022-04-12 RX ADMIN — PREGABALIN 75 MG: 75 CAPSULE ORAL at 12:33

## 2022-04-12 RX ADMIN — HYDROMORPHONE HYDROCHLORIDE 0.5 MG: 1 INJECTION, SOLUTION INTRAMUSCULAR; INTRAVENOUS; SUBCUTANEOUS at 01:47

## 2022-04-12 RX ADMIN — DIPHENHYDRAMINE HYDROCHLORIDE 50 MG: 50 INJECTION, SOLUTION INTRAMUSCULAR; INTRAVENOUS at 05:53

## 2022-04-12 RX ADMIN — OXYCODONE 10 MG: 5 TABLET ORAL at 05:50

## 2022-04-12 RX ADMIN — Medication 10 ML: at 09:28

## 2022-04-12 RX ADMIN — INSULIN LISPRO 1 UNITS: 100 INJECTION, SOLUTION INTRAVENOUS; SUBCUTANEOUS at 09:25

## 2022-04-12 ASSESSMENT — ENCOUNTER SYMPTOMS
ABDOMINAL PAIN: 0
ABDOMINAL DISTENTION: 0
SORE THROAT: 0
SHORTNESS OF BREATH: 0
STRIDOR: 0
APNEA: 0
EYE DISCHARGE: 0
VOICE CHANGE: 0
BLOOD IN STOOL: 0
CHOKING: 0
ANAL BLEEDING: 0
CHEST TIGHTNESS: 0
WHEEZING: 0
TROUBLE SWALLOWING: 0
VOMITING: 0
RECTAL PAIN: 0
COUGH: 0
BACK PAIN: 1
DIARRHEA: 0
NAUSEA: 0
COLOR CHANGE: 0
CONSTIPATION: 0

## 2022-04-12 ASSESSMENT — PAIN SCALES - GENERAL
PAINLEVEL_OUTOF10: 9
PAINLEVEL_OUTOF10: 9
PAINLEVEL_OUTOF10: 5
PAINLEVEL_OUTOF10: 7
PAINLEVEL_OUTOF10: 9
PAINLEVEL_OUTOF10: 6
PAINLEVEL_OUTOF10: 10

## 2022-04-12 ASSESSMENT — PAIN DESCRIPTION - PAIN TYPE: TYPE: ACUTE PAIN;CHRONIC PAIN

## 2022-04-12 ASSESSMENT — PAIN DESCRIPTION - LOCATION: LOCATION: GENERALIZED

## 2022-04-12 NOTE — PROGRESS NOTES
Progress Note  Date:2022       Room:[facility]/W264-01  Patient Name:Pepe Coleman     YOB: 1975     Age:47 y.o. Subjective    Subjective:  Symptoms:  He reports malaise and weakness. No shortness of breath, cough, chest pain, headache, chest pressure, diarrhea or anxiety. Diet:  No nausea or vomiting. Pain:  He complains of pain that is severe. Review of Systems   Respiratory: Negative for cough and shortness of breath. Cardiovascular: Negative for chest pain. Gastrointestinal: Negative for diarrhea, nausea and vomiting. Neurological: Positive for weakness. Objective         Vitals Last 24 Hours:  TEMPERATURE:  Temp  Av.4 °F (36.3 °C)  Min: 97.2 °F (36.2 °C)  Max: 97.5 °F (36.4 °C)  RESPIRATIONS RANGE: Resp  Av  Min: 18  Max: 18  PULSE OXIMETRY RANGE: SpO2  Av %  Min: 98 %  Max: 100 %  PULSE RANGE: Pulse  Av  Min: 86  Max: 90  BLOOD PRESSURE RANGE: Systolic (83WFO), BXD:189 , Min:126 , CTD:303   ; Diastolic (94QBA), XMF:19, Min:53, Max:74    I/O (24Hr): Intake/Output Summary (Last 24 hours) at 2022 1143  Last data filed at 2022 1730  Gross per 24 hour   Intake 760 ml   Output 400 ml   Net 360 ml     Objective:  General Appearance:  Comfortable, well-appearing and in no acute distress. Vital signs: (most recent): Blood pressure (!) 134/53, pulse 86, temperature 97.2 °F (36.2 °C), temperature source Oral, resp. rate 18, height 5' 7.01\" (1.702 m), weight 203 lb (92.1 kg), SpO2 100 %. HEENT: Normal HEENT exam.    Lungs: There are decreased breath sounds. Heart: Normal rate. S1 normal and S2 normal.    Abdomen: Abdomen is soft. Bowel sounds are normal.   There is no epigastric area or suprapubic area tenderness. Neurological: Patient is alert and oriented to person, place and time. Pupils:  Pupils are equal, round, and reactive to light. Skin:  Warm and dry.       Labs/Imaging/Diagnostics    Labs:  CBC:  Recent Labs 04/10/22  1331 04/11/22  0927 04/12/22  0810   WBC 5.1 4.2* 4.8   RBC 4.08* 4.14* 4.23*   HGB 10.1* 10.2* 10.3*   HCT 30.3* 30.7* 32.0*   MCV 74.1* 74.1* 75.6*   RDW 17.4* 17.3* 16.9*    219 232     CHEMISTRIES:  Recent Labs     04/10/22  1331 04/11/22  0926 04/12/22  0810    136 135   K 4.1 4.0 4.1    104 99   CO2 25 22 27   BUN 18 14 17   CREATININE 0.87 0.75 0.81   GLUCOSE 190* 138* 116*     PT/INR:  Recent Labs     04/10/22  0829 04/11/22  1257 04/12/22  0227   PROTIME 13.6 15.6* 18.0*   INR 1.0 1.2 1.5     APTT:  Recent Labs     04/09/22  1724 04/09/22  2031 04/10/22  1331   APTT >150.0* 50.2* 33.1     LIVER PROFILE:  Recent Labs     04/09/22  1500   AST 31   ALT 30   BILITOT <0.2   ALKPHOS 77       Imaging Last 24 Hours:  XR ANKLE RIGHT (MIN 3 VIEWS)    Result Date: 4/10/2022  EXAMINATION/TECHNIQUE:  XR ANKLE RIGHT (MIN 3 VIEWS), XR FOOT RIGHT (2 VIEWS) HISTORY:  History of recent ankle surgery. Right ankle pain. COMPARISON: None RESULT: 2 partially corticated screws crossing the medial malleolus. 2 corticated screws within the mid foot medially. No acute fracture. No dislocation. Scattered degenerative changes. Vague areas of patchy increased density especially within the distal tibia and calcaneus, nonspecific, may relate to bone infarcts, could be postsurgical. Correlation with prior radiographs and surgical history would be helpful. Diffuse soft tissue edema. No other significant abnormality. No acute findings. XR FOOT RIGHT (2 VIEWS)    Result Date: 4/10/2022  EXAMINATION/TECHNIQUE:  XR ANKLE RIGHT (MIN 3 VIEWS), XR FOOT RIGHT (2 VIEWS) HISTORY:  History of recent ankle surgery. Right ankle pain. COMPARISON: None RESULT: 2 partially corticated screws crossing the medial malleolus. 2 corticated screws within the mid foot medially. No acute fracture. No dislocation. Scattered degenerative changes.  Vague areas of patchy increased density especially within the distal tibia and calcaneus, nonspecific, may relate to bone infarcts, could be postsurgical. Correlation with prior radiographs and surgical history would be helpful. Diffuse soft tissue edema. No other significant abnormality. No acute findings. NM LUNG VENT/PERFUSION (VQ)    Result Date: 4/9/2022  Patient: Addison Olivares  Time Out: 19:46 Exam(s): NM VQ  EXAM:   NM Lung Perfusion and Ventilation Scan  FINDINGS:   Ventilation:  Unremarkable. No ventilation defects. Perfusion:  Unremarkable. No perfusion defects. Electronically signed by Gaurav Nicole D.O. on 04-09-22 at 1946      No findings to suggest pulmonary embolism. XR CHEST PORTABLE    Result Date: 4/9/2022  XR CHEST PORTABLE Clinical History:  Worsening shortness of breath. Comparison:  None  RESULT: Shallow inspiration. Within these limits, no distinct consolidation. No pleural effusion. No pneumothorax. Normal cardiomediastinal silhouette. No acute osseous findings. No acute radiographic abnormality within limits of shallow inspiration. Assessment//Plan           Hospital Problems           Last Modified POA    * (Principal) Hypercoagulable state ([address] 4/9/2022 Yes        Assessment & Plan    4/11: Increase pain meds by adding Roxicodone for breakthrough pain. IV pain meds till tomorrow and will DC IV pains tomorrow patient is in agreement. Palliative care for pain management. Continue with monitoring INR and heparin drip. Once INR is greater than 2 patient can be discharged. PT understands that IV dilaudid will be discontinue tomorrow. 4/12: INR is improving. Continue with Coumadin. Taper down pain meds as tolerated, no overnight events. Appreciate psych input.   Electronically signed by Kenrick Larkin MD on 4/11/22 at 12:31 PM EDT

## 2022-04-12 NOTE — PROGRESS NOTES
Warfarin Dosing - Pharmacy Consult Note  Consulting Provider: Dr. Davian Youngblood  Indication:   Recurrent VTE. 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   Warfarin Dose prior to admission:  possibly 7.5mg daily (off > 1 week, multiple AMA from various facilities  Concurrent anticoagulants/antiplatelets: UFH drip, ASA/Plavix  Significant Drug Interactions: No obvious interactions  Recent Labs     04/09/22  1500 04/09/22  1610 04/10/22  0829 04/10/22  1331 04/11/22  0927 04/11/22  1257 04/12/22  0227 04/12/22  0810   INR  --    < > 1.0  --   --  1.2 1.5  --    HGB  --    < >  --  10.1* 10.2*  --   --  10.3*   PLT  --    < >  --  254 219  --   --  232   LABALBU 4.0  --   --   --   --   --   --   --     < > = values in this interval not displayed. Recent warfarin administrations                     warfarin (COUMADIN) tablet 10 mg (mg) 10 mg Given 04/11/22 1704    warfarin (COUMADIN) tablet 7.5 mg (mg) 7.5 mg Given 04/10/22 1835    warfarin (COUMADIN) tablet 5 mg (mg) 5 mg Given 04/10/22 0123                   Date      INR       Dose  04/9/22   1.2       5 mg  4/10/22   1.0       7.5 mg  4/11/22   1.2       10mg  4/12/22   1.5       7.5 mg    Assessment/Plan  (Goal INR: 2 - 3)  INR not yet within therapeutic range after three doses, continues on UFH drip. Will give 7.5 mg today    Active problem list reviewed. INR orders are placed. Chart reviewed for pertinent labs, drug/diet interactions, and past doses. Documentation of patient's clinical condition was reviewed. Pharmacy Dosing:  Pharmacy will continue to follow.
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
## 2022-04-12 NOTE — FLOWSHEET NOTE
Perfect message sent to MD in regards to patient wanting his benadryl order changed. Awaiting response. 36 Perfect serve message sent to MD in request of patient wanting a one time dose of Dialuadid and PT/OT treatment. Awaiting response. [phone] Call back from MD Orders placed for PT/OT treat and eval. Ordered to contact Piero Blackwell from palliative care for pain management    [phone] Call out to Piero Blackwell from palliative care Awaiting call back.

## 2022-04-13 LAB
ANION GAP SERPL CALCULATED.3IONS-SCNC: 11 MEQ/L (ref 9–15)
ANTI-XA UNFRAC HEPARIN: 0.25 IU/ML
ANTI-XA UNFRAC HEPARIN: 0.68 IU/ML
BASOPHILS ABSOLUTE: 0 K/UL (ref 0–0.2)
BASOPHILS RELATIVE PERCENT: 0.7 %
BUN BLDV-MCNC: 17 MG/DL (ref 6–20)
CALCIUM SERPL-MCNC: 8.8 MG/DL (ref 8.5–9.9)
CHLORIDE BLD-SCNC: 100 MEQ/L (ref 95–107)
CO2: 26 MEQ/L (ref 20–31)
CREAT SERPL-MCNC: 0.82 MG/DL (ref 0.7–1.2)
EOSINOPHILS ABSOLUTE: 0.2 K/UL (ref 0–0.7)
EOSINOPHILS RELATIVE PERCENT: 5.3 %
GFR AFRICAN AMERICAN: >60
GFR NON-AFRICAN AMERICAN: >60
GLUCOSE BLD-MCNC: 115 MG/DL (ref 70–99)
GLUCOSE BLD-MCNC: 157 MG/DL (ref 70–99)
GLUCOSE BLD-MCNC: 162 MG/DL (ref 70–99)
GLUCOSE BLD-MCNC: 163 MG/DL (ref 70–99)
GLUCOSE BLD-MCNC: 190 MG/DL (ref 70–99)
HCT VFR BLD CALC: 30.6 % (ref 42–52)
HEMOGLOBIN: 9.8 G/DL (ref 14–18)
INR BLD: 1.8
LYMPHOCYTES ABSOLUTE: 0.9 K/UL (ref 1–4.8)
LYMPHOCYTES RELATIVE PERCENT: 24.9 %
MCH RBC QN AUTO: 24.3 PG (ref 27–31.3)
MCHC RBC AUTO-ENTMCNC: 32.1 % (ref 33–37)
MCV RBC AUTO: 75.7 FL (ref 80–100)
MONOCYTES ABSOLUTE: 0.4 K/UL (ref 0.2–0.8)
MONOCYTES RELATIVE PERCENT: 10.6 %
NEUTROPHILS ABSOLUTE: 2.1 K/UL (ref 1.4–6.5)
NEUTROPHILS RELATIVE PERCENT: 58.5 %
PDW BLD-RTO: 17.6 % (ref 11.5–14.5)
PERFORMED ON: ABNORMAL
PLATELET # BLD: 210 K/UL (ref 130–400)
POTASSIUM REFLEX MAGNESIUM: 3.9 MEQ/L (ref 3.4–4.9)
PROTHROMBIN TIME: 20.8 SEC (ref 12.3–14.9)
RBC # BLD: 4.04 M/UL (ref 4.7–6.1)
SODIUM BLD-SCNC: 137 MEQ/L (ref 135–144)
WBC # BLD: 3.6 K/UL (ref 4.8–10.8)

## 2022-04-13 PROCEDURE — 2060000000 HC ICU INTERMEDIATE R&B

## 2022-04-13 PROCEDURE — 85025 COMPLETE CBC W/AUTO DIFF WBC: CPT

## 2022-04-13 PROCEDURE — 6370000000 HC RX 637 (ALT 250 FOR IP): Performed by: INTERNAL MEDICINE

## 2022-04-13 PROCEDURE — 2580000003 HC RX 258: Performed by: INTERNAL MEDICINE

## 2022-04-13 PROCEDURE — 99232 SBSQ HOSP IP/OBS MODERATE 35: CPT | Performed by: NURSE PRACTITIONER

## 2022-04-13 PROCEDURE — 6370000000 HC RX 637 (ALT 250 FOR IP): Performed by: NURSE PRACTITIONER

## 2022-04-13 PROCEDURE — 85610 PROTHROMBIN TIME: CPT

## 2022-04-13 PROCEDURE — 6370000000 HC RX 637 (ALT 250 FOR IP)

## 2022-04-13 PROCEDURE — 6360000002 HC RX W HCPCS: Performed by: INTERNAL MEDICINE

## 2022-04-13 PROCEDURE — 97162 PT EVAL MOD COMPLEX 30 MIN: CPT

## 2022-04-13 PROCEDURE — 85520 HEPARIN ASSAY: CPT

## 2022-04-13 PROCEDURE — 36415 COLL VENOUS BLD VENIPUNCTURE: CPT

## 2022-04-13 PROCEDURE — 6360000002 HC RX W HCPCS: Performed by: NURSE PRACTITIONER

## 2022-04-13 PROCEDURE — 97166 OT EVAL MOD COMPLEX 45 MIN: CPT

## 2022-04-13 PROCEDURE — 80048 BASIC METABOLIC PNL TOTAL CA: CPT

## 2022-04-13 RX ORDER — ONDANSETRON 4 MG/1
4 TABLET, FILM COATED ORAL DAILY PRN
Qty: 30 TABLET | Refills: 0 | Status: SHIPPED | OUTPATIENT
Start: 2022-04-13

## 2022-04-13 RX ORDER — WARFARIN SODIUM 2.5 MG/1
7.5 TABLET ORAL
Status: COMPLETED | OUTPATIENT
Start: 2022-04-13 | End: 2022-04-13

## 2022-04-13 RX ORDER — ALBUTEROL SULFATE 90 UG/1
2 AEROSOL, METERED RESPIRATORY (INHALATION) EVERY 4 HOURS PRN
Qty: 18 G | Refills: 3 | Status: SHIPPED | OUTPATIENT
Start: 2022-04-13

## 2022-04-13 RX ORDER — FERROUS SULFATE 325(65) MG
325 TABLET ORAL 2 TIMES DAILY WITH MEALS
Status: DISCONTINUED | OUTPATIENT
Start: 2022-04-13 | End: 2022-04-14 | Stop reason: HOSPADM

## 2022-04-13 RX ORDER — LORATADINE 10 MG/1
10 TABLET ORAL DAILY
Qty: 30 TABLET | Refills: 0 | Status: SHIPPED | OUTPATIENT
Start: 2022-04-13

## 2022-04-13 RX ORDER — INSULIN GLARGINE 100 [IU]/ML
40 INJECTION, SOLUTION SUBCUTANEOUS NIGHTLY
Qty: 10 ML | Refills: 3 | Status: SHIPPED | OUTPATIENT
Start: 2022-04-13 | End: 2022-05-17

## 2022-04-13 RX ORDER — OMEPRAZOLE 20 MG/1
20 CAPSULE, DELAYED RELEASE ORAL DAILY
Qty: 30 CAPSULE | Refills: 3 | Status: SHIPPED | OUTPATIENT
Start: 2022-04-13

## 2022-04-13 RX ORDER — ALBUTEROL SULFATE 2.5 MG/3ML
2.5 SOLUTION RESPIRATORY (INHALATION) EVERY 6 HOURS PRN
Qty: 120 EACH | Refills: 3 | Status: SHIPPED | OUTPATIENT
Start: 2022-04-13

## 2022-04-13 RX ORDER — CLOPIDOGREL BISULFATE 75 MG/1
75 TABLET ORAL DAILY
Qty: 30 TABLET | Refills: 0 | Status: SHIPPED | OUTPATIENT
Start: 2022-04-13

## 2022-04-13 RX ORDER — FERROUS SULFATE 325(65) MG
325 TABLET ORAL 2 TIMES DAILY WITH MEALS
Qty: 30 TABLET | Refills: 3 | Status: SHIPPED | OUTPATIENT
Start: 2022-04-13

## 2022-04-13 RX ORDER — WARFARIN SODIUM 5 MG/1
7.5 TABLET ORAL DAILY
Qty: 45 TABLET | Refills: 3 | Status: SHIPPED | OUTPATIENT
Start: 2022-04-13 | End: 2022-05-17

## 2022-04-13 RX ORDER — MONTELUKAST SODIUM 10 MG/1
10 TABLET ORAL DAILY
Qty: 30 TABLET | Refills: 3 | Status: SHIPPED | OUTPATIENT
Start: 2022-04-13

## 2022-04-13 RX ADMIN — DIPHENHYDRAMINE HCL 50 MG: 25 TABLET ORAL at 13:36

## 2022-04-13 RX ADMIN — OXYCODONE 10 MG: 5 TABLET ORAL at 17:25

## 2022-04-13 RX ADMIN — MONTELUKAST 10 MG: 10 TABLET, FILM COATED ORAL at 08:48

## 2022-04-13 RX ADMIN — Medication 10 ML: at 21:28

## 2022-04-13 RX ADMIN — HEPARIN SODIUM 20.18 UNITS/KG/HR: 10000 INJECTION INTRAVENOUS; SUBCUTANEOUS at 06:26

## 2022-04-13 RX ADMIN — DIPHENHYDRAMINE HCL 50 MG: 25 TABLET ORAL at 08:45

## 2022-04-13 RX ADMIN — DIVALPROEX SODIUM 500 MG: 250 TABLET, DELAYED RELEASE ORAL at 21:28

## 2022-04-13 RX ADMIN — INSULIN LISPRO 1 UNITS: 100 INJECTION, SOLUTION INTRAVENOUS; SUBCUTANEOUS at 08:49

## 2022-04-13 RX ADMIN — Medication 325 MG: at 16:23

## 2022-04-13 RX ADMIN — HYDROMORPHONE HYDROCHLORIDE 0.5 MG: 1 INJECTION, SOLUTION INTRAMUSCULAR; INTRAVENOUS; SUBCUTANEOUS at 16:23

## 2022-04-13 RX ADMIN — WARFARIN SODIUM 7.5 MG: 2.5 TABLET ORAL at 17:25

## 2022-04-13 RX ADMIN — ONDANSETRON 4 MG: 4 TABLET, ORALLY DISINTEGRATING ORAL at 14:45

## 2022-04-13 RX ADMIN — ASPIRIN 81 MG 81 MG: 81 TABLET ORAL at 08:44

## 2022-04-13 RX ADMIN — DIPHENHYDRAMINE HYDROCHLORIDE 50 MG: 50 INJECTION, SOLUTION INTRAMUSCULAR; INTRAVENOUS at 21:36

## 2022-04-13 RX ADMIN — HYDROMORPHONE HYDROCHLORIDE 1 MG: 1 INJECTION, SOLUTION INTRAMUSCULAR; INTRAVENOUS; SUBCUTANEOUS at 09:43

## 2022-04-13 RX ADMIN — PREGABALIN 75 MG: 75 CAPSULE ORAL at 08:44

## 2022-04-13 RX ADMIN — ACETAMINOPHEN 650 MG: 325 TABLET ORAL at 06:21

## 2022-04-13 RX ADMIN — DIVALPROEX SODIUM 500 MG: 250 TABLET, DELAYED RELEASE ORAL at 08:44

## 2022-04-13 RX ADMIN — Medication 325 MG: at 12:04

## 2022-04-13 RX ADMIN — DIPHENHYDRAMINE HYDROCHLORIDE 50 MG: 50 INJECTION, SOLUTION INTRAMUSCULAR; INTRAVENOUS at 17:25

## 2022-04-13 RX ADMIN — HEPARIN SODIUM 22.18 UNITS/KG/HR: 10000 INJECTION INTRAVENOUS; SUBCUTANEOUS at 11:24

## 2022-04-13 RX ADMIN — CETIRIZINE HYDROCHLORIDE 10 MG: 10 TABLET, FILM COATED ORAL at 08:44

## 2022-04-13 RX ADMIN — INSULIN GLARGINE 40 UNITS: 100 INJECTION, SOLUTION SUBCUTANEOUS at 21:00

## 2022-04-13 RX ADMIN — PREGABALIN 75 MG: 75 CAPSULE ORAL at 21:28

## 2022-04-13 RX ADMIN — DIPHENHYDRAMINE HYDROCHLORIDE 50 MG: 50 INJECTION, SOLUTION INTRAMUSCULAR; INTRAVENOUS at 04:10

## 2022-04-13 RX ADMIN — CLOPIDOGREL BISULFATE 75 MG: 75 TABLET ORAL at 08:44

## 2022-04-13 RX ADMIN — OXYCODONE 10 MG: 5 TABLET ORAL at 04:10

## 2022-04-13 RX ADMIN — ACETAMINOPHEN 650 MG: 325 TABLET ORAL at 17:25

## 2022-04-13 RX ADMIN — DIPHENHYDRAMINE HCL 50 MG: 25 TABLET ORAL at 16:23

## 2022-04-13 RX ADMIN — DIPHENHYDRAMINE HYDROCHLORIDE 50 MG: 50 INJECTION, SOLUTION INTRAMUSCULAR; INTRAVENOUS at 09:43

## 2022-04-13 RX ADMIN — INSULIN LISPRO 1 UNITS: 100 INJECTION, SOLUTION INTRAVENOUS; SUBCUTANEOUS at 17:30

## 2022-04-13 RX ADMIN — ACETAMINOPHEN 650 MG: 325 TABLET ORAL at 12:04

## 2022-04-13 RX ADMIN — HEPARIN SODIUM 3630 UNITS: 1000 INJECTION INTRAVENOUS; SUBCUTANEOUS at 11:21

## 2022-04-13 ASSESSMENT — ENCOUNTER SYMPTOMS
COUGH: 0
APNEA: 0
SHORTNESS OF BREATH: 0
COLOR CHANGE: 0
ABDOMINAL DISTENTION: 0
EYE DISCHARGE: 0
BACK PAIN: 1
CHEST TIGHTNESS: 0
ABDOMINAL PAIN: 0
STRIDOR: 0
TROUBLE SWALLOWING: 0
NAUSEA: 0
BLOOD IN STOOL: 0
WHEEZING: 0
ANAL BLEEDING: 0
SORE THROAT: 0
CHOKING: 0
DIARRHEA: 0
CONSTIPATION: 0
VOMITING: 0
RECTAL PAIN: 0
VOICE CHANGE: 0

## 2022-04-13 ASSESSMENT — PAIN SCALES - GENERAL
PAINLEVEL_OUTOF10: 10
PAINLEVEL_OUTOF10: 8
PAINLEVEL_OUTOF10: 8
PAINLEVEL_OUTOF10: 10
PAINLEVEL_OUTOF10: 10
PAINLEVEL_OUTOF10: 8
PAINLEVEL_OUTOF10: 9
PAINLEVEL_OUTOF10: 10

## 2022-04-13 ASSESSMENT — PAIN DESCRIPTION - LOCATION
LOCATION: GENERALIZED
LOCATION: GENERALIZED

## 2022-04-13 ASSESSMENT — PAIN DESCRIPTION - PAIN TYPE
TYPE: CHRONIC PAIN
TYPE: ACUTE PAIN;CHRONIC PAIN

## 2022-04-13 NOTE — PROGRESS NOTES
pain,Chronic pain  Pain Location: Generalized       Prior Level of Function:  Social/Functional History  Home Equipment:  (walking boot)  Additional Comments: Pt currently homeless and does not have a plan for d/c- very anxious and distractible with difficulty redirecting to answer questions    OBJECTIVE:     Orientation Status:  Orientation  Overall Orientation Status: Impaired  Orientation Level: Oriented to place,Oriented to person    Observation:  Observation/Palpation  Posture: Fair  Observation: Pt alert, distractible, anxious. Pt able to don walking boot to R leg. Poor awareness of NWB despite education    Cognition Status:  Cognition  Overall Cognitive Status: Exceptions  Arousal/Alertness: Appropriate responses to stimuli  Following Commands: Inconsistently follows commands  Attention Span: Difficulty attending to directions,Difficulty dividing attention,Unable to maintain attention  Memory: Decreased recall of precautions (Does recall 'someone said I could use my foot and someone else said I couldn't. \")  Safety Judgement: Decreased awareness of need for assistance,Decreased awareness of need for safety  Problem Solving: Decreased awareness of errors,Assistance required to identify errors made,Assistance required to implement solutions,Assistance required to generate solutions,Assistance required to correct errors made  Insights: Decreased awareness of deficits  Initiation: Requires cues for some  Sequencing: Requires cues for some  Cognition Comment: Verbal cues for sequencing, safety and NWB    Perception Status:  Perception  Overall Perceptual Status: WFL    Sensation Status:  Sensation  Overall Sensation Status: Impaired  Additional Comments: BLE numbness/tingling in B feet to ankles    Vision and Hearing Status:  Vision  Vision: Within Functional Limits  Hearing  Hearing: Within functional limits     ROM:   LUE AROM (degrees)  LUE AROM : WFL  Left Hand AROM (degrees)  Left Hand AROM: WFL  RUE AROM (degrees)  RUE AROM : WFL  Right Hand AROM (degrees)  Right Hand AROM: WFL    Strength:  LUE Strength  Gross LUE Strength: Exceptions to [facility]  L Hand General: 3+/5  LUE Strength Comment: 3+/5 all planes  RUE Strength  Gross RUE Strength: Exceptions to [facility]  R Hand General: 3+/5  RUE Strength Comment: 3+/5 all planes    Coordination, Tone, Quality of Movement: Tone RUE  RUE Tone: Normotonic  Tone LUE  LUE Tone: Normotonic  Coordination  Movements Are Fluid And Coordinated: No  Coordination and Movement description: Fine motor impairments,Decreased speed,Decreased accuracy,Right UE,Left UE  Quality of Movement Other  Comment: Anxious and impulsive with all movements    Hand Dominance:  Hand Dominance  Hand Dominance: Right    ADL Status:  ADL  Feeding: Setup  Grooming: Setup  UE Bathing: Setup  LE Bathing: Moderate assistance  UE Dressing: Setup  LE Dressing: Maximum assistance  Toileting: Maximum assistance  Additional Comments: Simulated ADLs as above. Able to don shoes and walking boot however pt. was unable to maintain standing balance for safe simulation of pants management or toileting  Toilet Transfers  Toilet Transfer: Unable to assess  Toilet Transfers Comments: Anticipate max A     Therapy key for assistance levels -   Independent = Pt. is able to perform task with no assistance but may require a device   Stand by assistance = Pt. does not perform task at an independent level but does not need physical assistance, requires verbal cues  Minimal, Moderate, Maximal Assistance = Pt. requires physical assistance (25%, 50%, 75% assist from helper) for task but is able to actively participate in task   Dependent = Pt. requires total assistance with task and is not able to actively participate with task completion     Functional Mobility:  Functional Mobility  Functional Mobility Comments: Unable to take any hops  Transfers  Sit to stand:  Moderate assistance,2 Person assistance  Stand to sit: Moderate assistance,2 Person assistance  Transfer Comments: Pulls from walker, unable to sequence pushing up    Bed Mobility  Bed mobility  Supine to Sit: Stand by assistance  Sit to Supine: Stand by assistance  Comment: Impulsive, poor EOB attention, poor awareness of environment and IV    Seated and Standing Balance:  Balance  Sitting Balance: Supervision  Standing Balance: Maximum assistance (Mod A to push down on [employer] as well as to help hold pt. up from posterior lean)    Functional Endurance:  Activity Tolerance  Activity Tolerance: Patient limited by fatigue,Treatment limited secondary to decreased cognition,Treatment limited secondary to agitation    D/C Recommendations:  OT D/C RECOMMENDATIONS  REQUIRES OT FOLLOW UP: Yes    Equipment Recommendations:  OT Equipment Recommendations  Other: Continue to assess    OT Education:   OT Education  OT Education: [facility]  Patient Education: Educated pt. on role of acute care OT  Barriers to Learning: Agitation and decreased attention    OT Follow Up:  OT D/C RECOMMENDATIONS  REQUIRES OT FOLLOW UP: Yes       Assessment/Discharge Disposition:  Assessment: Pt is a 49year old man from home who presents to [facility] with the above deficits which impact his ability to perform ADLs and IADLs. Pt. limited d/t impulsivity, poor safety and sequencing deficits. Pt. would benefit from continued OT to maximize independence and safety with ADL tasks.   Performance deficits / Impairments: Decreased ADL status,Decreased functional mobility ,Decreased strength,Decreased cognition,Decreased balance,Decreased vision/visual deficit,Decreased endurance,Decreased high-level IADLs,Decreased safe awareness  Prognosis: Good  Discharge Recommendations: Continue to assess pending progress  Decision Making: Medium Complexity  History: Pt's medical history is moderately complex  Exam: pt. has 9 performance deficits  Assistance / Modification: Pt. requires max A    Six Click Score   How much help for putting on and taking off regular lower body clothing?: A Lot  How much help for Bathing?: A Lot  How much help for Toileting?: A Lot  How much help for putting on and taking off regular upper body clothing?: A Little  How much help for taking care of personal grooming?: A Little  How much help for eating meals?: A Little  AM-[facility] Inpatient Daily Activity Raw Score: 15  AM-PAC Inpatient ADL T-Scale Score : 34.69  ADL Inpatient CMS 0-100% Score: 56.46    Plan:  Plan  Times per week: 1-4x  Plan weeks: Length of acute stay  Current Treatment Recommendations: Strengthening,Balance Training,Functional Mobility Training,Endurance Training,Cognitive Reorientation,Pain Management,Safety Education & Training,Equipment Evaluation, Education, & procurement,Patient/Caregiver Education & Training,Home Management Training,Self-Care / ADL,Cognitive/Perceptual Training    Goals:   Patient will:    - Improve functional endurance to tolerate/complete 30 mins of ADL's  - Be Setup in UB ADLs   - Be Min A in LB ADLs  - Be Min A in ADL transfers without LOB  - Be Min A in toileting tasks  - Improve B hand fine motor coordination to [facility] in order to manage clothing fasteners/self-care containers in a timely manner  - Improve B UE strength and endurance to 4-/5 in order to participate in self-care activities as projected. - Access appropriate D/C site with as few architectural barriers as possible. - Sequence self-care tasks with no verbal cues for NWB technique    Patient Goal: Patient goals : \"I want to get home\"     Discussed and agreed upon: Yes Comments:     Therapy Time:   OT Individual Minutes  Time In: 1309  Time Out: 1323  Minutes: 14    Eval: 14 minutes     Electronically signed by:     Myrtle Read OTR/L, OTADRIAN/L  4/13/2022, 1:43 PM

## 2022-04-13 NOTE — PROGRESS NOTES
[facility] Occupational Therapy      Date: 2022  Patient Name: Makenna York        MRN: [id number]  Account: [de-identified]   : 1975  (49 y.o.)  [id number]    Chart reviewed, attempted OT at [phone] for eval. Patient not seen 2° to: Other: Pt with pending clarification of weight bearing status to RLE d/t previous fx. Pending clarification from NP. Spoke to case management and made aware. Will attempt again when able.     Electronically signed by LALY Wallace on 2022 at 11:16 AM

## 2022-04-13 NOTE — PROGRESS NOTES
Physical Therapy Med Surg Initial Assessment  Facility/Department: [facility]  Room: C5/T409-23     NAME: Damian Stephenson  : 1975 (49 y.o.)  MRN: [id number]  CODE STATUS: Full Code    Date of Service: 2022    Patient Diagnosis(es): Hypercoagulable state ([facility] [address] [D68.59]  History of DVT (deep vein thrombosis) [Z86.718]  Chest pain, unspecified type [R07.9]   Chief Complaint   Patient presents with    Shortness of Breath    Chest Pain     Patient Active Problem List    Diagnosis Date Noted    Hypercoagulable state ([address] 2022    Gastroenteritis 2022    Acute pulmonary embolism with acute cor pulmonale ([address] 2022    DVT, lower extremity, recurrent, right ([address] 2022    Chest pain         Past Medical History:   Diagnosis Date    Asthma     COPD (chronic obstructive pulmonary disease) ([facility]     [facility]     Factor V Leiden ([facility]     Protein S deficiency ([address]     Pulmonary emboli ([address]      Past Surgical History:   Procedure Laterality Date    APPENDECTOMY     [place] VASCULAR SURGERY      2021     Chart Reviewed: Yes  Patient assessed for rehabilitation services?: Yes  Family / Caregiver Present: No  General Comment  Comments: Pt resting in bed, agreeable to PT eval with encouragment and education  Restrictions:  Restrictions/Precautions: Weight Bearing  Lower Extremity Weight Bearing Restrictions  Right Lower Extremity Weight Bearing: Non Weight Bearing  Partial Weight Bearing Percentage Or Pounds: \"Based on fixation construct recommend continue nonweightbearing for an additional 6 weeks with cam walker boot to be applied at all times when up and out of bed. \"     SUBJECTIVE:      Pain  Pre Treatment Pain Screening  Pain at present: 8  Scale Used: Numeric Score  Intervention List: Patient able to continue with treatment;Patient declined any intervention    Post Treatment Pain Screening:   Pain Screening  Patient Currently in Pain: Yes  Pain Assessment  Pain Assessment: 0-10  Pain Level: 8  Pain Type: Chronic pain  Pain Location: Generalized    Prior Level of Function:  Social/Functional History  Home Equipment:  (walking boot)  Additional Comments: Pt currently homeless and does not have a plan for d/c- very anxious and distractible with difficulty redirecting to answer questions    OBJECTIVE:   Vision: Within Functional Limits  Hearing: Within functional limits    Cognition:  Overall Orientation Status: Impaired  Orientation Level: Oriented to person,Oriented to place,Oriented to situation  Follows Commands: Impaired (very distractable/impulsive)    Observation/Palpation  Observation: Pt alert, distractible, anxious. Pt able to don walking boot to R leg indep.  Poor awareness of NWB despite education  Edema: R hand edema 2+    ROM:  RLE General AROM: hip and knee WFL, ankle NT  LLE AROM : WFL    Strength:  Strength RLE  Comment: functionally >/=3+/5  Strength LLE  Comment: functionally >/=3+/5    Neuro:  Balance  Sitting - Static: Good  Sitting - Dynamic: Good  Standing - Static: Poor (Mod x 2 person to maintain R LE NWB)  Standing - Dynamic: Poor  Comments: stood 5-10 seconds x 2 trials, fatigued very quickly     Tone RLE  RLE Tone: Normotonic  Tone LLE  LLE Tone: Normotonic  Motor Control  Gross Motor?: WFL  Sensation  Overall Sensation Status: Impaired  Additional Comments: BLE numbness/tingling in B feet to ankles    Bed mobility  Supine to Sit: Stand by assistance  Sit to Supine: Stand by assistance  Comment: impulsive, poor awareness of environment and IV  Transfers  Sit to Stand: Maximum Assistance;2 Person Assistance  Stand to sit: Maximum Assistance;2 Person Assistance  Comment: Assist with 2ww, lifting assist, VC throughout to maintain NWB to RLE  Ambulation  Ambulation?: No (unsafe to attempt)        Activity Tolerance  Activity Tolerance: impulsive/decreased attention      PT Education  PT Education: Goals;PT Role;Plan of Care;Transfer Training  ASSESSMENT:   Body structures, Functions, Activity limitations: Decreased functional mobility ; Decreased strength;Decreased balance; Increased pain;Decreased ROM; Decreased safe awareness;Decreased endurance  Decision Making: Medium Complexity  History: high  Exam: high  Clinical Presentation: med    Prognosis: Good  Barriers to Learning: none    DISCHARGE RECOMMENDATIONS:  Discharge Recommendations: Continue to assess pending progress,Patient would benefit from continued therapy after discharge  Assessment: Pt demonstrates the above deficits and decline in functional mobility status placing them at increased risk for falls. Pt would benefit from physical therapy to address above deficits and allow for safe return home at highest level of function, decrease risk for falls, and improve QOL.     REQUIRES PT FOLLOW UP: Yes      PLAN OF CARE:  Plan  Times per week: 3-6  Current Treatment Recommendations: Strengthening,Balance Training,Transfer Training,Gait Training,Endurance Training,Stair training,Home Exercise Program,Safety Education & Training,Equipment Evaluation, Education, & procurement,Positioning,Patient/Caregiver Education & Training,Functional Mobility Training,ROM,Pain Management,Cognitive Reorientation  Safety Devices  Type of devices: Left in bed,Bed alarm in place,Call light within reach    Goals:  Patient goals : unable to be redirected  Long term goals  Long term goal 1: Bed mobility with indep  Long term goal 2: Functional transfers with LRAD indep  Long term goal 3: w/c mobility and parts management 50ft with indep  Long term goal 4: Pt will stand with 2ww x 2 min maintaining NWB to R LE with SBA  Long term goal 5: Pregait and progress gait with 2ww maintaining R LE NWB with Min A when safe/appropriate    AMPAC (6 CLICK) BASIC MOBILITY  AM-PAC Inpatient Mobility Raw Score : 12     Therapy Time:   Individual   Time In 1309   Time Out 1323   Minutes [address] [facility], [address], 04/13/22 at 1:49 PM     Definitions for assistance levels  Independent = pt does not require any physical supervision or assistance from another person for activity completion. Device may be needed.   Stand by assistance = pt requires verbal cues or instructions from another person, close to but not touching, to perform the activity  Minimal assistance= pt performs 75% or more of the activity; assistance is required to complete the activity  Moderate assistance= pt performs 50% of the activity; assistance is required to complete the activity  Maximal assistance = pt performs 25% of the activity; assistance is required to complete the activity  Dependent = pt requires total physical assistance to accomplish the task

## 2022-04-13 NOTE — PROGRESS NOTES
Hematology/Oncology   Progress Note        CHIEF COMPLAINT/HPI:  Patient has a history of Recurrent thromboembolic episodes. He was taking Coumadin 7.5 mg daily when He was in [place]. He had not been taking coumadin for some time . PT/INR today is 1.8. He is still on IV unfractionated Heparin and received Coumadin 7.5 mg daily . PT/INR is slowly increasing . REVIEW OF SYSTEMS:    Unremarkable except for symptoms mentioned in HPI.     Current Inpatient Medications:    Current Facility-Administered Medications   Medication Dose Route Frequency Provider Last Rate Last Admin    warfarin (COUMADIN) tablet 7.5 mg  7.5 mg Oral Once Kaylyn Naidu, [facility]        acetaminophen (TYLENOL) tablet 650 mg  650 mg Oral Q6H [facility], APRN - CNP   650 mg at 04/13/22 4295    Or    acetaminophen (TYLENOL) suppository 650 mg  650 mg Rectal Q6H Mosie Miracle, APRN - CNP        pregabalin (LYRICA) capsule 75 mg  75 mg Oral BID Mosie Miracle, APRN - CNP   75 mg at 04/13/22 0844    oxyCODONE (ROXICODONE) immediate release tablet 10 mg  10 mg Oral Q6H PRN Mosie Miracle, APRN - CNP   10 mg at 04/13/22 0410    diphenhydrAMINE (BENADRYL) tablet 50 mg  50 mg Oral 4x Daily Ricardo Calderón MD   50 mg at 04/13/22 0845    sodium chloride flush 0.9 % injection 10 mL  10 mL IntraVENous 2 times per day Waldemar WRAY Sedar, DO   10 mL at 04/12/22 9731    sodium chloride flush 0.9 % injection 10 mL  10 mL IntraVENous PRN Terri Seal Sedar, DO   10 mL at 04/10/22 2204    0.9 % sodium chloride infusion   IntraVENous PRN Waldemar WRAY Sedar, DO        ondansetron (ZOFRAN-ODT) disintegrating tablet 4 mg  4 mg Oral Q8H PRN Matthewe Seal Sedar, DO        Or    ondansetron [facility]) injection 4 mg  4 mg IntraVENous Q6H PRN Matthewe Seal Sedar, DO        polyethylene glycol (GLYCOLAX) packet 17 g  17 g Oral Daily PRN Matthewe Seal Sedar, DO        heparin (porcine) injection 7,260 Units  80 Units/kg IntraVENous PRN Terri Vance Sedar, DO   7,260 Units at 04/11/22 0216    heparin (porcine) injection 3,630 Units  40 Units/kg IntraVENous PRN Mirian Chan, DO   3,630 Units at 04/13/22 1121    heparin (porcine) 25,000 Units in dextrose 5 % 250 mL infusion  5-30 Units/kg/hr IntraVENous Continuous Ash Hamm, DO 20.1 mL/hr at 04/13/22 1124 22.176 Units/kg/hr at 04/13/22 1124    warfarin placeholder: dosing by pharmacy   Other RX Placeholder Ash Hamm, DO        diphenhydrAMINE (BENADRYL) injection 50 mg  50 mg IntraVENous Q6H PRN Deborah Sanders, DO   50 mg at 04/13/22 0943    albuterol (PROVENTIL) nebulizer solution 2.5 mg  2.5 mg Nebulization Q6H PRN Deborah Sanders, DO        albuterol sulfate  (90 Base) MCG/ACT inhaler 2 puff  2 puff Inhalation Q4H PRN Deborah Sanders, DO        aspirin chewable tablet 81 mg  81 mg Oral Daily Deborah Sanders, DO   81 mg at 04/13/22 0844    clopidogrel (PLAVIX) tablet 75 mg  75 mg Oral Daily Deborah Sanders, DO   75 mg at 04/13/22 0844    montelukast (SINGULAIR) tablet 10 mg  10 mg Oral Daily Deborah Sanders, DO   10 mg at 04/13/22 0848    divalproex (DEPAKOTE) DR tablet 500 mg  500 mg Oral BID Deborah Sanders, DO   500 mg at 04/13/22 0844    insulin glargine (LANTUS) injection vial 40 Units  40 Units SubCUTAneous Nightly Deborah Sanders, DO   40 Units at 04/12/22 2152    insulin lispro (HUMALOG) injection vial 15 Units  15 Units SubCUTAneous TID [facility], DO   15 Units at 04/12/22 1800    glucose (GLUTOSE) 40 % oral gel 15 g  15 g Oral PRN Silvano Tansein, DO        dextrose 50 % IV solution  12.5 g IntraVENous PRN Deborah Sanders, DO        glucagon (rDNA) injection 1 mg  1 mg IntraMUSCular PRN Deborah Sanders, DO        dextrose 5 % solution  100 mL/hr IntraVENous PRN Deborah Sanders, DO        insulin lispro (HUMALOG) injection vial 0-6 Units  0-6 Units SubCUTAneous TID  Deborah Sanders, DO   1 Units at 04/12/22 1800    insulin lispro (HUMALOG) injection vial 0-3 Units  0-3 Units SubCUTAneous Nightly Pitney Cedric, DO   1 Units at 04/12/22 2152    pantoprazole (PROTONIX) tablet 40 mg  40 mg Oral QAM AC Yazid R Emeka, DO   40 mg at 04/10/22 1302    cetirizine (ZYRTEC) tablet 10 mg  10 mg Oral Daily Vi Cedric, DO   10 mg at 04/13/22 0844    morphine sulfate (PF) injection 4 mg  4 mg IntraVENous Once Dariela Zaragoza MD           PHYSICAL EXAM:    EYES:  Lids and lashes normal, pupils equal, round and reactive to light, extra ocular muscles intact, sclera clear, conjunctiva pale    ENT:  Normocephalic, without obvious abnormality, atraumatic, Scalp alopecia noted , sinuses nontender on palpation, external ears without lesions, oral pharynx with moist mucus membranes, tonsils without erythema or exudates, gums normal and good dentition. NECK:  Supple, symmetrical, trachea midline, no adenopathy, thyroid symmetric, not enlarged and no tenderness, skin normal    CHEST: No tenderness over rib cage. LUNGS:  No increased work of breathing, good air exchange, clear to auscultation bilaterally, no crackles or wheezing    CARDIOVASCULAR:  Normal apical impulse, regular rate and rhythm, normal S1 and S2, no S3 or S4, and no murmur noted    ABDOMEN:  No scars, normal bowel sounds, soft, non-distended, non-tender, no masses palpated, no hepatosplenomegally    MUSCULOSKELETAL:  There is no redness, warmth, or swelling of the joints. Full range of motion noted. Motor strength is 5 out of 5 all extremities bilaterally. Tone is normal.  EXTREMITIES: No edema or calf tenderness. Scars noted over Left leg     NEURO: No focal neuro deficit .   DATA:      PT/INR:  No results found for: PTINR  PTT:    Lab Results   Component Value Date    APTT 33.1 04/10/2022     CMP:    Lab Results   Component Value Date     04/13/2022    K 3.9 04/13/2022     04/13/2022    CO2 26 04/13/2022    BUN 17 04/13/2022    PROT 6.5 04/09/2022     Magnesium:    Lab Results   Component Value Date    MG 1.5 04/04/2022     Phosphorus:  No components found for: PO4  Calcium:  No results found for: CA  CBC:    Lab Results   Component Value Date    WBC 3.6 04/13/2022    RBC 4.04 04/13/2022    RBC 5.28 04/04/2022    HGB 9.8 04/13/2022    HCT 30.6 04/13/2022    MCV 75.7 04/13/2022    RDW 17.6 04/13/2022     04/13/2022     DIFF:    Lab Results   Component Value Date    MCV 75.7 04/13/2022    RDW 17.6 04/13/2022      LDH:  No results found for: LDH  Uric Acid:  No components found for: URIC    EKG Reviewed  Appropriate Radiology Reviewed      Pathology: Reviewed where indicated      ASSESSMENT:  Principal Problem:    Hypercoagulable state ([address]  Resolved Problems:    * No resolved hospital problems. *    Patient Active Problem List   Diagnosis    DVT, lower extremity, recurrent, right (HCC)    Chest pain    Acute pulmonary embolism with acute cor pulmonale (HCC)    Gastroenteritis    Hypercoagulable state ([address]           Microcytic Hypochromic anemia. R/O Iron deficiency. Non compliance regarding anticoagulation . PLAN:  Patient may be discharged on Coumadin 7.5 mg daily . His PT/INR will be monitored as an outpatient and dosage of Coumadin will be adjusted accordingly . I shall arrange for Home PT/INR device so that He can do PT/INR testing at home . I shall obtain iron panel . In the mean time He can go home on oral iron supplements .            Electronically signed by Tehrese Coronado MD on 4/13/22 at 11:26 AM EDT

## 2022-04-13 NOTE — PROGRESS NOTES
Warfarin Dosing - Pharmacy Consult Note  Consulting Provider: Dr. Christopher Meza  Indication:   Recurrent VTE. 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   Warfarin Dose prior to admission:  possibly 7.5mg daily (off > 1 week, multiple AMA from various facilities  Concurrent anticoagulants/antiplatelets: UFH drip, ASA/Plavix  Significant Drug Interactions: No obvious interactions  Recent Labs     04/11/22  0927 04/11/22  1257 04/12/22  0227 04/12/22  0810 04/13/22  0356   INR  --  1.2 1.5  --  1.8   HGB 10.2*  --   --  10.3* 9.8*     --   --  232 210     Recent warfarin administrations                     warfarin (COUMADIN) tablet 7.5 mg (mg) 7.5 mg Given 04/12/22 1759    warfarin (COUMADIN) tablet 10 mg (mg) 10 mg Given 04/11/22 1704    warfarin (COUMADIN) tablet 7.5 mg (mg) 7.5 mg Given 04/10/22 1835                   Date      INR       Dose  04/9/22   1.2       5 mg  4/10/22   1.0       7.5 mg  4/11/22   1.2       10mg  4/12/22   1.5       7.5 mg  4/13/22   1.8       7.5 mg    Assessment/Plan  (Goal INR: 2 - 3)  INR is subtherapeutic at 1.8, but increasing steadily. Patient has received  four doses, continues on UFH drip. Will give 7.5 mg today    Active problem list reviewed. INR orders are placed. Chart reviewed for pertinent labs, drug/diet interactions, and past doses. Documentation of patient's clinical condition was reviewed. Pharmacy Dosing:  Pharmacy will continue to follow.

## 2022-04-13 NOTE — CARE COORDINATION
SPOKE W/PT TO DISCUSS D/C PLAN. PER PT/OT RECOMMENDS SNF PLACEMENT. SNF LIST PROVIDED. PT WOULD LIKE [facility], [facility], OR [facility]. REFERRAL SENT TO Kapil Kim. CM/LSW TO FOLLOW.

## 2022-04-13 NOTE — CARE COORDINATION
MET W/PT TO DISCUSS D/C PLAN/NEEDS. PT WOULD LIKE RXs FOR A ROLATOR WALKER AND A BSC. PT ALSO INQUIRING IF HE COULD HAVE RADHA VILLALPANDO ORDERED FOR HOME. P/S  WITH PT'S REQUESTS. WILL FOLLOW.

## 2022-04-13 NOTE — PLAN OF CARE
See OT evaluation for all goals and OT POC.  Electronically signed by Delvin Singh OTR/L on 4/13/2022 at 1:45 PM
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
## 2022-04-13 NOTE — PROGRESS NOTES
Physical Therapy   Facility/[facility] MED SURG P153/N519-53    NAME: Damian Stephenson    : 1975 (49 y.o.)  MRN: [id number]    Account: [de-identified]  Gender: male    PT evaluation and treatment orders received. Chart reviewed. PT eval attempted. Hold PT eval: awaiting clarification for WB order d/t conflicting WB status noted in Ortho note on 22. Pt currently has order for NWB. Perfect serve out to ortho NP. Will attempt PT evaluation again at earliest convenience.       Electronically signed by Oneil Lopez PT on 22 at 11:15 AM EDT

## 2022-04-13 NOTE — FLOWSHEET NOTE
1440-- Pt is asking for IV dilaudid, pt states he does not want the PO aris that is ordered. States his pain is 10/10 in his lower back and right leg. Perfect serve out to Palliative Care nurse to address his pain needs, awaiting response. 1511-- No response on perfect serve. Page out to Palliative Care office to address patient's pain needs. Awaiting call back. -- One time dose of Diluadid ordered per [facility].
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
## 2022-04-13 NOTE — DISCHARGE SUMMARY
Discharge Summary    Date: 4/13/2022  Patient Name: Makenna York YOB: 1975 Age: 49 y.o. Admit Date: 4/9/2022  Discharge Date: 4/13/2022  Discharge Condition: [address]    Admission Diagnosis  Hypercoagulable state ([address] (D68.59); History of DVT (deep vein thrombosis) (Z86.718); Chest pain, unspecified type (R07.9)     Discharge Diagnosis  Principal Problem: Hypercoagulable state (HCC)Resolved Problems: * No resolved hospital problems. [address] [facility] Stay  Narrative of Hospital Course:  Patient with a past medical history of history of DVT , PE, questionable history of HIV not on any HAART meds comes with subtherapeutic INR INR 1 noncompliant with meds. Patient received heparin drip with patient with Coumadin. Patient's allergic to Lovenox, and other anticoagulants. Patient is to follow with PCP as outpatient. Patient pain management is controlled by palliative. Consultants:  IP CONSULT TO PHARMACYIP CONSULT TO ONCOLOGYIP CONSULT TO CASE MANAGEMENTIP CONSULT TO PSYCHIATRYIP CONSULT TO ORTHOPEDIC SURGERYIP CONSULT TO PALLIATIVE CARE    Surgeries/procedures Performed:       Treatments:           Discharge Plan/Disposition:  Home    Hospital/Incidental Findings Requiring Follow Up:    Patient Instructions:    Diet:    Activity:  For number of days (if applicable): Other Instructions:    Provider Follow-Up:   No follow-ups on file. Significant Diagnostic Studies:    Recent Labs:  Admission on 04/09/2022No results displayed because visit has over 200 results. ------------    Radiology last 7 days:  XR ANKLE RIGHT (MIN 3 VIEWS)Result Date: 4/10/2022No acute findings. XR FOOT RIGHT (2 VIEWS)Result Date: 4/10/2022No acute findings. NM LUNG VENT/PERFUSION (VQ)Result Date: 4/9/2022 No findings to suggest pulmonary embolism. XR CHEST PORTABLEResult Date: 4/9/2022No acute radiographic abnormality within limits of shallow inspiration.       [unfilled]    Discharge Medications    Current Discharge Medication List    Current Discharge Medication ListCONTINUE these medications which have CHANGEDwarfarin (COUMADIN) 5 MG tabletTake 1.5 tablets by mouth dailyQty: 45 tablet Refills: 3    Current Discharge Medication ListCONTINUE these medications which have NOT CHANGEDdivalproex (DEPAKOTE) 500 MG DR tabletTake 1 tablet by mouth 2 times dailyinsulin glargine (LANTUS) 100 UNIT/ML injection vialInject 40 Units into the skin at bedtimeinsulin lispro (HUMALOG) 100 UNIT/ML injection vialInject 15 Units into the skin 3 times daily (before meals)albuterol sulfate HFA (VENTOLIN HFA) 108 (90 Base) MCG/ACT inhalerInhale 2 puffs into the lungs every 4 hours as needed for Wheezingmontelukast (SINGULAIR) 10 MG tabletTake 10 mg by mouth dailyomeprazole (PRILOSEC) 20 MG delayed release capsuleTake 20 mg by mouth dailyloratadine (CLARITIN) 10 MG tabletTake 10 mg by mouth dailyclopidogrel (PLAVIX) 75 MG tabletTake 75 mg by mouth dailyaspirin 81 MG chewable tabletTake 81 mg by mouth dailyalbuterol (PROVENTIL) (2.5 MG/3ML) 0.083% nebulizer solutionTake 2.5 mg by nebulization every 6 hours as needed for Wheezing    Current Discharge Medication List    Time Spent on Discharge:E] minutes were spent in patient examination, evaluation, counseling as well as medication reconciliation, prescriptions for required medications, discharge plan, and follow up.     Electronically signed by Kali Gonzalez MD on 4/13/22 at 11:37 AM EDT   Overtime on dc summary was 45 min

## 2022-04-14 VITALS
RESPIRATION RATE: 18 BRPM | BODY MASS INDEX: 31.86 KG/M2 | HEART RATE: 77 BPM | SYSTOLIC BLOOD PRESSURE: 121 MMHG | DIASTOLIC BLOOD PRESSURE: 64 MMHG | HEIGHT: 67 IN | WEIGHT: 203 LBS | OXYGEN SATURATION: 99 % | TEMPERATURE: 97.9 F

## 2022-04-14 LAB
GLUCOSE BLD-MCNC: 273 MG/DL (ref 70–99)
PERFORMED ON: ABNORMAL

## 2022-04-14 PROCEDURE — 6360000002 HC RX W HCPCS: Performed by: INTERNAL MEDICINE

## 2022-04-14 PROCEDURE — 6370000000 HC RX 637 (ALT 250 FOR IP): Performed by: INTERNAL MEDICINE

## 2022-04-14 PROCEDURE — 99232 SBSQ HOSP IP/OBS MODERATE 35: CPT | Performed by: NURSE PRACTITIONER

## 2022-04-14 PROCEDURE — 6370000000 HC RX 637 (ALT 250 FOR IP): Performed by: NURSE PRACTITIONER

## 2022-04-14 PROCEDURE — 2580000003 HC RX 258: Performed by: INTERNAL MEDICINE

## 2022-04-14 RX ORDER — OXYCODONE HYDROCHLORIDE 10 MG/1
10 TABLET ORAL EVERY 6 HOURS PRN
Qty: 12 TABLET | Refills: 0 | Status: SHIPPED | OUTPATIENT
Start: 2022-04-14 | End: 2022-04-17

## 2022-04-14 RX ORDER — PREGABALIN 75 MG/1
75 CAPSULE ORAL 2 TIMES DAILY
Qty: 6 CAPSULE | Refills: 0 | Status: SHIPPED | OUTPATIENT
Start: 2022-04-14 | End: 2022-05-17

## 2022-04-14 RX ADMIN — DIPHENHYDRAMINE HYDROCHLORIDE 50 MG: 50 INJECTION, SOLUTION INTRAMUSCULAR; INTRAVENOUS at 03:40

## 2022-04-14 RX ADMIN — INSULIN LISPRO 3 UNITS: 100 INJECTION, SOLUTION INTRAVENOUS; SUBCUTANEOUS at 10:39

## 2022-04-14 RX ADMIN — ACETAMINOPHEN 650 MG: 325 TABLET ORAL at 00:27

## 2022-04-14 RX ADMIN — ASPIRIN 81 MG 81 MG: 81 TABLET ORAL at 10:34

## 2022-04-14 RX ADMIN — DIVALPROEX SODIUM 500 MG: 250 TABLET, DELAYED RELEASE ORAL at 10:34

## 2022-04-14 RX ADMIN — PREGABALIN 75 MG: 75 CAPSULE ORAL at 10:33

## 2022-04-14 RX ADMIN — CLOPIDOGREL BISULFATE 75 MG: 75 TABLET ORAL at 10:34

## 2022-04-14 RX ADMIN — DIPHENHYDRAMINE HYDROCHLORIDE 50 MG: 50 INJECTION, SOLUTION INTRAMUSCULAR; INTRAVENOUS at 10:52

## 2022-04-14 RX ADMIN — MONTELUKAST 10 MG: 10 TABLET, FILM COATED ORAL at 10:33

## 2022-04-14 RX ADMIN — OXYCODONE 10 MG: 5 TABLET ORAL at 00:26

## 2022-04-14 RX ADMIN — OXYCODONE 10 MG: 5 TABLET ORAL at 10:34

## 2022-04-14 RX ADMIN — DIPHENHYDRAMINE HCL 50 MG: 25 TABLET ORAL at 10:32

## 2022-04-14 RX ADMIN — CETIRIZINE HYDROCHLORIDE 10 MG: 10 TABLET, FILM COATED ORAL at 10:34

## 2022-04-14 RX ADMIN — PANTOPRAZOLE SODIUM 40 MG: 40 TABLET, DELAYED RELEASE ORAL at 10:33

## 2022-04-14 RX ADMIN — Medication 325 MG: at 10:33

## 2022-04-14 RX ADMIN — Medication 10 ML: at 10:52

## 2022-04-14 ASSESSMENT — ENCOUNTER SYMPTOMS
BACK PAIN: 1
COUGH: 0
CONSTIPATION: 0
NAUSEA: 0
RECTAL PAIN: 0
EYE DISCHARGE: 0
DIARRHEA: 0
VOICE CHANGE: 0
BLOOD IN STOOL: 0
CHOKING: 0
TROUBLE SWALLOWING: 0
VOMITING: 0
STRIDOR: 0
ABDOMINAL DISTENTION: 0
APNEA: 0
SORE THROAT: 0
SHORTNESS OF BREATH: 0
CHEST TIGHTNESS: 0
COLOR CHANGE: 0
ANAL BLEEDING: 0
WHEEZING: 0
ABDOMINAL PAIN: 0

## 2022-04-14 ASSESSMENT — PAIN SCALES - GENERAL
PAINLEVEL_OUTOF10: 9
PAINLEVEL_OUTOF10: 9

## 2022-04-14 NOTE — PROGRESS NOTES
Progress Note  Date:2022       [facility]  Patient Name:Pepe Varela     YOB: 1975     Age:47 y.o. Subjective    Subjective:  Symptoms:  He reports malaise and weakness. No shortness of breath, cough, chest pain, headache, chest pressure, diarrhea or anxiety. Diet:  No nausea or vomiting. Pain:  He complains of pain that is severe. Review of Systems   Respiratory: Negative for cough and shortness of breath. Cardiovascular: Negative for chest pain. Gastrointestinal: Negative for diarrhea, nausea and vomiting. Neurological: Positive for weakness. Objective         Vitals Last 24 Hours:  TEMPERATURE:  Temp  Av.8 °F (36.6 °C)  Min: 97.7 °F (36.5 °C)  Max: 97.9 °F (36.6 °C)  RESPIRATIONS RANGE: Resp  Av  Min: 18  Max: 18  PULSE OXIMETRY RANGE: SpO2  Av %  Min: 95 %  Max: 99 %  PULSE RANGE: Pulse  Av.5  Min: 77  Max: 96  BLOOD PRESSURE RANGE: Systolic (73SXX), HSD:091 , Min:121 , UGX:548   ; Diastolic (77WUG), VNM:39, Min:64, Max:77    I/O (24Hr): Intake/Output Summary (Last 24 hours) at 2022 1152  Last data filed at 2022 1204  Gross per 24 hour   Intake 720 ml   Output --   Net 720 ml     Objective:  General Appearance:  Comfortable, well-appearing and in no acute distress. Vital signs: (most recent): Blood pressure 121/64, pulse 77, temperature 97.9 °F (36.6 °C), resp. rate 18, height 5' 7.01\" (1.702 m), weight 203 lb (92.1 kg), SpO2 99 %. HEENT: Normal HEENT exam.    Lungs: There are decreased breath sounds. Heart: Normal rate. S1 normal and S2 normal.    Abdomen: Abdomen is soft. Bowel sounds are normal.   There is no epigastric area or suprapubic area tenderness. Neurological: Patient is alert and oriented to person, place and time. Pupils:  Pupils are equal, round, and reactive to light. Skin:  Warm and dry.       Labs/Imaging/Diagnostics    Labs:  CBC:  Recent Labs     22  0810 22  0356   WBC 4.8 3.6*   RBC 4.23* 4.04*   HGB 10.3* 9.8*   HCT 32.0* 30.6*   MCV 75.6* 75.7*   RDW 16.9* 17.6*    210     CHEMISTRIES:  Recent Labs     04/12/22  0810 04/13/22  0356    137   K 4.1 3.9   CL 99 100   CO2 27 26   BUN 17 17   CREATININE 0.81 0.82   GLUCOSE 116* 163*     PT/INR:  Recent Labs     04/11/22  1257 04/12/22  0227 04/13/22  0356   PROTIME 15.6* 18.0* 20.8*   INR 1.2 1.5 1.8     APTT:  No results for input(s): APTT in the last 72 hours. LIVER PROFILE:  No results for input(s): AST, ALT, BILIDIR, BILITOT, ALKPHOS in the last 72 hours. Imaging Last 24 Hours:  XR ANKLE RIGHT (MIN 3 VIEWS)    Result Date: 4/10/2022  EXAMINATION/TECHNIQUE:  XR ANKLE RIGHT (MIN 3 VIEWS), XR FOOT RIGHT (2 VIEWS) HISTORY:  History of recent ankle surgery. Right ankle pain. COMPARISON: None RESULT: 2 partially corticated screws crossing the medial malleolus. 2 corticated screws within the mid foot medially. No acute fracture. No dislocation. Scattered degenerative changes. Vague areas of patchy increased density especially within the distal tibia and calcaneus, nonspecific, may relate to bone infarcts, could be postsurgical. Correlation with prior radiographs and surgical history would be helpful. Diffuse soft tissue edema. No other significant abnormality. No acute findings. XR FOOT RIGHT (2 VIEWS)    Result Date: 4/10/2022  EXAMINATION/TECHNIQUE:  XR ANKLE RIGHT (MIN 3 VIEWS), XR FOOT RIGHT (2 VIEWS) HISTORY:  History of recent ankle surgery. Right ankle pain. COMPARISON: None RESULT: 2 partially corticated screws crossing the medial malleolus. 2 corticated screws within the mid foot medially. No acute fracture. No dislocation. Scattered degenerative changes. Vague areas of patchy increased density especially within the distal tibia and calcaneus, nonspecific, may relate to bone infarcts, could be postsurgical. Correlation with prior radiographs and surgical history would be helpful. Diffuse soft tissue edema. No other significant abnormality. No acute findings. NM LUNG VENT/PERFUSION (VQ)    Result Date: 4/9/2022  Patient: Vin Morton  Time Out: 19:46 Exam(s): NM VQ  EXAM:   NM Lung Perfusion and Ventilation Scan  FINDINGS:   Ventilation:  Unremarkable. No ventilation defects. Perfusion:  Unremarkable. No perfusion defects. Electronically signed by Nicolasa Campuzano D.O. on 04-09-22 at 1946      No findings to suggest pulmonary embolism. XR CHEST PORTABLE    Result Date: 4/9/2022  XR CHEST PORTABLE Clinical History:  Worsening shortness of breath. Comparison:  None  RESULT: Shallow inspiration. Within these limits, no distinct consolidation. No pleural effusion. No pneumothorax. Normal cardiomediastinal silhouette. No acute osseous findings. No acute radiographic abnormality within limits of shallow inspiration. Assessment//Plan           Hospital Problems           Last Modified POA    * (Principal) Hypercoagulable state ([facility] [address] 4/9/2022 Yes        Assessment & Plan    4/11: Increase pain meds by adding Roxicodone for breakthrough pain. IV pain meds till tomorrow and will DC IV pains tomorrow patient is in agreement. Palliative care for pain management. Continue with monitoring INR and heparin drip. Once INR is greater than 2 patient can be discharged. PT understands that IV dilaudid will be discontinue tomorrow. 4/12: INR is improving. Continue with Coumadin. Taper down pain meds as tolerated, no overnight events. Appreciate psych input. 4/13: discharged  4/14: Patient was seen and evaluated. Awaiting placement. Denies any needs. Refused labs today. Counseling was given.   Electronically signed by Kourtney Hardin MD on 4/11/22 at 12:31 PM EDT
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
## 2022-04-14 NOTE — PROGRESS NOTES
Pt cooperative during shift. Noncompliant with NWB status to RLE.  Awake all night until around 0530, encouraged rest.

## 2022-04-17 NOTE — PROGRESS NOTES
Physical Therapy  Facility/Department: [facility] MED SURG Q260/D933-56  Physical Therapy Discharge      NAME: Louie Arroyo    : 1975 (49 y.o.)  MRN: [id number]    Account: [de-identified]  Gender: male      Patient has been discharged from acute care hospital. DC patient from current PT program.      Electronically signed by Storm Tillman PT on 22 at 12:51 PM EDT

## 2022-04-24 ENCOUNTER — HOSPITAL ENCOUNTER (INPATIENT)
Age: 47
LOS: 1 days | Discharge: LEFT BEFORE DISCHARGE | DRG: 197 | End: 2022-04-25
Attending: EMERGENCY MEDICINE | Admitting: INTERNAL MEDICINE
Payer: MEDICAID

## 2022-04-24 ENCOUNTER — APPOINTMENT (OUTPATIENT)
Dept: CT IMAGING | Age: 47
DRG: 197 | End: 2022-04-24
Payer: MEDICAID

## 2022-04-24 DIAGNOSIS — G83.14 PARESIS OF LEFT LOWER EXTREMITY (HCC): ICD-10-CM

## 2022-04-24 DIAGNOSIS — I82.509 MULTIPLE EPISODES OF DEEP VENOUS THROMBOSIS (HCC): Primary | ICD-10-CM

## 2022-04-24 DIAGNOSIS — D69.9 ANTICOAGULANT DISORDER (HCC): ICD-10-CM

## 2022-04-24 PROBLEM — I82.621 ACUTE DEEP VEIN THROMBOSIS (DVT) OF BRACHIAL VEIN OF RIGHT UPPER EXTREMITY (HCC): Status: ACTIVE | Noted: 2022-04-24

## 2022-04-24 PROBLEM — Z91.199 MEDICALLY NONCOMPLIANT: Status: ACTIVE | Noted: 2022-04-24

## 2022-04-24 LAB
ALBUMIN SERPL-MCNC: 4.3 G/DL (ref 3.5–4.6)
ALP BLD-CCNC: 104 U/L (ref 35–104)
ALT SERPL-CCNC: 21 U/L (ref 0–41)
ANION GAP SERPL CALCULATED.3IONS-SCNC: 14 MEQ/L (ref 9–15)
ANTI-XA UNFRAC HEPARIN: <0.1 IU/ML
APTT: 23.1 SEC (ref 24.4–36.8)
AST SERPL-CCNC: 26 U/L (ref 0–40)
BILIRUB SERPL-MCNC: 0.3 MG/DL (ref 0.2–0.7)
BUN BLDV-MCNC: 21 MG/DL (ref 6–20)
CALCIUM SERPL-MCNC: 8.7 MG/DL (ref 8.5–9.9)
CHLORIDE BLD-SCNC: 99 MEQ/L (ref 95–107)
CO2: 21 MEQ/L (ref 20–31)
CREAT SERPL-MCNC: 0.99 MG/DL (ref 0.7–1.2)
GFR AFRICAN AMERICAN: >60
GFR NON-AFRICAN AMERICAN: >60
GLOBULIN: 2.9 G/DL (ref 2.3–3.5)
GLUCOSE BLD-MCNC: 147 MG/DL (ref 70–99)
HCT VFR BLD CALC: 37.5 % (ref 42–52)
HEMOGLOBIN: 12.2 G/DL (ref 14–18)
INR BLD: 1.1
MCH RBC QN AUTO: 24.1 PG (ref 27–31.3)
MCHC RBC AUTO-ENTMCNC: 32.6 % (ref 33–37)
MCV RBC AUTO: 74 FL (ref 80–100)
PDW BLD-RTO: 17.8 % (ref 11.5–14.5)
PLATELET # BLD: 238 K/UL (ref 130–400)
POTASSIUM SERPL-SCNC: 4.6 MEQ/L (ref 3.4–4.9)
PROTHROMBIN TIME: 13.8 SEC (ref 12.3–14.9)
RBC # BLD: 5.07 M/UL (ref 4.7–6.1)
SODIUM BLD-SCNC: 134 MEQ/L (ref 135–144)
TOTAL PROTEIN: 7.2 G/DL (ref 6.3–8)
TROPONIN: <0.01 NG/ML (ref 0–0.01)
WBC # BLD: 4.8 K/UL (ref 4.8–10.8)

## 2022-04-24 PROCEDURE — 6370000000 HC RX 637 (ALT 250 FOR IP): Performed by: INTERNAL MEDICINE

## 2022-04-24 PROCEDURE — 85027 COMPLETE CBC AUTOMATED: CPT

## 2022-04-24 PROCEDURE — 85520 HEPARIN ASSAY: CPT

## 2022-04-24 PROCEDURE — 96375 TX/PRO/DX INJ NEW DRUG ADDON: CPT

## 2022-04-24 PROCEDURE — 80053 COMPREHEN METABOLIC PANEL: CPT

## 2022-04-24 PROCEDURE — 85610 PROTHROMBIN TIME: CPT

## 2022-04-24 PROCEDURE — 36415 COLL VENOUS BLD VENIPUNCTURE: CPT

## 2022-04-24 PROCEDURE — 96365 THER/PROPH/DIAG IV INF INIT: CPT

## 2022-04-24 PROCEDURE — 70450 CT HEAD/BRAIN W/O DYE: CPT

## 2022-04-24 PROCEDURE — 2060000000 HC ICU INTERMEDIATE R&B

## 2022-04-24 PROCEDURE — 6360000002 HC RX W HCPCS: Performed by: EMERGENCY MEDICINE

## 2022-04-24 PROCEDURE — 84484 ASSAY OF TROPONIN QUANT: CPT

## 2022-04-24 PROCEDURE — 85730 THROMBOPLASTIN TIME PARTIAL: CPT

## 2022-04-24 PROCEDURE — 99285 EMERGENCY DEPT VISIT HI MDM: CPT

## 2022-04-24 PROCEDURE — 93005 ELECTROCARDIOGRAM TRACING: CPT | Performed by: EMERGENCY MEDICINE

## 2022-04-24 RX ORDER — ACETAMINOPHEN 325 MG/1
650 TABLET ORAL EVERY 6 HOURS PRN
Status: DISCONTINUED | OUTPATIENT
Start: 2022-04-24 | End: 2022-04-25 | Stop reason: HOSPADM

## 2022-04-24 RX ORDER — SODIUM CHLORIDE 9 MG/ML
INJECTION, SOLUTION INTRAVENOUS PRN
Status: DISCONTINUED | OUTPATIENT
Start: 2022-04-24 | End: 2022-04-25 | Stop reason: HOSPADM

## 2022-04-24 RX ORDER — POLYETHYLENE GLYCOL 3350 17 G/17G
17 POWDER, FOR SOLUTION ORAL DAILY PRN
Status: DISCONTINUED | OUTPATIENT
Start: 2022-04-24 | End: 2022-04-25 | Stop reason: HOSPADM

## 2022-04-24 RX ORDER — ACETAMINOPHEN 650 MG/1
650 SUPPOSITORY RECTAL EVERY 6 HOURS PRN
Status: DISCONTINUED | OUTPATIENT
Start: 2022-04-24 | End: 2022-04-25 | Stop reason: HOSPADM

## 2022-04-24 RX ORDER — ONDANSETRON 2 MG/ML
4 INJECTION INTRAMUSCULAR; INTRAVENOUS EVERY 6 HOURS PRN
Status: DISCONTINUED | OUTPATIENT
Start: 2022-04-24 | End: 2022-04-25 | Stop reason: HOSPADM

## 2022-04-24 RX ORDER — HEPARIN SODIUM 1000 [USP'U]/ML
80 INJECTION, SOLUTION INTRAVENOUS; SUBCUTANEOUS PRN
Status: DISCONTINUED | OUTPATIENT
Start: 2022-04-24 | End: 2022-04-25 | Stop reason: HOSPADM

## 2022-04-24 RX ORDER — MORPHINE SULFATE 4 MG/ML
4 INJECTION, SOLUTION INTRAMUSCULAR; INTRAVENOUS
Status: COMPLETED | OUTPATIENT
Start: 2022-04-24 | End: 2022-04-24

## 2022-04-24 RX ORDER — WARFARIN SODIUM 5 MG/1
10 TABLET ORAL
Status: COMPLETED | OUTPATIENT
Start: 2022-04-24 | End: 2022-04-24

## 2022-04-24 RX ORDER — DIPHENHYDRAMINE HYDROCHLORIDE 50 MG/ML
50 INJECTION INTRAMUSCULAR; INTRAVENOUS ONCE
Status: COMPLETED | OUTPATIENT
Start: 2022-04-24 | End: 2022-04-24

## 2022-04-24 RX ORDER — DIPHENHYDRAMINE HCL 25 MG
25 TABLET ORAL EVERY 4 HOURS PRN
Status: DISCONTINUED | OUTPATIENT
Start: 2022-04-24 | End: 2022-04-25 | Stop reason: HOSPADM

## 2022-04-24 RX ORDER — DIPHENHYDRAMINE HCL 25 MG
25 TABLET ORAL EVERY 6 HOURS PRN
Status: DISCONTINUED | OUTPATIENT
Start: 2022-04-24 | End: 2022-04-24

## 2022-04-24 RX ORDER — ONDANSETRON 2 MG/ML
4 INJECTION INTRAMUSCULAR; INTRAVENOUS ONCE
Status: COMPLETED | OUTPATIENT
Start: 2022-04-24 | End: 2022-04-24

## 2022-04-24 RX ORDER — HEPARIN SODIUM 10000 [USP'U]/100ML
5-30 INJECTION, SOLUTION INTRAVENOUS CONTINUOUS
Status: DISCONTINUED | OUTPATIENT
Start: 2022-04-24 | End: 2022-04-25 | Stop reason: HOSPADM

## 2022-04-24 RX ORDER — HEPARIN SODIUM 1000 [USP'U]/ML
40 INJECTION, SOLUTION INTRAVENOUS; SUBCUTANEOUS PRN
Status: DISCONTINUED | OUTPATIENT
Start: 2022-04-24 | End: 2022-04-25 | Stop reason: HOSPADM

## 2022-04-24 RX ORDER — SODIUM CHLORIDE 0.9 % (FLUSH) 0.9 %
5-40 SYRINGE (ML) INJECTION PRN
Status: DISCONTINUED | OUTPATIENT
Start: 2022-04-24 | End: 2022-04-25 | Stop reason: HOSPADM

## 2022-04-24 RX ORDER — SODIUM CHLORIDE 0.9 % (FLUSH) 0.9 %
5-40 SYRINGE (ML) INJECTION EVERY 12 HOURS SCHEDULED
Status: DISCONTINUED | OUTPATIENT
Start: 2022-04-24 | End: 2022-04-25 | Stop reason: HOSPADM

## 2022-04-24 RX ORDER — LIDOCAINE 4 G/G
1 PATCH TOPICAL DAILY
Status: DISCONTINUED | OUTPATIENT
Start: 2022-04-24 | End: 2022-04-25 | Stop reason: HOSPADM

## 2022-04-24 RX ORDER — ONDANSETRON 4 MG/1
4 TABLET, ORALLY DISINTEGRATING ORAL EVERY 8 HOURS PRN
Status: DISCONTINUED | OUTPATIENT
Start: 2022-04-24 | End: 2022-04-25 | Stop reason: HOSPADM

## 2022-04-24 RX ORDER — HEPARIN SODIUM 1000 [USP'U]/ML
80 INJECTION, SOLUTION INTRAVENOUS; SUBCUTANEOUS ONCE
Status: COMPLETED | OUTPATIENT
Start: 2022-04-24 | End: 2022-04-24

## 2022-04-24 RX ORDER — CYCLOBENZAPRINE HCL 10 MG
10 TABLET ORAL 3 TIMES DAILY PRN
Status: DISCONTINUED | OUTPATIENT
Start: 2022-04-24 | End: 2022-04-25 | Stop reason: HOSPADM

## 2022-04-24 RX ADMIN — MORPHINE SULFATE 4 MG: 4 INJECTION, SOLUTION INTRAMUSCULAR; INTRAVENOUS at 16:40

## 2022-04-24 RX ADMIN — WARFARIN SODIUM 10 MG: 5 TABLET ORAL at 20:55

## 2022-04-24 RX ADMIN — HEPARIN SODIUM 7370 UNITS: 1000 INJECTION INTRAVENOUS; SUBCUTANEOUS at 16:44

## 2022-04-24 RX ADMIN — MORPHINE SULFATE 4 MG: 4 INJECTION, SOLUTION INTRAMUSCULAR; INTRAVENOUS at 18:54

## 2022-04-24 RX ADMIN — DIPHENHYDRAMINE HYDROCHLORIDE 50 MG: 50 INJECTION, SOLUTION INTRAMUSCULAR; INTRAVENOUS at 16:51

## 2022-04-24 RX ADMIN — HEPARIN SODIUM AND DEXTROSE 18 UNITS/KG/HR: 10000; 5 INJECTION INTRAVENOUS at 16:51

## 2022-04-24 RX ADMIN — DIPHENHYDRAMINE HCL 25 MG: 25 TABLET ORAL at 22:25

## 2022-04-24 RX ADMIN — ONDANSETRON 4 MG: 2 INJECTION INTRAMUSCULAR; INTRAVENOUS at 16:41

## 2022-04-24 ASSESSMENT — ENCOUNTER SYMPTOMS
VOMITING: 0
RHINORRHEA: 0
EYES NEGATIVE: 1
TROUBLE SWALLOWING: 0
ABDOMINAL PAIN: 0
NAUSEA: 0
SHORTNESS OF BREATH: 0
ALLERGIC/IMMUNOLOGIC NEGATIVE: 1
WHEEZING: 0

## 2022-04-24 ASSESSMENT — PAIN DESCRIPTION - LOCATION
LOCATION: GENERALIZED
LOCATION: GENERALIZED

## 2022-04-24 ASSESSMENT — PAIN DESCRIPTION - ORIENTATION: ORIENTATION: RIGHT;LEFT

## 2022-04-24 ASSESSMENT — PAIN - FUNCTIONAL ASSESSMENT
PAIN_FUNCTIONAL_ASSESSMENT: 0-10
PAIN_FUNCTIONAL_ASSESSMENT: NONE - DENIES PAIN

## 2022-04-24 ASSESSMENT — PAIN SCALES - GENERAL: PAINLEVEL_OUTOF10: 9

## 2022-04-24 ASSESSMENT — PAIN DESCRIPTION - ONSET: ONSET: ON-GOING

## 2022-04-24 ASSESSMENT — PAIN DESCRIPTION - FREQUENCY: FREQUENCY: CONTINUOUS

## 2022-04-24 ASSESSMENT — PAIN DESCRIPTION - DESCRIPTORS: DESCRIPTORS: ACHING;THROBBING

## 2022-04-24 ASSESSMENT — PAIN DESCRIPTION - PAIN TYPE: TYPE: ACUTE PAIN;CHRONIC PAIN

## 2022-04-24 NOTE — PROGRESS NOTES
Patient arrived to unit via cart from ER. Upon entering room, patient was upset with carb control diet. Patient states he is not diabetic and he doesn't want carb control diet. Patient also upset that benadryl is ordered every 6 hours prn and not every 4. Patient states: Davin Neville can ask every dr in the [facility], if I dont get benadryl every 4 hours with this(points at heparin drip) then I will dig my skin out\"  Dr. Souza Persons on unit and made aware of concerns.   Electronically signed by Euel Paget, RN on 4/24/2022 at 8:00 PM
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
## 2022-04-24 NOTE — ED TRIAGE NOTES
Pt presents to ER through triage with complaints of chest pain, headache, and blurry vision that started today. Patient reports last week, he received TPA for a stroke at the [address] [place] [address] and signed out AMA. He states his original stroke symptoms, including left sided weakness and slurred speech, have been improving. Vitals are stable in triage. Pt roomed and placed on continuous cardiac monitor and pulse ox. EKG performed and handed off to Dr. Shanique Thompson with report.
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
## 2022-04-25 VITALS
DIASTOLIC BLOOD PRESSURE: 93 MMHG | WEIGHT: 205.3 LBS | HEART RATE: 88 BPM | RESPIRATION RATE: 18 BRPM | HEIGHT: 67 IN | TEMPERATURE: 98.2 F | BODY MASS INDEX: 32.22 KG/M2 | SYSTOLIC BLOOD PRESSURE: 129 MMHG | OXYGEN SATURATION: 96 %

## 2022-04-25 LAB
EKG ATRIAL RATE: 97 BPM
EKG P AXIS: 61 DEGREES
EKG P-R INTERVAL: 170 MS
EKG Q-T INTERVAL: 338 MS
EKG QRS DURATION: 80 MS
EKG QTC CALCULATION (BAZETT): 429 MS
EKG R AXIS: 10 DEGREES
EKG T AXIS: 49 DEGREES
EKG VENTRICULAR RATE: 97 BPM

## 2022-04-25 PROCEDURE — 93010 ELECTROCARDIOGRAM REPORT: CPT | Performed by: INTERNAL MEDICINE

## 2022-04-25 NOTE — PROGRESS NOTES
Offered patient ordered pain patch. Did not want at this time stating his pain is under control. Perfect serve to Dr. Xiang Arroyo to come talk with patient about heparin drip.   Electronically signed by Nya Lerner RN on 4/24/2022 at 8:59 PM

## 2022-04-25 NOTE — PROGRESS NOTES
Patient refusing accucheck at this time.   Electronically signed by Shereen Kolb RN on 4/24/2022 at 8:48 PM

## 2022-04-25 NOTE — PROGRESS NOTES
Went to room to give patient PRN benadryl: heparin drip causing him generalized itching. When I went to room to give patient his benadryl, heparin drip was turned off and disconnected (not by this nurse). Patient asking to speak with hospital supervisor. Supervisor called.   Electronically signed by Dulce Maria Perera RN on 4/24/2022 at 10:47 PM

## 2022-04-25 NOTE — PROGRESS NOTES
Patient asking if benadryl order had been changed. Told him order was changed to every 4 hours as needed. Asked if I could restart heparin at this time, patient states he will itch too bad with the drip and would like to wait until benadryl is changed to IV. Denying morning blood work at this time.   Electronically signed by Tyler Lewis RN on 4/25/2022 at 5:40 AM

## 2022-04-25 NOTE — FLOWSHEET NOTE
Pt signed out AMA. Socorro Galeas RN encouraged patient to come back if he feels sick. Pt states verbal understanding of leaving AMA. IV removed.      Electronically signed by Stevie Noonan RN on 4/25/2022 at 9:52 AM

## 2022-04-25 NOTE — PROGRESS NOTES
Perfect serve sent to Dr. Shane Swartz per patient request asking him to take patient case.  Electronically signed by Lois Bennett RN on 4/25/2022 at 6:45 AM

## 2022-04-25 NOTE — PROGRESS NOTES
Warfarin Dosing - Pharmacy Consult Note  Consulting Provider: Dr. Nikki Alberto  Indication:  New Venous Thromboembolism, History of  VTE/PE, and Factor V Leiden  Warfarin Dose prior to admission: 10 mg Daily (per patient)(may have been on 7.5 mg Daily previously)   Concurrent anticoagulants/antiplatelets: heparin drip  Significant Drug Interactions: No obvious interactions  Recent Labs     04/24/22  1600 04/24/22  1615   INR 1.1  --    HGB 12.2*  --      --    LABALBU  --  4.3     Recent warfarin administrations        No warfarin orders with administrations found. Orders not given:            warfarin (COUMADIN) tablet 10 mg    warfarin placeholder: dosing by pharmacy                   Date   INR    Dose  4/24       1.1        10 mg    Assessment/Plan  (Goal INR: 2 - 3)  Patient subtherapeutic at 1.1. Started on heparin drip. Possibly was taking 10 mg Daily, or may have been taking 7.5 mg Daily from previous rx and [facility] records. Will give 10 mg tonight as ordered by physician and assess how INR reacts tomorrow for further dosing. Active problem list reviewed. INR orders are placed. Chart reviewed for pertinent labs, drug/diet interactions, and past doses. Documentation of patient's clinical condition was reviewed. Pharmacy Dosing:  Pharmacy will continue to follow.       Norma Anthony, PharmD, BCPS, [facility]  Staff Pharmacist  4/24/2022 8:15 PM

## 2022-04-25 NOTE — PROGRESS NOTES
Hospital supervisor, Kapil, in room with patient discussing concerns.   Electronically signed by Mariana Paniagua RN on 4/24/2022 at 10:47 PM

## 2022-04-27 ENCOUNTER — TELEPHONE (OUTPATIENT)
Dept: FAMILY MEDICINE CLINIC | Age: 47
End: 2022-04-27

## 2022-04-27 NOTE — TELEPHONE ENCOUNTER
[facility] Transitions Initial Follow Up Call    Outreach made within 2 business days of discharge: Yes    Patient: Henrique Hines Patient : 1975   MRN: [id number]  Reason for Admission: There are no discharge diagnoses documented for the most recent discharge. Discharge Date: 22       Spoke with: [email or web address]         Follow Up  No future appointments.     Paul Nichols

## 2022-05-05 NOTE — PROGRESS NOTES
Patient explains he would be agreeable to SNF if he has a private room Patient instructed to continue follow-up with referring provider.

## 2022-05-11 ENCOUNTER — ANESTHESIA EVENT (OUTPATIENT)
Dept: INTERVENTIONAL RADIOLOGY/VASCULAR | Age: 47
DRG: 030 | End: 2022-05-11
Payer: MEDICAID

## 2022-05-11 ENCOUNTER — ANESTHESIA (OUTPATIENT)
Dept: INTERVENTIONAL RADIOLOGY/VASCULAR | Age: 47
DRG: 030 | End: 2022-05-11
Payer: MEDICAID

## 2022-05-11 ENCOUNTER — APPOINTMENT (OUTPATIENT)
Dept: INTERVENTIONAL RADIOLOGY/VASCULAR | Age: 47
DRG: 030 | End: 2022-05-11
Payer: MEDICAID

## 2022-05-11 ENCOUNTER — APPOINTMENT (OUTPATIENT)
Dept: CT IMAGING | Age: 47
DRG: 030 | End: 2022-05-11
Payer: MEDICAID

## 2022-05-11 ENCOUNTER — HOSPITAL ENCOUNTER (INPATIENT)
Age: 47
LOS: 1 days | Discharge: LEFT BEFORE DISCHARGE | DRG: 030 | End: 2022-05-12
Attending: EMERGENCY MEDICINE | Admitting: INTERNAL MEDICINE
Payer: MEDICAID

## 2022-05-11 ENCOUNTER — APPOINTMENT (OUTPATIENT)
Dept: GENERAL RADIOLOGY | Age: 47
DRG: 030 | End: 2022-05-11
Payer: MEDICAID

## 2022-05-11 VITALS — SYSTOLIC BLOOD PRESSURE: 132 MMHG | DIASTOLIC BLOOD PRESSURE: 71 MMHG | OXYGEN SATURATION: 98 %

## 2022-05-11 DIAGNOSIS — I63.9 CEREBROVASCULAR ACCIDENT (CVA), UNSPECIFIED MECHANISM (HCC): ICD-10-CM

## 2022-05-11 LAB
ALBUMIN SERPL-MCNC: 4 G/DL (ref 3.5–5.2)
ALP BLD-CCNC: 91 U/L (ref 40–129)
ALT SERPL-CCNC: 25 U/L (ref 0–40)
ANION GAP SERPL CALCULATED.3IONS-SCNC: 13 MMOL/L (ref 7–16)
APTT: 22.2 SEC (ref 24.5–35.1)
APTT: 22.5 SEC (ref 24.5–35.1)
AST SERPL-CCNC: 17 U/L (ref 0–39)
BASOPHILS ABSOLUTE: 0.02 E9/L (ref 0–0.2)
BASOPHILS RELATIVE PERCENT: 0.3 % (ref 0–2)
BILIRUB SERPL-MCNC: <0.2 MG/DL (ref 0–1.2)
BILIRUBIN URINE: NEGATIVE
BLOOD, URINE: NEGATIVE
BUN BLDV-MCNC: 16 MG/DL (ref 6–20)
CALCIUM SERPL-MCNC: 9.2 MG/DL (ref 8.6–10.2)
CHLORIDE BLD-SCNC: 102 MMOL/L (ref 98–107)
CLARITY: CLEAR
CO2: 19 MMOL/L (ref 22–29)
COLOR: YELLOW
CREAT SERPL-MCNC: 0.9 MG/DL (ref 0.7–1.2)
EKG ATRIAL RATE: 85 BPM
EKG P AXIS: 63 DEGREES
EKG P-R INTERVAL: 164 MS
EKG Q-T INTERVAL: 362 MS
EKG QRS DURATION: 78 MS
EKG QTC CALCULATION (BAZETT): 430 MS
EKG R AXIS: 18 DEGREES
EKG T AXIS: 22 DEGREES
EKG VENTRICULAR RATE: 85 BPM
EOSINOPHILS ABSOLUTE: 0.19 E9/L (ref 0.05–0.5)
EOSINOPHILS RELATIVE PERCENT: 2.5 % (ref 0–6)
GFR AFRICAN AMERICAN: >60
GFR NON-AFRICAN AMERICAN: >60 ML/MIN/1.73
GLUCOSE BLD-MCNC: 188 MG/DL (ref 74–99)
GLUCOSE URINE: NEGATIVE MG/DL
HCT VFR BLD CALC: 34 % (ref 37–54)
HEMOGLOBIN: 10.7 G/DL (ref 12.5–16.5)
IMMATURE GRANULOCYTES #: 0.07 E9/L
IMMATURE GRANULOCYTES %: 0.9 % (ref 0–5)
INR BLD: 0.9
KETONES, URINE: NEGATIVE MG/DL
LEUKOCYTE ESTERASE, URINE: NEGATIVE
LYMPHOCYTES ABSOLUTE: 1.53 E9/L (ref 1.5–4)
LYMPHOCYTES RELATIVE PERCENT: 19.8 % (ref 20–42)
MCH RBC QN AUTO: 23.3 PG (ref 26–35)
MCHC RBC AUTO-ENTMCNC: 31.5 % (ref 32–34.5)
MCV RBC AUTO: 74.1 FL (ref 80–99.9)
METER GLUCOSE: 159 MG/DL (ref 74–99)
METER GLUCOSE: 213 MG/DL (ref 74–99)
MONOCYTES ABSOLUTE: 0.46 E9/L (ref 0.1–0.95)
MONOCYTES RELATIVE PERCENT: 6 % (ref 2–12)
NEUTROPHILS ABSOLUTE: 5.45 E9/L (ref 1.8–7.3)
NEUTROPHILS RELATIVE PERCENT: 70.5 % (ref 43–80)
NITRITE, URINE: NEGATIVE
PDW BLD-RTO: 15.6 FL (ref 11.5–15)
PH UA: 5.5 (ref 5–9)
PLATELET # BLD: 218 E9/L (ref 130–450)
PMV BLD AUTO: 10.9 FL (ref 7–12)
POTASSIUM SERPL-SCNC: 4.4 MMOL/L (ref 3.5–5)
PROTEIN UA: NEGATIVE MG/DL
PROTHROMBIN TIME: 10.3 SEC (ref 9.3–12.4)
RBC # BLD: 4.59 E12/L (ref 3.8–5.8)
SODIUM BLD-SCNC: 134 MMOL/L (ref 132–146)
SPECIFIC GRAVITY UA: >=1.03 (ref 1–1.03)
TOTAL PROTEIN: 6.6 G/DL (ref 6.4–8.3)
TROPONIN, HIGH SENSITIVITY: 12 NG/L (ref 0–11)
TROPONIN, HIGH SENSITIVITY: 16 NG/L (ref 0–11)
UROBILINOGEN, URINE: 0.2 E.U./DL
WBC # BLD: 7.7 E9/L (ref 4.5–11.5)

## 2022-05-11 PROCEDURE — 02HV33Z INSERTION OF INFUSION DEVICE INTO SUPERIOR VENA CAVA, PERCUTANEOUS APPROACH: ICD-10-PCS | Performed by: STUDENT IN AN ORGANIZED HEALTH CARE EDUCATION/TRAINING PROGRAM

## 2022-05-11 PROCEDURE — 2500000003 HC RX 250 WO HCPCS: Performed by: NURSE ANESTHETIST, CERTIFIED REGISTERED

## 2022-05-11 PROCEDURE — 99291 CRITICAL CARE FIRST HOUR: CPT | Performed by: SURGERY

## 2022-05-11 PROCEDURE — 61645 PERQ ART M-THROMBECT &/NFS: CPT

## 2022-05-11 PROCEDURE — A4216 STERILE WATER/SALINE, 10 ML: HCPCS | Performed by: PSYCHIATRY & NEUROLOGY

## 2022-05-11 PROCEDURE — 6370000000 HC RX 637 (ALT 250 FOR IP): Performed by: NURSE PRACTITIONER

## 2022-05-11 PROCEDURE — 76376 3D RENDER W/INTRP POSTPROCES: CPT | Performed by: PSYCHIATRY & NEUROLOGY

## 2022-05-11 PROCEDURE — 2060000000 HC ICU INTERMEDIATE R&B

## 2022-05-11 PROCEDURE — 70450 CT HEAD/BRAIN W/O DYE: CPT

## 2022-05-11 PROCEDURE — 99291 CRITICAL CARE FIRST HOUR: CPT

## 2022-05-11 PROCEDURE — 99291 CRITICAL CARE FIRST HOUR: CPT | Performed by: PSYCHIATRY & NEUROLOGY

## 2022-05-11 PROCEDURE — C1887 CATHETER, GUIDING: HCPCS

## 2022-05-11 PROCEDURE — 03CG3Z7 EXTIRPATION OF MATTER FROM INTRACRANIAL ARTERY USING STENT RETRIEVER, PERCUTANEOUS APPROACH: ICD-10-PCS | Performed by: PSYCHIATRY & NEUROLOGY

## 2022-05-11 PROCEDURE — 93010 ELECTROCARDIOGRAM REPORT: CPT | Performed by: INTERNAL MEDICINE

## 2022-05-11 PROCEDURE — 6360000002 HC RX W HCPCS

## 2022-05-11 PROCEDURE — 85730 THROMBOPLASTIN TIME PARTIAL: CPT

## 2022-05-11 PROCEDURE — 31500 INSERT EMERGENCY AIRWAY: CPT

## 2022-05-11 PROCEDURE — 93005 ELECTROCARDIOGRAM TRACING: CPT | Performed by: EMERGENCY MEDICINE

## 2022-05-11 PROCEDURE — 2500000003 HC RX 250 WO HCPCS: Performed by: EMERGENCY MEDICINE

## 2022-05-11 PROCEDURE — 2500000003 HC RX 250 WO HCPCS: Performed by: PSYCHIATRY & NEUROLOGY

## 2022-05-11 PROCEDURE — 85025 COMPLETE CBC W/AUTO DIFF WBC: CPT

## 2022-05-11 PROCEDURE — 2580000003 HC RX 258: Performed by: PSYCHIATRY & NEUROLOGY

## 2022-05-11 PROCEDURE — 36415 COLL VENOUS BLD VENIPUNCTURE: CPT

## 2022-05-11 PROCEDURE — 76377 3D RENDER W/INTRP POSTPROCES: CPT

## 2022-05-11 PROCEDURE — 80053 COMPREHEN METABOLIC PANEL: CPT

## 2022-05-11 PROCEDURE — 7100000001 HC PACU RECOVERY - ADDTL 15 MIN

## 2022-05-11 PROCEDURE — 2580000003 HC RX 258: Performed by: NURSE ANESTHETIST, CERTIFIED REGISTERED

## 2022-05-11 PROCEDURE — 2580000003 HC RX 258: Performed by: EMERGENCY MEDICINE

## 2022-05-11 PROCEDURE — 6370000000 HC RX 637 (ALT 250 FOR IP): Performed by: PSYCHIATRY & NEUROLOGY

## 2022-05-11 PROCEDURE — 36226 PLACE CATH VERTEBRAL ART: CPT

## 2022-05-11 PROCEDURE — 85610 PROTHROMBIN TIME: CPT

## 2022-05-11 PROCEDURE — 6360000002 HC RX W HCPCS: Performed by: PSYCHIATRY & NEUROLOGY

## 2022-05-11 PROCEDURE — 2000000000 HC ICU R&B

## 2022-05-11 PROCEDURE — 7100000000 HC PACU RECOVERY - FIRST 15 MIN

## 2022-05-11 PROCEDURE — 99285 EMERGENCY DEPT VISIT HI MDM: CPT

## 2022-05-11 PROCEDURE — 84484 ASSAY OF TROPONIN QUANT: CPT

## 2022-05-11 PROCEDURE — 2580000003 HC RX 258: Performed by: ANESTHESIOLOGY

## 2022-05-11 PROCEDURE — 36556 INSERT NON-TUNNEL CV CATH: CPT

## 2022-05-11 PROCEDURE — 6360000002 HC RX W HCPCS: Performed by: NURSE ANESTHETIST, CERTIFIED REGISTERED

## 2022-05-11 PROCEDURE — 36223 PLACE CATH CAROTID/INOM ART: CPT

## 2022-05-11 PROCEDURE — 82962 GLUCOSE BLOOD TEST: CPT

## 2022-05-11 PROCEDURE — 96375 TX/PRO/DX INJ NEW DRUG ADDON: CPT

## 2022-05-11 PROCEDURE — 71045 X-RAY EXAM CHEST 1 VIEW: CPT

## 2022-05-11 PROCEDURE — 6360000002 HC RX W HCPCS: Performed by: INTERNAL MEDICINE

## 2022-05-11 PROCEDURE — 6360000004 HC RX CONTRAST MEDICATION: Performed by: PSYCHIATRY & NEUROLOGY

## 2022-05-11 PROCEDURE — 3700000001 HC ADD 15 MINUTES (ANESTHESIA)

## 2022-05-11 PROCEDURE — 3700000000 HC ANESTHESIA ATTENDED CARE

## 2022-05-11 PROCEDURE — B3171ZZ FLUOROSCOPY OF LEFT INTERNAL CAROTID ARTERY USING LOW OSMOLAR CONTRAST: ICD-10-PCS | Performed by: PSYCHIATRY & NEUROLOGY

## 2022-05-11 PROCEDURE — 96374 THER/PROPH/DIAG INJ IV PUSH: CPT

## 2022-05-11 PROCEDURE — 6360000002 HC RX W HCPCS: Performed by: EMERGENCY MEDICINE

## 2022-05-11 PROCEDURE — 99254 IP/OBS CNSLTJ NEW/EST MOD 60: CPT | Performed by: INTERNAL MEDICINE

## 2022-05-11 PROCEDURE — 6370000000 HC RX 637 (ALT 250 FOR IP)

## 2022-05-11 PROCEDURE — 61645 PERQ ART M-THROMBECT &/NFS: CPT | Performed by: PSYCHIATRY & NEUROLOGY

## 2022-05-11 PROCEDURE — 81003 URINALYSIS AUTO W/O SCOPE: CPT

## 2022-05-11 PROCEDURE — A4216 STERILE WATER/SALINE, 10 ML: HCPCS | Performed by: EMERGENCY MEDICINE

## 2022-05-11 RX ORDER — LABETALOL HYDROCHLORIDE 5 MG/ML
INJECTION, SOLUTION INTRAVENOUS PRN
Status: DISCONTINUED | OUTPATIENT
Start: 2022-05-11 | End: 2022-05-11 | Stop reason: SDUPTHER

## 2022-05-11 RX ORDER — ACETAMINOPHEN 325 MG/1
650 TABLET ORAL EVERY 4 HOURS PRN
Status: DISCONTINUED | OUTPATIENT
Start: 2022-05-11 | End: 2022-05-13 | Stop reason: HOSPADM

## 2022-05-11 RX ORDER — ONDANSETRON 2 MG/ML
4 INJECTION INTRAMUSCULAR; INTRAVENOUS EVERY 6 HOURS PRN
Status: DISCONTINUED | OUTPATIENT
Start: 2022-05-11 | End: 2022-05-13 | Stop reason: HOSPADM

## 2022-05-11 RX ORDER — SUCCINYLCHOLINE CHLORIDE 20 MG/ML
INJECTION INTRAMUSCULAR; INTRAVENOUS PRN
Status: DISCONTINUED | OUTPATIENT
Start: 2022-05-11 | End: 2022-05-11 | Stop reason: SDUPTHER

## 2022-05-11 RX ORDER — HEPARIN SODIUM 1000 [USP'U]/ML
80 INJECTION, SOLUTION INTRAVENOUS; SUBCUTANEOUS ONCE
Status: COMPLETED | OUTPATIENT
Start: 2022-05-11 | End: 2022-05-11

## 2022-05-11 RX ORDER — NEOSTIGMINE METHYLSULFATE 1 MG/ML
INJECTION, SOLUTION INTRAVENOUS PRN
Status: DISCONTINUED | OUTPATIENT
Start: 2022-05-11 | End: 2022-05-11 | Stop reason: SDUPTHER

## 2022-05-11 RX ORDER — GLYCOPYRROLATE 1 MG/5 ML
SYRINGE (ML) INTRAVENOUS PRN
Status: DISCONTINUED | OUTPATIENT
Start: 2022-05-11 | End: 2022-05-11 | Stop reason: SDUPTHER

## 2022-05-11 RX ORDER — DROPERIDOL 2.5 MG/ML
0.62 INJECTION, SOLUTION INTRAMUSCULAR; INTRAVENOUS
Status: DISCONTINUED | OUTPATIENT
Start: 2022-05-11 | End: 2022-05-11

## 2022-05-11 RX ORDER — MIDAZOLAM HYDROCHLORIDE 1 MG/ML
INJECTION INTRAMUSCULAR; INTRAVENOUS
Status: DISCONTINUED
Start: 2022-05-11 | End: 2022-05-11 | Stop reason: WASHOUT

## 2022-05-11 RX ORDER — SODIUM CHLORIDE 0.9 % (FLUSH) 0.9 %
5-40 SYRINGE (ML) INJECTION EVERY 12 HOURS SCHEDULED
Status: DISCONTINUED | OUTPATIENT
Start: 2022-05-11 | End: 2022-05-13 | Stop reason: HOSPADM

## 2022-05-11 RX ORDER — ONDANSETRON 2 MG/ML
4 INJECTION INTRAMUSCULAR; INTRAVENOUS
Status: DISCONTINUED | OUTPATIENT
Start: 2022-05-11 | End: 2022-05-11

## 2022-05-11 RX ORDER — LABETALOL HYDROCHLORIDE 5 MG/ML
5 INJECTION, SOLUTION INTRAVENOUS EVERY 10 MIN PRN
Status: DISCONTINUED | OUTPATIENT
Start: 2022-05-11 | End: 2022-05-13 | Stop reason: HOSPADM

## 2022-05-11 RX ORDER — PANTOPRAZOLE SODIUM 40 MG/1
40 TABLET, DELAYED RELEASE ORAL
Status: CANCELLED | OUTPATIENT
Start: 2022-05-12

## 2022-05-11 RX ORDER — HYDRALAZINE HYDROCHLORIDE 20 MG/ML
5 INJECTION INTRAMUSCULAR; INTRAVENOUS
Status: DISCONTINUED | OUTPATIENT
Start: 2022-05-11 | End: 2022-05-11

## 2022-05-11 RX ORDER — ATORVASTATIN CALCIUM 40 MG/1
40 TABLET, FILM COATED ORAL NIGHTLY
Status: DISCONTINUED | OUTPATIENT
Start: 2022-05-11 | End: 2022-05-13 | Stop reason: HOSPADM

## 2022-05-11 RX ORDER — MORPHINE SULFATE 2 MG/ML
2 INJECTION, SOLUTION INTRAMUSCULAR; INTRAVENOUS EVERY 4 HOURS PRN
Status: DISCONTINUED | OUTPATIENT
Start: 2022-05-11 | End: 2022-05-12

## 2022-05-11 RX ORDER — VECURONIUM BROMIDE 1 MG/ML
INJECTION, POWDER, LYOPHILIZED, FOR SOLUTION INTRAVENOUS PRN
Status: DISCONTINUED | OUTPATIENT
Start: 2022-05-11 | End: 2022-05-11 | Stop reason: SDUPTHER

## 2022-05-11 RX ORDER — MONTELUKAST SODIUM 10 MG/1
10 TABLET ORAL DAILY
Status: DISCONTINUED | OUTPATIENT
Start: 2022-05-11 | End: 2022-05-13 | Stop reason: HOSPADM

## 2022-05-11 RX ORDER — SODIUM CHLORIDE 0.9 % (FLUSH) 0.9 %
5-40 SYRINGE (ML) INJECTION PRN
Status: DISCONTINUED | OUTPATIENT
Start: 2022-05-11 | End: 2022-05-13 | Stop reason: HOSPADM

## 2022-05-11 RX ORDER — DIPHENHYDRAMINE HYDROCHLORIDE 50 MG/ML
12.5 INJECTION INTRAMUSCULAR; INTRAVENOUS
Status: DISCONTINUED | OUTPATIENT
Start: 2022-05-11 | End: 2022-05-11

## 2022-05-11 RX ORDER — FENTANYL CITRATE 50 UG/ML
25 INJECTION, SOLUTION INTRAMUSCULAR; INTRAVENOUS
Status: DISCONTINUED | OUTPATIENT
Start: 2022-05-11 | End: 2022-05-11

## 2022-05-11 RX ORDER — FERROUS SULFATE 325(65) MG
325 TABLET ORAL 2 TIMES DAILY WITH MEALS
Status: DISCONTINUED | OUTPATIENT
Start: 2022-05-12 | End: 2022-05-13 | Stop reason: HOSPADM

## 2022-05-11 RX ORDER — ONDANSETRON 2 MG/ML
INJECTION INTRAMUSCULAR; INTRAVENOUS PRN
Status: DISCONTINUED | OUTPATIENT
Start: 2022-05-11 | End: 2022-05-11 | Stop reason: SDUPTHER

## 2022-05-11 RX ORDER — MORPHINE SULFATE 2 MG/ML
2 INJECTION, SOLUTION INTRAMUSCULAR; INTRAVENOUS EVERY 4 HOURS PRN
Status: DISCONTINUED | OUTPATIENT
Start: 2022-05-11 | End: 2022-05-11

## 2022-05-11 RX ORDER — MIDAZOLAM HYDROCHLORIDE 2 MG/2ML
2 INJECTION, SOLUTION INTRAMUSCULAR; INTRAVENOUS
Status: DISCONTINUED | OUTPATIENT
Start: 2022-05-11 | End: 2022-05-11

## 2022-05-11 RX ORDER — NITROGLYCERIN 0.4 MG/1
0.4 TABLET SUBLINGUAL EVERY 5 MIN PRN
Status: DISCONTINUED | OUTPATIENT
Start: 2022-05-11 | End: 2022-05-11

## 2022-05-11 RX ORDER — LABETALOL HYDROCHLORIDE 5 MG/ML
5 INJECTION, SOLUTION INTRAVENOUS
Status: DISCONTINUED | OUTPATIENT
Start: 2022-05-11 | End: 2022-05-11

## 2022-05-11 RX ORDER — METHYLPREDNISOLONE SODIUM SUCCINATE 125 MG/2ML
125 INJECTION, POWDER, LYOPHILIZED, FOR SOLUTION INTRAMUSCULAR; INTRAVENOUS ONCE
Status: COMPLETED | OUTPATIENT
Start: 2022-05-11 | End: 2022-05-11

## 2022-05-11 RX ORDER — WARFARIN SODIUM 5 MG/1
7.5 TABLET ORAL
Status: COMPLETED | OUTPATIENT
Start: 2022-05-11 | End: 2022-05-11

## 2022-05-11 RX ORDER — MIDAZOLAM HYDROCHLORIDE 1 MG/ML
INJECTION INTRAMUSCULAR; INTRAVENOUS
Status: COMPLETED
Start: 2022-05-11 | End: 2022-05-11

## 2022-05-11 RX ORDER — MIDAZOLAM HYDROCHLORIDE 2 MG/2ML
2 INJECTION, SOLUTION INTRAMUSCULAR; INTRAVENOUS ONCE
Status: COMPLETED | OUTPATIENT
Start: 2022-05-11 | End: 2022-05-11

## 2022-05-11 RX ORDER — MIDAZOLAM HYDROCHLORIDE 1 MG/ML
INJECTION INTRAMUSCULAR; INTRAVENOUS PRN
Status: DISCONTINUED | OUTPATIENT
Start: 2022-05-11 | End: 2022-05-11 | Stop reason: SDUPTHER

## 2022-05-11 RX ORDER — DEXTROSE MONOHYDRATE 25 G/50ML
12.5 INJECTION, SOLUTION INTRAVENOUS PRN
Status: DISCONTINUED | OUTPATIENT
Start: 2022-05-11 | End: 2022-05-13 | Stop reason: HOSPADM

## 2022-05-11 RX ORDER — DIPHENHYDRAMINE HYDROCHLORIDE 50 MG/ML
25 INJECTION INTRAMUSCULAR; INTRAVENOUS ONCE
Status: COMPLETED | OUTPATIENT
Start: 2022-05-11 | End: 2022-05-11

## 2022-05-11 RX ORDER — DIVALPROEX SODIUM 250 MG/1
500 TABLET, DELAYED RELEASE ORAL 2 TIMES DAILY
Status: DISCONTINUED | OUTPATIENT
Start: 2022-05-11 | End: 2022-05-13 | Stop reason: HOSPADM

## 2022-05-11 RX ORDER — INSULIN LISPRO 100 [IU]/ML
0-12 INJECTION, SOLUTION INTRAVENOUS; SUBCUTANEOUS
Status: DISCONTINUED | OUTPATIENT
Start: 2022-05-11 | End: 2022-05-13 | Stop reason: HOSPADM

## 2022-05-11 RX ORDER — INSULIN LISPRO 100 [IU]/ML
0-6 INJECTION, SOLUTION INTRAVENOUS; SUBCUTANEOUS NIGHTLY
Status: DISCONTINUED | OUTPATIENT
Start: 2022-05-11 | End: 2022-05-13 | Stop reason: HOSPADM

## 2022-05-11 RX ORDER — HEPARIN SODIUM 10000 [USP'U]/ML
INJECTION, SOLUTION INTRAVENOUS; SUBCUTANEOUS
Status: COMPLETED | OUTPATIENT
Start: 2022-05-11 | End: 2022-05-11

## 2022-05-11 RX ORDER — LIDOCAINE HYDROCHLORIDE 20 MG/ML
INJECTION, SOLUTION INFILTRATION; PERINEURAL PRN
Status: DISCONTINUED | OUTPATIENT
Start: 2022-05-11 | End: 2022-05-11 | Stop reason: SDUPTHER

## 2022-05-11 RX ORDER — HYDRALAZINE HYDROCHLORIDE 20 MG/ML
5 INJECTION INTRAMUSCULAR; INTRAVENOUS EVERY 10 MIN PRN
Status: DISCONTINUED | OUTPATIENT
Start: 2022-05-11 | End: 2022-05-13 | Stop reason: HOSPADM

## 2022-05-11 RX ORDER — PROPOFOL 10 MG/ML
INJECTION, EMULSION INTRAVENOUS PRN
Status: DISCONTINUED | OUTPATIENT
Start: 2022-05-11 | End: 2022-05-11 | Stop reason: SDUPTHER

## 2022-05-11 RX ORDER — SODIUM CHLORIDE 9 MG/ML
25 INJECTION, SOLUTION INTRAVENOUS PRN
Status: DISCONTINUED | OUTPATIENT
Start: 2022-05-11 | End: 2022-05-13 | Stop reason: HOSPADM

## 2022-05-11 RX ORDER — HEPARIN SODIUM 10000 [USP'U]/100ML
5-30 INJECTION, SOLUTION INTRAVENOUS CONTINUOUS
Status: DISCONTINUED | OUTPATIENT
Start: 2022-05-11 | End: 2022-05-13 | Stop reason: HOSPADM

## 2022-05-11 RX ORDER — SODIUM CHLORIDE 9 MG/ML
INJECTION, SOLUTION INTRAVENOUS
Status: ACTIVE | OUTPATIENT
Start: 2022-05-11 | End: 2022-05-11

## 2022-05-11 RX ORDER — SODIUM CHLORIDE 0.9 % (FLUSH) 0.9 %
10 SYRINGE (ML) INJECTION
Status: DISPENSED | OUTPATIENT
Start: 2022-05-11 | End: 2022-05-11

## 2022-05-11 RX ORDER — DIPHENHYDRAMINE HCL 25 MG
25 TABLET ORAL EVERY 6 HOURS PRN
Status: DISCONTINUED | OUTPATIENT
Start: 2022-05-11 | End: 2022-05-11 | Stop reason: CLARIF

## 2022-05-11 RX ORDER — DIPHENHYDRAMINE HYDROCHLORIDE 50 MG/ML
50 INJECTION INTRAMUSCULAR; INTRAVENOUS EVERY 6 HOURS PRN
Status: DISCONTINUED | OUTPATIENT
Start: 2022-05-11 | End: 2022-05-13 | Stop reason: HOSPADM

## 2022-05-11 RX ORDER — DEXTROSE MONOHYDRATE 50 MG/ML
100 INJECTION, SOLUTION INTRAVENOUS PRN
Status: DISCONTINUED | OUTPATIENT
Start: 2022-05-11 | End: 2022-05-13 | Stop reason: HOSPADM

## 2022-05-11 RX ORDER — ONDANSETRON 4 MG/1
4 TABLET, ORALLY DISINTEGRATING ORAL EVERY 8 HOURS PRN
Status: DISCONTINUED | OUTPATIENT
Start: 2022-05-11 | End: 2022-05-13 | Stop reason: HOSPADM

## 2022-05-11 RX ORDER — SODIUM CHLORIDE 9 MG/ML
INJECTION, SOLUTION INTRAVENOUS CONTINUOUS PRN
Status: DISCONTINUED | OUTPATIENT
Start: 2022-05-11 | End: 2022-05-11 | Stop reason: SDUPTHER

## 2022-05-11 RX ORDER — ALBUTEROL SULFATE 2.5 MG/3ML
2.5 SOLUTION RESPIRATORY (INHALATION) EVERY 4 HOURS PRN
Status: DISCONTINUED | OUTPATIENT
Start: 2022-05-11 | End: 2022-05-13 | Stop reason: HOSPADM

## 2022-05-11 RX ORDER — FENTANYL CITRATE 50 UG/ML
INJECTION, SOLUTION INTRAMUSCULAR; INTRAVENOUS PRN
Status: DISCONTINUED | OUTPATIENT
Start: 2022-05-11 | End: 2022-05-11 | Stop reason: SDUPTHER

## 2022-05-11 RX ADMIN — SODIUM CHLORIDE: 9 INJECTION, SOLUTION INTRAVENOUS at 10:36

## 2022-05-11 RX ADMIN — ONDANSETRON HYDROCHLORIDE 4 MG: 2 SOLUTION INTRAMUSCULAR; INTRAVENOUS at 10:50

## 2022-05-11 RX ADMIN — MIDAZOLAM HYDROCHLORIDE 2 MG: 2 INJECTION, SOLUTION INTRAMUSCULAR; INTRAVENOUS at 12:15

## 2022-05-11 RX ADMIN — FAMOTIDINE 20 MG: 10 INJECTION, SOLUTION INTRAVENOUS at 10:07

## 2022-05-11 RX ADMIN — SODIUM CHLORIDE, PRESERVATIVE FREE 20 MG: 5 INJECTION INTRAVENOUS at 15:09

## 2022-05-11 RX ADMIN — FENTANYL CITRATE 100 MCG: 50 INJECTION, SOLUTION INTRAMUSCULAR; INTRAVENOUS at 10:40

## 2022-05-11 RX ADMIN — METHYLPREDNISOLONE SODIUM SUCCINATE 125 MG: 125 INJECTION, POWDER, FOR SOLUTION INTRAMUSCULAR; INTRAVENOUS at 10:07

## 2022-05-11 RX ADMIN — MIDAZOLAM 2 MG: 1 INJECTION INTRAMUSCULAR; INTRAVENOUS at 10:36

## 2022-05-11 RX ADMIN — Medication 0.6 MG: at 11:40

## 2022-05-11 RX ADMIN — LIDOCAINE HYDROCHLORIDE 100 MG: 20 INJECTION, SOLUTION INFILTRATION; PERINEURAL at 10:40

## 2022-05-11 RX ADMIN — PROPOFOL 100 MG: 10 INJECTION, EMULSION INTRAVENOUS at 10:40

## 2022-05-11 RX ADMIN — IOPAMIDOL 80 ML: 612 INJECTION, SOLUTION INTRAVENOUS at 11:15

## 2022-05-11 RX ADMIN — MORPHINE SULFATE 2 MG: 2 INJECTION, SOLUTION INTRAMUSCULAR; INTRAVENOUS at 13:54

## 2022-05-11 RX ADMIN — HYDRALAZINE HYDROCHLORIDE 5 MG: 20 INJECTION INTRAMUSCULAR; INTRAVENOUS at 12:27

## 2022-05-11 RX ADMIN — MONTELUKAST SODIUM 10 MG: 10 TABLET ORAL at 20:24

## 2022-05-11 RX ADMIN — MORPHINE SULFATE 2 MG: 2 INJECTION, SOLUTION INTRAMUSCULAR; INTRAVENOUS at 17:40

## 2022-05-11 RX ADMIN — SODIUM CHLORIDE, PRESERVATIVE FREE 10 ML: 5 INJECTION INTRAVENOUS at 21:13

## 2022-05-11 RX ADMIN — DIPHENHYDRAMINE HYDROCHLORIDE 50 MG: 50 INJECTION, SOLUTION INTRAMUSCULAR; INTRAVENOUS at 18:54

## 2022-05-11 RX ADMIN — DIPHENHYDRAMINE HYDROCHLORIDE 25 MG: 50 INJECTION, SOLUTION INTRAMUSCULAR; INTRAVENOUS at 10:07

## 2022-05-11 RX ADMIN — DIVALPROEX SODIUM 500 MG: 250 TABLET, DELAYED RELEASE ORAL at 21:03

## 2022-05-11 RX ADMIN — LABETALOL HYDROCHLORIDE 5 MG: 5 INJECTION INTRAVENOUS at 11:48

## 2022-05-11 RX ADMIN — Medication 1000 ML: at 11:15

## 2022-05-11 RX ADMIN — SUCCINYLCHOLINE CHLORIDE 100 MG: 20 INJECTION, SOLUTION INTRAMUSCULAR; INTRAVENOUS at 10:40

## 2022-05-11 RX ADMIN — Medication 3 MG: at 11:40

## 2022-05-11 RX ADMIN — DIVALPROEX SODIUM 500 MG: 250 TABLET, DELAYED RELEASE ORAL at 15:30

## 2022-05-11 RX ADMIN — HEPARIN SODIUM 18 UNITS/KG/HR: 10000 INJECTION, SOLUTION INTRAVENOUS at 15:07

## 2022-05-11 RX ADMIN — MIDAZOLAM 2 MG: 1 INJECTION INTRAMUSCULAR; INTRAVENOUS at 12:15

## 2022-05-11 RX ADMIN — HEPARIN SODIUM 7440 UNITS: 1000 INJECTION, SOLUTION INTRAVENOUS; SUBCUTANEOUS at 15:10

## 2022-05-11 RX ADMIN — WARFARIN SODIUM 7.5 MG: 5 TABLET ORAL at 18:59

## 2022-05-11 RX ADMIN — MORPHINE SULFATE 2 MG: 2 INJECTION, SOLUTION INTRAMUSCULAR; INTRAVENOUS at 21:51

## 2022-05-11 RX ADMIN — VECURONIUM BROMIDE FOR INJECTION 4 MG: 1 INJECTION, POWDER, LYOPHILIZED, FOR SOLUTION INTRAVENOUS at 10:50

## 2022-05-11 RX ADMIN — Medication 1000 ML: at 11:14

## 2022-05-11 RX ADMIN — SODIUM CHLORIDE, PRESERVATIVE FREE 20 MG: 5 INJECTION INTRAVENOUS at 21:03

## 2022-05-11 RX ADMIN — INSULIN LISPRO 2 UNITS: 100 INJECTION, SOLUTION INTRAVENOUS; SUBCUTANEOUS at 17:40

## 2022-05-11 RX ADMIN — Medication 5000 UNITS: at 11:16

## 2022-05-11 RX ADMIN — LABETALOL HYDROCHLORIDE 5 MG: 5 INJECTION INTRAVENOUS at 11:45

## 2022-05-11 RX ADMIN — Medication 5000 UNITS: at 11:15

## 2022-05-11 RX ADMIN — NITROGLYCERIN 0.4 MG: 0.4 TABLET, ORALLY DISINTEGRATING SUBLINGUAL at 15:08

## 2022-05-11 RX ADMIN — INSULIN LISPRO 2 UNITS: 100 INJECTION, SOLUTION INTRAVENOUS; SUBCUTANEOUS at 20:24

## 2022-05-11 RX ADMIN — HYDRALAZINE HYDROCHLORIDE 5 MG: 20 INJECTION INTRAMUSCULAR; INTRAVENOUS at 12:05

## 2022-05-11 RX ADMIN — ATORVASTATIN CALCIUM 40 MG: 40 TABLET, FILM COATED ORAL at 20:24

## 2022-05-11 ASSESSMENT — PULMONARY FUNCTION TESTS
PIF_VALUE: 23
PIF_VALUE: 24
PIF_VALUE: 1
PIF_VALUE: 1
PIF_VALUE: 23
PIF_VALUE: 23
PIF_VALUE: 1
PIF_VALUE: 13
PIF_VALUE: 22
PIF_VALUE: 17
PIF_VALUE: 22
PIF_VALUE: 1
PIF_VALUE: 2
PIF_VALUE: 24
PIF_VALUE: 16
PIF_VALUE: 23
PIF_VALUE: 22
PIF_VALUE: 6
PIF_VALUE: 20
PIF_VALUE: 1
PIF_VALUE: 22
PIF_VALUE: 22
PIF_VALUE: 12
PIF_VALUE: 22
PIF_VALUE: 21
PIF_VALUE: 0
PIF_VALUE: 22
PIF_VALUE: 23
PIF_VALUE: 24
PIF_VALUE: 16
PIF_VALUE: 22
PIF_VALUE: 1
PIF_VALUE: 1
PIF_VALUE: 23
PIF_VALUE: 13
PIF_VALUE: 16
PIF_VALUE: 13
PIF_VALUE: 1
PIF_VALUE: 17
PIF_VALUE: 22
PIF_VALUE: 22
PIF_VALUE: 2
PIF_VALUE: 25
PIF_VALUE: 23
PIF_VALUE: 24
PIF_VALUE: 14
PIF_VALUE: 22
PIF_VALUE: 23
PIF_VALUE: 1
PIF_VALUE: 16
PIF_VALUE: 3
PIF_VALUE: 23
PIF_VALUE: 18
PIF_VALUE: 22
PIF_VALUE: 1
PIF_VALUE: 18
PIF_VALUE: 17
PIF_VALUE: 22
PIF_VALUE: 22
PIF_VALUE: 13
PIF_VALUE: 22
PIF_VALUE: 2
PIF_VALUE: 22
PIF_VALUE: 16
PIF_VALUE: 16
PIF_VALUE: 2
PIF_VALUE: 23
PIF_VALUE: 22
PIF_VALUE: 23
PIF_VALUE: 23
PIF_VALUE: 22
PIF_VALUE: 14
PIF_VALUE: 2
PIF_VALUE: 22
PIF_VALUE: 23
PIF_VALUE: 22
PIF_VALUE: 3

## 2022-05-11 ASSESSMENT — ENCOUNTER SYMPTOMS
SHORTNESS OF BREATH: 0
NAUSEA: 0
SORE THROAT: 0
COUGH: 0
ABDOMINAL PAIN: 0
BACK PAIN: 0

## 2022-05-11 ASSESSMENT — PAIN SCALES - GENERAL
PAINLEVEL_OUTOF10: 8
PAINLEVEL_OUTOF10: 9
PAINLEVEL_OUTOF10: 10
PAINLEVEL_OUTOF10: 8
PAINLEVEL_OUTOF10: 10

## 2022-05-11 ASSESSMENT — PAIN DESCRIPTION - DESCRIPTORS
DESCRIPTORS: ACHING;DISCOMFORT;CRUSHING
DESCRIPTORS: DISCOMFORT;HEAVINESS
DESCRIPTORS: ACHING
DESCRIPTORS: PRESSURE;BURNING;ACHING

## 2022-05-11 ASSESSMENT — PAIN DESCRIPTION - FREQUENCY: FREQUENCY: CONTINUOUS

## 2022-05-11 ASSESSMENT — PAIN DESCRIPTION - LOCATION
LOCATION: CHEST;HEAD
LOCATION: CHEST
LOCATION: CHEST;GROIN
LOCATION: HEAD

## 2022-05-11 ASSESSMENT — PAIN DESCRIPTION - PAIN TYPE: TYPE: ACUTE PAIN

## 2022-05-11 ASSESSMENT — PAIN DESCRIPTION - ORIENTATION
ORIENTATION: MID
ORIENTATION: MID

## 2022-05-11 NOTE — ED NOTES
Time Neurologist ACID:[id number] elie      Time Stroke Alert called:0939  :    Time Neurologist called JAPA:1717 elie    X-Ray/CT [id number]     Bradley West  05/11/22 0938

## 2022-05-11 NOTE — ANESTHESIA POSTPROCEDURE EVALUATION
Department of Anesthesiology  Postprocedure Note    Patient: Za Zaman  MRN: [id number]  [place]: 1975  Date of evaluation: 5/11/2022  Time:  12:21 PM     Procedure Summary     Date: 05/11/22 Room / Location: [address] [place] [address] Procedures; [facility] Radiology    Anesthesia Start: 1036 Anesthesia Stop: 1200    Procedures:       IR MECHANICAL ART THROMBECTOMY INTRACRANIAL      IR SABINE COM CAROTID LT INTRCRANIAL      IR SABINE VERTEBRAL ART LT W IMAGING Diagnosis:       Cerebrovascular accident (CVA), unspecified mechanism ([address]      (Cerebrovascular accident (CVA), unspecified mechanism ([address]))      (stroke )      (stroke)    Scheduled Providers: [facility] General Radiologist Responsible Provider: Zaynab Mirza MD    Anesthesia Type: general ASA Status: 4 - Emergent          Anesthesia Type: No value filed. Joshua Phase I: Joshua Score: 9    Joshua Phase II:      Last vitals: Reviewed and per EMR flowsheets.        Anesthesia Post Evaluation    Patient location during evaluation: PACU  Patient participation: complete - patient participated  Level of consciousness: awake  Airway patency: patent  Nausea & Vomiting: no nausea and no vomiting  Complications: no  Cardiovascular status: blood pressure returned to baseline and hemodynamically stable  Respiratory status: acceptable and spontaneous ventilation  Hydration status: euvolemic  Multimodal analgesia pain management approach
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
## 2022-05-11 NOTE — ANESTHESIA PRE PROCEDURE
Department of Anesthesiology  Preprocedure Note       Name:  Enedina Gentiel   Age:  49 y.o.  :  1975                                          MRN:  [id number]         Date:  2022      Surgeon: * No surgeons listed *    Procedure: * No procedures listed *    Medications prior to admission:   Prior to Admission medications    Medication Sig Start Date End Date Taking? Authorizing Provider   pregabalin (LYRICA) 75 MG capsule Take 1 capsule by mouth 2 times daily for 3 days.  22  AGUILA Hartman CNP   warfarin (COUMADIN) 5 MG tablet Take 1.5 tablets by mouth daily  Patient taking differently: Take 10 mg by mouth daily  22, MD   loratadine (CLARITIN) 10 MG tablet Take 1 tablet by mouth daily 22, MD   clopidogrel (PLAVIX) 75 MG tablet Take 1 tablet by mouth daily 22, MD   omeprazole (PRILOSEC) 20 MG delayed release capsule Take 1 capsule by mouth daily 22, MD   insulin glargine (LANTUS) 100 UNIT/ML injection vial Inject 40 Units into the skin at bedtime 22, MD   insulin lispro (HUMALOG) 100 UNIT/ML injection vial Inject 15 Units into the skin 3 times daily (before meals) 22, MD   albuterol (PROVENTIL) (2.5 MG/3ML) 0.083% nebulizer solution Take 3 mLs by nebulization every 6 hours as needed for Wheezing 22, MD   albuterol sulfate HFA (VENTOLIN HFA) 108 (90 Base) MCG/ACT inhaler Inhale 2 puffs into the lungs every 4 hours as needed for Wheezing 22, MD   montelukast (SINGULAIR) 10 MG tablet Take 1 tablet by mouth daily 22, MD   ferrous sulfate (IRON 325) 325 (65 Fe) MG tablet Take 1 tablet by mouth 2 times daily (with meals) 22, MD   ondansetron (ZOFRAN) 4 MG tablet Take 1 tablet by mouth daily as needed for Nausea or Vomiting 22, MD   divalproex (DEPAKOTE) 500 MG DR tablet Take 1 tablet by mouth 2 times daily 3/23/22 4/22/22  Historical Provider, MD   aspirin 81 MG chewable tablet Take 81 mg by mouth daily    Historical Provider, MD       Current medications:    Current Facility-Administered Medications   Medication Dose Route Frequency Provider Last Rate Last Admin    sodium chloride flush 0.9 % injection 10 mL  10 mL IntraVENous Once PRN Viola Munguia MD        iopamidol (ISOVUE-370) 76 % injection 105 mL  105 mL IntraVENous ONCE PRN Viola Munguia MD        acetaminophen (TYLENOL) tablet 650 mg  650 mg Oral Q4H PRN Lucretia Willard MD        ondansetron (ZOFRAN-ODT) disintegrating tablet 4 mg  4 mg Oral Q8H PRN Lucretia Willard MD        Or    ondansetron (ZOFRAN) injection 4 mg  4 mg IntraVENous Q6H PRN Lucretia Willard MD        famotidine (PEPCID) 20 mg in sodium chloride (PF) 10 mL injection  20 mg IntraVENous BID Lucretia Willard MD        labetalol (NORMODYNE;TRANDATE) injection 5 mg  5 mg IntraVENous Q10 Min PRN Lucretia Willard MD        hydrALAZINE (APRESOLINE) injection 5 mg  5 mg IntraVENous Q10 Min PRN Lucretia Willard MD        atorvastatin (LIPITOR) tablet 40 mg  40 mg Oral Nightly Lucretia Willard MD        morphine (PF) injection 2 mg  2 mg IntraVENous Q4H PRN Heber Daniel MD        0.9 % sodium chloride infusion   IntraVENous Once PRN Lucretia Willard MD        diphenhydrAMINE (BENADRYL) tablet 25 mg  25 mg Oral Q6H PRN Lucretia Willard MD        heparin (porcine) injection 7,440 Units  80 Units/kg IntraVENous Once Lucretia Willard MD        heparin 25,000 units in dextrose 5% 250 mL (premix) infusion  5-30 Units/kg/hr IntraVENous Continuous Lucretia Willard MD         Current Outpatient Medications   Medication Sig Dispense Refill    pregabalin (LYRICA) 75 MG capsule Take 1 capsule by mouth 2 times daily for 3 days.  6 capsule 0    warfarin (COUMADIN) 5 MG tablet Take 1.5 tablets by mouth daily (Patient taking differently: Take 10 mg by mouth daily ) 45 tablet 3    loratadine (CLARITIN) 10 MG tablet Take 1 tablet by mouth daily 30 tablet 0    clopidogrel (PLAVIX) 75 MG tablet Take 1 tablet by mouth daily 30 tablet 0    omeprazole (PRILOSEC) 20 MG delayed release capsule Take 1 capsule by mouth daily 30 capsule 3    insulin glargine (LANTUS) 100 UNIT/ML injection vial Inject 40 Units into the skin at bedtime 10 mL 3    insulin lispro (HUMALOG) 100 UNIT/ML injection vial Inject 15 Units into the skin 3 times daily (before meals) 10 mL 3    albuterol (PROVENTIL) (2.5 MG/3ML) 0.083% nebulizer solution Take 3 mLs by nebulization every 6 hours as needed for Wheezing 120 each 3    albuterol sulfate HFA (VENTOLIN HFA) 108 (90 Base) MCG/ACT inhaler Inhale 2 puffs into the lungs every 4 hours as needed for Wheezing 18 g 3    montelukast (SINGULAIR) 10 MG tablet Take 1 tablet by mouth daily 30 tablet 3    ferrous sulfate (IRON 325) 325 (65 Fe) MG tablet Take 1 tablet by mouth 2 times daily (with meals) 30 tablet 3    ondansetron (ZOFRAN) 4 MG tablet Take 1 tablet by mouth daily as needed for Nausea or Vomiting 30 tablet 0    divalproex (DEPAKOTE) 500 MG DR tablet Take 1 tablet by mouth 2 times daily      aspirin 81 MG chewable tablet Take 81 mg by mouth daily       Facility-Administered Medications Ordered in Other Encounters   Medication Dose Route Frequency Provider Last Rate Last Admin    0.9 % sodium chloride infusion   IntraVENous Continuous PRN Primus Samuel, APRN - CRNA   New Bag at 05/11/22 1036    midazolam (VERSED) injection   IntraVENous PRN Primus Selmer, APRN - CRNA   2 mg at 05/11/22 1036    propofol injection   IntraVENous PRN Primus Selmer, APRN - CRNA   100 mg at 05/11/22 1040    lidocaine 2 % injection   IntraVENous PRN Primus Selmer, APRN - CRNA   100 mg at 05/11/22 1040    succinylcholine (ANECTINE) injection   IntraVENous PRN Meseret Lemon Anupam, APRN - CRNA   100 mg at 05/11/22 1040    vecuronium (NORCURON) injection   IntraVENous PRN Wanda Arlette, APRN - CRNA   4 mg at 05/11/22 1050    ondansetron (ZOFRAN) injection   IntraVENous PRN Wanda Arlette, APRN - CRNA   4 mg at 05/11/22 1050    fentaNYL (SUBLIMAZE) injection   IntraVENous PRN Wanda Arlette, APRN - CRNA   100 mcg at 05/11/22 1040    neostigmine (PROSTIGMINE) injection   IntraVENous PRN Wanda Arlette, APRN - CRNA   3 mg at 05/11/22 1140    glycopyrrolate (ROBINUL) injection   IntraVENous PRN Wanda Arlette, APRN - CRNA   0.6 mg at 05/11/22 1140    labetalol (NORMODYNE;TRANDATE) injection   IntraVENous PRN Wanda Arlette, APRN - CRNA   5 mg at 05/11/22 1148       Allergies:     Allergies   Allergen Reactions    Lovenox [Enoxaparin Sodium] Anaphylaxis     Patient reports anaphylaxis to Lovenox    Arixtra [Fondaparinux]     Dye [Iodides]     Eliquis [Apixaban]     Fragmin [Dalteparin]     Iodine     Ipratropium     Motrin [Ibuprofen]     Robaxin [Methocarbamol]     Skelaxin [Metaxalone]     Toradol [Ketorolac Tromethamine]     Tramadol     Xarelto [Rivaroxaban]        Problem List:    Patient Active Problem List   Diagnosis Code    DVT, lower extremity, recurrent, right ([facility]) I82.401    Chest pain R07.9    Acute pulmonary embolism with acute cor pulmonale ([facility]) I26.09    Gastroenteritis K52.9    Hypercoagulable state ([facility] D68.59    Acute deep vein thrombosis (DVT) of brachial vein of right upper extremity ([facility]) I82.621    Medically noncompliant Z91.19    Acute CVA (cerebrovascular accident) ([facility] [address] I63.9       Past Medical History:        Diagnosis Date    Asthma     COPD (chronic obstructive pulmonary disease) ([facility])     Deep vein thrombosis (DVT) ([facility])     Factor V Leiden ([facility] [address]     Protein S deficiency ([facility] [address]     Pulmonary emboli ([facility])        Past Surgical History:        Procedure Laterality Date    APPENDECTOMY     [facility]GERY      july 2021 Social History:    Social History     Tobacco Use    Smoking status: Former Smoker    Smokeless tobacco: Never Used   Substance Use Topics    Alcohol use: Not Currently                                Counseling given: Not Answered      Vital Signs (Current):   Vitals:    05/11/22 0929 05/11/22 1156   BP: (!) 116/94 (!) 182/101   Pulse: 94 86   Resp: 18 20   Temp: 97.8 °F (36.6 °C) 98 °F (36.7 °C)   TempSrc:  Temporal   SpO2: 98% 95%   Weight: 205 lb (93 kg)                                               BP Readings from Last 3 Encounters:   05/11/22 (!) 182/101   05/11/22 132/71   04/24/22 (!) 129/93       NPO Status:                                                                                 BMI:   Wt Readings from Last 3 Encounters:   05/11/22 205 lb (93 kg)   04/25/22 205 lb 4.8 oz (93.1 kg)   04/14/22 203 lb (92.1 kg)     Body mass index is 32.11 kg/m². CBC:   Lab Results   Component Value Date    WBC 7.7 05/11/2022    RBC 4.59 05/11/2022    RBC 5.28 04/04/2022    HGB 10.7 05/11/2022    HCT 34.0 05/11/2022    MCV 74.1 05/11/2022    RDW 15.6 05/11/2022     05/11/2022       CMP:   Lab Results   Component Value Date     05/11/2022    K 4.4 05/11/2022    K 3.9 04/13/2022     05/11/2022    CO2 19 05/11/2022    BUN 16 05/11/2022    CREATININE 0.9 05/11/2022    GFRAA >60 05/11/2022    LABGLOM >60 05/11/2022    GLUCOSE 188 05/11/2022    PROT 6.6 05/11/2022    CALCIUM 9.2 05/11/2022    BILITOT <0.2 05/11/2022    ALKPHOS 91 05/11/2022    AST 17 05/11/2022    ALT 25 05/11/2022       POC Tests: No results for input(s): POCGLU, POCNA, POCK, POCCL, POCBUN, POCHEMO, POCHCT in the last 72 hours.     Coags:   Lab Results   Component Value Date    PROTIME 10.3 05/11/2022    INR 0.9 05/11/2022    APTT 22.5 05/11/2022       HCG (If Applicable): No results found for: PREGTESTUR, PREGSERUM, HCG, HCGQUANT     ABGs: No results found for: PHART, PO2ART, LJU8JMZ, KBR2SYE, BEART, A1CIJDBF     Type & Screen (If Applicable):  No results found for: LABABO, LABRH    Drug/Infectious Status (If Applicable):  Lab Results   Component Value Date    HEPCAB NONREACTIVE 04/11/2022       COVID-19 Screening (If Applicable):   Lab Results   Component Value Date    COVID19  04/04/2022     NEGATIVE: No targets were detected by the [employer] Upper  Respiratory Pathogens PCR Panel.  _  Expected Result: Not Detected  The [employer] Upper Respiratory Pathogens PCR Panel can  detect the following targets: SARS-CoV-2, Adenovirus,  Coronavirus 229E, Coronavirus HKU1, Coronavirus NL63,  Coronavirus OC43, Human Metapneumovirus, Human  Rhinovirus/Enterovirus, Influenza A, Influenza B,  Parainfluenza Virus 1, Parainfluenza Virus 2,  Parainfluenza Virus 3, Parainfluenza Virus 4,  Respiratory Syncytial Virus, Bordetella pertussis,  Bordetella parapertussis, Chlamydia pneumoniae,  Mycoplasma pneumoniae. Method: Real-time PCR. Anesthesia Evaluation  Patient summary reviewed and Nursing notes reviewed no history of anesthetic complications:   Airway: Mallampati: II  TM distance: >3 FB   Neck ROM: full  Mouth opening: > = 3 FB Dental: normal exam         Pulmonary:   (+) COPD:  decreased breath sounds,  asthma:                            Cardiovascular:  Exercise tolerance: good (>4 METS),   (+) hypertension:,         Rhythm: regular  Rate: normal                    Neuro/Psych:   (+) CVA:,              ROS comment: Acute CVA with left flaccid paralysis  Expressive aphasia GI/Hepatic/Renal: Neg GI/Hepatic/Renal ROS            Endo/Other:    (+) blood dyscrasia: anticoagulation therapy:., .                  ROS comment: Hypercoagulable. Abdominal:             Vascular:   + DVT, PE. Other Findings:             Anesthesia Plan      general     ASA 4 - emergent       Induction: intravenous and rapid sequence. Anesthetic plan and risks discussed with patient. Plan discussed with CRNA.           DOS STAFF ADDENDUM:    Patient seen and chart reviewed. Physical exam and history updated as indicated. NPO status confirmed. Anesthesia options and plan discussed including risks benefits with patient/legal guardian and family as available. Concerns and questions addressed. Consent verbalized to proceed.   Anesthesia plan, options and intraoperative/postoperative concerns discussed with care team.    Jere Dorantes MD, MD  5/11/2022  12:19 PM          Jere Dorantes MD   5/11/2022

## 2022-05-11 NOTE — ANESTHESIA PROCEDURE NOTES
Arterial Line:    An arterial line was placed using ultrasound guidance and surface landmarks, in the pre-op for the following indication(s): continuous blood pressure monitoring and blood sampling needed. A 20 gauge (size), 1 and 3/4 inch (length), Arrow (type) catheter was placed, Seldinger technique used, into the left radial artery, secured by tape. Anesthesia type: Local  Local infiltration: Injection    Events:  patient tolerated procedure well with no complications. 5/11/2022 12:20 PM  Anesthesiologist: Ruthy Lee MD  Performed: Anesthesiologist   Preanesthetic Checklist  Completed: patient identified, IV checked, site marked, risks and benefits discussed, surgical consent, monitors and equipment checked, pre-op evaluation, timeout performed, anesthesia consent given, oxygen available and patient being monitored

## 2022-05-11 NOTE — PROGRESS NOTES
Neurointerventional POST Procedure Note      Date of Service: 22      Patient Name: Darrell Persaud   : 1975  Medical record number:  [id number]        Procedure: Right MCA inferior division thrombectomy  Physician: Prudence Pastor MD  Assistant: JOSE LUIS [facility]  Access:Left Fem  Vessels injected:   Hemostasis: 8f angioseal   Anesthesia: Please see flowchart  Specimens: None  Blood loss: 120 ml  Contrast Material:  please see dictation in PACS  Fluoro time: Please see dictation in PACS      Diagnosis/Findings:   1. Occlusion of the inferior division of the right MCA M2 this could be acute or subacute from  but patient has good leptomeningeal collateral hence thrombectomy was pursued    2. Successful thrombectomy with TICI 2c recanalization with solitaire device. Residual vasospasm due to solitaire in the inferior division   DynaCT is negative for intracranial hemorrhage  Plan:  1. Q15 min neuro checks x2 hours, Q30 for 6 hours and q1h for 24 hours and then q4h/q6h till discharge   2. Maintain SBP <160  MAP always greater than 60 unless otherwise specified by us in updated note elsewhere. 3. Neurovascular checks   4. 24 hour CT head or MRI( if MRI is feasible)  5. STAT CT head for any neurological decline and contact me immediately  6.  Antiplatelet Recs Best to go back on coumadin with heparin drip coverage
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
## 2022-05-11 NOTE — PROGRESS NOTES
NEUROINTERVENTION PROCEDURE NOTE    PATIENT NAME: Candy Kline  MRN: [id number]  : 1975  DATE OF PROCEDURE: 22    Stroke Metrics  NIHSS prior to procedure:  18  IV TPA Administered: [] Yes  [x]  No  Consent obtained: [x] Yes  []  No  by Dr Wilman Paez    Neurointerventionalist: Dr Wilman Paez  1st assistant Cindy Schulz      Time Event Device Notes   1100 Access site puncture  Left femoral        1120 1st pass Pneumbra/suction TICI Reperfusion thgthrthathdtheth:th th4th 2nd pass  TICI Reperfusion grade:     3rd pass  TICI Reperfusion grade:    4th pass  TICI Reperfusion grade:          8731 Access site closure  Sheath pulled and  angioseal used to close arterial puncture. Puncture site cleansed and dry dressing applied. No bleeding, swelling or complications noted, no change in pulses. IA tPA adminstered: [] Yes  [x]  No       Time Event Dose     IA tPA administered      IA tPA administered      IA tPA administered       Post-procedure NIHSS       1137 - CT head completed.     1200 - Patient transported to PACU  and handoff report given to    Family updated: [x] Yes  []  No

## 2022-05-11 NOTE — ED NOTES
Name: Za Zaman  : 1975  MRN: [id number]    Date: 2022    Benefits of immediately proceeding with Radiology exam outweigh the risks and therefore the following is being waived:      [] Pregnancy test    [x] Protocol for Iodine allergy    [] MRI questionnaire    [] BUN/Creatinine        MD eFmi Mayo MD  22 [id number]

## 2022-05-11 NOTE — ED PROVIDER NOTES
48 yo male presenting for CVA.  am. Symptoms have been severe and constant, no exacerbating or relieving factors. Associated symptoms include slurred speech, left arm and leg weakness, left arm and leg numbness, left facial numbness, headache, chest pain, and left eye blurry vision. Patient states he was walking when he developed chest pain and dizziness and fell backward, hitting his head off the grass. He did not lose consciousness. He is on Coumadin for previous DVTs, but states he has not taken it for the past 3 days. Review of Systems   Constitutional: Negative for fever. HENT: Negative for congestion and sore throat. Eyes: Positive for visual disturbance. Respiratory: Negative for cough and shortness of breath. Cardiovascular: Positive for chest pain. Gastrointestinal: Negative for abdominal pain and nausea. Genitourinary: Negative for flank pain. Musculoskeletal: Negative for back pain and neck pain. Skin: Negative for rash and wound. Neurological: Positive for dizziness, speech difficulty, weakness, numbness and headaches. All other systems reviewed and are negative. Physical Exam  Constitutional:       Appearance: He is not ill-appearing or toxic-appearing. HENT:      Head: Normocephalic. Right Ear: External ear normal.      Left Ear: External ear normal.      Nose: Nose normal.   Eyes:      Conjunctiva/sclera: Conjunctivae normal.      Pupils: Pupils are equal, round, and reactive to light. Comments: EOMI limited, unable to cross bilateral eyes to the left past midline   Cardiovascular:      Rate and Rhythm: Normal rate and regular rhythm. Pulmonary:      Effort: Pulmonary effort is normal.      Breath sounds: Normal breath sounds. Abdominal:      General: There is no distension. Palpations: Abdomen is soft. Tenderness: There is no abdominal tenderness.    Musculoskeletal:      Comments: Scarring left groin   Skin:     General: Skin is warm and dry. Neurological:      Mental Status: He is alert. Comments: NIH Stroke Scale/Score at time of initial evaluation:      1A: Level of Consciousness 0 - alert; keenly responsive  1B: Ask Month and Age 1 - answers one question correctly  1C: Tell Patient To Open and Close Eyes, then Hand  Squeeze 0 - performs both tasks correctly  2: Test Horizontal Extraocular Movements 1 - partial gaze palsy  3: Test Visual Fields 1 - partial hemianopia  4: Test Facial Palsy 1 - minor paralysis (flattened nasolabial fold, asymmetric on smiling)  5A: Test Left Arm Motor Drift 2 - some effort against gravity, limb cannot get to or maintain (if cued) 90 (or 45) degrees, drifts down to bed, but has some effort against gravity   5B: Test Right Arm Motor Drift 0 - no drift, limb holds 90 (or 45) degrees for full 10 seconds  6A: Test Left Leg Motor Drift 2 - some effort against gravity; leg falls to bed by 5 seconds but has some effort against gravity  6B: Test Right Leg Motor Drift 0 - no drift; leg holds 30 degree position for full 5 seconds  7: Test Limb Ataxia   (FNF/Heel-Shin) 0 - absent  8: Test Sensation 1 - mild to moderate sensory loss; patient feels pinprick is less sharp or is dull on the affected side; there is a loss of superficial pain with pinprick but patient is aware of being touched   9: Test Language/Aphasia 1 - mild to moderate aphasia; some obvious loss of fluency or facility of comprehension without significant limitation on ideas expressed or form of expression. Reduction of speech and/or comprehension, however, makes conversation about provided materials difficult or impossible. For example, in conversation about provided materials, examiner can identify picture or naming card content from patient's response.    10: Test Dysarthria 1 - mild to moderate, patient slurs at least some words and at worst, can be understood with some difficulty  11: Test Extinction/Inattention 0 - no abnormality  Total 11     Psychiatric:         Mood and Affect: Mood normal.         Behavior: Behavior normal.          Procedures     PROCEDURE  5/11/22       Time: 0945    CENTRAL LINE INSERTION  Risks, benefits and alternatives (for applicable procedures below) described. Performed By: Caryn Ambrose MD.    Indication: inability to gain peripheral access. Informed consent: Verbal consent obtained. The patient was counseled regarding the procedure in person, it's indications, risks, potential complications and alternatives and any questions were answered. Verbal consent was obtained. Procedure: After routine sterile preparation, local anesthesia obtained by infiltration using 1% Lidocaine without epinephrine. A right 3-Lumen 7F Central Venous Catheter was placed by femoral vein approach and secured by standard fashion. Ultrasound Guidance:   used. Number of Attempts: 1  Post-procedure Findings: A post procedural chest x-ray  was not indicated. Patient tolerated the procedure well. MDM  Number of Diagnoses or Management Options  Cerebrovascular accident (CVA), unspecified mechanism ([facility]  Diagnosis management comments: 48 yo male with history of DVT on coumadin presenting for CVA. LKW 0830. NIH 11 on arrival. Stroke alert called. Patient refused CTA due to contrast, stating that he had to be intubated after getting contrast previously. He refused even after premedicating him and discussing in depth the risks of not obtaining imaging with contrast, up to risk of death. Patient expressed understanding. Noncontrast CT head obtained, no apparent hemorrhage. INR 0.9.  Dr. Garcia Console assessed patient, will take patient to IR stat.                  --------------------------------------------- PAST HISTORY ---------------------------------------------  Past Medical History:  has a past medical history of Asthma, COPD (chronic obstructive pulmonary disease) ([facility] Deep vein thrombosis (DVT) ([facility] Factor V [facility]), Protein S deficiency ([facility] and Pulmonary emboli ([facility] Past Surgical History:  has a past surgical history that includes Appendectomy and vascular surgery. Social History:  reports that he has quit smoking. He has never used smokeless tobacco. He reports previous alcohol use. He reports that he does not use drugs. Family History: family history is not on file. The patients home medications have been reviewed. Allergies: Lovenox [enoxaparin sodium], Arixtra [fondaparinux], Dye [iodides], Eliquis [apixaban], Fragmin [dalteparin], Iodine, Ipratropium, Motrin [ibuprofen], Robaxin [methocarbamol], Skelaxin [metaxalone], Toradol [ketorolac tromethamine], Tramadol, and Xarelto [rivaroxaban]    -------------------------------------------------- RESULTS -------------------------------------------------    Lab  Results for orders placed or performed during the hospital encounter of 05/11/22   CBC with Auto Differential   Result Value Ref Range    WBC 7.7 4.5 - 11.5 E9/L    RBC 4.59 3.80 - 5.80 E12/L    Hemoglobin 10.7 (L) 12.5 - 16.5 g/dL    Hematocrit 34.0 (L) 37.0 - 54.0 %    MCV 74.1 (L) 80.0 - 99.9 fL    MCH 23.3 (L) 26.0 - 35.0 pg    MCHC 31.5 (L) 32.0 - 34.5 %    RDW 15.6 (H) 11.5 - 15.0 fL    Platelets 398 553 - 427 E9/L    MPV 10.9 7.0 - 12.0 fL   Protime-INR   Result Value Ref Range    Protime 10.3 9.3 - 12.4 sec    INR 0.9    APTT   Result Value Ref Range    aPTT 22.5 (L) 24.5 - 35.1 sec   POCT Glucose   Result Value Ref Range    Meter Glucose 213 (H) 74 - 99 mg/dL       Radiology  CT HEAD WO CONTRAST    (Results Pending)   XR CHEST PORTABLE    (Results Pending)   IR MECHANICAL ART THROMBECTOMY INTRACRANIAL    (Results Pending)       EKG: This EKG is signed and interpreted by me.     Rate: 85  Rhythm: Sinus  Interpretation: no acute ST elevations or depressions; no acute T wave changes, normal intervals  Comparison: stable compared to previous 4/24/22      ------------------------- NURSING NOTES AND VITALS REVIEWED ---------------------------  Date / Time Roomed:  5/11/2022  9:30 AM  ED Bed Assignment:  JUAN/JUAN    The nursing notes within the ED encounter and vital signs as below have been reviewed. Patient Vitals for the past 24 hrs:   BP Temp Pulse Resp SpO2 Weight   05/11/22 0929 (!) 116/94 97.8 °F (36.6 °C) 94 18 98 % 205 lb (93 kg)       Oxygen Saturation Interpretation: Normal      ------------------------------------------ PROGRESS NOTES ------------------------------------------    I have spoken with the patient and discussed todays results, in addition to providing specific details for the plan of care and counseling regarding the diagnosis and prognosis. Their questions are answered at this time and they are agreeable with the plan.      --------------------------------- ADDITIONAL PROVIDER NOTES ---------------------------------    This patient's ED course included: a personal history and physicial examination, re-evaluation prior to disposition, multiple bedside re-evaluations, IV medications, cardiac monitoring, continuous pulse oximetry and complex medical decision making and emergency management    This patient has remained hemodynamically stable during their ED course. Please note that the withdrawal or failure to initiate urgent interventions for this patient would likely result in a life threatening deterioration or permanent disability. Accordingly this patient received 30 minutes of critical care time, excluding separately billable procedures. Clinical Impression  1. Cerebrovascular accident (CVA), unspecified mechanism ([facility]          Disposition  Patient's disposition: Admit to CCU/ICU  Patient's condition is stable.           Wally Gomes MD  Resident  05/11/22 5526       Wally Gomes MD  Resident  05/11/22 0877

## 2022-05-11 NOTE — PROGRESS NOTES
[facility]  SURGICAL INTENSIVE CARE UNIT (SICU)  ATTENDING PHYSICIAN CRITICAL CARE PROGRESS NOTE     I have examined the patient, reviewed the record, and discussed the case with the APN/ resident. Please refer to the APN/ resident's note. I agree with the assessment and plan. I have reviewed all relevant labs and imaging data. The following summarizes my clinical findings and independent assessment. CC:  Critical care management after thrombectomy    Hospital Course/Overnight Events:  5/1--presented with left sided weakness/dizziness; taken for thrombectomy; admitted to ICU post-procedure    Pt denies pain. Reports his left side is numb.     Asleep but arousable  Follows commands  Hrt:  Regular rate/rhythm; no murmur  Lungs:  Fairly clear bilaterally  Abd:  Soft; BS active; NT/ND  Skin:  Warm/dry  Ext:  Flaccid on left; no sensation left side of body; scar LLE; distal pulses readily detectable    Labs/films personally reviewed/interpreted--unremarkable head CT    Patient Active Problem List    Diagnosis Date Noted    Acute CVA (cerebrovascular accident) ([facility] 05/11/2022    Acute deep vein thrombosis (DVT) of brachial vein of right upper extremity ([address] 04/24/2022    Medically noncompliant 04/24/2022    Hypercoagulable state ([address] 04/09/2022    Gastroenteritis 04/04/2022    Acute pulmonary embolism with acute cor pulmonale ([address] 03/13/2022    DVT, lower extremity, recurrent, right ([facility]) 03/08/2022    Chest pain        Acute stroke--s/p thrombectomy--monitor neuro exam  SBP goal < 160 (MAP >60)  Swallow eval and start po as able  PT/OT evals  Speech eval  Statin  Echo  Hx of DVT/PE--heparin drip--transition back to coumadin    Pt is at risk for neurologic/hemodynamic/metabolic deterioration and requires ICU care    Sammy Finch MD, FACS  5/11/2022  2:07 PM      Critical care time exclusive of teaching and procedures = 38 minutes

## 2022-05-11 NOTE — ED NOTES
Name: Karyn Celaya  : 1975  MRN: [id number]    Date: 2022    Benefits of immediately proceeding with Radiology exam outweigh the risks and therefore the following is being waived:      [] Pregnancy test    [] Protocol for Iodine allergy    [] MRI questionnaire    [x] BUN/Creatinine        MD Tamie Gerard MD  22 [id number]

## 2022-05-11 NOTE — PROGRESS NOTES
Pharmacy Consultation Note  (Warfarin Dosing and Monitoring)    Initial consult date: 5/11/22  Consulting Provider: Dr. Siddiqi Felix is a 49 y.o. male for whom pharmacy has been asked to manage warfarin therapy. Weight:   Wt Readings from Last 1 Encounters:   05/11/22 205 lb (93 kg)       TSH:  No results found for: TSH    Hepatic Function Panel:                            Lab Results   Component Value Date    ALKPHOS 91 05/11/2022    ALT 25 05/11/2022    AST 17 05/11/2022    PROT 6.6 05/11/2022    BILITOT <0.2 05/11/2022    BILIDIR 0.0 03/10/2022    LABALBU 4.0 05/11/2022       Current warfarin drug-drug interactions include: No significant interactions    Recent Labs     05/11/22  1010   HGB 10.7*        Date Warfarin Dose INR Heparin or LMWH Comment   5/11 7.5mg 0.9 Heparin gtt                                  Assessment:  · Patient is a 49 y.o. male on warfarin for History of  VTE/PE and Protein C & S deficiency . Patient's home warfarin dosing regimen is 7.5mg daily.    · Goal INR 2 - 3  · INR 0.9 today    Plan:  · Warfarin 7.5mg tonight  · Daily PT/INR until the INR is stable within the therapeutic range  · Pharmacist will follow and monitor/adjust dosing as necessary    Thank you for this consult,    Gwynneth Boast, [facility] 5/11/2022 2:00 PM

## 2022-05-12 ENCOUNTER — APPOINTMENT (OUTPATIENT)
Dept: ULTRASOUND IMAGING | Age: 47
DRG: 030 | End: 2022-05-12
Payer: MEDICAID

## 2022-05-12 LAB
ALBUMIN SERPL-MCNC: 3.7 G/DL (ref 3.5–5.2)
ALP BLD-CCNC: 83 U/L (ref 40–129)
ALT SERPL-CCNC: 23 U/L (ref 0–40)
ANION GAP SERPL CALCULATED.3IONS-SCNC: 13 MMOL/L (ref 7–16)
APTT: 125.5 SEC (ref 24.5–35.1)
APTT: 61 SEC (ref 24.5–35.1)
APTT: 80.1 SEC (ref 24.5–35.1)
APTT: 88 SEC (ref 24.5–35.1)
AST SERPL-CCNC: 17 U/L (ref 0–39)
BASOPHILS ABSOLUTE: 0.01 E9/L (ref 0–0.2)
BASOPHILS RELATIVE PERCENT: 0.1 % (ref 0–2)
BILIRUB SERPL-MCNC: 0.3 MG/DL (ref 0–1.2)
BUN BLDV-MCNC: 12 MG/DL (ref 6–20)
CALCIUM SERPL-MCNC: 8.4 MG/DL (ref 8.6–10.2)
CHLORIDE BLD-SCNC: 100 MMOL/L (ref 98–107)
CHOLESTEROL, TOTAL: 201 MG/DL (ref 0–199)
CO2: 21 MMOL/L (ref 22–29)
CREAT SERPL-MCNC: 0.8 MG/DL (ref 0.7–1.2)
EOSINOPHILS ABSOLUTE: 0.12 E9/L (ref 0.05–0.5)
EOSINOPHILS RELATIVE PERCENT: 1.6 % (ref 0–6)
GFR AFRICAN AMERICAN: >60
GFR NON-AFRICAN AMERICAN: >60 ML/MIN/1.73
GLUCOSE BLD-MCNC: 211 MG/DL (ref 74–99)
HBA1C MFR BLD: 8.5 % (ref 4–5.6)
HCT VFR BLD CALC: 30.6 % (ref 37–54)
HDLC SERPL-MCNC: 47 MG/DL
HEMOGLOBIN: 9.7 G/DL (ref 12.5–16.5)
HOMOCYSTEINE: 8.5 UMOL/L (ref 0–15)
IMMATURE GRANULOCYTES #: 0.08 E9/L
IMMATURE GRANULOCYTES %: 1.1 % (ref 0–5)
INR BLD: 1.2
LDL CHOLESTEROL CALCULATED: 113 MG/DL (ref 0–99)
LUPUS ANTICOAG DVVT: NORMAL
LYMPHOCYTES ABSOLUTE: 1.29 E9/L (ref 1.5–4)
LYMPHOCYTES RELATIVE PERCENT: 17.1 % (ref 20–42)
MAGNESIUM: 1.5 MG/DL (ref 1.6–2.6)
MCH RBC QN AUTO: 23.4 PG (ref 26–35)
MCHC RBC AUTO-ENTMCNC: 31.7 % (ref 32–34.5)
MCV RBC AUTO: 73.7 FL (ref 80–99.9)
METER GLUCOSE: 226 MG/DL (ref 74–99)
METER GLUCOSE: 233 MG/DL (ref 74–99)
MONOCYTES ABSOLUTE: 0.51 E9/L (ref 0.1–0.95)
MONOCYTES RELATIVE PERCENT: 6.8 % (ref 2–12)
NEUTROPHILS ABSOLUTE: 5.53 E9/L (ref 1.8–7.3)
NEUTROPHILS RELATIVE PERCENT: 73.3 % (ref 43–80)
PDW BLD-RTO: 15.9 FL (ref 11.5–15)
PHOSPHORUS: 2.8 MG/DL (ref 2.5–4.5)
PLATELET # BLD: 210 E9/L (ref 130–450)
PMV BLD AUTO: 10.7 FL (ref 7–12)
POTASSIUM SERPL-SCNC: 4.2 MMOL/L (ref 3.5–5)
PROTEIN C ACTIVITY: 104 % ACTIVITY (ref 68–165)
PROTHROMBIN TIME: 12.6 SEC (ref 9.3–12.4)
RBC # BLD: 4.15 E12/L (ref 3.8–5.8)
SODIUM BLD-SCNC: 134 MMOL/L (ref 132–146)
T4 TOTAL: 6.1 MCG/DL (ref 4.5–11.7)
TOTAL PROTEIN: 6.1 G/DL (ref 6.4–8.3)
TRIGL SERPL-MCNC: 205 MG/DL (ref 0–149)
TSH SERPL DL<=0.05 MIU/L-ACNC: 5.89 UIU/ML (ref 0.27–4.2)
VLDLC SERPL CALC-MCNC: 41 MG/DL
WBC # BLD: 7.5 E9/L (ref 4.5–11.5)

## 2022-05-12 PROCEDURE — 6370000000 HC RX 637 (ALT 250 FOR IP): Performed by: NURSE PRACTITIONER

## 2022-05-12 PROCEDURE — 6370000000 HC RX 637 (ALT 250 FOR IP)

## 2022-05-12 PROCEDURE — 2700000000 HC OXYGEN THERAPY PER DAY

## 2022-05-12 PROCEDURE — 92523 SPEECH SOUND LANG COMPREHEN: CPT | Performed by: SPEECH-LANGUAGE PATHOLOGIST

## 2022-05-12 PROCEDURE — 97530 THERAPEUTIC ACTIVITIES: CPT

## 2022-05-12 PROCEDURE — 85610 PROTHROMBIN TIME: CPT

## 2022-05-12 PROCEDURE — 6360000002 HC RX W HCPCS: Performed by: FAMILY MEDICINE

## 2022-05-12 PROCEDURE — 83036 HEMOGLOBIN GLYCOSYLATED A1C: CPT

## 2022-05-12 PROCEDURE — 2500000003 HC RX 250 WO HCPCS: Performed by: PSYCHIATRY & NEUROLOGY

## 2022-05-12 PROCEDURE — 6360000002 HC RX W HCPCS: Performed by: PSYCHIATRY & NEUROLOGY

## 2022-05-12 PROCEDURE — 90791 PSYCH DIAGNOSTIC EVALUATION: CPT | Performed by: NURSE PRACTITIONER

## 2022-05-12 PROCEDURE — 2580000003 HC RX 258: Performed by: ANESTHESIOLOGY

## 2022-05-12 PROCEDURE — 93971 EXTREMITY STUDY: CPT | Performed by: RADIOLOGY

## 2022-05-12 PROCEDURE — 97162 PT EVAL MOD COMPLEX 30 MIN: CPT

## 2022-05-12 PROCEDURE — 80053 COMPREHEN METABOLIC PANEL: CPT

## 2022-05-12 PROCEDURE — 86147 CARDIOLIPIN ANTIBODY EA IG: CPT

## 2022-05-12 PROCEDURE — 99232 SBSQ HOSP IP/OBS MODERATE 35: CPT | Performed by: PSYCHIATRY & NEUROLOGY

## 2022-05-12 PROCEDURE — 83735 ASSAY OF MAGNESIUM: CPT

## 2022-05-12 PROCEDURE — A4216 STERILE WATER/SALINE, 10 ML: HCPCS | Performed by: PSYCHIATRY & NEUROLOGY

## 2022-05-12 PROCEDURE — 82962 GLUCOSE BLOOD TEST: CPT

## 2022-05-12 PROCEDURE — 92610 EVALUATE SWALLOWING FUNCTION: CPT | Performed by: SPEECH-LANGUAGE PATHOLOGIST

## 2022-05-12 PROCEDURE — 81240 F2 GENE: CPT

## 2022-05-12 PROCEDURE — 85730 THROMBOPLASTIN TIME PARTIAL: CPT

## 2022-05-12 PROCEDURE — 6370000000 HC RX 637 (ALT 250 FOR IP): Performed by: PSYCHIATRY & NEUROLOGY

## 2022-05-12 PROCEDURE — 84100 ASSAY OF PHOSPHORUS: CPT

## 2022-05-12 PROCEDURE — 93970 EXTREMITY STUDY: CPT

## 2022-05-12 PROCEDURE — 84443 ASSAY THYROID STIM HORMONE: CPT

## 2022-05-12 PROCEDURE — 84436 ASSAY OF TOTAL THYROXINE: CPT

## 2022-05-12 PROCEDURE — 6360000002 HC RX W HCPCS: Performed by: NURSE PRACTITIONER

## 2022-05-12 PROCEDURE — 97166 OT EVAL MOD COMPLEX 45 MIN: CPT

## 2022-05-12 PROCEDURE — 6370000000 HC RX 637 (ALT 250 FOR IP): Performed by: INTERNAL MEDICINE

## 2022-05-12 PROCEDURE — 85303 CLOT INHIBIT PROT C ACTIVITY: CPT

## 2022-05-12 PROCEDURE — 85025 COMPLETE CBC W/AUTO DIFF WBC: CPT

## 2022-05-12 PROCEDURE — 36415 COLL VENOUS BLD VENIPUNCTURE: CPT

## 2022-05-12 PROCEDURE — 2580000003 HC RX 258: Performed by: PSYCHIATRY & NEUROLOGY

## 2022-05-12 PROCEDURE — 2060000000 HC ICU INTERMEDIATE R&B

## 2022-05-12 PROCEDURE — 80061 LIPID PANEL: CPT

## 2022-05-12 PROCEDURE — 99232 SBSQ HOSP IP/OBS MODERATE 35: CPT | Performed by: SURGERY

## 2022-05-12 PROCEDURE — 85306 CLOT INHIBIT PROT S FREE: CPT

## 2022-05-12 PROCEDURE — 6360000002 HC RX W HCPCS: Performed by: INTERNAL MEDICINE

## 2022-05-12 PROCEDURE — 85613 RUSSELL VIPER VENOM DILUTED: CPT

## 2022-05-12 PROCEDURE — 86225 DNA ANTIBODY NATIVE: CPT

## 2022-05-12 PROCEDURE — 83090 ASSAY OF HOMOCYSTEINE: CPT

## 2022-05-12 RX ORDER — OXYCODONE HYDROCHLORIDE 5 MG/1
5 TABLET ORAL EVERY 4 HOURS PRN
Status: DISCONTINUED | OUTPATIENT
Start: 2022-05-12 | End: 2022-05-13 | Stop reason: HOSPADM

## 2022-05-12 RX ORDER — MAGNESIUM SULFATE IN WATER 40 MG/ML
2000 INJECTION, SOLUTION INTRAVENOUS ONCE
Status: COMPLETED | OUTPATIENT
Start: 2022-05-12 | End: 2022-05-12

## 2022-05-12 RX ORDER — MORPHINE SULFATE 4 MG/ML
4 INJECTION, SOLUTION INTRAMUSCULAR; INTRAVENOUS EVERY 4 HOURS PRN
Status: DISCONTINUED | OUTPATIENT
Start: 2022-05-12 | End: 2022-05-13 | Stop reason: HOSPADM

## 2022-05-12 RX ORDER — WARFARIN SODIUM 5 MG/1
7.5 TABLET ORAL
Status: COMPLETED | OUTPATIENT
Start: 2022-05-12 | End: 2022-05-12

## 2022-05-12 RX ADMIN — MORPHINE SULFATE 4 MG: 4 INJECTION, SOLUTION INTRAMUSCULAR; INTRAVENOUS at 16:48

## 2022-05-12 RX ADMIN — SODIUM CHLORIDE, PRESERVATIVE FREE 10 ML: 5 INJECTION INTRAVENOUS at 21:28

## 2022-05-12 RX ADMIN — HEPARIN SODIUM 15.7 UNITS/KG/HR: 10000 INJECTION, SOLUTION INTRAVENOUS at 21:35

## 2022-05-12 RX ADMIN — DIPHENHYDRAMINE HYDROCHLORIDE 50 MG: 50 INJECTION, SOLUTION INTRAMUSCULAR; INTRAVENOUS at 14:17

## 2022-05-12 RX ADMIN — INSULIN LISPRO 2 UNITS: 100 INJECTION, SOLUTION INTRAVENOUS; SUBCUTANEOUS at 21:25

## 2022-05-12 RX ADMIN — WARFARIN SODIUM 7.5 MG: 5 TABLET ORAL at 18:17

## 2022-05-12 RX ADMIN — INSULIN LISPRO 2 UNITS: 100 INJECTION, SOLUTION INTRAVENOUS; SUBCUTANEOUS at 18:17

## 2022-05-12 RX ADMIN — FERROUS SULFATE TAB 325 MG (65 MG ELEMENTAL FE) 325 MG: 325 (65 FE) TAB at 08:42

## 2022-05-12 RX ADMIN — MORPHINE SULFATE 4 MG: 4 INJECTION, SOLUTION INTRAMUSCULAR; INTRAVENOUS at 21:26

## 2022-05-12 RX ADMIN — DIPHENHYDRAMINE HYDROCHLORIDE 50 MG: 50 INJECTION, SOLUTION INTRAMUSCULAR; INTRAVENOUS at 21:26

## 2022-05-12 RX ADMIN — INSULIN LISPRO 4 UNITS: 100 INJECTION, SOLUTION INTRAVENOUS; SUBCUTANEOUS at 12:43

## 2022-05-12 RX ADMIN — HEPARIN SODIUM 17.7 UNITS/KG/HR: 10000 INJECTION, SOLUTION INTRAVENOUS at 05:03

## 2022-05-12 RX ADMIN — OXYCODONE 5 MG: 5 TABLET ORAL at 18:16

## 2022-05-12 RX ADMIN — MORPHINE SULFATE 4 MG: 4 INJECTION, SOLUTION INTRAMUSCULAR; INTRAVENOUS at 12:55

## 2022-05-12 RX ADMIN — MORPHINE SULFATE 4 MG: 4 INJECTION, SOLUTION INTRAMUSCULAR; INTRAVENOUS at 08:55

## 2022-05-12 RX ADMIN — ATORVASTATIN CALCIUM 40 MG: 40 TABLET, FILM COATED ORAL at 21:26

## 2022-05-12 RX ADMIN — MORPHINE SULFATE 4 MG: 4 INJECTION, SOLUTION INTRAMUSCULAR; INTRAVENOUS at 04:53

## 2022-05-12 RX ADMIN — PENICILLIN G BENZATHINE 2.4 MILLION UNITS: 1200000 INJECTION, SUSPENSION INTRAMUSCULAR at 21:27

## 2022-05-12 RX ADMIN — FERROUS SULFATE TAB 325 MG (65 MG ELEMENTAL FE) 325 MG: 325 (65 FE) TAB at 18:17

## 2022-05-12 RX ADMIN — DIPHENHYDRAMINE HYDROCHLORIDE 50 MG: 50 INJECTION, SOLUTION INTRAMUSCULAR; INTRAVENOUS at 01:30

## 2022-05-12 RX ADMIN — MONTELUKAST SODIUM 10 MG: 10 TABLET ORAL at 21:28

## 2022-05-12 RX ADMIN — SODIUM CHLORIDE, PRESERVATIVE FREE 10 ML: 5 INJECTION INTRAVENOUS at 08:43

## 2022-05-12 RX ADMIN — DIPHENHYDRAMINE HYDROCHLORIDE 50 MG: 50 INJECTION, SOLUTION INTRAMUSCULAR; INTRAVENOUS at 08:42

## 2022-05-12 RX ADMIN — SODIUM CHLORIDE, PRESERVATIVE FREE 20 MG: 5 INJECTION INTRAVENOUS at 08:42

## 2022-05-12 RX ADMIN — SODIUM CHLORIDE, PRESERVATIVE FREE 20 MG: 5 INJECTION INTRAVENOUS at 21:26

## 2022-05-12 RX ADMIN — DIVALPROEX SODIUM 500 MG: 250 TABLET, DELAYED RELEASE ORAL at 22:51

## 2022-05-12 RX ADMIN — DIVALPROEX SODIUM 500 MG: 250 TABLET, DELAYED RELEASE ORAL at 08:55

## 2022-05-12 RX ADMIN — INSULIN LISPRO 4 UNITS: 100 INJECTION, SOLUTION INTRAVENOUS; SUBCUTANEOUS at 08:43

## 2022-05-12 RX ADMIN — MORPHINE SULFATE 4 MG: 4 INJECTION, SOLUTION INTRAMUSCULAR; INTRAVENOUS at 00:48

## 2022-05-12 RX ADMIN — MAGNESIUM SULFATE HEPTAHYDRATE 2000 MG: 40 INJECTION, SOLUTION INTRAVENOUS at 11:12

## 2022-05-12 ASSESSMENT — PAIN SCALES - GENERAL
PAINLEVEL_OUTOF10: 9
PAINLEVEL_OUTOF10: 10
PAINLEVEL_OUTOF10: 8
PAINLEVEL_OUTOF10: 5
PAINLEVEL_OUTOF10: 8
PAINLEVEL_OUTOF10: 10
PAINLEVEL_OUTOF10: 8
PAINLEVEL_OUTOF10: 10
PAINLEVEL_OUTOF10: 8
PAINLEVEL_OUTOF10: 5

## 2022-05-12 ASSESSMENT — PAIN DESCRIPTION - DESCRIPTORS
DESCRIPTORS: DISCOMFORT;HEAVINESS
DESCRIPTORS: DISCOMFORT;HEAVINESS
DESCRIPTORS: HEAVINESS
DESCRIPTORS: HEAVINESS

## 2022-05-12 ASSESSMENT — PAIN DESCRIPTION - LOCATION
LOCATION: CHEST
LOCATION: CHEST
LOCATION: BACK;GROIN
LOCATION: CHEST
LOCATION: BACK;GROIN
LOCATION: CHEST

## 2022-05-12 ASSESSMENT — PAIN DESCRIPTION - ORIENTATION
ORIENTATION: MID
ORIENTATION: MID

## 2022-05-12 NOTE — PROGRESS NOTES
Pt is refusing the recommended diet, pt wants to be on a regular diet, pt is also refusing to wear his telemetry even after being educated, Pt also was complainding of pain  10/10 pain pt was given PRN,  MD was notified and orders were placed and followed, RN will continue to monitor pt.

## 2022-05-12 NOTE — PROGRESS NOTES
SPEECH/LANGUAGE PATHOLOGY  CLINICAL ASSESSMENT OF SWALLOWING FUNCTION   and PLAN OF CARE  PATIENT NAME:  Tavo Drew  (male)     MRN:  [id number]    :  1975  (49 y.o.)  STATUS:  Inpatient: Room 4523/4523-A    TODAY'S DATE:  22 141   SLP swallowing-dysphagia evaluation and treatment Start: 22, End: 22, ONE TIME, Standing Count: 1 Occurrences, R    Naz Ball, APRN - CNP  REASON FOR REFERRAL: stroke like symptoms    EVALUATING THERAPIST: CHARITY Johnson                 ASSESSMENT:    DYSPHAGIA DIAGNOSIS:   Clinical indicators of functional swallow for age/premorbid functioning      DIET RECOMMENDATIONS:  Easy to chew consistency solids (IDDSI level 7, transitional) with  thin liquids (IDDSI level 0)     FEEDING RECOMMENDATIONS:     Assistance level:  No assistance needed      Compensatory strategies recommended: No strategies are recommended at this time      Discussed recommendations with nursing?: No secondary to no diet/liquid change recommended     SPEECH THERAPY  PLAN OF CARE   The dysphagia POC is established based on physician order, dysphagia diagnosis and results of clinical assessment     Dysphagia therapy is not recommended     Conditions Requiring Skilled Therapeutic Intervention for dysphagia:    not applicable    Specific dysphagia interventions to include:     Not applicable    Specific instructions for next treatment:  not applicable   Patient Treatment Goals:    Short Term Goals:  Not applicable no therapy warranted     Long Term Goals:   Not applicable no therapy warranted      Patient/family Goal:    not applicable                    ADMITTING DIAGNOSIS: Acute CVA (cerebrovascular accident) ([facility] [I63.9]  Cerebrovascular accident (CVA), unspecified mechanism ([address] [I63.9]    VISIT DIAGNOSIS:   Visit Diagnoses       Codes    Cerebrovascular accident (CVA), unspecified mechanism ([address]     I63.9           PATIENT REPORT/COMPLAINT: denies difficulty swallowing  RN cleared patient for participation in assessment     yes     PRIOR LEVEL OF SWALLOW FUNCTION:    PAST HISTORY OF DYSPHAGIA?: none reported    Home diet: Regular consistency solids (IDDSI level 7) with  thin liquids (IDDSI level 0)    Current Diet Order:  ADULT DIET; Dysphagia - Soft and Bite Sized; 3 carb choices (45 gm/meal)    PROCEDURE:  Consistencies Administered During the Evaluation   Liquids: thin liquid   Solids:  pureed foods and soft solid foods      Method of Intake:   cup, straw, spoon  Self fed      Position:   Seated, upright    CLINICAL ASSESSMENT  Oral Stage: The oral stage of swallowing was within functional limits      Pharyngeal Stage:    No signs of aspiration were noted during this evaluation however, silent aspiration cannot be ruled out at bedside. If silent aspiration is suspected, a Videofluoroscopic Study of Swallowing (MBS) is recommended and requires a physician order. Cognition:   Within functional limits for this exam    Oral Peripheral Examination   Adequate lingual/labial strength     Current Respiratory Status    room air     Parameters of Speech Production  Respiration:  Adequate for speech production  Quality:   Within functional limits  Intensity: Within functional limits    Volitional Swallow: Present    Volitional Cough:    Present    Pain: No pain reported. EDUCATION:   The Speech Language Pathologist (SLP) completed education regarding results of evaluation and that intervention is not warranted at this time. Learner: Patient  Education:  Reviewed results and recommendations of this evaluation  Evaluation of Education: Verbalizes understanding    This plan will be re-evaluated and revised in 1 week  if warranted. CPT code:  17691  bedside swallow eval      [x]The admitting diagnosis and active problem list, have been reviewed prior to initiation of this evaluation.         ACTIVE PROBLEM LIST:   Patient Active Problem List   Diagnosis    DVT, lower extremity, recurrent, right ([address]    Chest pain    Acute pulmonary embolism with acute cor pulmonale (HCC)    Gastroenteritis    Hypercoagulable state ([address]    Acute deep vein thrombosis (DVT) of brachial vein of right upper extremity ([address]    Medically noncompliant    Acute CVA (cerebrovascular accident) ([facility]           Breanna Moss., [address] Pathologist  [id number]  5/12/2022

## 2022-05-12 NOTE — PROGRESS NOTES
Intensive Care Unit  Critical Care Consult  Daily Progress Note 5/12/2022    Date of Admission: 5/11    EVENTS:   5/11--presented with left sided weakness/dizziness; taken for thrombectomy; admitted to ICU post-procedure  5/12: No acute events    PHYSICAL EXAM:    /86   Pulse 105   Temp 98.1 °F (36.7 °C) (Oral)   Resp 16   Wt 205 lb (93 kg)   SpO2 98%   BMI 32.11 kg/m²     General appearance:  Comfortable. Pain Description: none    GCS:    4 - Opens eyes on own   6 - Follows simple motor commands  5 - Alert and oriented    Pupil size:  Left 3 mm  Right 3 mm  Pupil reaction: Yes  Wiggles fingers: Left Yes Right Yes  Hand grasp:   Left absent     Right normal  Wiggles toes: Left No    Right Yes  Plantar flexion: Left normal    Right normal    CONSTITUTIONAL: no acute distress, lying in hospital bed  NEUROLOGIC: PERRL, oriented x 4, speech clear when on the phone, slurred when I enter the room  CARDIOVASCULAR: S1 S2, regular rate, regular rhythm, no murmur/gallop/rub. Monitor: sinus  PULMONARY: no rhonchi/rales/wheezes, no use of accessory muscles, RA  RENAL: voids   ABDOMEN: soft, nontender, nondistended, nontympanic, normal bowel sounds   MUSCULOSKELETAL: moves all extremities purposefully, left sided numbness/weakness  SKIN/EXTREMITIES: no rashes/ecchymosis, no edema/clubbing, warm/dry, good capillary refill     LINES: CVC placed 5/11      Past Medical History:   Diagnosis Date    Asthma     [facility]ch[facility] disease) [facility]     Deep vein thrombosis (DVT) ([facility])     Factor V Leiden ([facility]     Protein S deficiency ([facility]     Pulmonary emboli ([facility])        ASSESSMENT/PLAN:       · Neuro:  M 2 occlusion s/p thrombectomy. Monitor neuro status, Neurology following,  Stroke protocol, Depakote   · CV: Chest pain. Cardiology following - no further studies  Monitor hemodynamics. BP goal < 160 MAP > 60. PRN hydralazine & labetalol. Statin. · Pulm: No acute issues. Monitor RR & SpO2. Pulmonary hygiene  · GI: No acute issues. Diet. Monitor bowel function. · Renal: No acute issues. Monitor BUN & Cr. Monitor electrolytes & replace as needed. Monitor urine output. · ID: No acute issues   · Endocrine: Hyperglycemia. Monitor BS. ISS. · MSK: No acute issues. ROM. turn & reposition. PT/OT  · Heme: Leiden factor 5, hx PE/DVT. Heparin drip bridge to warfarin     Bowel regime: Glycolax  Pain control/Sedation:  morphine, Tylenol  DVT prophylaxis: SCDs.  Heparin drip  GI prophylaxis: Pepcid, Diet  Mouth/Eye care: as needed  Family update: Questions answered for patient   Code status:  full  Disposition:  ICU      Total critical care time: 20 minutes     Electronically signed by AGUILA Pan CNP on 5/12/2022 at 9:45 AM
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
## 2022-05-12 NOTE — PROGRESS NOTES
[address]       Date:2022                                                               Patient Name: Hong Pritchard  MRN: [id number]  : 1975  Room: [address] [place] [address]    Evaluating OT: RUTHIE Eagle,  OTR/L; [id number]    Referring Provider: Israel Martínez MD   Specific Provider Orders/Date: OT eval and treat (22)       Diagnosis: Acute CVA (cerebrovascular accident) [facility]) [I63.9]  Cerebrovascular accident (CVA), unspecified mechanism ([facility] [I63.9]     Reason for admission: Pt admitted with R MCA occlusion with L sided weakness, neglect, slurred speech. Surgery/Procedures:   R MCA thrombectomy     Pertinent Medical History:    Past Medical History:   Diagnosis Date    Asthma     COPD (chronic obstructive pulmonary disease) ([facility]     Deep vein thrombosis (DVT) ([facility])     Factor V Leiden ([facility])     Protein S deficiency ([facility]     Pulmonary emboli ([facility])         *Precautions:  Fall Risk, psych, L jasper, soft bite sized diet, SBP <160    Assessment of current deficits   [x] Functional mobility  [x]ADLs  [x] Strength               [x]Cognition   [x] Functional transfers   [x] IADLs         [x] Safety Awareness   [x]Endurance   [] Fine Coordination        [] ROM     [] Vision/perception   []Sensation    []Gross Motor Coordination [x] Balance   [] Delirium                  []Motor Control     [] Communication    OT PLAN OF CARE   OT POC based on physician orders, patient diagnosis and results of clinical assessment.        Frequency/Duration: 1-3 days/wk for 1-2 weeks PRN    Specific OT Treatment Interventions to include:   * Instruction/training on adapted ADL techniques and AE recommendations to increase functional independence within precautions       * Training on energy conservation strategies, correct breathing pattern and techniques to improve independence/tolerance for self-care routine  * Functional transfer/mobility training/DME recommendations for increased independence, safety, and fall prevention  * Patient/Family education to increase follow through with safety techniques and functional independence  * Recommendation of environmental modifications for increased safety with functional transfers/mobility and ADLs  * Cognitive retraining/development of therapeutic activities to improve problem solving, judgement, memory, and attention for increased safety/participation in ADL/IADL tasks  * Sensory re-education to improve body/limb awareness, maintain/improve skin integrity, and improve hand/UE motor function  * Visual-perceptual training to improve environmental scanning, visual attention/focus, and oculomotor skills for increased safety/independence with functional transfers/mobility and ADLs  * Splinting/positioning for increased function, prevention of contractures, and improve skin integrity  * Therapeutic exercise to improve motor endurance, ROM, and functional strength for ADLs/functional transfers  * Therapeutic activities to facilitate/challenge dynamic balance, stand tolerance for increased safety and independence with ADLs  * Therapeutic activities to facilitate gross/fine motor skills for increased independence with ADLs  * Neuro-muscular re-education: facilitation of righting/equilibrium reactions, midline orientation, scapular stability/mobility, normalization of muscle tone, and facilitation of volitional active controled movement  * Positioning to improve skin integrity, interaction with environment and functional independence  * Delirium prevention/treatment  * Manual techniques for edema management    Recommended Adaptive Equipment:      Home Living: Pt lives alone in an apartment; arrangements made to stay with sister at a boarding house in [facility] [place].   Bathroom setup: Unknown  Equipment owned: None    Prior Level of Function: Ind with ADLs; Ind with IADLs. No AD for functional mobility. Driving: Unknown  Occupation: None stated. Pain Level: 0/10 pain at rest.    Cognition: A&O: 4/4; Follows 1 step commands, highly impulsive, requires frequent redirection and verbal cues for safety awareness. Memory: G-   Comprehension: F+   Problem solving: F   Judgement/safety: F               Communication skills:           Vision: WFL               Glasses: No                                                Hearing: WFL     RASS: +1  CAM-ICU: (NT) Delirium    UE Assessment:  Hand Dominance: Right [x]  Left []     ROM Strength   RUE  AROM WFL Grossly 5/5   LUE AROM WFL Formally Assessed:   Grossly 3/5    Functionally Assessed  Grossly 4-/5     Sensation: No c/o numbness/tingling in BUE extremities. Tone: WNL   Edema: Unremarkable     Functional Assessment:  AM-PAC Daily Activity Raw Score: 15/24   Initial Eval Status  Date: 5/12/22 Treatment Status  Date:  STGs = LTGs  Time frame: 7-14 days   Feeding S; set-up                      Ind       Grooming S; set-up   While seated in chair to wash hands/face. Pt washing hand bilaterally with cloth. Ind        UB dressing/bathing Min A  While seated at EOB                      Ind       LB dressing/bathing Mod A overall  S  To don socks while seated upright in bed. Ind        Toileting Mod A overall                      Ind     Bed Mobility  Supine to sit:   SBA    Sit to supine:   SBA                      Ind     Functional Transfers Sit to stand: Mod A    Stand to sit:   Mod A    From EOB/chair    Stand Pivot: Mod A                      Ind     Functional Mobility Mod A with ww  For short distance in room. Ind        Balance Sitting:     Static: S    Dynamic: Min A  Standing: Mod A  Sitting:     Static:  Ind    Dynamic:Ind  Standing: Ind                   Endurance/Activity Tolerance   Fair tolerance with light activity. WFL   Visual/  Perceptual   WFL                     Vitals:   HR at rest: 90 bpm HR at end of session: 108 bpm   Spo2 at rest:-- % Spo2 at end of session --%   BP at rest: 134/78 mmHg BP at end of session 114/67 mmHg       Treatment: OT treatment provided this date includes:      Instruction/training on safety and adapted techniques for completion of ADLs: to increase independence in self-care.   Instruction/training on safe bed mobility/functional mobility/transfer techniques: with focus on safety, body mechanics, and precautions   Skilled Monitoring of Vitals:  to include BP, spO2, and HR throughout session to maximize safety.  Therapeutic activity:  to challenge dynamic sitting/standing balance and endurance to promote safety during ADL tasks and functional transfers and mobility. Line management and environmental modifications made prior to and end of session to ensure patient safety and to increase efficiency of session. Skilled monitoring of HR, O2 saturation, blood pressure and patient's response to activity performed throughout session. Comments: OK from RN to see patient. Upon arrival, patient seated upright in bed talking on phone with phone in LUE. Pt demo fair tolerance with fair understanding of education/techniques. At end of session, patient seated upright in chair. Call light within reach, all lines and tubes intact. Pt instructed on use of call light for assistance and fall prevention. Nursing notified of patient positioning. Patient presents with decreased ROM/strength, activity tolerance, dynamic balance, functional mobility limiting completion of ADLs and safety. Pt can benefit from continued skilled OT services to increase safety, functional independence and quality of life. Rehab Potential: Good for established goals    Patient / Family Goal: to return to PLOF    Patient and/or family were instructed/educated on diagnosis, prognosis/goals and plan of care.  Patient demonstrated fair understanding. Evaluation Complexity: Moderate     · History: Expanded chart review of consults, imaging, and psychosocial history related to current functional performance. · Exam: 5+ performance deficits identified limiting functional independence and safe return home   · Assistance/Modification: Min/mod assistance or modifications required to perform tasks. May have comorbidities that affect occupational performance. [] Malnutrition indicators have been identified and nursing has been notified to ensure a dietitian consult is ordered. Time In: 1:50p             Time Out: 2:14p         Total Treatment time: 9 min   Min Units   OT Eval Low 61030     OT Eval Medium 97166 X 1   OT Eval High 67058     OT Re-Eval [id number]     Therapeutic Ex 04015     Therapeutic Activities 98429 9 1   ADL/Self Care 92607     Orthotic Management 66494     Neuro Re-Ed 17381     Non-Billable Time        Evaluation time includes thorough review of current medical information, gathering information on past medical history/social history and prior level of function, completion of standardized testing/informal observation of tasks, assessment of data and development of POC/Goals.      Dottie Hughes, OTNILS,  OTR/L; [id number]

## 2022-05-12 NOTE — PROGRESS NOTES
[facility]  SURGICAL INTENSIVE CARE UNIT (SICU)  ATTENDING PHYSICIAN CRITICAL CARE PROGRESS NOTE     I have examined the patient, reviewed the record, and discussed the case with the APN/ resident. Please refer to the APN/ resident's note. I agree with the assessment and plan. I have reviewed all relevant labs and imaging data. The following summarizes my clinical findings and independent assessment. CC:  Critical care management after thrombectomy    Hospital Course/Overnight Events:  5/1--presented with left sided weakness/dizziness; taken for thrombectomy; admitted to ICU post-procedure  5/12    Pt without complaints.     Awake and alert  Follows commands  Hrt:  Regular rate/rhythm; no murmur  Lungs:  Fairly clear bilaterally  Abd:  Soft; BS active; NT/ND  Skin:  Warm/dry  Ext:  Moving left but weaker compared to right; scar LLE; distal pulses readily detectable    Labs/films personally reviewed/interpreted--unremarkable head CT    Patient Active Problem List    Diagnosis Date Noted    Acute CVA (cerebrovascular accident) ([address] 05/11/2022    Acute deep vein thrombosis (DVT) of brachial vein of right upper extremity ([address] 04/24/2022    Medically noncompliant 04/24/2022    Hypercoagulable state ([address] 04/09/2022    Gastroenteritis 04/04/2022    Acute pulmonary embolism with acute cor pulmonale ([address] 03/13/2022    DVT, lower extremity, recurrent, right (HCC) 03/08/2022    Chest pain        Acute stroke--s/p thrombectomy--monitor neuro exam  SBP goal < 160 (MAP >60)  Dysphagia diet as tolerated  Electrolyte imbalance (hypomagnesemia)--correct as able  PT/OT evals  Speech eval  Statin  Echo  Hx of DVT/PE--heparin drip--transitioning back to coumadin    Pt is at risk for neurologic/hemodynamic/metabolic deterioration    Ghulam Fuentes MD, FACS  5/12/2022  11:12 AM
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
## 2022-05-12 NOTE — CARE COORDINATION
5/12/2022 - Care Coordination - admitted for acute CVA - right MCA occlusion. S/P successful thrombectomy on 5/10. Neuro stroke following. For CT head today. Cardiology and psychiatry consults. PT/OT/SLP ordered. Spoke with pt in room to discuss discharge planning. He has no PCP . He will use [address] at discharge if needed. He lives in an apartment in [place] but lease is up June 1st. He made arrangements with through his sister to stay at a boarding house in [facility] [place] [address] and he will get the address for the house. Denies hx HHC, DME. Hx [facility] at [employer] in 3/2022. Will rely on evals and recommendations to determine appropriate discharge plan. Will call pt [facility] for transportation if needed at discharge. SW/CM will follow.

## 2022-05-12 NOTE — PROGRESS NOTES
Physical Therapy  Physical Therapy Initial Assessment     Name: Carmelita Tabor  : 1975  MRN: [id number]      Date of Service: 2022    Evaluating PT:  Harjit Lyon PT, DPT [id number]    Room #:  8901/5894-N  Diagnosis:  Acute CVA (cerebrovascular accident) [facility]) [I63.9]  Cerebrovascular accident (CVA), unspecified mechanism ([address] [I63.9]  PMHx/PSHx:  See chart  Procedure/Surgery:   R MCA thrombectomy   Precautions:  Falls, L hemiparesis, cognition/psych, bed alarm  Equipment Needs:  TBD    SUBJECTIVE:    Pt lives in an apartment in [place]. Pt reported no longer using AD or walking boot in room. OBJECTIVE:   Initial Evaluation  Date: 22 Treatment Short Term/ Long Term   Goals   AM-PAC 6 Clicks      Was pt agreeable to Eval/treatment? Yes     Does pt have pain? Incisional pain with mobility     Bed Mobility  Rolling: NT  Supine to sit: SBA with HOB elevated  Sit to supine: NT  Scooting: SBA  Mod Independent   Transfers Sit to stand: ModA  Stand to sit: ModA  Stand pivot: ModA with [employer]  Mod Independent with AAD   Ambulation   15 feet x 2 reps with ModA with [employer]  >400 feet with Mod Independent with AAD   Stair negotiation: ascended and descended NT  >4 steps with 1 rail Mod Independent   ROM BUE:  Defer to OT note  BLE:  WFL     Strength BUE:  Defer to OT note  RLE:  4+/5  LLE:  2+ to 3-/5  Increase by 1/3 MMT grade   Balance Sitting EOB:  SBA  Dynamic Standing:  ModA with [employer]  Sitting EOB:  Independent  Dynamic Standing:   Mod Independent with  AAD     Pt is A & O x 4  CAM-ICU: NT  RASS: +1  Sensation:  \"Burning\" in LLE  Edema:  None    Vitals:  Heart Rate at rest 103 bpm   SpO2 at rest -%   Blood Pressure at rest 136/78 mmHg       Functional Status Score-Intensive Care Unit (FSS-ICU)   Rolling -/7   Supine to sit transfer 5/7   Unsupported sitting  5/7   Sit to stand transfers 3/7   Ambulation /   Total  14/       Therapeutic Exercises:  NA    Patient education  Pt educated on safety    Patient response to education:   Pt verbalized understanding Pt demonstrated skill Pt requires further education in this area   x x x     ASSESSMENT:    Conditions Requiring Skilled Therapeutic Intervention:    [x]Decreased strength     []Decreased ROM  [x]Decreased functional mobility  [x]Decreased balance   [x]Decreased endurance   []Decreased posture  [x]Decreased sensation  []Decreased coordination   []Decreased vision  [x]Decreased safety awareness   []Increased pain       Comments:  NP reported pt was medically stable. Pt was in bed upon arrival, agreeable to initial evaluation. Pt was very animated and movements were exaggerated. Inconsistent deficits noted with all mobility. At one point, pt was using BUEs on [employer] to swing BLEs forward to take a step. Other times, pt remained on L forefoot during stance phase and dragged L toes during swing despite being able to have some DF. Seated rest breaks in between bouts of ambulation. Pt was left in chair with all needs met and call light in reach. All lines remained intact. RN notified. Treatment:  Patient practiced and was instructed in the following treatment:     Bed mobility training - pt given verbal cues to facilitate proper sequencing and safety during  supine>sit.  Sitting EOB for >3 minutes for upright tolerance, postural awareness and BLE ROM   Transfer training - pt was given verbal and tactile cues to facilitate proper hand placement, technique and safety during sit to stand, stand to sit and stand pivot transfers as well as provided with physical assistance.  Gait training- pt was given verbal and tactile cues to facilitate safety, balance and use of AD during ambulation as well as provided with physical assistance. Pt's/ family goals   1. Return home    Prognosis is good for reaching above PT goals. Patient and or family understand(s) diagnosis, prognosis, and plan of care.   yes    PHYSICAL THERAPY PLAN OF CARE:    PT POC is established based on physician order and patient diagnosis     Referring provider/PT Order:  Ilene Villalobos MD /05/11/22 1200 PT eval and treat  Diagnosis:  Acute CVA (cerebrovascular accident) ([address] [I63.9]  Cerebrovascular accident (CVA), unspecified mechanism ([address] [I63.9]  Specific instructions for next treatment:  Progress activity    Current Treatment Recommendations:     [x] Strengthening to improve independence with functional mobility   [] ROM to improve independence with functional mobility   [x] Balance Training to improve static/dynamic balance and to reduce fall risk  [x] Endurance Training to improve activity tolerance during functional mobility   [x] Transfer Training to improve safety and independence with all functional transfers   [x] Gait Training to improve gait mechanics, endurance and asses need for appropriate assistive device  [x] Stair Training in preparation for safe discharge home and/or into the community   [] Positioning to prevent skin breakdown and contractures  [x] Safety and Education Training   [x] Patient/Caregiver Education   [] HEP  [] Other     PT long term treatment goals are located in above grid    Frequency of treatments: 2-5x/week x 1-2 weeks. Time in  1345  Time out  1405    Total Treatment Time  10 minutes     Evaluation Time includes thorough review of current medical information, gathering information on past medical history/social history and prior level of function, completion of standardized testing/informal observation of tasks, assessment of data and education on plan of care and goals.     CPT codes:  [] Low Complexity PT evaluation 12056  [x] Moderate Complexity PT evaluation 19353  [] High Complexity PT evaluation 67471  [] PT Re-evaluation 70038  [] Gait training 49858 - minutes  [] Manual therapy 18968 - minutes  [x] Therapeutic activities 76963 10 minutes  [] Therapeutic exercises 83023 - minutes  [] Neuromuscular reeducation 51385 - minutes     Stevie Flannery PT, DPT  [id number]

## 2022-05-12 NOTE — PROGRESS NOTES
Paged on call provider Alban Hashimoto MD regarding patient's continued chest pain 8/10. VSS no additional symptoms tele monitoring remains NSR unchanged from previous in the shift. Dr. Marisol Yan placed a new order for 4 mg IV morphine q 4 prn. He advise okay to administer at this time. Updated the patient who has no additional questions.

## 2022-05-12 NOTE — PROGRESS NOTES
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 male for whom pharmacy has been asked to manage warfarin therapy. Weight:   Wt Readings from Last 1 Encounters:   05/11/22 205 lb (93 kg)       TSH:    Lab Results   Component Value Date    TSH 5.890 05/12/2022       Hepatic Function Panel:                            Lab Results   Component Value Date    ALKPHOS 83 05/12/2022    ALT 23 05/12/2022    AST 17 05/12/2022    PROT 6.1 05/12/2022    BILITOT 0.3 05/12/2022    BILIDIR 0.0 03/10/2022    LABALBU 3.7 05/12/2022       Current warfarin drug-drug interactions include: No significant interactions    Recent Labs     05/11/22  1010 05/12/22  0559   HGB 10.7* 9.7*    210     Date Warfarin Dose INR Heparin or LMWH Comment   5/11 7.5 mg 0.9 Heparin gtt    5/12 7.5 mg 1.2 Heparin gtt                           Assessment:  · Patient is a 49 y.o. male on warfarin for History of  VTE/PE and Protein C & S deficiency . Patient's home warfarin dosing regimen is 7.5mg daily.    · Goal INR 2 - 3  · INR 1.2 today    Plan:  · Will continue Warfarin 7.5 mg PO x1 tonight  · Daily PT/INR   · Continue heparin infusion until the INR is stable within the therapeutic range  · Pharmacist will follow and monitor/adjust dosing as necessary    Jani Klein, MalD, BCPS, BCCCP  5/12/2022  8:59 AM

## 2022-05-12 NOTE — PROGRESS NOTES
Speech Language Pathology      NAME:  Louie Arroyo  :  1975  DATE: 2022  ROOM:  9317/6459-T    Order received. Chart reviewed. Pt unavailable at this time due to:  [] HOLD per RN  [x] Off unit for testing/ procedure    [] With medical staff   [] Declined intervention  [] Sleeping/ Lethargic   [] Other:     Will re-attempt later this date as able. Thank you.        Acute CVA (cerebrovascular accident) [facility]) [I63.9]  Cerebrovascular accident (CVA), unspecified mechanism ([facility] [I63.9]

## 2022-05-12 NOTE — PROGRESS NOTES
Of note: This nurse has noted discrepancies between observed assessment, and participatory NIHSS. Nurse has heard the patient on multiple phone calls without observable deficits in speech articulation, and or word identification. Elenor Nanas is varied, memory recall present including state, address recall, personal identification is present. While patient is participating in NIHSS per charting the patient demonstrates significant slurring, dysarthria, with difficulties in object recall, \" I don't remember what that is called\", and object identification. Additionally patient has been observed moving LLE, and LUE spontaneously and purposefully while orienting himself in the bed. During participatory patient demonstrates significant sensory loss and weakness in both LUE and LLE.

## 2022-05-12 NOTE — CONSULTS
Reason for consult is Alfred Goodpasture gone to multiple different hospitals with stroke symptoms. (9 admissions in last month)does not move left side and dysarthric during exam however nursing observed him talking on phone and moving left side spontaneously see nursing note. ?malingering\"    Malingering is not a psychiatric diagnosis and psychiatry is not able to determine whether or not a patient is malingering in light of patient's medical history. Would recommend a neurology consult. Please reconsult psychiatry if there are acute inpatient psychiatric needs such as the patient demonstrates suicidal ideations intent or plan homicidal ideations intent or plan demonstrate significant manic symptoms or any psychotic symptoms.

## 2022-05-12 NOTE — PROGRESS NOTES
SPEECH/LANGUAGE PATHOLOGY  SPEECH/LANGUAGE/COGNITIVE EVALUATION   and PLAN OF CARE      PATIENT NAME:  Celeste Alvarenga  (male)     MRN:  [id number]    :  1975  (49 y.o.)  STATUS:  Inpatient: Room 4523/4523-A    TODAY'S DATE:  22 141   SLP cognitive language evaluation Start: 22, End: 22, ONE TIME, Standing Count: 1 Occurrences, [address], APRN - [facility]    EVALUATING THERAPIST: Neva Galeana, SLP    ADMITTING DIAGNOSIS: Acute CVA (cerebrovascular accident) [facility]) [I63.9]  Cerebrovascular accident (CVA), unspecified mechanism ([address] [I63.9]    VISIT DIAGNOSIS:   Visit Diagnoses       Codes    Cerebrovascular accident (CVA), unspecified mechanism ([facility]     I63.9           SPEECH THERAPY  PLAN OF CARE   The speech therapy  POC is established based on physician order, speech pathology diagnosis and results of clinical assessment     SPEECH PATHOLOGY DIAGNOSIS:    Within function limits-- did not present with dysarthria at time of SLP assessment     Speech Pathology intervention is not warranted at this time. Conditions Requiring Skilled Therapeutic Intervention for speech, language and/or cognition  Not applicable     Specific Speech Therapy Interventions to Include:   Not applicable    Specific instructions for next treatment:    Not applicable    SHORT/LONG TERM GOALS  Not applicable      Patient goals: Patient/family involved in developing goals and treatment plan:   Treatment goals discussed with Patient    The Patient understand(s) the diagnosis, prognosis and plan of care   The patient/family Agreed with above,     This plan may be re-evaluated and revised as warranted.         Rehabilitation Potential/Prognosis: not applicable                CLINICAL ASSESSMENT:  MOTOR SPEECH       Oral Peripheral Examination   Adequate lingual/labial strength     Parameters of Speech Production  Respiration:  Adequate for speech production  Articulation:  Within functional limits  Resonance:  Within functional limits  Quality:   Within functional limits  Pitch: Within functional limits  Intensity: Within functional limits  Fluency:  Intact  Prosody Intact    RECEPTIVE LANGUAGE    Comprehension of Yes/No Questions: Within functional limits    Process  Simple Verbal Commands:   Within functional limits  Process Intermediate Verbal Commands:   Within functional limits  Process Complex Verbal Commands:     Within functional limits    Comprehension of Conversation:      Within functional limits      EXPRESSIVE LANGUAGE     Serials: Functional    Imitation:  Words   Functional   Sentences Functional    Naming:  (Modality used:  Verbal)  Confrontation Naming  Functional  Functional Description  Functional  Response Naming: Functional    Conversation:      Conversation was within functional limits    COGNITION     Attention/Orientation  Attention: Sustained attention   Orientation:  Oriented to Person, Place, Date, Reason for hospitalization    Memory   Did not appear impaired    Organization/Problem Solving/Reasoning   Verbal Sequencing:   Functional        Verbal Problem solving:   Functional          CLINICAL OBSERVATIONS NOTED DURING THE EVALUATION  Within functional limits                  EDUCATION:   The Speech Language Pathologist (SLP) completed education regarding results of evaluation and that intervention is not warranted at this time. Learner: Patient  Education: Reviewed results and recommendations of this evaluation  Evaluation of Education:  Verbalizes understanding    Evaluation Time includes thorough review of current medical information, gathering information on past medical history/social history and prior level of function, completion of standardized testing/informal observation of tasks, assessment of data and education on plan of care and goals.       CPT code:    50499  eval speech sound lang comprehension      The admitting diagnosis and active problem list, as listed below have been reviewed prior to initiation of this evaluation.         ACTIVE PROBLEM LIST:   Patient Active Problem List   Diagnosis    DVT, lower extremity, recurrent, right ([address]    Chest pain    Acute pulmonary embolism with acute cor pulmonale ([facility]    Gastroenteritis    Hypercoagulable state ([facility]    Acute deep vein thrombosis (DVT) of brachial vein of right upper extremity ([facility]    Medically noncompliant    Acute CVA (cerebrovascular accident) ([facility]       Blanca Whitley., [address] Pathologist  [id number]  5/12/2022

## 2022-05-12 NOTE — PROGRESS NOTES
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 washout seen in the left PCA because the PCA also get supply from the left posterior commuting artery. Normal capillary phase and venous drainage was noted. No evidence of aneurysms, AVMs or dural AV fistulas. 5. 3D rotational DynaCT imaging: The raw images were transferred to an independent workstation, and volume rendered 3D images were generated and reviewed. The images were independently reviewed. Interpretation of the images reveal no evidence of any subarachnoid hemorrhage or intraparenchymal hemorrhage. The right MCA hyperdensity is not visualized. .     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 Patients: If you have questions regarding some of the verbiage in your report, please visit [email or web address] for a definition. If you have any other questions, please contact your physician.

## 2022-05-13 VITALS
HEART RATE: 99 BPM | WEIGHT: 205 LBS | OXYGEN SATURATION: 97 % | SYSTOLIC BLOOD PRESSURE: 132 MMHG | TEMPERATURE: 97.2 F | DIASTOLIC BLOOD PRESSURE: 78 MMHG | RESPIRATION RATE: 20 BRPM | BODY MASS INDEX: 32.11 KG/M2

## 2022-05-13 LAB
METER GLUCOSE: 146 MG/DL (ref 74–99)
METER GLUCOSE: 244 MG/DL (ref 74–99)

## 2022-05-13 NOTE — PROGRESS NOTES
2250: RN came into patient room and patient was dressed and stated he needed AMA papers. Patient stated that his nephew had been shot and he needed to be with his sister. This RN explain the benefits of staying since patient was on heparin drip and just had surgery, but patient was agitated and visibly upset. Patient just wanted to leave. Pt was told to stay in room while RN spoke with MD and got AMA papers. Patient agitation increased and crying out, patient stopped own IV, signed AMA forms and just walked out off floor. Pt Alert and orient x4. RN called police since patient still had central line in place. MD notified of situation. 6526: Police called and notified this RN that they found pt walking on street, would try to convince patient to come back so central line could be removed. Police told RN that it is not against the law to leave the hospital with a line in place and that they cannot contain the patient but simply try and get the patient to come back. Police stated they would keep RN informed. 6349: Police to floor and spoke with RN. Stated the patient pulled own central line out and showed the police to confirm. Per police, he applied pressure for 10 minutes and was awaiting a ride from sister. MD aware of situation.

## 2022-05-13 NOTE — PLAN OF CARE
Problem: Discharge Planning  Goal: Discharge to home or other facility with appropriate resources  Outcome: Completed     Problem: Pain  Goal: Verbalizes/displays adequate comfort level or baseline comfort level  5/13/2022 0100 by Yary Ireland RN  Outcome: Completed  5/12/2022 1920 by Carlee Quintanilla RN  Outcome: Not Progressing     Problem: Skin/Tissue Integrity  Goal: Absence of new skin breakdown  5/13/2022 0100 by Yary Ireland RN  Outcome: Completed  5/12/2022 1920 by Carlee Quintanilla RN  Outcome: Progressing     Problem: Safety - Adult  Goal: Free from fall injury  5/13/2022 0100 by Yary Ireland RN  Outcome: Completed  5/12/2022 1920 by Carlee Quintanilla RN  Outcome: Progressing     Problem: ABCDS Injury Assessment  Goal: Absence of physical injury  Outcome: Completed     Problem: Chronic Conditions and Co-morbidities  Goal: Patient's chronic conditions and co-morbidity symptoms are monitored and maintained or improved  5/13/2022 0100 by Yary Ireland RN  Outcome: Completed  5/12/2022 1920 by Carlee Quintanilla RN  Outcome: Progressing

## 2022-05-15 LAB
ANTICARDIOLIPIN IGA ANTIBODY: <10 APL
ANTICARDIOLIPIN IGG ANTIBODY: <10 GPL
CARDIOLIPIN AB IGM: <10 MPL

## 2022-05-16 LAB — PROTEIN S ANTIGEN, FREE: 71 % (ref 74–147)

## 2022-05-17 ENCOUNTER — APPOINTMENT (OUTPATIENT)
Dept: ULTRASOUND IMAGING | Age: 47
DRG: 724 | End: 2022-05-17
Payer: MEDICAID

## 2022-05-17 ENCOUNTER — HOSPITAL ENCOUNTER (INPATIENT)
Age: 47
LOS: 6 days | Discharge: LEFT BEFORE DISCHARGE | DRG: 724 | End: 2022-05-24
Attending: EMERGENCY MEDICINE | Admitting: INTERNAL MEDICINE
Payer: MEDICAID

## 2022-05-17 ENCOUNTER — APPOINTMENT (OUTPATIENT)
Dept: NUCLEAR MEDICINE | Age: 47
DRG: 724 | End: 2022-05-17
Payer: MEDICAID

## 2022-05-17 ENCOUNTER — APPOINTMENT (OUTPATIENT)
Dept: GENERAL RADIOLOGY | Age: 47
DRG: 724 | End: 2022-05-17
Payer: MEDICAID

## 2022-05-17 DIAGNOSIS — Z86.73 HISTORY OF CVA (CEREBROVASCULAR ACCIDENT): ICD-10-CM

## 2022-05-17 DIAGNOSIS — D68.59 HYPERCOAGULABLE STATE (HCC): ICD-10-CM

## 2022-05-17 DIAGNOSIS — Z86.711 HISTORY OF PULMONARY EMBOLUS (PE): ICD-10-CM

## 2022-05-17 DIAGNOSIS — J44.1 COPD EXACERBATION (HCC): Primary | ICD-10-CM

## 2022-05-17 PROBLEM — Z79.01 SUBTHERAPEUTIC ANTICOAGULATION: Status: ACTIVE | Noted: 2022-05-17

## 2022-05-17 PROBLEM — Z51.81 SUBTHERAPEUTIC ANTICOAGULATION: Status: ACTIVE | Noted: 2022-05-17

## 2022-05-17 LAB
ALBUMIN SERPL-MCNC: 4.1 G/DL (ref 3.5–5.2)
ALP BLD-CCNC: 112 U/L (ref 40–129)
ALT SERPL-CCNC: 25 U/L (ref 0–40)
ANION GAP SERPL CALCULATED.3IONS-SCNC: 15 MMOL/L (ref 7–16)
APTT: <20 SEC (ref 24.5–35.1)
AST SERPL-CCNC: 18 U/L (ref 0–39)
BASOPHILS ABSOLUTE: 0.02 E9/L (ref 0–0.2)
BASOPHILS RELATIVE PERCENT: 0.3 % (ref 0–2)
BILIRUB SERPL-MCNC: 0.2 MG/DL (ref 0–1.2)
BUN BLDV-MCNC: 11 MG/DL (ref 6–20)
CALCIUM SERPL-MCNC: 8.5 MG/DL (ref 8.6–10.2)
CHLORIDE BLD-SCNC: 100 MMOL/L (ref 98–107)
CO2: 19 MMOL/L (ref 22–29)
CREAT SERPL-MCNC: 0.7 MG/DL (ref 0.7–1.2)
EKG ATRIAL RATE: 98 BPM
EKG P AXIS: 55 DEGREES
EKG P-R INTERVAL: 170 MS
EKG Q-T INTERVAL: 356 MS
EKG QRS DURATION: 88 MS
EKG QTC CALCULATION (BAZETT): 454 MS
EKG R AXIS: 4 DEGREES
EKG T AXIS: 40 DEGREES
EKG VENTRICULAR RATE: 98 BPM
EOSINOPHILS ABSOLUTE: 0.14 E9/L (ref 0.05–0.5)
EOSINOPHILS RELATIVE PERCENT: 2.3 % (ref 0–6)
GFR AFRICAN AMERICAN: >60
GFR NON-AFRICAN AMERICAN: >60 ML/MIN/1.73
GLUCOSE BLD-MCNC: 351 MG/DL (ref 74–99)
HCT VFR BLD CALC: 33.9 % (ref 37–54)
HEMOGLOBIN: 10.5 G/DL (ref 12.5–16.5)
IMMATURE GRANULOCYTES #: 0.05 E9/L
IMMATURE GRANULOCYTES %: 0.8 % (ref 0–5)
INFLUENZA A BY PCR: NOT DETECTED
INFLUENZA B BY PCR: NOT DETECTED
INR BLD: 1.1
LYMPHOCYTES ABSOLUTE: 0.85 E9/L (ref 1.5–4)
LYMPHOCYTES RELATIVE PERCENT: 13.9 % (ref 20–42)
MCH RBC QN AUTO: 23.8 PG (ref 26–35)
MCHC RBC AUTO-ENTMCNC: 31 % (ref 32–34.5)
MCV RBC AUTO: 76.7 FL (ref 80–99.9)
METER GLUCOSE: 409 MG/DL (ref 74–99)
METER GLUCOSE: 440 MG/DL (ref 74–99)
MONOCYTES ABSOLUTE: 0.43 E9/L (ref 0.1–0.95)
MONOCYTES RELATIVE PERCENT: 7 % (ref 2–12)
NEUTROPHILS ABSOLUTE: 4.61 E9/L (ref 1.8–7.3)
NEUTROPHILS RELATIVE PERCENT: 75.7 % (ref 43–80)
PDW BLD-RTO: 16.3 FL (ref 11.5–15)
PLATELET # BLD: 250 E9/L (ref 130–450)
PMV BLD AUTO: 10.4 FL (ref 7–12)
POTASSIUM SERPL-SCNC: 3.3 MMOL/L (ref 3.5–5)
PRO-BNP: 24 PG/ML (ref 0–125)
PROTHROMBIN TIME: 12 SEC (ref 9.3–12.4)
RBC # BLD: 4.42 E12/L (ref 3.8–5.8)
SARS-COV-2, NAAT: NOT DETECTED
SODIUM BLD-SCNC: 134 MMOL/L (ref 132–146)
TOTAL PROTEIN: 7 G/DL (ref 6.4–8.3)
TROPONIN, HIGH SENSITIVITY: <6 NG/L (ref 0–11)
WBC # BLD: 6.1 E9/L (ref 4.5–11.5)

## 2022-05-17 PROCEDURE — 6360000002 HC RX W HCPCS: Performed by: EMERGENCY MEDICINE

## 2022-05-17 PROCEDURE — A9540 TC99M MAA: HCPCS | Performed by: RADIOLOGY

## 2022-05-17 PROCEDURE — 96374 THER/PROPH/DIAG INJ IV PUSH: CPT

## 2022-05-17 PROCEDURE — 80053 COMPREHEN METABOLIC PANEL: CPT

## 2022-05-17 PROCEDURE — 78582 LUNG VENTILAT&PERFUS IMAGING: CPT | Performed by: RADIOLOGY

## 2022-05-17 PROCEDURE — 6370000000 HC RX 637 (ALT 250 FOR IP): Performed by: FAMILY MEDICINE

## 2022-05-17 PROCEDURE — 36415 COLL VENOUS BLD VENIPUNCTURE: CPT

## 2022-05-17 PROCEDURE — 94664 DEMO&/EVAL PT USE INHALER: CPT

## 2022-05-17 PROCEDURE — 87491 CHLMYD TRACH DNA AMP PROBE: CPT

## 2022-05-17 PROCEDURE — 86592 SYPHILIS TEST NON-TREP QUAL: CPT

## 2022-05-17 PROCEDURE — S5553 INSULIN LONG ACTING 5 U: HCPCS | Performed by: FAMILY MEDICINE

## 2022-05-17 PROCEDURE — 87635 SARS-COV-2 COVID-19 AMP PRB: CPT

## 2022-05-17 PROCEDURE — 78582 LUNG VENTILAT&PERFUS IMAGING: CPT

## 2022-05-17 PROCEDURE — A9539 TC99M PENTETATE: HCPCS | Performed by: RADIOLOGY

## 2022-05-17 PROCEDURE — G0378 HOSPITAL OBSERVATION PER HR: HCPCS

## 2022-05-17 PROCEDURE — 2700000000 HC OXYGEN THERAPY PER DAY

## 2022-05-17 PROCEDURE — 93970 EXTREMITY STUDY: CPT

## 2022-05-17 PROCEDURE — 83880 ASSAY OF NATRIURETIC PEPTIDE: CPT

## 2022-05-17 PROCEDURE — 85610 PROTHROMBIN TIME: CPT

## 2022-05-17 PROCEDURE — 6370000000 HC RX 637 (ALT 250 FOR IP): Performed by: EMERGENCY MEDICINE

## 2022-05-17 PROCEDURE — 96365 THER/PROPH/DIAG IV INF INIT: CPT

## 2022-05-17 PROCEDURE — 84484 ASSAY OF TROPONIN QUANT: CPT

## 2022-05-17 PROCEDURE — 82962 GLUCOSE BLOOD TEST: CPT

## 2022-05-17 PROCEDURE — 3430000000 HC RX DIAGNOSTIC RADIOPHARMACEUTICAL: Performed by: RADIOLOGY

## 2022-05-17 PROCEDURE — 96376 TX/PRO/DX INJ SAME DRUG ADON: CPT

## 2022-05-17 PROCEDURE — 87502 INFLUENZA DNA AMP PROBE: CPT

## 2022-05-17 PROCEDURE — 99285 EMERGENCY DEPT VISIT HI MDM: CPT

## 2022-05-17 PROCEDURE — 93970 EXTREMITY STUDY: CPT | Performed by: RADIOLOGY

## 2022-05-17 PROCEDURE — 93005 ELECTROCARDIOGRAM TRACING: CPT | Performed by: PHYSICIAN ASSISTANT

## 2022-05-17 PROCEDURE — 85730 THROMBOPLASTIN TIME PARTIAL: CPT

## 2022-05-17 PROCEDURE — 96375 TX/PRO/DX INJ NEW DRUG ADDON: CPT

## 2022-05-17 PROCEDURE — 6360000002 HC RX W HCPCS: Performed by: GENERAL PRACTICE

## 2022-05-17 PROCEDURE — 85025 COMPLETE CBC W/AUTO DIFF WBC: CPT

## 2022-05-17 PROCEDURE — 6360000002 HC RX W HCPCS: Performed by: STUDENT IN AN ORGANIZED HEALTH CARE EDUCATION/TRAINING PROGRAM

## 2022-05-17 PROCEDURE — 96366 THER/PROPH/DIAG IV INF ADDON: CPT

## 2022-05-17 PROCEDURE — 87591 N.GONORRHOEAE DNA AMP PROB: CPT

## 2022-05-17 PROCEDURE — 6370000000 HC RX 637 (ALT 250 FOR IP)

## 2022-05-17 RX ORDER — METHYLPREDNISOLONE SODIUM SUCCINATE 125 MG/2ML
125 INJECTION, POWDER, LYOPHILIZED, FOR SOLUTION INTRAMUSCULAR; INTRAVENOUS ONCE
Status: COMPLETED | OUTPATIENT
Start: 2022-05-17 | End: 2022-05-17

## 2022-05-17 RX ORDER — INSULIN LISPRO 100 [IU]/ML
0-18 INJECTION, SOLUTION INTRAVENOUS; SUBCUTANEOUS
Status: DISCONTINUED | OUTPATIENT
Start: 2022-05-18 | End: 2022-05-24 | Stop reason: HOSPADM

## 2022-05-17 RX ORDER — INSULIN GLARGINE-YFGN 100 [IU]/ML
0.25 INJECTION, SOLUTION SUBCUTANEOUS NIGHTLY
Status: DISCONTINUED | OUTPATIENT
Start: 2022-05-17 | End: 2022-05-24 | Stop reason: HOSPADM

## 2022-05-17 RX ORDER — HEPARIN SODIUM 10000 [USP'U]/100ML
5-30 INJECTION, SOLUTION INTRAVENOUS CONTINUOUS
Status: DISCONTINUED | OUTPATIENT
Start: 2022-05-17 | End: 2022-05-24 | Stop reason: HOSPADM

## 2022-05-17 RX ORDER — KIT FOR THE PREPARATION OF TECHNETIUM TC 99M PENTETATE 20 MG/1
35 INJECTION, POWDER, LYOPHILIZED, FOR SOLUTION INTRAVENOUS; RESPIRATORY (INHALATION)
Status: COMPLETED | OUTPATIENT
Start: 2022-05-17 | End: 2022-05-17

## 2022-05-17 RX ORDER — ACETAMINOPHEN 500 MG
1000 TABLET ORAL ONCE
Status: COMPLETED | OUTPATIENT
Start: 2022-05-17 | End: 2022-05-17

## 2022-05-17 RX ORDER — IPRATROPIUM BROMIDE AND ALBUTEROL SULFATE 2.5; .5 MG/3ML; MG/3ML
3 SOLUTION RESPIRATORY (INHALATION) ONCE
Status: DISCONTINUED | OUTPATIENT
Start: 2022-05-17 | End: 2022-05-17

## 2022-05-17 RX ORDER — INSULIN GLARGINE 100 [IU]/ML
40 INJECTION, SOLUTION SUBCUTANEOUS NIGHTLY
COMMUNITY

## 2022-05-17 RX ORDER — HEPARIN SODIUM 1000 [USP'U]/ML
4000 INJECTION, SOLUTION INTRAVENOUS; SUBCUTANEOUS ONCE
Status: COMPLETED | OUTPATIENT
Start: 2022-05-17 | End: 2022-05-17

## 2022-05-17 RX ORDER — DIPHENHYDRAMINE HYDROCHLORIDE 50 MG/ML
50 INJECTION INTRAMUSCULAR; INTRAVENOUS ONCE
Status: COMPLETED | OUTPATIENT
Start: 2022-05-17 | End: 2022-05-17

## 2022-05-17 RX ORDER — FENTANYL CITRATE 50 UG/ML
50 INJECTION, SOLUTION INTRAMUSCULAR; INTRAVENOUS ONCE
Status: DISCONTINUED | OUTPATIENT
Start: 2022-05-17 | End: 2022-05-17

## 2022-05-17 RX ORDER — HEPARIN SODIUM 1000 [USP'U]/ML
2000 INJECTION, SOLUTION INTRAVENOUS; SUBCUTANEOUS PRN
Status: DISCONTINUED | OUTPATIENT
Start: 2022-05-17 | End: 2022-05-24 | Stop reason: HOSPADM

## 2022-05-17 RX ORDER — ACETAMINOPHEN 500 MG
TABLET ORAL
Status: COMPLETED
Start: 2022-05-17 | End: 2022-05-17

## 2022-05-17 RX ORDER — ALBUTEROL SULFATE 2.5 MG/3ML
2.5 SOLUTION RESPIRATORY (INHALATION)
Status: DISCONTINUED | OUTPATIENT
Start: 2022-05-17 | End: 2022-05-17

## 2022-05-17 RX ORDER — INSULIN LISPRO 100 [IU]/ML
0-9 INJECTION, SOLUTION INTRAVENOUS; SUBCUTANEOUS NIGHTLY
Status: DISCONTINUED | OUTPATIENT
Start: 2022-05-17 | End: 2022-05-24 | Stop reason: HOSPADM

## 2022-05-17 RX ORDER — POTASSIUM CHLORIDE 20 MEQ/1
40 TABLET, EXTENDED RELEASE ORAL ONCE
Status: COMPLETED | OUTPATIENT
Start: 2022-05-17 | End: 2022-05-17

## 2022-05-17 RX ORDER — HEPARIN SODIUM 1000 [USP'U]/ML
4000 INJECTION, SOLUTION INTRAVENOUS; SUBCUTANEOUS PRN
Status: DISCONTINUED | OUTPATIENT
Start: 2022-05-17 | End: 2022-05-24 | Stop reason: HOSPADM

## 2022-05-17 RX ORDER — WARFARIN SODIUM 7.5 MG/1
7.5 TABLET ORAL EVERY EVENING
COMMUNITY

## 2022-05-17 RX ORDER — PREDNISONE 20 MG/1
60 TABLET ORAL ONCE
Status: DISCONTINUED | OUTPATIENT
Start: 2022-05-17 | End: 2022-05-24 | Stop reason: HOSPADM

## 2022-05-17 RX ORDER — DIVALPROEX SODIUM 500 MG/1
500 TABLET, DELAYED RELEASE ORAL 2 TIMES DAILY
COMMUNITY

## 2022-05-17 RX ORDER — ALBUTEROL SULFATE 2.5 MG/3ML
2.5 SOLUTION RESPIRATORY (INHALATION) ONCE
Status: COMPLETED | OUTPATIENT
Start: 2022-05-17 | End: 2022-05-17

## 2022-05-17 RX ADMIN — ACETAMINOPHEN 1000 MG: 500 TABLET ORAL at 13:22

## 2022-05-17 RX ADMIN — Medication 6 MILLICURIE: at 16:08

## 2022-05-17 RX ADMIN — HYDROMORPHONE HYDROCHLORIDE 1 MG: 1 INJECTION, SOLUTION INTRAMUSCULAR; INTRAVENOUS; SUBCUTANEOUS at 21:31

## 2022-05-17 RX ADMIN — DIPHENHYDRAMINE HYDROCHLORIDE 50 MG: 50 INJECTION, SOLUTION INTRAMUSCULAR; INTRAVENOUS at 21:40

## 2022-05-17 RX ADMIN — KIT FOR THE PREPARATION OF TECHNETIUM TC 99M PENTETATE 35 MILLICURIE: 20 INJECTION, POWDER, LYOPHILIZED, FOR SOLUTION INTRAVENOUS; RESPIRATORY (INHALATION) at 15:47

## 2022-05-17 RX ADMIN — ALBUTEROL SULFATE 2.5 MG: 2.5 SOLUTION RESPIRATORY (INHALATION) at 11:50

## 2022-05-17 RX ADMIN — INSULIN GLARGINE-YFGN 23 UNITS: 100 INJECTION, SOLUTION SUBCUTANEOUS at 22:15

## 2022-05-17 RX ADMIN — Medication 1000 MG: at 13:22

## 2022-05-17 RX ADMIN — METHYLPREDNISOLONE SODIUM SUCCINATE 125 MG: 125 INJECTION, POWDER, FOR SOLUTION INTRAMUSCULAR; INTRAVENOUS at 12:51

## 2022-05-17 RX ADMIN — POTASSIUM CHLORIDE 40 MEQ: 20 TABLET, EXTENDED RELEASE ORAL at 20:41

## 2022-05-17 RX ADMIN — HEPARIN SODIUM 4000 UNITS: 1000 INJECTION, SOLUTION INTRAVENOUS; SUBCUTANEOUS at 21:31

## 2022-05-17 RX ADMIN — HYDROMORPHONE HYDROCHLORIDE 0.5 MG: 1 INJECTION, SOLUTION INTRAMUSCULAR; INTRAVENOUS; SUBCUTANEOUS at 14:41

## 2022-05-17 RX ADMIN — HEPARIN SODIUM 12 UNITS/KG/HR: 10000 INJECTION, SOLUTION INTRAVENOUS at 21:43

## 2022-05-17 ASSESSMENT — PAIN SCALES - GENERAL
PAINLEVEL_OUTOF10: 10
PAINLEVEL_OUTOF10: 8
PAINLEVEL_OUTOF10: 10
PAINLEVEL_OUTOF10: 9

## 2022-05-17 ASSESSMENT — ENCOUNTER SYMPTOMS
VOMITING: 0
FACIAL SWELLING: 0
ABDOMINAL PAIN: 0
DIARRHEA: 0
SHORTNESS OF BREATH: 1
BACK PAIN: 0
NAUSEA: 0
TROUBLE SWALLOWING: 0
COUGH: 0

## 2022-05-17 ASSESSMENT — PAIN DESCRIPTION - ORIENTATION: ORIENTATION: LEFT

## 2022-05-17 ASSESSMENT — PAIN DESCRIPTION - LOCATION
LOCATION: CHEST
LOCATION: LEG

## 2022-05-17 ASSESSMENT — PAIN DESCRIPTION - DESCRIPTORS: DESCRIPTORS: PRESSURE

## 2022-05-17 ASSESSMENT — PAIN - FUNCTIONAL ASSESSMENT: PAIN_FUNCTIONAL_ASSESSMENT: 0-10

## 2022-05-17 NOTE — ED NOTES
Pt spoke to Dr Shannon Pritchett. Will do all scans if given something for pain other than tylenol. Pain medication will be given per Dr. Shannon Pritchett request due to tylenol not working for pain.       Lakesha Reid RN  05/17/22 [id number]

## 2022-05-17 NOTE — ED NOTES
Provider aware patient refuses scans. Nuclear, [address], and xray aware patient refuses scans.       Mat Hinotn RN  05/17/22 5412

## 2022-05-17 NOTE — ED PROVIDER NOTES
Chief Complaint   Patient presents with    Shortness of Breath     thinks that he has a PE which he had 2 months ago       Patient is a 43-year-old male with history of previous PE presents today for shortness of breath. He states he took a road trip to [place] on the 12th of this month, has not been taking his Coumadin since then. Did return back into town yesterday evening. States he is having chest pain and shortness of breath. Symptoms have been persistent since onset yesterday. He describes as a sharp stabbing pain. Pain does not radiate. Is not made better worse by anything specific. He also has a history of asthma for which she has been using his inhaler. Denies fevers or chills. Denies lightheadedness or dizziness. Does have a history of multiple PEs in the past.      The history is provided by the patient. No  was used. Review of Systems   Constitutional: Negative for chills, diaphoresis and fever. HENT: Negative for facial swelling and trouble swallowing. Eyes: Negative for visual disturbance. Respiratory: Positive for shortness of breath. Negative for cough. Cardiovascular: Positive for chest pain. Negative for palpitations and leg swelling. Gastrointestinal: Negative for abdominal pain, diarrhea, nausea and vomiting. Genitourinary: Negative for difficulty urinating and flank pain. Musculoskeletal: Negative for back pain. Skin: Negative for wound. Neurological: Negative for dizziness, syncope, weakness, numbness and headaches. Hematological: Negative for adenopathy. Psychiatric/Behavioral: Negative for behavioral problems and confusion. Physical Exam  Vitals and nursing note reviewed. Constitutional:       General: He is not in acute distress. Appearance: Normal appearance. He is well-developed. He is not ill-appearing. HENT:      Head: Normocephalic and atraumatic.    Eyes:      Pupils: Pupils are equal, round, and reactive to light. Cardiovascular:      Rate and Rhythm: Regular rhythm. Tachycardia present. Heart sounds: Normal heart sounds. No murmur heard. Pulmonary:      Effort: Pulmonary effort is normal. No respiratory distress. Breath sounds: Normal breath sounds. No wheezing or rales. Abdominal:      General: Bowel sounds are normal.      Palpations: Abdomen is soft. Tenderness: There is no abdominal tenderness. There is no guarding or rebound. Musculoskeletal:      Cervical back: Normal range of motion and neck supple. Comments: Chronic deformities to the lower extremities from previous fasciotomies   Skin:     General: Skin is warm and dry. Capillary Refill: Capillary refill takes less than 2 seconds. Neurological:      General: No focal deficit present. Mental Status: He is alert and oriented to person, place, and time. Cranial Nerves: No cranial nerve deficit.       Coordination: Coordination normal.          Procedures     Labs Reviewed   CBC WITH AUTO DIFFERENTIAL - Abnormal; Notable for the following components:       Result Value    Hemoglobin 10.5 (*)     Hematocrit 33.9 (*)     MCV 76.7 (*)     MCH 23.8 (*)     MCHC 31.0 (*)     RDW 16.3 (*)     Lymphocytes % 13.9 (*)     Lymphocytes Absolute 0.85 (*)     All other components within normal limits   COMPREHENSIVE METABOLIC PANEL - Abnormal; Notable for the following components:    Potassium 3.3 (*)     CO2 19 (*)     Glucose 351 (*)     Calcium 8.5 (*)     All other components within normal limits   APTT - Abnormal; Notable for the following components:    aPTT <20.0 (*)     All other components within normal limits   APTT - Abnormal; Notable for the following components:    aPTT 22.7 (*)     All other components within normal limits   BASIC METABOLIC PANEL W/ REFLEX TO MG FOR LOW K - Abnormal; Notable for the following components:    Glucose 383 (*)     All other components within normal limits   CBC WITH AUTO DIFFERENTIAL - Abnormal; Notable for the following components:    Hemoglobin 9.6 (*)     Hematocrit 30.5 (*)     MCV 75.3 (*)     MCH 23.7 (*)     MCHC 31.5 (*)     RDW 16.3 (*)     Neutrophils % 83.4 (*)     Lymphocytes % 9.5 (*)     Lymphocytes Absolute 0.69 (*)     Eosinophils Absolute 0.03 (*)     All other components within normal limits   HEMOGLOBIN A1C - Abnormal; Notable for the following components:    Hemoglobin A1C 8.2 (*)     All other components within normal limits   POCT GLUCOSE - Abnormal; Notable for the following components:    Meter Glucose 440 (*)     All other components within normal limits   POCT GLUCOSE - Abnormal; Notable for the following components:    Meter Glucose 409 (*)     All other components within normal limits   POCT GLUCOSE - Abnormal; Notable for the following components:    Meter Glucose 432 (*)     All other components within normal limits   COVID-19, RAPID   RAPID INFLUENZA A/B ANTIGENS   C.TRACHOMATIS N.GONORRHOEAE DNA, URINE   TROPONIN   BRAIN NATRIURETIC PEPTIDE   PROTIME-INR   RPR   PROTIME-INR   APTT   APTT   POCT GLUCOSE   POCT GLUCOSE   POCT GLUCOSE   POCT GLUCOSE     US DUP LOWER EXTREMITIES BILATERAL VENOUS   Final Result   Within the visualized vessels there is no evidence for deep venous   thrombosis. Findings compatible superficial venous thrombosis               NM LUNG VENT/PERFUSION (VQ)   Final Result   Low probability for pulmonary embolism                 EKG #1:   I personally interpreted this EKG  Time:  1013    Rate: 98  Rhythm: Sinus. Interpretation: Sinus rhythm, no ST elevation. MDM  Number of Diagnoses or Management Options  COPD exacerbation ([address]  History of CVA (cerebrovascular accident)  History of pulmonary embolus (PE)  Hypercoagulable state ([address]  Diagnosis management comments: Patient is a 48-year-old male who presents today for chest pain or shortness of breath. He is mostly on Coumadin, however is not taking this for the past several days.   He has a history of multiple PEs. We are unable to obtain CTA as he is anaphylactic to contrast.  Labs and imaging attained. COVID and influenza negative. White count is elevated, high-sensitivity troponin less than 6. Ultrasound shows no evidence of DVT, however there is evidence of superficial venous thrombosis. VQ scan low probability for PE. Patient was anticoagulated as he is subtherapeutic with his INR and he will be admitted to the hospital for further evaluation and treatment. While in the hospital he also endorsed concern for possible syphilis, states he was really had symptoms in the past, however RPR today was negative. He will be admitted for further evaluation and treatment. Amount and/or Complexity of Data Reviewed  Clinical lab tests: reviewed  Tests in the radiology section of CPT®: reviewed  Tests in the medicine section of CPT®: reviewed                  --------------------------------------------- PAST HISTORY ---------------------------------------------  Past Medical History:  has a past medical history of Asthma, COPD (chronic obstructive pulmonary disease) ([facility] Deep vein thrombosis (DVT) ([facility] Factor V Leiden ([facility] Protein S deficiency ([facility] and Pulmonary emboli ([facility] Past Surgical History:  has a past surgical history that includes Appendectomy; vascular surgery; and IR MECHANICAL ART THROMBECTOMY INTRACRANIAL (5/11/2022). Social History:  reports that he has quit smoking. He has never used smokeless tobacco. He reports previous alcohol use. He reports that he does not use drugs. Family History: family history is not on file. The patients home medications have been reviewed.     Allergies: Lovenox [enoxaparin sodium], Arixtra [fondaparinux], Dye [iodides], Eliquis [apixaban], Fragmin [dalteparin], Iodine, Ipratropium, Motrin [ibuprofen], Robaxin [methocarbamol], Skelaxin [metaxalone], Toradol [ketorolac tromethamine], Tramadol, and Xarelto [rivaroxaban]    -------------------------------------------------- RESULTS -------------------------------------------------    LABS:  Results for orders placed or performed during the hospital encounter of 05/17/22   COVID-19, Rapid    Specimen: Nasopharyngeal Swab; Nasal   Result Value Ref Range    SARS-CoV-2, NAAT Not Detected Not Detected   Rapid influenza A/B antigens    Specimen: Nasopharyngeal; Nose   Result Value Ref Range    Influenza A by PCR Not Detected Not Detected    Influenza B by PCR Not Detected Not Detected   C.trachomatis N.gonorrhoeae DNA, Urine    Specimen: Urine   Result Value Ref Range    Source Urine    CBC with Auto Differential   Result Value Ref Range    WBC 6.1 4.5 - 11.5 E9/L    RBC 4.42 3.80 - 5.80 E12/L    Hemoglobin 10.5 (L) 12.5 - 16.5 g/dL    Hematocrit 33.9 (L) 37.0 - 54.0 %    MCV 76.7 (L) 80.0 - 99.9 fL    MCH 23.8 (L) 26.0 - 35.0 pg    MCHC 31.0 (L) 32.0 - 34.5 %    RDW 16.3 (H) 11.5 - 15.0 fL    Platelets 720 412 - 273 E9/L    MPV 10.4 7.0 - 12.0 fL    Neutrophils % 75.7 43.0 - 80.0 %    Immature Granulocytes % 0.8 0.0 - 5.0 %    Lymphocytes % 13.9 (L) 20.0 - 42.0 %    Monocytes % 7.0 2.0 - 12.0 %    Eosinophils % 2.3 0.0 - 6.0 %    Basophils % 0.3 0.0 - 2.0 %    Neutrophils Absolute 4.61 1.80 - 7.30 E9/L    Immature Granulocytes # 0.05 E9/L    Lymphocytes Absolute 0.85 (L) 1.50 - 4.00 E9/L    Monocytes Absolute 0.43 0.10 - 0.95 E9/L    Eosinophils Absolute 0.14 0.05 - 0.50 E9/L    Basophils Absolute 0.02 0.00 - 0.20 E9/L   Comprehensive Metabolic Panel   Result Value Ref Range    Sodium 134 132 - 146 mmol/L    Potassium 3.3 (L) 3.5 - 5.0 mmol/L    Chloride 100 98 - 107 mmol/L    CO2 19 (L) 22 - 29 mmol/L    Anion Gap 15 7 - 16 mmol/L    Glucose 351 (H) 74 - 99 mg/dL    BUN 11 6 - 20 mg/dL    CREATININE 0.7 0.7 - 1.2 mg/dL    GFR Non-African American >60 >=60 mL/min/1.73    GFR African American >60     Calcium 8.5 (L) 8.6 - 10.2 mg/dL    Total Protein 7.0 6.4 - 8.3 g/dL Albumin 4.1 3.5 - 5.2 g/dL    Total Bilirubin 0.2 0.0 - 1.2 mg/dL    Alkaline Phosphatase 112 40 - 129 U/L    ALT 25 0 - 40 U/L    AST 18 0 - 39 U/L   Troponin   Result Value Ref Range    Troponin, High Sensitivity <6 0 - 11 ng/L   Brain Natriuretic Peptide   Result Value Ref Range    Pro-BNP 24 0 - 125 pg/mL   Protime-INR   Result Value Ref Range    Protime 12.0 9.3 - 12.4 sec    INR 1.1    APTT   Result Value Ref Range    aPTT <20.0 (L) 24.5 - 35.1 sec   RPR   Result Value Ref Range    RPR NON-REACTIVE Non-reactive   APTT   Result Value Ref Range    aPTT 22.7 (L) 24.5 - 35.1 sec   Basic Metabolic Panel w/ Reflex to MG   Result Value Ref Range    Sodium 135 132 - 146 mmol/L    Potassium reflex Magnesium 4.6 3.5 - 5.0 mmol/L    Chloride 98 98 - 107 mmol/L    CO2 23 22 - 29 mmol/L    Anion Gap 14 7 - 16 mmol/L    Glucose 383 (H) 74 - 99 mg/dL    BUN 13 6 - 20 mg/dL    CREATININE 0.7 0.7 - 1.2 mg/dL    GFR Non-African American >60 >=60 mL/min/1.73    GFR African American >60     Calcium 9.0 8.6 - 10.2 mg/dL   CBC with Auto Differential   Result Value Ref Range    WBC 7.3 4.5 - 11.5 E9/L    RBC 4.05 3.80 - 5.80 E12/L    Hemoglobin 9.6 (L) 12.5 - 16.5 g/dL    Hematocrit 30.5 (L) 37.0 - 54.0 %    MCV 75.3 (L) 80.0 - 99.9 fL    MCH 23.7 (L) 26.0 - 35.0 pg    MCHC 31.5 (L) 32.0 - 34.5 %    RDW 16.3 (H) 11.5 - 15.0 fL    Platelets 674 149 - 547 E9/L    MPV 10.8 7.0 - 12.0 fL    Neutrophils % 83.4 (H) 43.0 - 80.0 %    Immature Granulocytes % 0.7 0.0 - 5.0 %    Lymphocytes % 9.5 (L) 20.0 - 42.0 %    Monocytes % 5.9 2.0 - 12.0 %    Eosinophils % 0.4 0.0 - 6.0 %    Basophils % 0.1 0.0 - 2.0 %    Neutrophils Absolute 6.07 1.80 - 7.30 E9/L    Immature Granulocytes # 0.05 E9/L    Lymphocytes Absolute 0.69 (L) 1.50 - 4.00 E9/L    Monocytes Absolute 0.43 0.10 - 0.95 E9/L    Eosinophils Absolute 0.03 (L) 0.05 - 0.50 E9/L    Basophils Absolute 0.01 0.00 - 0.20 E9/L   Protime-INR   Result Value Ref Range    Protime 12.1 9.3 - 12.4 sec INR 1.1    Hemoglobin A1c   Result Value Ref Range    Hemoglobin A1C 8.2 (H) 4.0 - 5.6 %   POCT Glucose   Result Value Ref Range    Meter Glucose 440 (H) 74 - 99 mg/dL   POCT Glucose   Result Value Ref Range    Meter Glucose 409 (H) 74 - 99 mg/dL   POCT Glucose   Result Value Ref Range    Meter Glucose 432 (H) 74 - 99 mg/dL   EKG 12 Lead   Result Value Ref Range    Ventricular Rate 98 BPM    Atrial Rate 98 BPM    P-R Interval 170 ms    QRS Duration 88 ms    Q-T Interval 356 ms    QTc Calculation (Bazett) 454 ms    P Axis 55 degrees    R Axis 4 degrees    T Axis 40 degrees       RADIOLOGY:  US DUP LOWER EXTREMITIES BILATERAL VENOUS   Final Result   Within the visualized vessels there is no evidence for deep venous   thrombosis. Findings compatible superficial venous thrombosis               NM LUNG VENT/PERFUSION (VQ)   Final Result   Low probability for pulmonary embolism                       ------------------------- NURSING NOTES AND VITALS REVIEWED ---------------------------  Date / Time Roomed:  5/17/2022  9:49 AM  ED Bed Assignment:  [id number]    The nursing notes within the ED encounter and vital signs as below have been reviewed. Patient Vitals for the past 24 hrs:   BP Temp Temp src Pulse Resp SpO2   05/18/22 0815 (!) 119/55 97.3 °F (36.3 °C) Temporal 74 18 98 %   05/17/22 2349 (!) 123/94 98 °F (36.7 °C) Oral 81 18 100 %   05/17/22 2007 111/74 -- -- 97 18 99 %   05/17/22 1645 114/79 -- -- 98 18 98 %   05/17/22 1441 -- -- -- -- 16 --   05/17/22 1150 -- -- -- -- 14 --       Oxygen Saturation Interpretation: Normal    ------------------------------------------ PROGRESS NOTES ------------------------------------------    Counseling:  I have spoken with the patient and discussed todays results, in addition to providing specific details for the plan of care and counseling regarding the diagnosis and prognosis.   Their questions are answered at this time and they are agreeable with the plan of admission.    --------------------------------- ADDITIONAL PROVIDER NOTES ---------------------------------  Consultations:  Spoke with On Call. Discussed case. They will admit the patient. This patient's ED course included: a personal history and physicial examination    This patient has remained hemodynamically stable during their ED course.       Medications   predniSONE (DELTASONE) tablet 60 mg (0 mg Oral Held 5/17/22 1806)   heparin 25,000 units in dextrose 5% 250 mL (premix) infusion (12 Units/kg/hr × 90.7 kg IntraVENous Restarted 5/18/22 0624)   heparin (porcine) injection 4,000 Units (has no administration in time range)   heparin (porcine) injection 2,000 Units (has no administration in time range)   albuterol (PROVENTIL) nebulizer solution 2.5 mg (has no administration in time range)   aspirin chewable tablet 81 mg (81 mg Oral Given 5/18/22 1019)   clopidogrel (PLAVIX) tablet 75 mg (75 mg Oral Given 5/18/22 1019)   divalproex (DEPAKOTE) DR tablet 500 mg (500 mg Oral Given 5/18/22 1019)   ferrous sulfate (IRON 325) tablet 325 mg (325 mg Oral Given 5/18/22 1019)   montelukast (SINGULAIR) tablet 10 mg (10 mg Oral Given 5/18/22 1019)   pantoprazole (PROTONIX) tablet 40 mg (40 mg Oral Not Given 5/18/22 1019)   sodium chloride flush 0.9 % injection 10 mL (10 mLs IntraVENous Given 5/18/22 1019)   sodium chloride flush 0.9 % injection 10 mL (has no administration in time range)   0.9 % sodium chloride infusion (has no administration in time range)   promethazine (PHENERGAN) tablet 12.5 mg (has no administration in time range)     Or   ondansetron (ZOFRAN) injection 4 mg (has no administration in time range)   polyethylene glycol (GLYCOLAX) packet 17 g (has no administration in time range)   acetaminophen (TYLENOL) tablet 650 mg (has no administration in time range)     Or   acetaminophen (TYLENOL) suppository 650 mg (has no administration in time range)   glucose chewable tablet 16 g (has no administration in time range)   dextrose 10 % infusion (has no administration in time range)     Or   dextrose 10 % infusion (has no administration in time range)   glucagon (rDNA) injection 1 mg (has no administration in time range)   dextrose 5 % solution (has no administration in time range)   insulin glargine-yfgn (SEMGLEE-YFGN) injection vial 23 Units (23 Units SubCUTAneous Given 5/17/22 2215)   insulin lispro (HUMALOG) injection vial 0-18 Units (18 Units SubCUTAneous Incomplete 5/18/22 1036)   insulin lispro (HUMALOG) injection vial 0-9 Units (has no administration in time range)   ziprasidone (GEODON) 20 mg in sterile water 1 mL injection (20 mg IntraMUSCular Not Given 5/18/22 0130)   LORazepam (ATIVAN) injection 1 mg (1 mg IntraVENous Not Given 5/18/22 0134)   diphenhydrAMINE (BENADRYL) injection 50 mg (50 mg IntraVENous Given 5/18/22 0629)   HYDROmorphone (DILAUDID) injection 1 mg (1 mg IntraVENous Incomplete 5/18/22 1036)   warfarin placeholder: dosing by pharmacy (has no administration in time range)   warfarin (COUMADIN) tablet 7.5 mg (has no administration in time range)   methylPREDNISolone sodium (SOLU-MEDROL) injection 125 mg (125 mg IntraVENous Given 5/17/22 1251)   albuterol (PROVENTIL) nebulizer solution 2.5 mg (2.5 mg Nebulization Given 5/17/22 1150)   acetaminophen (TYLENOL) tablet 1,000 mg (1,000 mg Oral Given 5/17/22 1322)   HYDROmorphone (DILAUDID) injection 0.5 mg (0.5 mg IntraVENous Given 5/17/22 1441)   technetium 99m DTPA solution 35 millicurie (35 millicuries IntraVENous Given 5/17/22 1547)   technetium albumin aggregated (MAA) solution 6 millicurie (6 millicuries IntraVENous Given 5/17/22 1608)   potassium chloride (KLOR-CON M) extended release tablet 40 mEq (40 mEq Oral Given 5/17/22 2041)   HYDROmorphone (DILAUDID) injection 1 mg (1 mg IntraVENous Given 5/17/22 2131)   heparin (porcine) injection 4,000 Units (4,000 Units IntraVENous Given 5/17/22 4965)   diphenhydrAMINE (BENADRYL) injection 50 mg (50 mg IntraVENous Given 5/17/22 5930)         Diagnosis:  1. COPD exacerbation ([facility]    2. Hypercoagulable state ([facility]    3. History of CVA (cerebrovascular accident)    4. History of pulmonary embolus (PE)        Disposition:  Patient's disposition: Admit to telemetry  Patient's condition is stable.              Qamar Mena DO  Resident  05/18/22 6375

## 2022-05-17 NOTE — PROGRESS NOTES
Attempted to send for patient multiple times. He has at this time refused all scans confirmed with patients Angélica Rivera.

## 2022-05-17 NOTE — ED NOTES
Patient refuses fentanyl stating he is allergic. Provider notified and new order for tylenol at this time. Patient refuses any scans at this time.  Pt. given warm blanket per request, patient given urinal per request.      Castillo Sims RN  05/17/22 2965

## 2022-05-18 PROBLEM — R79.1 SUBTHERAPEUTIC INTERNATIONAL NORMALIZED RATIO (INR): Status: ACTIVE | Noted: 2022-05-18

## 2022-05-18 LAB
ANION GAP SERPL CALCULATED.3IONS-SCNC: 14 MMOL/L (ref 7–16)
APTT: 22.7 SEC (ref 24.5–35.1)
APTT: 26.1 SEC (ref 24.5–35.1)
APTT: 26.9 SEC (ref 24.5–35.1)
APTT: 51.7 SEC (ref 24.5–35.1)
BASOPHILS ABSOLUTE: 0.01 E9/L (ref 0–0.2)
BASOPHILS RELATIVE PERCENT: 0.1 % (ref 0–2)
BUN BLDV-MCNC: 13 MG/DL (ref 6–20)
CALCIUM SERPL-MCNC: 9 MG/DL (ref 8.6–10.2)
CHLORIDE BLD-SCNC: 98 MMOL/L (ref 98–107)
CO2: 23 MMOL/L (ref 22–29)
CREAT SERPL-MCNC: 0.7 MG/DL (ref 0.7–1.2)
EOSINOPHILS ABSOLUTE: 0.03 E9/L (ref 0.05–0.5)
EOSINOPHILS RELATIVE PERCENT: 0.4 % (ref 0–6)
GFR AFRICAN AMERICAN: >60
GFR NON-AFRICAN AMERICAN: >60 ML/MIN/1.73
GLUCOSE BLD-MCNC: 383 MG/DL (ref 74–99)
HBA1C MFR BLD: 8.2 % (ref 4–5.6)
HCT VFR BLD CALC: 30.5 % (ref 37–54)
HEMOGLOBIN: 9.6 G/DL (ref 12.5–16.5)
IMMATURE GRANULOCYTES #: 0.05 E9/L
IMMATURE GRANULOCYTES %: 0.7 % (ref 0–5)
INR BLD: 1.1
LYMPHOCYTES ABSOLUTE: 0.69 E9/L (ref 1.5–4)
LYMPHOCYTES RELATIVE PERCENT: 9.5 % (ref 20–42)
MCH RBC QN AUTO: 23.7 PG (ref 26–35)
MCHC RBC AUTO-ENTMCNC: 31.5 % (ref 32–34.5)
MCV RBC AUTO: 75.3 FL (ref 80–99.9)
METER GLUCOSE: 119 MG/DL (ref 74–99)
METER GLUCOSE: 275 MG/DL (ref 74–99)
METER GLUCOSE: 432 MG/DL (ref 74–99)
MONOCYTES ABSOLUTE: 0.43 E9/L (ref 0.1–0.95)
MONOCYTES RELATIVE PERCENT: 5.9 % (ref 2–12)
NEUTROPHILS ABSOLUTE: 6.07 E9/L (ref 1.8–7.3)
NEUTROPHILS RELATIVE PERCENT: 83.4 % (ref 43–80)
PDW BLD-RTO: 16.3 FL (ref 11.5–15)
PLATELET # BLD: 311 E9/L (ref 130–450)
PMV BLD AUTO: 10.8 FL (ref 7–12)
POTASSIUM REFLEX MAGNESIUM: 4.6 MMOL/L (ref 3.5–5)
PROTHROMBIN G20210A MUTATION: NEGATIVE
PROTHROMBIN TIME: 12.1 SEC (ref 9.3–12.4)
PT PCR SPECIMEN: NORMAL
RBC # BLD: 4.05 E12/L (ref 3.8–5.8)
RPR: NORMAL
SODIUM BLD-SCNC: 135 MMOL/L (ref 132–146)
WBC # BLD: 7.3 E9/L (ref 4.5–11.5)

## 2022-05-18 PROCEDURE — 96366 THER/PROPH/DIAG IV INF ADDON: CPT

## 2022-05-18 PROCEDURE — 86703 HIV-1/HIV-2 1 RESULT ANTBDY: CPT

## 2022-05-18 PROCEDURE — 6360000002 HC RX W HCPCS: Performed by: INTERNAL MEDICINE

## 2022-05-18 PROCEDURE — 86592 SYPHILIS TEST NON-TREP QUAL: CPT

## 2022-05-18 PROCEDURE — 6360000002 HC RX W HCPCS: Performed by: FAMILY MEDICINE

## 2022-05-18 PROCEDURE — 85730 THROMBOPLASTIN TIME PARTIAL: CPT

## 2022-05-18 PROCEDURE — 36415 COLL VENOUS BLD VENIPUNCTURE: CPT

## 2022-05-18 PROCEDURE — S5553 INSULIN LONG ACTING 5 U: HCPCS | Performed by: FAMILY MEDICINE

## 2022-05-18 PROCEDURE — 6370000000 HC RX 637 (ALT 250 FOR IP): Performed by: INTERNAL MEDICINE

## 2022-05-18 PROCEDURE — 2580000003 HC RX 258: Performed by: FAMILY MEDICINE

## 2022-05-18 PROCEDURE — 83036 HEMOGLOBIN GLYCOSYLATED A1C: CPT

## 2022-05-18 PROCEDURE — 96376 TX/PRO/DX INJ SAME DRUG ADON: CPT

## 2022-05-18 PROCEDURE — 85025 COMPLETE CBC W/AUTO DIFF WBC: CPT

## 2022-05-18 PROCEDURE — 82962 GLUCOSE BLOOD TEST: CPT

## 2022-05-18 PROCEDURE — 1200000000 HC SEMI PRIVATE

## 2022-05-18 PROCEDURE — 80048 BASIC METABOLIC PNL TOTAL CA: CPT

## 2022-05-18 PROCEDURE — 87517 HEPATITIS B DNA QUANT: CPT

## 2022-05-18 PROCEDURE — 85610 PROTHROMBIN TIME: CPT

## 2022-05-18 PROCEDURE — 6370000000 HC RX 637 (ALT 250 FOR IP): Performed by: FAMILY MEDICINE

## 2022-05-18 PROCEDURE — 87350 HEPATITIS BE AG IA: CPT

## 2022-05-18 RX ORDER — DIVALPROEX SODIUM 250 MG/1
500 TABLET, DELAYED RELEASE ORAL 2 TIMES DAILY
Status: DISCONTINUED | OUTPATIENT
Start: 2022-05-18 | End: 2022-05-24 | Stop reason: HOSPADM

## 2022-05-18 RX ORDER — ONDANSETRON 2 MG/ML
4 INJECTION INTRAMUSCULAR; INTRAVENOUS EVERY 6 HOURS PRN
Status: DISCONTINUED | OUTPATIENT
Start: 2022-05-18 | End: 2022-05-24 | Stop reason: HOSPADM

## 2022-05-18 RX ORDER — ASPIRIN 81 MG/1
81 TABLET, CHEWABLE ORAL DAILY
Status: DISCONTINUED | OUTPATIENT
Start: 2022-05-18 | End: 2022-05-24 | Stop reason: HOSPADM

## 2022-05-18 RX ORDER — HEPARIN SODIUM (PORCINE) LOCK FLUSH IV SOLN 100 UNIT/ML 100 UNIT/ML
3 SOLUTION INTRAVENOUS EVERY 12 HOURS SCHEDULED
Status: DISCONTINUED | OUTPATIENT
Start: 2022-05-18 | End: 2022-05-24 | Stop reason: HOSPADM

## 2022-05-18 RX ORDER — PANTOPRAZOLE SODIUM 40 MG/1
40 TABLET, DELAYED RELEASE ORAL
Status: DISCONTINUED | OUTPATIENT
Start: 2022-05-18 | End: 2022-05-24 | Stop reason: HOSPADM

## 2022-05-18 RX ORDER — LIDOCAINE HYDROCHLORIDE 10 MG/ML
5 INJECTION, SOLUTION EPIDURAL; INFILTRATION; INTRACAUDAL; PERINEURAL ONCE
Status: DISCONTINUED | OUTPATIENT
Start: 2022-05-18 | End: 2022-05-24 | Stop reason: HOSPADM

## 2022-05-18 RX ORDER — SODIUM CHLORIDE 9 MG/ML
INJECTION, SOLUTION INTRAVENOUS PRN
Status: DISCONTINUED | OUTPATIENT
Start: 2022-05-18 | End: 2022-05-24 | Stop reason: HOSPADM

## 2022-05-18 RX ORDER — LORAZEPAM 2 MG/ML
1 INJECTION INTRAMUSCULAR ONCE
Status: DISCONTINUED | OUTPATIENT
Start: 2022-05-18 | End: 2022-05-24 | Stop reason: HOSPADM

## 2022-05-18 RX ORDER — DEXTROSE MONOHYDRATE 50 MG/ML
100 INJECTION, SOLUTION INTRAVENOUS PRN
Status: DISCONTINUED | OUTPATIENT
Start: 2022-05-18 | End: 2022-05-24 | Stop reason: HOSPADM

## 2022-05-18 RX ORDER — POLYETHYLENE GLYCOL 3350 17 G/17G
17 POWDER, FOR SOLUTION ORAL DAILY PRN
Status: DISCONTINUED | OUTPATIENT
Start: 2022-05-18 | End: 2022-05-24 | Stop reason: HOSPADM

## 2022-05-18 RX ORDER — SODIUM CHLORIDE 0.9 % (FLUSH) 0.9 %
5-40 SYRINGE (ML) INJECTION EVERY 12 HOURS SCHEDULED
Status: DISCONTINUED | OUTPATIENT
Start: 2022-05-18 | End: 2022-05-24 | Stop reason: HOSPADM

## 2022-05-18 RX ORDER — ALBUTEROL SULFATE 2.5 MG/3ML
2.5 SOLUTION RESPIRATORY (INHALATION) EVERY 6 HOURS PRN
Status: DISCONTINUED | OUTPATIENT
Start: 2022-05-18 | End: 2022-05-24 | Stop reason: HOSPADM

## 2022-05-18 RX ORDER — CLOPIDOGREL BISULFATE 75 MG/1
75 TABLET ORAL DAILY
Status: DISCONTINUED | OUTPATIENT
Start: 2022-05-18 | End: 2022-05-24 | Stop reason: HOSPADM

## 2022-05-18 RX ORDER — DEXTROSE MONOHYDRATE 100 MG/ML
250 INJECTION, SOLUTION INTRAVENOUS PRN
Status: DISCONTINUED | OUTPATIENT
Start: 2022-05-18 | End: 2022-05-24 | Stop reason: HOSPADM

## 2022-05-18 RX ORDER — HEPARIN SODIUM (PORCINE) LOCK FLUSH IV SOLN 100 UNIT/ML 100 UNIT/ML
3 SOLUTION INTRAVENOUS PRN
Status: DISCONTINUED | OUTPATIENT
Start: 2022-05-18 | End: 2022-05-24 | Stop reason: HOSPADM

## 2022-05-18 RX ORDER — ACETAMINOPHEN 325 MG/1
650 TABLET ORAL EVERY 6 HOURS PRN
Status: DISCONTINUED | OUTPATIENT
Start: 2022-05-18 | End: 2022-05-24 | Stop reason: HOSPADM

## 2022-05-18 RX ORDER — ACETAMINOPHEN 650 MG/1
650 SUPPOSITORY RECTAL EVERY 6 HOURS PRN
Status: DISCONTINUED | OUTPATIENT
Start: 2022-05-18 | End: 2022-05-24 | Stop reason: HOSPADM

## 2022-05-18 RX ORDER — WARFARIN SODIUM 7.5 MG/1
7.5 TABLET ORAL
Status: COMPLETED | OUTPATIENT
Start: 2022-05-18 | End: 2022-05-18

## 2022-05-18 RX ORDER — DIPHENHYDRAMINE HYDROCHLORIDE 50 MG/ML
50 INJECTION INTRAMUSCULAR; INTRAVENOUS EVERY 4 HOURS
Status: DISCONTINUED | OUTPATIENT
Start: 2022-05-18 | End: 2022-05-24

## 2022-05-18 RX ORDER — SODIUM CHLORIDE 0.9 % (FLUSH) 0.9 %
10 SYRINGE (ML) INJECTION EVERY 12 HOURS SCHEDULED
Status: DISCONTINUED | OUTPATIENT
Start: 2022-05-18 | End: 2022-05-24 | Stop reason: HOSPADM

## 2022-05-18 RX ORDER — SODIUM CHLORIDE 0.9 % (FLUSH) 0.9 %
10 SYRINGE (ML) INJECTION PRN
Status: DISCONTINUED | OUTPATIENT
Start: 2022-05-18 | End: 2022-05-24 | Stop reason: HOSPADM

## 2022-05-18 RX ORDER — FERROUS SULFATE 325(65) MG
325 TABLET ORAL 2 TIMES DAILY WITH MEALS
Status: DISCONTINUED | OUTPATIENT
Start: 2022-05-18 | End: 2022-05-24 | Stop reason: HOSPADM

## 2022-05-18 RX ORDER — PROMETHAZINE HYDROCHLORIDE 12.5 MG/1
12.5 TABLET ORAL EVERY 6 HOURS PRN
Status: DISCONTINUED | OUTPATIENT
Start: 2022-05-18 | End: 2022-05-24 | Stop reason: HOSPADM

## 2022-05-18 RX ORDER — MONTELUKAST SODIUM 10 MG/1
10 TABLET ORAL DAILY
Status: DISCONTINUED | OUTPATIENT
Start: 2022-05-18 | End: 2022-05-24 | Stop reason: HOSPADM

## 2022-05-18 RX ORDER — DEXTROSE MONOHYDRATE 100 MG/ML
125 INJECTION, SOLUTION INTRAVENOUS PRN
Status: DISCONTINUED | OUTPATIENT
Start: 2022-05-18 | End: 2022-05-24 | Stop reason: HOSPADM

## 2022-05-18 RX ORDER — SODIUM CHLORIDE 0.9 % (FLUSH) 0.9 %
5-40 SYRINGE (ML) INJECTION PRN
Status: DISCONTINUED | OUTPATIENT
Start: 2022-05-18 | End: 2022-05-24 | Stop reason: HOSPADM

## 2022-05-18 RX ORDER — DIPHENHYDRAMINE HYDROCHLORIDE 50 MG/ML
50 INJECTION INTRAMUSCULAR; INTRAVENOUS EVERY 6 HOURS
Status: DISCONTINUED | OUTPATIENT
Start: 2022-05-18 | End: 2022-05-18

## 2022-05-18 RX ADMIN — SODIUM CHLORIDE, PRESERVATIVE FREE 10 ML: 5 INJECTION INTRAVENOUS at 10:19

## 2022-05-18 RX ADMIN — SODIUM CHLORIDE, PRESERVATIVE FREE 10 ML: 5 INJECTION INTRAVENOUS at 14:46

## 2022-05-18 RX ADMIN — INSULIN LISPRO 5 UNITS: 100 INJECTION, SOLUTION INTRAVENOUS; SUBCUTANEOUS at 21:28

## 2022-05-18 RX ADMIN — HYDROMORPHONE HYDROCHLORIDE 1 MG: 1 INJECTION, SOLUTION INTRAMUSCULAR; INTRAVENOUS; SUBCUTANEOUS at 14:46

## 2022-05-18 RX ADMIN — WARFARIN SODIUM 7.5 MG: 7.5 TABLET ORAL at 18:49

## 2022-05-18 RX ADMIN — HYDROMORPHONE HYDROCHLORIDE 1 MG: 1 INJECTION, SOLUTION INTRAMUSCULAR; INTRAVENOUS; SUBCUTANEOUS at 06:29

## 2022-05-18 RX ADMIN — CLOPIDOGREL BISULFATE 75 MG: 75 TABLET ORAL at 10:19

## 2022-05-18 RX ADMIN — DIPHENHYDRAMINE HYDROCHLORIDE 50 MG: 50 INJECTION, SOLUTION INTRAMUSCULAR; INTRAVENOUS at 06:29

## 2022-05-18 RX ADMIN — HEPARIN SODIUM 16 UNITS/KG/HR: 10000 INJECTION, SOLUTION INTRAVENOUS at 23:57

## 2022-05-18 RX ADMIN — SODIUM CHLORIDE, PRESERVATIVE FREE 10 ML: 5 INJECTION INTRAVENOUS at 21:27

## 2022-05-18 RX ADMIN — HYDROMORPHONE HYDROCHLORIDE 1 MG: 1 INJECTION, SOLUTION INTRAMUSCULAR; INTRAVENOUS; SUBCUTANEOUS at 10:36

## 2022-05-18 RX ADMIN — HEPARIN SODIUM 4000 UNITS: 1000 INJECTION, SOLUTION INTRAVENOUS; SUBCUTANEOUS at 16:51

## 2022-05-18 RX ADMIN — HYDROMORPHONE HYDROCHLORIDE 1 MG: 1 INJECTION, SOLUTION INTRAMUSCULAR; INTRAVENOUS; SUBCUTANEOUS at 18:46

## 2022-05-18 RX ADMIN — ASPIRIN 81 MG 81 MG: 81 TABLET ORAL at 10:19

## 2022-05-18 RX ADMIN — DIPHENHYDRAMINE HYDROCHLORIDE 50 MG: 50 INJECTION, SOLUTION INTRAMUSCULAR; INTRAVENOUS at 18:46

## 2022-05-18 RX ADMIN — INSULIN GLARGINE-YFGN 23 UNITS: 100 INJECTION, SOLUTION SUBCUTANEOUS at 21:27

## 2022-05-18 RX ADMIN — MONTELUKAST SODIUM 10 MG: 10 TABLET ORAL at 10:19

## 2022-05-18 RX ADMIN — PENICILLIN G BENZATHINE 2.4 MILLION UNITS: 1200000 INJECTION, SUSPENSION INTRAMUSCULAR at 18:46

## 2022-05-18 RX ADMIN — HYDROMORPHONE HYDROCHLORIDE 1 MG: 1 INJECTION, SOLUTION INTRAMUSCULAR; INTRAVENOUS; SUBCUTANEOUS at 22:49

## 2022-05-18 RX ADMIN — SODIUM CHLORIDE, PRESERVATIVE FREE 10 ML: 5 INJECTION INTRAVENOUS at 12:55

## 2022-05-18 RX ADMIN — DIVALPROEX SODIUM 500 MG: 250 TABLET, DELAYED RELEASE ORAL at 21:27

## 2022-05-18 RX ADMIN — DIVALPROEX SODIUM 500 MG: 250 TABLET, DELAYED RELEASE ORAL at 10:19

## 2022-05-18 RX ADMIN — DIPHENHYDRAMINE HYDROCHLORIDE 50 MG: 50 INJECTION, SOLUTION INTRAMUSCULAR; INTRAVENOUS at 12:55

## 2022-05-18 RX ADMIN — DIPHENHYDRAMINE HYDROCHLORIDE 50 MG: 50 INJECTION, SOLUTION INTRAMUSCULAR; INTRAVENOUS at 22:49

## 2022-05-18 RX ADMIN — INSULIN LISPRO 18 UNITS: 100 INJECTION, SOLUTION INTRAVENOUS; SUBCUTANEOUS at 10:36

## 2022-05-18 RX ADMIN — SODIUM CHLORIDE, PRESERVATIVE FREE 10 ML: 5 INJECTION INTRAVENOUS at 18:46

## 2022-05-18 RX ADMIN — FERROUS SULFATE TAB 325 MG (65 MG ELEMENTAL FE) 325 MG: 325 (65 FE) TAB at 18:46

## 2022-05-18 RX ADMIN — FERROUS SULFATE TAB 325 MG (65 MG ELEMENTAL FE) 325 MG: 325 (65 FE) TAB at 10:19

## 2022-05-18 ASSESSMENT — PAIN DESCRIPTION - LOCATION: LOCATION: GENERALIZED

## 2022-05-18 ASSESSMENT — PAIN SCALES - GENERAL
PAINLEVEL_OUTOF10: 10
PAINLEVEL_OUTOF10: 10
PAINLEVEL_OUTOF10: 9
PAINLEVEL_OUTOF10: 2

## 2022-05-18 ASSESSMENT — PAIN DESCRIPTION - DESCRIPTORS
DESCRIPTORS: ACHING;DISCOMFORT
DESCRIPTORS: ACHING

## 2022-05-18 ASSESSMENT — PAIN DESCRIPTION - ORIENTATION: ORIENTATION: RIGHT

## 2022-05-18 NOTE — PROGRESS NOTES
Hospitalist Progress Note      SYNOPSIS: Patient admitted on 2022 for this of breath. Patient presented to the emergency department with shortness of breath. Patient has a history of pulmonary embolism and deep vein thrombosis. Patient had taken a road trip to [place] and has not been taking his Coumadin. Has a history of factor V Leiden. Vital signs within normal and stable. Patient is afebrile. Laboratory studies demonstrate potassium 3.3, glucose 440, hemoglobin 10.5. INR is 1.1. Patient had a reaction to IV contrast dye so VQ scan was performed. VQ scan was reassuring. Patient has anaphylactic reaction to Lovenox so was started on a heparin infusion for bridging. Medicine consulted for admission. SUBJECTIVE:    Patient seen and examined in his room. Alert awake oriented x3 and providing most of the information. Had multiple requests. Denies any chest pain, nausea, vomiting  Records reviewed. Stable overnight. No other overnight issues reported. Temp (24hrs), Av.2 °F (36.8 °C), Min:98 °F (36.7 °C), Max:98.3 °F (36.8 °C)    DIET: ADULT DIET; Regular  CODE: Full Code  No intake or output data in the 24 hours ending 22 0819    OBJECTIVE:    BP (!) 123/94   Pulse 81   Temp 98 °F (36.7 °C) (Oral)   Resp 18   Wt 200 lb (90.7 kg)   SpO2 100%   BMI 31.32 kg/m²     General appearance: No apparent distress, appears stated age and cooperative. HEENT:  Conjunctivae/corneas clear. Neck: Supple. No jugular venous distention. Respiratory: Clear to auscultation bilaterally, normal respiratory effort  Cardiovascular: Regular rate rhythm, normal S1-S2  Abdomen: Soft, nontender, nondistended  Musculoskeletal: No clubbing, cyanosis, no bilateral lower extremity edema. Brisk capillary refill.    Skin:  No rashes  on visible skin  Neurologic: awake, alert and following commands       ASSESSMENT:  -Subtherapeutic INR  -History of PE/DVT  -Poorly controlled diabetes mellitus  -Asthma/COPD  -Factor V Leiden  -Ulcer on glans        PLAN:  -Admit to medicine  -VQ scan low probability  -Heparin infusion for bridging  -Resume Coumadin daily  -Monitor INR  -Continue home medications  -Consult ID for syphilis    DISPOSITION:   Unclear discharge disposition at the moment. Medications:  REVIEWED DAILY    Infusion Medications    sodium chloride      dextrose      Or    dextrose      dextrose      heparin (PORCINE) Infusion 12 Units/kg/hr (05/18/22 1968)     Scheduled Medications    aspirin  81 mg Oral Daily    clopidogrel  75 mg Oral Daily    divalproex  500 mg Oral BID    ferrous sulfate  325 mg Oral BID WC    montelukast  10 mg Oral Daily    pantoprazole  40 mg Oral QAM AC    sodium chloride flush  10 mL IntraVENous 2 times per day    ziprasidone (GEODON) in sterile water injection  20 mg IntraMUSCular Once    LORazepam  1 mg IntraVENous Once    diphenhydrAMINE  50 mg IntraVENous Q6H    predniSONE  60 mg Oral Once    insulin glargine  0.25 Units/kg SubCUTAneous Nightly    insulin lispro  0-18 Units SubCUTAneous TID WC    insulin lispro  0-9 Units SubCUTAneous Nightly     PRN Meds: albuterol, sodium chloride flush, sodium chloride, promethazine **OR** ondansetron, polyethylene glycol, acetaminophen **OR** acetaminophen, glucose, dextrose **OR** dextrose, glucagon (rDNA), dextrose, HYDROmorphone, heparin (porcine), heparin (porcine)    Labs:     Recent Labs     05/17/22  1204 05/18/22  0427   WBC 6.1 7.3   HGB 10.5* 9.6*   HCT 33.9* 30.5*    311       Recent Labs     05/17/22  1204 05/18/22  0427    135   K 3.3* 4.6    98   CO2 19* 23   BUN 11 13   CREATININE 0.7 0.7   CALCIUM 8.5* 9.0       Recent Labs     05/17/22  1204   PROT 7.0   ALKPHOS 112   ALT 25   AST 18   BILITOT 0.2       Recent Labs     05/17/22  1204 05/18/22  0427   INR 1.1 1.1       No results for input(s): CKTOTAL, TROPONINI in the last 72 hours.     Chronic labs:    Lab Results Component Value Date    CHOL 201 (H) 05/12/2022    TRIG 205 (H) 05/12/2022    HDL 47 05/12/2022    LDLCALC 113 (H) 05/12/2022    TSH 5.890 (H) 05/12/2022    INR 1.1 05/18/2022    LABA1C 8.2 (H) 05/18/2022       Radiology: REVIEWED DAILY    +++++++++++++++++++++++++++++++++++++++++++++++++  Sandra Mclean MD  [employer] - [address], [place]  +++++++++++++++++++++++++++++++++++++++++++++++++  NOTE: This report was transcribed using voice recognition software. Every effort was made to ensure accuracy; however, inadvertent computerized transcription errors may be present.

## 2022-05-18 NOTE — PLAN OF CARE
Problem: Pain  Goal: Verbalizes/displays adequate comfort level or baseline comfort level  Outcome: Progressing     Problem: Safety - Adult  Goal: Free from fall injury  Outcome: Progressing     Problem: ABCDS Injury Assessment  Goal: Absence of physical injury  Outcome: Progressing  Flowsheets (Taken 5/18/2022 1936)  Absence of Physical Injury: Implement safety measures based on patient assessment

## 2022-05-18 NOTE — PROGRESS NOTES
Pt unhooked his heparin drip states it makes him itchy. No visible redness or rash noted. Pt agitated that he doesn't have any iv pain medication or iv benadryl ordered. Doctor notified and new orders placed. Pt refused medications and is refusing heparin drip. Pt educated with no success.

## 2022-05-18 NOTE — PROGRESS NOTES
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 male for whom pharmacy has been asked to manage warfarin therapy. Weight:   Wt Readings from Last 1 Encounters:   05/17/22 200 lb (90.7 kg)       TSH:    Lab Results   Component Value Date    TSH 5.890 05/12/2022       Hepatic Function Panel:                            Lab Results   Component Value Date    ALKPHOS 112 05/17/2022    ALT 25 05/17/2022    AST 18 05/17/2022    PROT 7.0 05/17/2022    BILITOT 0.2 05/17/2022    BILIDIR 0.0 03/10/2022    LABALBU 4.1 05/17/2022       Current warfarin drug-drug interactions include: No significant interactions    Recent Labs     05/17/22  1204 05/18/22  0427   HGB 10.5* 9.6*    311     Date Warfarin Dose INR Heparin or LMWH Comment   05/18 7.5 mg 1.1 Heparin Gtt Factor V Leiden                                 Assessment:  · Patient is a 49 y.o. male on warfarin for History of  VTE/PE and Factor V Leiden. Patient's home warfarin dosing regimen is 7.5mg nightly.    · Goal INR 2 - 3  · INR 1.1 today    Plan:  · Warfarin 7.5 mg tonight x one  · Daily PT/INR until the INR is stable within the therapeutic range  · Pharmacist will follow and monitor/adjust dosing as necessary    Thank you for this consult,    Isra Barnhart [facility] 5/18/2022 10:47 AM

## 2022-05-18 NOTE — H&P
Hospitalist History & Physical      PCP: Janessa Alvarez MD    Date of Service: Pt seen/examined on 5/17/2022     Chief Complaint:  had concerns including Shortness of Breath (thinks that he has a PE which he had 2 months ago). History Of Present Illness:    Mr. Coretta Kimble, a 49y.o. year old male  who  has a past medical history of Asthma, COPD (chronic obstructive pulmonary disease) (HCC), Deep vein thrombosis (DVT) ([address] Factor V Leiden ([facility] [address] Protein S deficiency ([address] and Pulmonary emboli ([facility] [address] Patient presented to the emergency department with shortness of breath. Patient has a history of pulmonary embolism and deep vein thrombosis. Patient had taken a road trip to [place] and has not been taking his Coumadin. Has a history of factor V Leiden. Vital signs within normal and stable. Patient is afebrile. Laboratory studies demonstrate potassium 3.3, glucose 440, hemoglobin 10.5. INR is 1.1. Patient had a reaction to IV contrast dye so VQ scan was performed. VQ scan was reassuring. Patient has anaphylactic reaction to Lovenox so was started on a heparin infusion for bridging. Medicine consulted for admission. Past Medical History:   Diagnosis Date    Asthma     COPD (chronic obstructive pulmonary disease) ([facility] [address]     Deep vein thrombosis (DVT) (HCC)     Factor V Leiden (HCC)     Protein S deficiency ([address]     Pulmonary emboli (HCC)        Past Surgical History:   Procedure Laterality Date    APPENDECTOMY      IR MECHANICAL ART THROMBECTOMY INTRACRANIAL  5/11/2022    IR MECHANICAL ART THROMBECTOMY INTRACRANIAL 5/11/2022 Markus Valente MD [facility]    VASCULAR SURGERY      july 2021       Prior to Admission medications    Medication Sig Start Date End Date Taking?  Authorizing Provider   divalproex (DEPAKOTE) 500 MG DR tablet Take 500 mg by mouth 2 times daily   Yes Historical Provider, MD   insulin glargine (LANTUS) 100 UNIT/ML injection vial Inject 40 Units into the skin nightly   Yes Historical Provider, MD   insulin lispro (HUMALOG) 100 UNIT/ML injection vial Inject 15 Units into the skin 3 times daily (before meals)   Yes Historical Provider, MD   warfarin (COUMADIN) 7.5 MG tablet Take 7.5 mg by mouth every evening   Yes Historical Provider, MD   loratadine (CLARITIN) 10 MG tablet Take 1 tablet by mouth daily 4/13/22   Ayesha Harvest, MD   clopidogrel (PLAVIX) 75 MG tablet Take 1 tablet by mouth daily 4/13/22 Minna Harvest, MD   omeprazole (PRILOSEC) 20 MG delayed release capsule Take 1 capsule by mouth daily 4/13/22 Minna Harvest, MD   albuterol (PROVENTIL) (2.5 MG/3ML) 0.083% nebulizer solution Take 3 mLs by nebulization every 6 hours as needed for Wheezing 4/13/22 Minna Harvest, MD   albuterol sulfate HFA (VENTOLIN HFA) 108 (90 Base) MCG/ACT inhaler Inhale 2 puffs into the lungs every 4 hours as needed for Wheezing 4/13/22 Minna Harvest, MD   montelukast (SINGULAIR) 10 MG tablet Take 1 tablet by mouth daily 4/13/22 Minna Harvest, MD   ferrous sulfate (IRON 325) 325 (65 Fe) MG tablet Take 1 tablet by mouth 2 times daily (with meals) 4/13/22 Minna Harvest, MD   ondansetron (ZOFRAN) 4 MG tablet Take 1 tablet by mouth daily as needed for Nausea or Vomiting 4/13/22 Minna Harvest, MD   aspirin 81 MG chewable tablet Take 81 mg by mouth daily    Historical Provider, MD         Allergies:  Lovenox [enoxaparin sodium], Arixtra [fondaparinux], Dye [iodides], Eliquis [apixaban], Fragmin [dalteparin], Iodine, Ipratropium, Motrin [ibuprofen], Robaxin [methocarbamol], Skelaxin [metaxalone], Toradol [ketorolac tromethamine], Tramadol, and Xarelto [rivaroxaban]    Social History:    TOBACCO:   reports that he has quit smoking. He has never used smokeless tobacco.  ETOH:   reports previous alcohol use. Family History:    Reviewed in detail and negative for DM, CAD, Cancer, CVA. Positive as follows\"  History reviewed. No pertinent family history.     REVIEW OF SYSTEMS: Pertinent positives as noted in the HPI. All other systems reviewed and negative. PHYSICAL EXAM:  /74   Pulse 97   Temp 98.3 °F (36.8 °C) (Oral)   Resp 18   Wt 200 lb (90.7 kg)   SpO2 99%   BMI 31.32 kg/m²   General appearance: No apparent distress, appears stated age and cooperative. HEENT: Normal cephalic, atraumatic without obvious deformity. Pupils equal, round, and reactive to light. Extra ocular muscles intact. Conjunctivae/corneas clear. Neck: Supple, with full range of motion. No jugular venous distention. Trachea midline. Respiratory: Clear to auscultation bilaterally  Cardiovascular: Regular rate and rhythm  Abdomen: Soft, nontender, nondistended  Musculoskeletal: No clubbing, cyanosis, edema of bilateral lower extremities. Brisk capillary refill. Skin: Normal skin color. No rashes or lesions. Neurologic:  Neurovascularly intact without any focal sensory/motor deficits. Cranial nerves: II-XII intact, grossly non-focal.  Psychiatric: Alert and oriented, thought content appropriate, normal insight    Reviewed EKG and CXR personally      CBC:   Recent Labs     05/17/22  1204   WBC 6.1   RBC 4.42   HGB 10.5*   HCT 33.9*   MCV 76.7*   RDW 16.3*        BMP:   Recent Labs     05/17/22  1204      K 3.3*      CO2 19*   BUN 11   CREATININE 0.7     LFT:  Recent Labs     05/17/22  1204   PROT 7.0   ALKPHOS 112   ALT 25   AST 18   BILITOT 0.2     CE:  No results for input(s): Jarek Mackinac in the last 72 hours. PT/INR:   Recent Labs     05/17/22  1204   INR 1.1   APTT <20.0*     BNP: No results for input(s): BNP in the last 72 hours.   ESR: No results found for: SEDRATE  CRP: No results found for: CRP  D Dimer:   Lab Results   Component Value Date    DDIMER 0.60 (H) 03/08/2022      Folate and B12: No results found for: VGTRABAS52, No results found for: FOLATE  Lactic Acid:   Lab Results   Component Value Date    LACTA 2.1 04/10/2022     Thyroid Studies:   Lab Results Component Value Date    TSH 5.890 (H) 05/12/2022    T9EHTIO 6.1 05/12/2022       Oupatient labs:  Lab Results   Component Value Date    CHOL 201 (H) 05/12/2022    TRIG 205 (H) 05/12/2022    HDL 47 05/12/2022    LDLCALC 113 (H) 05/12/2022    TSH 5.890 (H) 05/12/2022    INR 1.1 05/17/2022    LABA1C 8.5 (H) 05/12/2022       Urinalysis:    Lab Results   Component Value Date    NITRU Negative 05/11/2022    WBCUA 0-2 03/13/2022    BACTERIA Negative 03/13/2022    RBCUA 0-2 03/13/2022    BLOODU Negative 05/11/2022    SPECGRAV >=1.030 05/11/2022    GLUCOSEU Negative 05/11/2022       Imaging:  CT head without contrast    Result Date: 5/12/2022  EXAMINATION: CT OF THE HEAD WITHOUT CONTRAST  5/12/2022 10:28 am TECHNIQUE: CT of the head was performed without the administration of intravenous contrast. Automated exposure control, iterative reconstruction, and/or weight based adjustment of the mA/kV was utilized to reduce the radiation dose to as low as reasonably achievable. COMPARISON: None. HISTORY: ORDERING SYSTEM PROVIDED HISTORY: post thrombectomy TECHNOLOGIST PROVIDED HISTORY: Reason for exam:->post thrombectomy Has a \"code stroke\" or \"stroke alert\" been called? ->No What reading provider will be dictating this exam?->[facility] FINDINGS: The ventricles are normal size, position and contour. There are no masses or mass effect. There are no parenchymal hemorrhages or extra-axial fluid collections. The cerebellum and brainstem are normal. There is mucosal thickening of the maxillary sinuses. The mastoid air cells are clear. There are scattered atherosclerotic calcifications of the intracavernous portions of the internal carotid arteries. Osseous calvarium is normal.     No acute intracranial abnormality.      CT HEAD WO CONTRAST    Result Date: 5/11/2022  EXAMINATION: CT OF THE HEAD WITHOUT CONTRAST  5/11/2022 10:34 am TECHNIQUE: CT of the head was performed without the administration of intravenous contrast. Automated exposure control, iterative reconstruction, and/or weight based adjustment of the mA/kV was utilized to reduce the radiation dose to as low as reasonably achievable. COMPARISON: None. HISTORY: ORDERING SYSTEM PROVIDED HISTORY: cva TECHNOLOGIST PROVIDED HISTORY: Has a \"code stroke\" or \"stroke alert\" been called? ->Yes Reason for exam:->cva Decision Support Exception - unselect if not a suspected or confirmed emergency medical condition->Emergency Medical Condition (MA) What reading provider will be dictating this exam?->CRC FINDINGS: The ventricles are normal size, position and contour. There are no masses or mass effect. There are no parenchymal hemorrhages or extra-axial fluid collections. No evidence of acute CVA. The cerebellum and brainstem are normal.  There is mucosal thickening of the maxillary sinuses. The mastoid air cells are clear. Osseous calvarium is normal.     No acute intracranial abnormality. Mild bilateral maxillary mucosal sinus disease. CT Head WO Contrast    Result Date: 4/24/2022  EXAMINATION: CT of the brain without contrast HISTORY: Recent CVA with TPA. Residual left lower extremity paresis. COMPARISON: None available TECHNIQUE: Multiple contiguous axial images were obtained of the brain from the skull base through the vertex. Multiplanar reformats were obtained. FINDINGS: Brain volume is age appropriate. Ventricular morphology is within normal limits. Gray-white matter differentiation is preserved. No acute hemorrhage or abnormal extra-axial fluid collection. No mass effect or midline shift. Mucosal thickening of the bilateral maxillary sinuses. The mastoid air cells are clear. Calvarium is intact. No acute intracranial process. All CT scans at this facility use dose modulation, iterative reconstruction, and/or weight based dosing when appropriate to reduce radiation dose to as low as reasonably achievable.     NM LUNG VENT/PERFUSION (VQ)    Result Date: 5/17/2022  Patient MRN: [id number] : 1975 Age:  49 years Gender: Male Order Date: 2022 3:31 PM Exam: NM LUNG VENT/PERFUSION (VQ) Number of Images: 9 views Indication:   SOB history of PE allergy to IV dye SOB history of PE allergy to IV dye What reading provider will be dictating this exam?->[facility] Comparison: None. Findings: The patient received 35 mCi of technetium dtpa Ventilation images  reveal normal ventilation throughout the lung fields The patient received 6 millicuries of technetium MAA. Perfusion images reveal no segmental perfusion defects. Low probability for pulmonary embolism     XR CHEST PORTABLE    Result Date: 2022  EXAMINATION: ONE XRAY VIEW OF THE CHEST 2022 4:40 pm COMPARISON: None. HISTORY: ORDERING SYSTEM PROVIDED HISTORY: weakness, Possible Stroke TECHNOLOGIST PROVIDED HISTORY: Reason for exam:->weakness, Possible Stroke What reading provider will be dictating this exam?->CRC FINDINGS: The lungs are without acute focal process. There is no effusion or pneumothorax. The cardiomediastinal silhouette is without acute process. The osseous structures are without acute process. No acute process. US DUP UPPER EXTREMITIES BILATERAL VENOUS    Result Date: 2022  Patient MRN:  [id number] : 1975 Age: 49 years Gender: Male Order Date:  2022 12:12 PM EXAM: US DUP UPPER EXTREMITIES BILATERAL VENOUS NUMBER OF IMAGES:  40 INDICATION:  r/o dvt r/o dvt What reading provider will be dictating this exam?->[facility] COMPARISON: None The cephalic veins are not seen Within the visualized vessels, there is no evidence for deep venous thrombosis There is good compressibility, there is good augmentation, there is good color flow.      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 Currently uncertain if this is chronic or acute however postprocedural Kristi CT does not reveal any evidence of intracranial hemorrhage. RECOMMENDATIONS Patient will be managed medically with blood pressure parameters as given. Will start patient on a heparin drip and transition to Coumadin. STROKE ACUTE TIME STAMPS: Preprocedure NIH stroke scale: 18 Vascular access time: 1100 hours Time First Pass: 1120 solitaire 3-40+ Machelle Time of final recanalization: 1120 Time of vascular closure: 1132 Postprocedure NIH stroke scale: Not assessed due to anesthesia    Result Date: 5/12/2022  IR MECHANICAL ART THROMBECTOMY INTRACRANIAL, IR SABINE CATH PLACE VERTEBRAL ART LEFT W OR WO ANGIO, IR SABINE CATH PLACE COM CAROTID INTRACRANIAL LEFT W OR WO ANGIO PROCEDURE PERFORMED: Diagnostic Cerebral angiogram with mechanical thrombectomy Ultrasound guided left femoral artery access Intraoperative Kristi CT Limited CT with interpretation. COMPLETED DATE:  5/11/2022 10:58 AM CLINICAL HISTORY/PRE-PROCEDURE DIAGNOSIS: Acute ischemic stroke. Patient with a NIH stroke scale of 18 presents with acute left-sided symptoms concerning for a right MCA stroke. Of note he recently had a stroke on April 25 and hence was not a TPA candidate. Patient refused CTA and CTP as he is had severe contrast allergy despite preparation per patient. Patient is taken straight to IR with prophylactic intubation and general anesthesia with patient consent. POST-PROCEDURE DIAGNOSIS: Please see below COMPARISON: None ANESTHESIA: General anesthesia VESSELS CATHETERIZED: 1. Left vertebral artery. 2. Left common carotid artery. 3. Right common carotid artery. 4. Right internal carotid artery. 5. Right middle cerebral artery.  RADIATION EXPOSURE DATA:  829.8 MG Y TOTAL FLUOROSCOPY TIME: 15 minutes and 3 seconds CONTRAST USED: 80 mL of Isovue-300 PROCEDURE: Following informed consent, the patient was brought to the angiography suite, both groins are prepped and draped in usual sterile manner. Using sonographic assistance Vascular access was obtained in the left common femoral artery with a 8-[place]izen [place] sheath which was connected to continuous heparinized saline flush. A 6F cerebase catheter was prepped and with the support of a diagnostic catheter was brought into the aorta, double flushed and connected to continuous heparinized saline flush. The balloon guide catheter was then telescoped into the Right internal carotid artery. Fluoroscopic imaging performed at that time confirmed the presence of inferior division M2 occlusion. As the CT head did not show any evidence of infarction this is thought to be either an acute or subacute thrombus from his stroke at the end of April however with CT head showing salvageable tissue identified decide to go ahead with the thrombectomy. Along with the Machelle A microcatheter-Marksman was prepped and with the support of a microwire was navigated across the arterial occlusion. A Solitaire 3 mm x 40 mm device was then deployed into the thrombus and allowed to integrate for approximately 2-3 minutes. The device was then retrieved with flow of contrast reversed by distal aspiration through the Machelle device. Continuous aspiration was performed during the retrieval process. Postretrieval digital subtraction images were obtained and showed revascularization- however on follow-up imaging there is vasospasm of the M1 M2 junction and delayed filling noticed here. As there is vasospasm and the thrombus has been retrieved it was decided to end the procedure here. Intraoperative Kristi CT was obtained which did not show any evidence of intracranial hemorrhage.  Anesthesia was reversed patient was extubated and patient was transferred to the postoperative area in a stable condition. He will be transferred to the ICU when a bed is available. INTERPRETATION OF IMAGES: 1. Right internal carotid artery injection: Intracranially, there was normal opacification of the right ophthalmic artery, right middle cerebral artery and its branches are visualized. There is inferior division occlusion in the M2 segment. 2. Postprocedure right internal carotid artery injections reveal recanalization of the right MCA M2 segment followed by subsequent imaging revealing vasospasm in this segment. There is no vasospasm noticed in the superior division. No contrast extravasation is noticed. 3. Left internal carotid artery injection: Intracranially, there was normal opacification of the left ophthalmic artery, left posterior communicating artery, left anterior choroidal artery, left middle cerebral artery and its branches, and left anterior cerebral artery and its branches. The left posterior communicating artery appeared supplying the territory of the left posterior cerebral artery suggesting a fetal type left posterior cerebral artery. Please note that the left PCA also get supply from the basilar artery as indicated below. Normal capillary phase and venous drainage was noted. No evidence of any aneurysms ,AVMs or dural AV fistulas. 4. Left vertebral artery injection: The origin of the left vertebral artery appear to be free from any atherosclerotic disease. Left vertebral artery appear to be normal in size, caliber and course. Intracranially, there was normal opacification of the left posterior inferior cerebellar artery, bilateral anterior-inferior cerebellar arteries, bilateral superior cerebellar arteries, bilateral posterior cerebral artery, basilar artery lumen and apex. There is some contrast washout seen in the left PCA because the PCA also get supply from the left posterior commuting artery. Normal capillary phase and venous drainage was noted. No evidence of aneurysms, AVMs or dural AV fistulas. 5. 3D rotational DynaCT imaging: The raw images were transferred to an independent workstation, and volume rendered 3D images were generated and reviewed. The images were independently reviewed. Interpretation of the images reveal no evidence of any subarachnoid hemorrhage or intraparenchymal hemorrhage. The right MCA hyperdensity is not visualized. .     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 Patients: If you have questions regarding some of the verbiage in your report, please visit [email or web address] for a definition. If you have any other questions, please contact your physician. 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 Currently uncertain if this is chronic or acute however postprocedural Kristi CT does not reveal any evidence of intracranial hemorrhage. RECOMMENDATIONS Patient will be managed medically with blood pressure parameters as given. Will start patient on a heparin drip and transition to Coumadin.  STROKE ACUTE TIME STAMPS: Preprocedure NIH stroke scale: 18 Vascular access time: 1100 hours Time First Pass: 1120 solitaire 3-40+ Machelle Time of final recanalization: 1120 Time of vascular closure: 1132 Postprocedure NIH stroke scale: Not assessed due to anesthesia    Result Date: 5/12/2022  IR MECHANICAL ART THROMBECTOMY INTRACRANIAL, IR SABINE CATH PLACE VERTEBRAL ART LEFT W OR WO ANGIO, IR SABINE CATH PLACE COM CAROTID INTRACRANIAL LEFT W OR WO ANGIO PROCEDURE PERFORMED: Diagnostic Cerebral angiogram with mechanical thrombectomy Ultrasound guided left femoral artery access Intraoperative Kristi CT Limited CT with interpretation. COMPLETED DATE:  5/11/2022 10:58 AM CLINICAL HISTORY/PRE-PROCEDURE DIAGNOSIS: Acute ischemic stroke. Patient with a NIH stroke scale of 18 presents with acute left-sided symptoms concerning for a right MCA stroke. Of note he recently had a stroke on April 25 and hence was not a TPA candidate. Patient refused CTA and CTP as he is had severe contrast allergy despite preparation per patient. Patient is taken straight to IR with prophylactic intubation and general anesthesia with patient consent. POST-PROCEDURE DIAGNOSIS: Please see below COMPARISON: None ANESTHESIA: General anesthesia VESSELS CATHETERIZED: 1. Left vertebral artery. 2. Left common carotid artery. 3. Right common carotid artery. 4. Right internal carotid artery. 5. Right middle cerebral artery. RADIATION EXPOSURE DATA:  829.8 MG Y TOTAL FLUOROSCOPY TIME: 15 minutes and 3 seconds CONTRAST USED: 80 mL of Isovue-300 PROCEDURE: Following informed consent, the patient was brought to the angiography suite, both groins are prepped and draped in usual sterile manner. Using sonographic assistance Vascular access was obtained in the left common femoral artery with a 8-[place] sheath which was connected to continuous heparinized saline flush. A 6F cerebase catheter was prepped and with the support of a diagnostic catheter was brought into the aorta, double flushed and connected to continuous heparinized saline flush. The balloon guide catheter was then telescoped into the Right internal carotid artery. Fluoroscopic imaging performed at that time confirmed the presence of inferior division M2 occlusion. As the CT head did not show any evidence of infarction this is thought to be either an acute or subacute thrombus from his stroke at the end of April however with CT head showing salvageable tissue identified decide to go ahead with the thrombectomy.  Along with the Machelle BOATENG microcatheter-[place] was prepped and with the support of a microwire was navigated across the arterial occlusion. A Solitaire 3 mm x 40 mm device was then deployed into the thrombus and allowed to integrate for approximately 2-3 minutes. The device was then retrieved with flow of contrast reversed by distal aspiration through the Machelle device. Continuous aspiration was performed during the retrieval process. Postretrieval digital subtraction images were obtained and showed revascularization- however on follow-up imaging there is vasospasm of the M1 M2 junction and delayed filling noticed here. As there is vasospasm and the thrombus has been retrieved it was decided to end the procedure here. Intraoperative Kristi CT was obtained which did not show any evidence of intracranial hemorrhage. Anesthesia was reversed patient was extubated and patient was transferred to the postoperative area in a stable condition. He will be transferred to the ICU when a bed is available. INTERPRETATION OF IMAGES: 1. Right internal carotid artery injection: Intracranially, there was normal opacification of the right ophthalmic artery, right middle cerebral artery and its branches are visualized. There is inferior division occlusion in the M2 segment. 2. Postprocedure right internal carotid artery injections reveal recanalization of the right MCA M2 segment followed by subsequent imaging revealing vasospasm in this segment. There is no vasospasm noticed in the superior division. No contrast extravasation is noticed. 3. Left internal carotid artery injection: Intracranially, there was normal opacification of the left ophthalmic artery, left posterior communicating artery, left anterior choroidal artery, left middle cerebral artery and its branches, and left anterior cerebral artery and its branches. The left posterior communicating artery appeared supplying the territory of the left posterior cerebral artery suggesting a fetal type left posterior cerebral artery. Please note that the left PCA also get supply from the basilar artery as indicated below. Normal capillary phase and venous drainage was noted. No evidence of any aneurysms ,AVMs or dural AV fistulas. 4. Left vertebral artery injection: The origin of the left vertebral artery appear to be free from any atherosclerotic disease. Left vertebral artery appear to be normal in size, caliber and course. Intracranially, there was normal opacification of the left posterior inferior cerebellar artery, bilateral anterior-inferior cerebellar arteries, bilateral superior cerebellar arteries, bilateral posterior cerebral artery, basilar artery lumen and apex. There is some contrast washout seen in the left PCA because the PCA also get supply from the left posterior commuting artery. Normal capillary phase and venous drainage was noted. No evidence of aneurysms, AVMs or dural AV fistulas. 5. 3D rotational DynaCT imaging: The raw images were transferred to an independent workstation, and volume rendered 3D images were generated and reviewed. The images were independently reviewed. Interpretation of the images reveal no evidence of any subarachnoid hemorrhage or intraparenchymal hemorrhage. The right MCA hyperdensity is not visualized. .     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 Patients: If you have questions regarding some of the verbiage in your report, please visit [email or web address] for a definition. If you have any other questions, please contact your physician.      US DUP LOWER EXTREMITIES BILATERAL VENOUS    Result Date: 2022  Patient MRN:  [id number] : 1975 Age: 49 years Gender: Male Order Date:  2022 6:46 PM EXAM: US DUP LOWER EXTREMITIES BILATERAL VENOUS NUMBER OF IMAGES:  48 INDICATION:  leg pain and swelling leg pain and swelling What reading provider will be dictating this exam?->[facility] COMPARISON: 2022 There is evidence for venous thrombosis in the greater saphenous vein. There is  no evidence for deep venous thrombosis. There is otherwise good compressibility, there is good augmentation, there is good color flow. Within the visualized vessels there is no evidence for deep venous thrombosis. Findings compatible superficial venous thrombosis     US DUP LOWER EXTREMITIES BILATERAL VENOUS    Result Date: 2022  Patient MRN:  [id number] : 1975 Age: 49 years Gender: Male Order Date:  2022 12:12 PM EXAM: US DUP LOWER EXTREMITIES BILATERAL VENOUS NUMBER OF IMAGES:  39 INDICATION:  r/o dvt r/o dvt What reading provider will be dictating this exam?->[facility] COMPARISON: None The right and left external iliac, the right left common femoral and the right left profunda are not seen due to bilateral inguinal lines and bandages Within the visualized vessels, there is no evidence for deep venous thrombosis There is good compressibility, there is good augmentation, there is good color flow. Within the visualized vessels there is no evidence for deep venous thrombosis     IR MECHANICAL ART THROMBECTOMY INTRACRANIAL    Addendum Date: 2022    Addendum Report signed before completion, the following is add on to the report IMPRESSION Right middle cerebral artery inferior division occlusion with suspected subocclusive thrombus versus atherosclerotic plaque status post thrombectomy with recanalization followed by vasospasm in this segment. Currently uncertain if this is chronic or acute however postprocedural Kristi CT does not reveal any evidence of intracranial hemorrhage. RECOMMENDATIONS Patient will be managed medically with blood pressure parameters as given. Will start patient on a heparin drip and transition to Coumadin.  STROKE ACUTE TIME STAMPS: Preprocedure NIH stroke scale: 18 Vascular access time: 1100 hours Time First Pass: 1120 solitaire 3-40+ Machelle Time of final recanalization: 1120 Time of vascular closure: 1132 Postprocedure NIH stroke scale: Not assessed due to anesthesia    Result Date: 5/12/2022  IR MECHANICAL ART THROMBECTOMY INTRACRANIAL, IR SABINE CATH PLACE VERTEBRAL ART LEFT W OR WO ANGIO, IR SABINE CATH PLACE COM CAROTID INTRACRANIAL LEFT W OR WO ANGIO PROCEDURE PERFORMED: Diagnostic Cerebral angiogram with mechanical thrombectomy Ultrasound guided left femoral artery access Intraoperative Kristi CT Limited CT with interpretation. COMPLETED DATE:  5/11/2022 10:58 AM CLINICAL HISTORY/PRE-PROCEDURE DIAGNOSIS: Acute ischemic stroke. Patient with a NIH stroke scale of 18 presents with acute left-sided symptoms concerning for a right MCA stroke. Of note he recently had a stroke on April 25 and hence was not a TPA candidate. Patient refused CTA and CTP as he is had severe contrast allergy despite preparation per patient. Patient is taken straight to IR with prophylactic intubation and general anesthesia with patient consent. POST-PROCEDURE DIAGNOSIS: Please see below COMPARISON: None ANESTHESIA: General anesthesia VESSELS CATHETERIZED: 1. Left vertebral artery. 2. Left common carotid artery. 3. Right common carotid artery. 4. Right internal carotid artery. 5. Right middle cerebral artery. RADIATION EXPOSURE DATA:  829.8 MG Y TOTAL FLUOROSCOPY TIME: 15 minutes and 3 seconds CONTRAST USED: 80 mL of Isovue-300 PROCEDURE: Following informed consent, the patient was brought to the angiography suite, both groins are prepped and draped in usual sterile manner. Using sonographic assistance Vascular access was obtained in the left common femoral artery with a 8-Latvian sheath which was connected to continuous heparinized saline flush. A 6F cerebase catheter was prepped and with the support of a diagnostic catheter was brought into the aorta, double flushed and connected to continuous heparinized saline flush. The balloon guide catheter was then telescoped into the Right internal carotid artery. Fluoroscopic imaging performed at that time confirmed the presence of inferior division M2 occlusion. As the CT head did not show any evidence of infarction this is thought to be either an acute or subacute thrombus from his stroke at the end of April however with CT head showing salvageable tissue identified decide to go ahead with the thrombectomy. Along with the Machelle A microcatheter-Marksman was prepped and with the support of a microwire was navigated across the arterial occlusion. A Solitaire 3 mm x 40 mm device was then deployed into the thrombus and allowed to integrate for approximately 2-3 minutes. The device was then retrieved with flow of contrast reversed by distal aspiration through the Machelle device. Continuous aspiration was performed during the retrieval process. Postretrieval digital subtraction images were obtained and showed revascularization- however on follow-up imaging there is vasospasm of the M1 M2 junction and delayed filling noticed here. As there is vasospasm and the thrombus has been retrieved it was decided to end the procedure here. Intraoperative Kristi CT was obtained which did not show any evidence of intracranial hemorrhage. Anesthesia was reversed patient was extubated and patient was transferred to the postoperative area in a stable condition. He will be transferred to the ICU when a bed is available. INTERPRETATION OF IMAGES: 1. Right internal carotid artery injection: Intracranially, there was normal opacification of the right ophthalmic artery, right middle cerebral artery and its branches are visualized. There is inferior division occlusion in the M2 segment. 2. Postprocedure right internal carotid artery injections reveal recanalization of the right MCA M2 segment followed by subsequent imaging revealing vasospasm in this segment. There is no vasospasm noticed in the superior division. No contrast extravasation is noticed.  3. Left internal carotid artery injection: Intracranially, there was normal opacification of the left ophthalmic artery, left posterior communicating artery, left anterior choroidal artery, left middle cerebral artery and its branches, and left anterior cerebral artery and its branches. The left posterior communicating artery appeared supplying the territory of the left posterior cerebral artery suggesting a fetal type left posterior cerebral artery. Please note that the left PCA also get supply from the basilar artery as indicated below. Normal capillary phase and venous drainage was noted. No evidence of any aneurysms ,AVMs or dural AV fistulas. 4. Left vertebral artery injection: The origin of the left vertebral artery appear to be free from any atherosclerotic disease. Left vertebral artery appear to be normal in size, caliber and course. Intracranially, there was normal opacification of the left posterior inferior cerebellar artery, bilateral anterior-inferior cerebellar arteries, bilateral superior cerebellar arteries, bilateral posterior cerebral artery, basilar artery lumen and apex. There is some contrast washout seen in the left PCA because the PCA also get supply from the left posterior commuting artery. Normal capillary phase and venous drainage was noted. No evidence of aneurysms, AVMs or dural AV fistulas. 5. 3D rotational DynaCT imaging: The raw images were transferred to an independent workstation, and volume rendered 3D images were generated and reviewed. The images were independently reviewed. Interpretation of the images reveal no evidence of any subarachnoid hemorrhage or intraparenchymal hemorrhage. The right MCA hyperdensity is not visualized. .     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 Patients: If you have questions regarding some of the verbiage in your report, please visit [email or web address] for a definition.   If you have any other questions, please contact your physician. ASSESSMENT:  -Subtherapeutic INR  -History of PE/DVT  -Poorly controlled diabetes mellitus  -Asthma/COPD  -Factor V Leiden      PLAN:  -Admit to medicine  -Heparin infusion  -Resume Coumadin daily  -Monitor INR  -Continue home medications        Diet: No diet orders on file  Code Status: Prior  Surrogate decision maker confirmed with patient:   Extended Emergency Contact Information  Primary Emergency Contact: Dafne Roldan  Mobile Phone: [phone]  Relation: Aunt/Uncle    DVT Prophylaxis: []Lovenox []Heparin []PCD [] [address] []Encouraged ambulation  Disposition: []Med/Surg [] Intermediate [] ICU/CCU  Admit status: [] Observation [] Inpatient     +++++++++++++++++++++++++++++++++++++++++++++++++  Mery Almaguer, DO  +++++++++++++++++++++++++++++++++++++++++++++++++  NOTE: This report was transcribed using voice recognition software. Every effort was made to ensure accuracy; however, inadvertent computerized transcription errors may be present.

## 2022-05-18 NOTE — CONSULTS
[facility] CONSULT NOTE    Reason for Consult: Penile ulcer    Requested by: Maximus Short MD     Chief complaint: Shortness of breath    History Obtained From: Patient and EMR     HISTORY Breanne              The patient is a 49 y.o. male, MSM, with history of COPD, 5 Leyden deficiency, protein S deficiency, pulmonary embolism, pancreatic cancer s/p chemoradiation therapy, DM, hypertension, hepatitis B (HBsAg and cAb positive, HBsAb negative on 04/23), recently admitted in 04/2022 for left-sided weakness suspected to have stroke for which he received tPA and underwent thrombectomy then signed out AMA, presented on 05/17 with shortness of breath, incidentally found to have ulcer on glans penis. On admission, he was afebrile and hemodynamically stable with no leukocytosis. VQ scan showed low probability for pulmonary embolism. Bilateral venous duplex lower extremity ultrasound showed no evidence for DVT. ID service was consulted for penile ulcer. He reports not having known to have and not have been treated for syphilis in the past. He reports having had sexual intercourse in the past month. He reports having felt burning pain on urination then noticed sore on his penis. He had negative HIV screen and hep C Ab, positive initial syphilis screening test and TP-PA Ab but negative RPR done at [facility] during his hospital admission for stroke in April. He then developed papules on his trunk, sparing palms. Repeat RPR on 05/17 was nonreactive.     Past Medical History  Past Medical History:   Diagnosis Date    Asthma     [facility]chronic obstructive pulmonary disease) [facility] [address]     [facility]     Factor V Leiden ([facility] [address]     [facility]     Pulmonary emboli (H[facility])        Current Facility-Administered Medications   Medication Dose Route Frequency Provider Last Rate Last Admin    albuterol (PROVENTIL) nebulizer solution 2.5 mg  2.5 mg Nebulization Q6H PRN Stephanie Miller, DO        aspirin chewable tablet 81 mg  81 mg Oral Daily Gerre Neo, DO        clopidogrel (PLAVIX) tablet 75 mg  75 mg Oral Daily Gerre Neo, DO        divalproex (DEPAKOTE) DR tablet 500 mg  500 mg Oral BID Gerre Neo, DO        ferrous sulfate (IRON 325) tablet 325 mg  325 mg Oral BID  Gerre Neo, DO        montelukast (SINGULAIR) tablet 10 mg  10 mg Oral Daily Gerre Neo, DO        pantoprazole (PROTONIX) tablet 40 mg  40 mg Oral QAM AC Gerre Neo, DO        sodium chloride flush 0.9 % injection 10 mL  10 mL IntraVENous 2 times per day Gerre Neo, DO        sodium chloride flush 0.9 % injection 10 mL  10 mL IntraVENous PRN Gerre Neo, DO        0.9 % sodium chloride infusion   IntraVENous PRN Gerre Neo, DO        promethazine (PHENERGAN) tablet 12.5 mg  12.5 mg Oral Q6H PRN Gerre Neo, DO        Or    ondansetron [facility]) injection 4 mg  4 mg IntraVENous Q6H PRN Gerre Neo, DO        polyethylene glycol (GLYCOLAX) packet 17 g  17 g Oral Daily PRN Gerre Neo, DO        acetaminophen (TYLENOL) tablet 650 mg  650 mg Oral Q6H PRN Gerre Neo, DO        Or    acetaminophen (TYLENOL) suppository 650 mg  650 mg Rectal Q6H PRN Gerre Neo, DO        glucose chewable tablet 16 g  4 tablet Oral PRN Gerre Neo, DO        dextrose 10 % infusion  125 mL IntraVENous PRN Gerre Neo, DO        Or    dextrose 10 % infusion  250 mL IntraVENous PRN Gerre Neo, DO        glucagon (rDNA) injection 1 mg  1 mg IntraMUSCular PRN Gerre Neo, DO        dextrose 5 % solution  100 mL/hr IntraVENous PRN Gerre Neo, DO        ziprasidone (GEODON) 20 mg in sterile water 1 mL injection  20 mg IntraMUSCular Once Gerre Neo, DO        LORazepam (ATIVAN) injection 1 mg  1 mg IntraVENous Once Gerre Neo, DO        diphenhydrAMINE (BENADRYL) injection 50 mg  50 mg IntraVENous Q6H Gerre Neo, DO   50 mg at 05/18/22 0629    HYDROmorphone (DILAUDID) injection 1 mg  1 mg IntraVENous Q4H PRN Shan Chick, DO   1 mg at 05/18/22 8211    predniSONE (DELTASONE) tablet 60 mg  60 mg Oral Once Diane Cortes, DO        heparin 25,000 units in dextrose 5% 250 mL (premix) infusion  5-30 Units/kg/hr IntraVENous Continuous Shan Chick, DO 10.9 mL/hr at 05/18/22 0624 12 Units/kg/hr at 05/18/22 7992    heparin (porcine) injection 4,000 Units  4,000 Units IntraVENous PRN Shan Chick, DO        heparin (porcine) injection 2,000 Units  2,000 Units IntraVENous PRN Shan Chick, DO        insulin glargine-yfgn [facility]) injection vial 23 Units  0.25 Units/kg SubCUTAneous Nightly Shan Chick, DO   23 Units at 05/17/22 2215    insulin lispro (HUMALOG) injection vial 0-18 Units  0-18 Units SubCUTAneous TID WC Shan Chick, DO        insulin lispro (HUMALOG) injection vial 0-9 Units  0-9 Units SubCUTAneous Nightly Shan Chick, DO           Allergies   Allergen Reactions    Lovenox [Enoxaparin Sodium] Anaphylaxis     Patient reports anaphylaxis to Lovenox    Arixtra [Fondaparinux]     Dye [Iodides]     Eliquis [Apixaban]     Fragmin [Dalteparin]     Iodine     Ipratropium     Motrin [Ibuprofen]     Robaxin [Methocarbamol]     Skelaxin [Metaxalone]     Toradol [Ketorolac Tromethamine]     Tramadol     Xarelto [Rivaroxaban]        Surgical History  Past Surgical History:   Procedure Laterality Date    APPENDECTOMY      IR MECHANICAL ART THROMBECTOMY INTRACRANIAL  5/11/2022    IR MECHANICAL ART THROMBECTOMY INTRACRANIAL 5/11/2022 Brennon Navarrete MD [facility]    VASCULAR SURGERY      july 2021        Social History  Social History     Socioeconomic History    Marital status: Single   Tobacco Use    Smoking status: Former Smoker    Smokeless tobacco: Never Used   Vaping Use    Vaping Use: Never used   Substance and Sexual Activity    Alcohol use: Not Currently    Drug use: Never     Family Medical History  History reviewed.  No pertinent family history. Review of Systems:  Constitutional: No fever, no chills  Eyes: No vision changes, no retroorbital pain  ENT: No hearing changes, no ear pain  Respiratory: No cough, has dyspnea  Cardiovascular: Has chest pain, no palpitations  Gastrointestinal: No abdominal pain, no diarrhea  Genitourinary: No dysuria, no hematuria  Integumentary: Has rash, has itching  Musculoskeletal: No muscle pain, no joint pain  Neurologic: Has headache, no numbness in extremities    Physical Examination:  Vitals:    05/17/22 1645 05/17/22 2007 05/17/22 2349 05/18/22 0815   BP: 114/79 111/74 (!) 123/94 (!) 119/55   Pulse: 98 97 81 74   Resp: 18 18 18 18   Temp:   98 °F (36.7 °C) 97.3 °F (36.3 °C)   TempSrc:   Oral Temporal   SpO2: 98% 99% 100% 98%   Weight:         Constitutional: Alert, not in distress  Eyes: Sclerae anicteric, no conjunctival erythema  ENT: No buccal lesion, no pharyngeal exudates  Neck: No nuchal rigidity, no cervical adenopathy  Lungs: Clear breath sounds, no crackles, no wheezes  Heart: Regular rate and rhythm, no murmurs  Abdomen: Bowel sounds present, soft, nontender  Skin: Warm and dry, papules over trunk and back, sparing extremities, palms and soles, inguinal areas. Desquamated sore on glans penis, no inguinal lympahdenopathy  Musculoskeletal: No joint erythema, no joint swelling    Labs, imaging, and medical records/notes were personally reviewed. Assessment:  Syphilis, suspect latent vs secondary    Plan:  Give IM benzathine penicillin 2.5 million units weekly for 3 doses. Check HIV screen, hepatitis B eAg and viral load. Repeat RPR. Follow up urine GC/Chlamydia PCR. Thank you for involving me in the care of Darrell Persaud. I will continue to follow. Please do not hesitate to call for any questions or concerns.       Electronically signed by Ilda Ventura MD on 5/18/2022 at 10:16 AM

## 2022-05-19 LAB
ANION GAP SERPL CALCULATED.3IONS-SCNC: 11 MMOL/L (ref 7–16)
APTT: 73.1 SEC (ref 24.5–35.1)
BUN BLDV-MCNC: 17 MG/DL (ref 6–20)
C. TRACHOMATIS DNA ,URINE: NEGATIVE
CALCIUM SERPL-MCNC: 8.6 MG/DL (ref 8.6–10.2)
CHLORIDE BLD-SCNC: 101 MMOL/L (ref 98–107)
CO2: 21 MMOL/L (ref 22–29)
CREAT SERPL-MCNC: 0.8 MG/DL (ref 0.7–1.2)
GFR AFRICAN AMERICAN: >60
GFR NON-AFRICAN AMERICAN: >60 ML/MIN/1.73
GLUCOSE BLD-MCNC: 209 MG/DL (ref 74–99)
HCT VFR BLD CALC: 35.3 % (ref 37–54)
HEMOGLOBIN: 10.5 G/DL (ref 12.5–16.5)
HIV-1 AND HIV-2 ANTIBODIES: NORMAL
INR BLD: 1
MCH RBC QN AUTO: 23.4 PG (ref 26–35)
MCHC RBC AUTO-ENTMCNC: 29.7 % (ref 32–34.5)
MCV RBC AUTO: 78.6 FL (ref 80–99.9)
METER GLUCOSE: 200 MG/DL (ref 74–99)
METER GLUCOSE: 203 MG/DL (ref 74–99)
METER GLUCOSE: 240 MG/DL (ref 74–99)
METER GLUCOSE: 242 MG/DL (ref 74–99)
METER GLUCOSE: 262 MG/DL (ref 74–99)
N. GONORRHOEAE DNA, URINE: NEGATIVE
PDW BLD-RTO: 16.3 FL (ref 11.5–15)
PLATELET # BLD: 352 E9/L (ref 130–450)
PMV BLD AUTO: 10.8 FL (ref 7–12)
POTASSIUM SERPL-SCNC: 4.3 MMOL/L (ref 3.5–5)
PROTHROMBIN TIME: 11.3 SEC (ref 9.3–12.4)
RBC # BLD: 4.49 E12/L (ref 3.8–5.8)
RPR: NORMAL
SODIUM BLD-SCNC: 133 MMOL/L (ref 132–146)
SOURCE: NORMAL
WBC # BLD: 5.4 E9/L (ref 4.5–11.5)

## 2022-05-19 PROCEDURE — 6360000002 HC RX W HCPCS: Performed by: FAMILY MEDICINE

## 2022-05-19 PROCEDURE — 82962 GLUCOSE BLOOD TEST: CPT

## 2022-05-19 PROCEDURE — 36415 COLL VENOUS BLD VENIPUNCTURE: CPT

## 2022-05-19 PROCEDURE — 2580000003 HC RX 258: Performed by: FAMILY MEDICINE

## 2022-05-19 PROCEDURE — 85027 COMPLETE CBC AUTOMATED: CPT

## 2022-05-19 PROCEDURE — 85610 PROTHROMBIN TIME: CPT

## 2022-05-19 PROCEDURE — 6370000000 HC RX 637 (ALT 250 FOR IP): Performed by: INTERNAL MEDICINE

## 2022-05-19 PROCEDURE — S5553 INSULIN LONG ACTING 5 U: HCPCS | Performed by: FAMILY MEDICINE

## 2022-05-19 PROCEDURE — 6370000000 HC RX 637 (ALT 250 FOR IP): Performed by: FAMILY MEDICINE

## 2022-05-19 PROCEDURE — 85730 THROMBOPLASTIN TIME PARTIAL: CPT

## 2022-05-19 PROCEDURE — 1200000000 HC SEMI PRIVATE

## 2022-05-19 PROCEDURE — 80048 BASIC METABOLIC PNL TOTAL CA: CPT

## 2022-05-19 RX ORDER — SODIUM CHLORIDE 0.9 % (FLUSH) 0.9 %
5-40 SYRINGE (ML) INJECTION EVERY 12 HOURS SCHEDULED
Status: DISCONTINUED | OUTPATIENT
Start: 2022-05-19 | End: 2022-05-24 | Stop reason: HOSPADM

## 2022-05-19 RX ORDER — DOXYCYCLINE HYCLATE 100 MG/1
100 CAPSULE ORAL EVERY 12 HOURS SCHEDULED
Status: DISCONTINUED | OUTPATIENT
Start: 2022-05-19 | End: 2022-05-24 | Stop reason: HOSPADM

## 2022-05-19 RX ORDER — WARFARIN SODIUM 10 MG/1
10 TABLET ORAL
Status: COMPLETED | OUTPATIENT
Start: 2022-05-19 | End: 2022-05-19

## 2022-05-19 RX ORDER — DOXYCYCLINE HYCLATE 100 MG/1
100 CAPSULE ORAL 2 TIMES DAILY
Qty: 52 CAPSULE | Refills: 0 | Status: SHIPPED | OUTPATIENT
Start: 2022-05-19 | End: 2022-06-14

## 2022-05-19 RX ORDER — SODIUM CHLORIDE 0.9 % (FLUSH) 0.9 %
5-40 SYRINGE (ML) INJECTION PRN
Status: DISCONTINUED | OUTPATIENT
Start: 2022-05-19 | End: 2022-05-24 | Stop reason: HOSPADM

## 2022-05-19 RX ORDER — SODIUM CHLORIDE 9 MG/ML
INJECTION, SOLUTION INTRAVENOUS PRN
Status: DISCONTINUED | OUTPATIENT
Start: 2022-05-19 | End: 2022-05-24 | Stop reason: HOSPADM

## 2022-05-19 RX ADMIN — ASPIRIN 81 MG 81 MG: 81 TABLET ORAL at 09:49

## 2022-05-19 RX ADMIN — SODIUM CHLORIDE, PRESERVATIVE FREE 10 ML: 5 INJECTION INTRAVENOUS at 22:00

## 2022-05-19 RX ADMIN — HYDROMORPHONE HYDROCHLORIDE 1 MG: 1 INJECTION, SOLUTION INTRAMUSCULAR; INTRAVENOUS; SUBCUTANEOUS at 22:05

## 2022-05-19 RX ADMIN — INSULIN LISPRO 6 UNITS: 100 INJECTION, SOLUTION INTRAVENOUS; SUBCUTANEOUS at 13:20

## 2022-05-19 RX ADMIN — FERROUS SULFATE TAB 325 MG (65 MG ELEMENTAL FE) 325 MG: 325 (65 FE) TAB at 09:48

## 2022-05-19 RX ADMIN — DIVALPROEX SODIUM 500 MG: 250 TABLET, DELAYED RELEASE ORAL at 22:05

## 2022-05-19 RX ADMIN — DIPHENHYDRAMINE HYDROCHLORIDE 50 MG: 50 INJECTION, SOLUTION INTRAMUSCULAR; INTRAVENOUS at 22:04

## 2022-05-19 RX ADMIN — INSULIN LISPRO 6 UNITS: 100 INJECTION, SOLUTION INTRAVENOUS; SUBCUTANEOUS at 09:50

## 2022-05-19 RX ADMIN — DIPHENHYDRAMINE HYDROCHLORIDE 50 MG: 50 INJECTION, SOLUTION INTRAMUSCULAR; INTRAVENOUS at 02:50

## 2022-05-19 RX ADMIN — WARFARIN SODIUM 10 MG: 10 TABLET ORAL at 13:22

## 2022-05-19 RX ADMIN — DIPHENHYDRAMINE HYDROCHLORIDE 50 MG: 50 INJECTION, SOLUTION INTRAMUSCULAR; INTRAVENOUS at 10:46

## 2022-05-19 RX ADMIN — HYDROMORPHONE HYDROCHLORIDE 1 MG: 1 INJECTION, SOLUTION INTRAMUSCULAR; INTRAVENOUS; SUBCUTANEOUS at 06:44

## 2022-05-19 RX ADMIN — MONTELUKAST SODIUM 10 MG: 10 TABLET ORAL at 09:48

## 2022-05-19 RX ADMIN — INSULIN LISPRO 6 UNITS: 100 INJECTION, SOLUTION INTRAVENOUS; SUBCUTANEOUS at 18:54

## 2022-05-19 RX ADMIN — DIVALPROEX SODIUM 500 MG: 250 TABLET, DELAYED RELEASE ORAL at 09:48

## 2022-05-19 RX ADMIN — HYDROMORPHONE HYDROCHLORIDE 1 MG: 1 INJECTION, SOLUTION INTRAMUSCULAR; INTRAVENOUS; SUBCUTANEOUS at 15:12

## 2022-05-19 RX ADMIN — DIPHENHYDRAMINE HYDROCHLORIDE 50 MG: 50 INJECTION, SOLUTION INTRAMUSCULAR; INTRAVENOUS at 19:14

## 2022-05-19 RX ADMIN — INSULIN LISPRO 5 UNITS: 100 INJECTION, SOLUTION INTRAVENOUS; SUBCUTANEOUS at 22:04

## 2022-05-19 RX ADMIN — DIPHENHYDRAMINE HYDROCHLORIDE 50 MG: 50 INJECTION, SOLUTION INTRAMUSCULAR; INTRAVENOUS at 15:12

## 2022-05-19 RX ADMIN — HYDROMORPHONE HYDROCHLORIDE 1 MG: 1 INJECTION, SOLUTION INTRAMUSCULAR; INTRAVENOUS; SUBCUTANEOUS at 02:50

## 2022-05-19 RX ADMIN — HYDROMORPHONE HYDROCHLORIDE 1 MG: 1 INJECTION, SOLUTION INTRAMUSCULAR; INTRAVENOUS; SUBCUTANEOUS at 10:45

## 2022-05-19 RX ADMIN — DIPHENHYDRAMINE HYDROCHLORIDE 50 MG: 50 INJECTION, SOLUTION INTRAMUSCULAR; INTRAVENOUS at 06:44

## 2022-05-19 RX ADMIN — SODIUM CHLORIDE, PRESERVATIVE FREE 10 ML: 5 INJECTION INTRAVENOUS at 10:45

## 2022-05-19 RX ADMIN — DOXYCYCLINE HYCLATE 100 MG: 100 CAPSULE ORAL at 22:04

## 2022-05-19 RX ADMIN — HEPARIN SODIUM 16 UNITS/KG/HR: 10000 INJECTION, SOLUTION INTRAVENOUS at 18:40

## 2022-05-19 RX ADMIN — HYDROMORPHONE HYDROCHLORIDE 1 MG: 1 INJECTION, SOLUTION INTRAMUSCULAR; INTRAVENOUS; SUBCUTANEOUS at 19:12

## 2022-05-19 RX ADMIN — CLOPIDOGREL BISULFATE 75 MG: 75 TABLET ORAL at 09:49

## 2022-05-19 RX ADMIN — INSULIN GLARGINE-YFGN 23 UNITS: 100 INJECTION, SOLUTION SUBCUTANEOUS at 22:03

## 2022-05-19 ASSESSMENT — PAIN SCALES - GENERAL
PAINLEVEL_OUTOF10: 9
PAINLEVEL_OUTOF10: 7
PAINLEVEL_OUTOF10: 4
PAINLEVEL_OUTOF10: 10
PAINLEVEL_OUTOF10: 9
PAINLEVEL_OUTOF10: 10
PAINLEVEL_OUTOF10: 8
PAINLEVEL_OUTOF10: 10

## 2022-05-19 ASSESSMENT — PAIN - FUNCTIONAL ASSESSMENT: PAIN_FUNCTIONAL_ASSESSMENT: PREVENTS OR INTERFERES SOME ACTIVE ACTIVITIES AND ADLS

## 2022-05-19 ASSESSMENT — PAIN SCALES - WONG BAKER: WONGBAKER_NUMERICALRESPONSE: 6

## 2022-05-19 ASSESSMENT — PAIN DESCRIPTION - LOCATION
LOCATION: GENERALIZED;GROIN
LOCATION: GENERALIZED

## 2022-05-19 ASSESSMENT — PAIN DESCRIPTION - DESCRIPTORS
DESCRIPTORS: ACHING
DESCRIPTORS: ACHING;BURNING;ITCHING

## 2022-05-19 ASSESSMENT — PAIN DESCRIPTION - ONSET: ONSET: ON-GOING

## 2022-05-19 ASSESSMENT — PAIN DESCRIPTION - FREQUENCY: FREQUENCY: CONTINUOUS

## 2022-05-19 ASSESSMENT — PAIN DESCRIPTION - PAIN TYPE: TYPE: ACUTE PAIN

## 2022-05-19 NOTE — CARE COORDINATION
Care Coordination  Met with the patient at bedside to discuss transition care planning. The patient states that he lives in an apartment in [place] and he lives alone. He has had a recent stroke on 5/12/22. Dme he said he has an old nebulizer machine and its broke wound like a new one if possible. Has had no hhc in the past but has been to a skilled facility called [facility] in March of this year. His primary care physician is Dr Lashanda woods and his pharmacy is through [facility].His plan is to return home and his ride is her aunt/umncle. Patient was admitted due to [address] [place] [address]. he has a PE but he has one 2 months ago. He took a trip to [place] and did not take his coumadin. Has a history of factor V Leiden. VQ scan shows low probability. Us of bilateral lower extremities  findings compatible superficial venous thrombosis. The patient is getting daily pt and coumadin till inr is greater then 2.0. Inr today is 1.0 and he is getting 10 mg po coumadin today. The patient is on an iv heparin gtt.  Plan is to return to his apartment in [facility] upon discharge.

## 2022-05-19 NOTE — PROGRESS NOTES
Pharmacy Consultation Note  (Warfarin Dosing and Monitoring)  Initial consult date: 05/18/22  Consulting Provider: Dr. Lorrayne Dakin is a 49 y.o. male for whom pharmacy has been asked to manage warfarin therapy. Weight:   Wt Readings from Last 1 Encounters:   05/17/22 200 lb (90.7 kg)       TSH:    Lab Results   Component Value Date    TSH 5.890 05/12/2022       Hepatic Function Panel:                            Lab Results   Component Value Date    ALKPHOS 112 05/17/2022    ALT 25 05/17/2022    AST 18 05/17/2022    PROT 7.0 05/17/2022    BILITOT 0.2 05/17/2022    BILIDIR 0.0 03/10/2022    LABALBU 4.1 05/17/2022       Current warfarin drug-drug interactions include: No significant interactions    Recent Labs     05/17/22  1204 05/18/22  0427 05/19/22  0609   HGB 10.5* 9.6* 10.5*    311 352     Date Warfarin Dose INR Heparin or LMWH Comment   05/18 7.5 mg 1.1 Heparin Gtt Factor V Leiden   5/19 10 mg 1 Heparin gtt                           Assessment:  · Patient is a 49 y.o. male on warfarin for History of  VTE/PE and Factor V Leiden. Patient's home warfarin dosing regimen is 7.5mg nightly. · Goal INR 2 - 3  · INR 1 today, therapeutic aptt 73.1 with heparin gtt.     Plan:  · Warfarin 10 mg tonight x one today  · Daily PT/INR until the INR is stable within the therapeutic range  · Pharmacist will follow and monitor/adjust dosing as necessary    Thank you for this consult,    Marlin Parks, [facility] 5/19/2022 8:01 AM

## 2022-05-19 NOTE — PROGRESS NOTES
Hospitalist Progress Note      SYNOPSIS: Patient admitted on 2022 for this of breath. Patient presented to the emergency department with shortness of breath. Patient has a history of pulmonary embolism and deep vein thrombosis. Patient had taken a road trip to [place] and has not been taking his Coumadin. Has a history of factor V Leiden. Vital signs within normal and stable. Patient is afebrile. Laboratory studies demonstrate potassium 3.3, glucose 440, hemoglobin 10.5. INR is 1.1. Patient had a reaction to IV contrast dye so VQ scan was performed. VQ scan was reassuring. Patient has anaphylactic reaction to Lovenox so was started on a heparin infusion for bridging. Medicine consulted for admission. Patient was seen by infectious disease for syphilis, suspect latent versus secondary. He was given IM benzathine penicillin 2,500,000 units weekly for 3 doses, first dose on . SUBJECTIVE:    Patient seen and examined in his room. Alert awake oriented x3 and providing most of the information. Had multiple requests. Denies any chest pain, nausea, vomiting  Records reviewed. Stable overnight. No other overnight issues reported. Temp (24hrs), Av.9 °F (36.6 °C), Min:97.3 °F (36.3 °C), Max:98.4 °F (36.9 °C)    DIET: ADULT DIET; Regular  CODE: Full Code    Intake/Output Summary (Last 24 hours) at 2022 0821  Last data filed at 2022 2344  Gross per 24 hour   Intake 360 ml   Output 700 ml   Net -340 ml       OBJECTIVE:    BP (!) 110/35   Pulse 79   Temp 97.3 °F (36.3 °C) (Temporal)   Resp 18   Wt 200 lb (90.7 kg)   SpO2 99%   BMI 31.32 kg/m²     General appearance: No apparent distress, appears stated age and cooperative. HEENT:  Conjunctivae/corneas clear. Neck: Supple. No jugular venous distention.    Respiratory: Clear to auscultation bilaterally, normal respiratory effort  Cardiovascular: Regular rate rhythm, normal S1-S2  Abdomen: Soft, nontender, nondistended  Musculoskeletal: No clubbing, cyanosis, no bilateral lower extremity edema. Brisk capillary refill. Skin:  No rashes  on visible skin  Neurologic: awake, alert and following commands       ASSESSMENT:  -Subtherapeutic INR  -History of PE/DVT  -Poorly controlled diabetes mellitus  -Asthma/COPD  -Factor V Leiden  -Ulcer on glans        PLAN:  -Admit to medicine  -VQ scan low probability  -Heparin infusion for bridging  -Resume Coumadin daily  -Monitor INR  -Continue home medications  -Consult ID for syphilis, follow ID recommendations    DISPOSITION:   Unclear discharge disposition at the moment.     Medications:  REVIEWED DAILY    Infusion Medications    sodium chloride      dextrose      Or    dextrose      dextrose      sodium chloride      heparin (PORCINE) Infusion 16 Units/kg/hr (05/19/22 0938)     Scheduled Medications    warfarin  10 mg Oral Once    aspirin  81 mg Oral Daily    clopidogrel  75 mg Oral Daily    divalproex  500 mg Oral BID    ferrous sulfate  325 mg Oral BID WC    montelukast  10 mg Oral Daily    pantoprazole  40 mg Oral QAM AC    sodium chloride flush  10 mL IntraVENous 2 times per day    ziprasidone (GEODON) in sterile water injection  20 mg IntraMUSCular Once    LORazepam  1 mg IntraVENous Once    warfarin placeholder: dosing by pharmacy   Other RX Placeholder    penicillin G benzathine  2.4 Million Units IntraMUSCular Weekly    diphenhydrAMINE  50 mg IntraVENous Q4H    lidocaine PF  5 mL IntraDERmal Once    sodium chloride flush  5-40 mL IntraVENous 2 times per day    heparin flush  3 mL IntraVENous 2 times per day    predniSONE  60 mg Oral Once    insulin glargine  0.25 Units/kg SubCUTAneous Nightly    insulin lispro  0-18 Units SubCUTAneous TID WC    insulin lispro  0-9 Units SubCUTAneous Nightly     PRN Meds: albuterol, sodium chloride flush, sodium chloride, promethazine **OR** ondansetron, polyethylene glycol, acetaminophen **OR** acetaminophen, glucose, dextrose **OR** dextrose, glucagon (rDNA), dextrose, HYDROmorphone, sodium chloride flush, sodium chloride, heparin flush, heparin (porcine), heparin (porcine)    Labs:     Recent Labs     05/17/22  1204 05/18/22  0427 05/19/22  0609   WBC 6.1 7.3 5.4   HGB 10.5* 9.6* 10.5*   HCT 33.9* 30.5* 35.3*    311 352       Recent Labs     05/17/22  1204 05/18/22  0427 05/19/22  0609    135 133   K 3.3* 4.6 4.3    98 101   CO2 19* 23 21*   BUN 11 13 17   CREATININE 0.7 0.7 0.8   CALCIUM 8.5* 9.0 8.6       Recent Labs     05/17/22  1204   PROT 7.0   ALKPHOS 112   ALT 25   AST 18   BILITOT 0.2       Recent Labs     05/17/22  1204 05/18/22  0427 05/19/22  0609   INR 1.1 1.1 1.0       No results for input(s): Lucila Comment in the last 72 hours. Chronic labs:    Lab Results   Component Value Date    CHOL 201 (H) 05/12/2022    TRIG 205 (H) 05/12/2022    HDL 47 05/12/2022    LDLCALC 113 (H) 05/12/2022    TSH 5.890 (H) 05/12/2022    INR 1.0 05/19/2022    LABA1C 8.2 (H) 05/18/2022       Radiology: REVIEWED DAILY    +++++++++++++++++++++++++++++++++++++++++++++++++  Mable Kruger MD  [facility] - [address], [place]  +++++++++++++++++++++++++++++++++++++++++++++++++  NOTE: This report was transcribed using voice recognition software. Every effort was made to ensure accuracy; however, inadvertent computerized transcription errors may be present.

## 2022-05-19 NOTE — PLAN OF CARE
Problem: Pain  Goal: Verbalizes/displays adequate comfort level or baseline comfort level  5/19/2022 1133 by Saadia Nails RN  Outcome: Progressing  5/19/2022 1133 by Saadia Nails RN  Outcome: Not Progressing     Problem: Safety - Adult  Goal: Free from fall injury  5/19/2022 1133 by Saadia Nails RN  Outcome: Progressing  5/19/2022 1133 by Saadia Nails RN  Outcome: Not Progressing     Problem: ABCDS Injury Assessment  Goal: Absence of physical injury  5/19/2022 1133 by Saadia Nails RN  Outcome: Progressing  5/19/2022 1133 by Saadia Nails RN  Outcome: Not Progressing

## 2022-05-19 NOTE — PROGRESS NOTES
Attempted left midline cath with ultrasound under sterile and max. Barrier prec via the brachial and basilic veins. Unable to pass guidewire. The right upper extremity was assessed there are areas of intermittent non compression of vessels. The lower forearms were assessed for iv site. . no appreciated veins identified. RN notified.

## 2022-05-19 NOTE — PROGRESS NOTES
CLINICAL PHARMACY NOTE: MEDS TO BEDS    Total # of Prescriptions Filled: 1   The following medications were delivered to the patient:  · Doxycycline 100mg    Additional Documentation:    Delivered to patient 5/16/22 @4:35pm

## 2022-05-19 NOTE — PROGRESS NOTES
When giving pt evening meds at 6:45pm the Pt became very agitated and verbally aggressive with nursing staff because he was informed that that his Dilaudid could not be given until 7:10pm, as it is a Q6 hour Prn Dilaudid order. Pt asked to speak to the charge nurse and she was notified.

## 2022-05-19 NOTE — PROGRESS NOTES
YANY PROGRESS NOTE      Chief complaint: Follow-up of latent syphilis    The patient is a 49 y.o. male, MSM, with history of COPD, 5 Leyden deficiency, protein S deficiency, pulmonary embolism, pancreatic cancer s/p chemoradiation therapy, DM, hypertension, hepatitis B (HBsAg and cAb positive, HBsAb negative on 04/23), recently admitted in 04/2022 for left-sided weakness suspected to have stroke for which he received tPA and underwent thrombectomy then signed out AMA, presented on 05/17 with shortness of breath, incidentally found to have ulcer on glans penis. On admission, he was afebrile and hemodynamically stable with no leukocytosis. VQ scan showed low probability for pulmonary embolism. Bilateral venous duplex lower extremity ultrasound showed no evidence for DVT. ID service was consulted for penile ulcer. He reports not having known to have and not have been treated for syphilis in the past. He reports having had sexual intercourse in the past month. He reports having felt burning pain on urination then noticed sore on his penis. He had negative HIV screen and hep C Ab, positive initial syphilis screening test and TP-PA Ab but negative RPR done at [facility] during his hospital admission for stroke in April. He then developed papules on his trunk, sparing palms. Repeat RPR on 05/17 and on 05/18 were nonreactive. Urine GC/Chlamydia PCR was negative. Subjective: Patient was seen and examined. No chills, no abdominal pain, no diarrhea, no rash, no itching, complains of pain and swelling on right arm where benzathine penicillin was administered by his nurse yesterday despite clear instructions on administration instructions stating to administer on upper outer gluteal area. He still complains of painful open sore on his penis with pus.       Objective:    Vitals:    05/19/22 0800   BP: (!) 110/35   Pulse: 79   Resp: 18   Temp: 97.3 °F (36.3 °C)   SpO2: 99%     Constitutional: Alert, not in distress  Respiratory: Clear breath sounds, no crackles, no wheezes  Cardiovascular: Regular rate and rhythm, no murmurs  Gastrointestinal: Bowel sounds present, soft, nontender  Skin: Warm and dry, no active dermatoses. Desquamated sore on glans penis with no pus appreciated  Musculoskeletal: No joint swelling, no joint erythema    Labs, imaging, and medical records/notes were personally reviewed. Assessment:  Syphilis, suspect latent vs secondary    Recommendations:  Continue IM benzathine penicillin 2.4 million units weekly for 3 doses but patient refused further doses. I have explained to him that for latent syphilis, IM penicillin (not IV penicillin) is the treatment of choice. Since he is not amenable to coming to infusion center to continue IM penicillin treatments, will do alternative therapy instead with doxycycline 100 mg BID for 28 days. He was also mentioning a conversation he had with a \"Dr. Elma Peterson" to which I offered him the option of following up with \"Dr. Elma Peterson" instead for the treatment of syphilis if he so chooses. Follow up HIV screen, hepatitis B eAg and viral load. Plan was discussed with Dr. Meghana Christiansen.     Thank you for involving me in the care of Henrique Hines. I will continue to follow. Please do not hesitate to call for any questions or concerns.     Electronically signed by Cleo Longo MD on 5/19/2022 at 10:12 AM

## 2022-05-20 LAB
ANION GAP SERPL CALCULATED.3IONS-SCNC: 13 MMOL/L (ref 7–16)
APTT: 43.1 SEC (ref 24.5–35.1)
BUN BLDV-MCNC: 13 MG/DL (ref 6–20)
CALCIUM SERPL-MCNC: 8.3 MG/DL (ref 8.6–10.2)
CHLORIDE BLD-SCNC: 98 MMOL/L (ref 98–107)
CO2: 21 MMOL/L (ref 22–29)
CREAT SERPL-MCNC: 0.8 MG/DL (ref 0.7–1.2)
GFR AFRICAN AMERICAN: >60
GFR NON-AFRICAN AMERICAN: >60 ML/MIN/1.73
GLUCOSE BLD-MCNC: 289 MG/DL (ref 74–99)
HCT VFR BLD CALC: 38.2 % (ref 37–54)
HEMOGLOBIN: 11 G/DL (ref 12.5–16.5)
INR BLD: 1.1
MCH RBC QN AUTO: 23.6 PG (ref 26–35)
MCHC RBC AUTO-ENTMCNC: 28.8 % (ref 32–34.5)
MCV RBC AUTO: 81.8 FL (ref 80–99.9)
METER GLUCOSE: 218 MG/DL (ref 74–99)
METER GLUCOSE: 251 MG/DL (ref 74–99)
METER GLUCOSE: 254 MG/DL (ref 74–99)
METER GLUCOSE: 302 MG/DL (ref 74–99)
PDW BLD-RTO: 16.8 FL (ref 11.5–15)
PLATELET # BLD: 316 E9/L (ref 130–450)
PMV BLD AUTO: 10.2 FL (ref 7–12)
POTASSIUM SERPL-SCNC: 4.2 MMOL/L (ref 3.5–5)
PROTHROMBIN TIME: 12.3 SEC (ref 9.3–12.4)
RBC # BLD: 4.67 E12/L (ref 3.8–5.8)
SODIUM BLD-SCNC: 132 MMOL/L (ref 132–146)
WBC # BLD: 4.5 E9/L (ref 4.5–11.5)

## 2022-05-20 PROCEDURE — 6370000000 HC RX 637 (ALT 250 FOR IP): Performed by: INTERNAL MEDICINE

## 2022-05-20 PROCEDURE — 6360000002 HC RX W HCPCS: Performed by: FAMILY MEDICINE

## 2022-05-20 PROCEDURE — 2580000003 HC RX 258: Performed by: FAMILY MEDICINE

## 2022-05-20 PROCEDURE — S5553 INSULIN LONG ACTING 5 U: HCPCS | Performed by: FAMILY MEDICINE

## 2022-05-20 PROCEDURE — 85027 COMPLETE CBC AUTOMATED: CPT

## 2022-05-20 PROCEDURE — 82962 GLUCOSE BLOOD TEST: CPT

## 2022-05-20 PROCEDURE — 85610 PROTHROMBIN TIME: CPT

## 2022-05-20 PROCEDURE — 80048 BASIC METABOLIC PNL TOTAL CA: CPT

## 2022-05-20 PROCEDURE — 6370000000 HC RX 637 (ALT 250 FOR IP): Performed by: FAMILY MEDICINE

## 2022-05-20 PROCEDURE — 85730 THROMBOPLASTIN TIME PARTIAL: CPT

## 2022-05-20 PROCEDURE — 1200000000 HC SEMI PRIVATE

## 2022-05-20 PROCEDURE — 36415 COLL VENOUS BLD VENIPUNCTURE: CPT

## 2022-05-20 RX ORDER — WARFARIN SODIUM 10 MG/1
10 TABLET ORAL
Status: COMPLETED | OUTPATIENT
Start: 2022-05-20 | End: 2022-05-20

## 2022-05-20 RX ADMIN — HYDROMORPHONE HYDROCHLORIDE 1 MG: 1 INJECTION, SOLUTION INTRAMUSCULAR; INTRAVENOUS; SUBCUTANEOUS at 06:42

## 2022-05-20 RX ADMIN — HYDROMORPHONE HYDROCHLORIDE 1 MG: 1 INJECTION, SOLUTION INTRAMUSCULAR; INTRAVENOUS; SUBCUTANEOUS at 10:45

## 2022-05-20 RX ADMIN — INSULIN LISPRO 5 UNITS: 100 INJECTION, SOLUTION INTRAVENOUS; SUBCUTANEOUS at 21:02

## 2022-05-20 RX ADMIN — ASPIRIN 81 MG 81 MG: 81 TABLET ORAL at 08:56

## 2022-05-20 RX ADMIN — SODIUM CHLORIDE, PRESERVATIVE FREE 10 ML: 5 INJECTION INTRAVENOUS at 19:45

## 2022-05-20 RX ADMIN — INSULIN LISPRO 9 UNITS: 100 INJECTION, SOLUTION INTRAVENOUS; SUBCUTANEOUS at 17:01

## 2022-05-20 RX ADMIN — DIVALPROEX SODIUM 500 MG: 250 TABLET, DELAYED RELEASE ORAL at 21:02

## 2022-05-20 RX ADMIN — HYDROMORPHONE HYDROCHLORIDE 1 MG: 1 INJECTION, SOLUTION INTRAMUSCULAR; INTRAVENOUS; SUBCUTANEOUS at 23:59

## 2022-05-20 RX ADMIN — DIPHENHYDRAMINE HYDROCHLORIDE 50 MG: 50 INJECTION, SOLUTION INTRAMUSCULAR; INTRAVENOUS at 02:24

## 2022-05-20 RX ADMIN — INSULIN LISPRO 12 UNITS: 100 INJECTION, SOLUTION INTRAVENOUS; SUBCUTANEOUS at 08:56

## 2022-05-20 RX ADMIN — FERROUS SULFATE TAB 325 MG (65 MG ELEMENTAL FE) 325 MG: 325 (65 FE) TAB at 08:57

## 2022-05-20 RX ADMIN — FERROUS SULFATE TAB 325 MG (65 MG ELEMENTAL FE) 325 MG: 325 (65 FE) TAB at 17:01

## 2022-05-20 RX ADMIN — DIVALPROEX SODIUM 500 MG: 250 TABLET, DELAYED RELEASE ORAL at 08:56

## 2022-05-20 RX ADMIN — CLOPIDOGREL BISULFATE 75 MG: 75 TABLET ORAL at 08:57

## 2022-05-20 RX ADMIN — WARFARIN SODIUM 10 MG: 10 TABLET ORAL at 12:33

## 2022-05-20 RX ADMIN — DIPHENHYDRAMINE HYDROCHLORIDE 50 MG: 50 INJECTION, SOLUTION INTRAMUSCULAR; INTRAVENOUS at 10:45

## 2022-05-20 RX ADMIN — DIPHENHYDRAMINE HYDROCHLORIDE 50 MG: 50 INJECTION, SOLUTION INTRAMUSCULAR; INTRAVENOUS at 23:14

## 2022-05-20 RX ADMIN — INSULIN LISPRO 6 UNITS: 100 INJECTION, SOLUTION INTRAVENOUS; SUBCUTANEOUS at 12:34

## 2022-05-20 RX ADMIN — HYDROMORPHONE HYDROCHLORIDE 1 MG: 1 INJECTION, SOLUTION INTRAMUSCULAR; INTRAVENOUS; SUBCUTANEOUS at 15:23

## 2022-05-20 RX ADMIN — HYDROMORPHONE HYDROCHLORIDE 1 MG: 1 INJECTION, SOLUTION INTRAMUSCULAR; INTRAVENOUS; SUBCUTANEOUS at 19:45

## 2022-05-20 RX ADMIN — DIPHENHYDRAMINE HYDROCHLORIDE 50 MG: 50 INJECTION, SOLUTION INTRAMUSCULAR; INTRAVENOUS at 06:42

## 2022-05-20 RX ADMIN — HEPARIN SODIUM 16 UNITS/KG/HR: 10000 INJECTION, SOLUTION INTRAVENOUS at 13:02

## 2022-05-20 RX ADMIN — DOXYCYCLINE HYCLATE 100 MG: 100 CAPSULE ORAL at 08:56

## 2022-05-20 RX ADMIN — HYDROMORPHONE HYDROCHLORIDE 1 MG: 1 INJECTION, SOLUTION INTRAMUSCULAR; INTRAVENOUS; SUBCUTANEOUS at 02:24

## 2022-05-20 RX ADMIN — DOXYCYCLINE HYCLATE 100 MG: 100 CAPSULE ORAL at 21:02

## 2022-05-20 RX ADMIN — DIPHENHYDRAMINE HYDROCHLORIDE 50 MG: 50 INJECTION, SOLUTION INTRAMUSCULAR; INTRAVENOUS at 19:45

## 2022-05-20 RX ADMIN — MONTELUKAST SODIUM 10 MG: 10 TABLET ORAL at 08:57

## 2022-05-20 RX ADMIN — INSULIN GLARGINE-YFGN 23 UNITS: 100 INJECTION, SOLUTION SUBCUTANEOUS at 21:03

## 2022-05-20 RX ADMIN — DIPHENHYDRAMINE HYDROCHLORIDE 50 MG: 50 INJECTION, SOLUTION INTRAMUSCULAR; INTRAVENOUS at 15:23

## 2022-05-20 ASSESSMENT — PAIN SCALES - WONG BAKER
WONGBAKER_NUMERICALRESPONSE: 6
WONGBAKER_NUMERICALRESPONSE: 0
WONGBAKER_NUMERICALRESPONSE: 10

## 2022-05-20 ASSESSMENT — PAIN SCALES - GENERAL
PAINLEVEL_OUTOF10: 10
PAINLEVEL_OUTOF10: 9
PAINLEVEL_OUTOF10: 0
PAINLEVEL_OUTOF10: 10
PAINLEVEL_OUTOF10: 6

## 2022-05-20 ASSESSMENT — PAIN DESCRIPTION - LOCATION: LOCATION: GENERALIZED

## 2022-05-20 ASSESSMENT — PAIN DESCRIPTION - DESCRIPTORS: DESCRIPTORS: ACHING;BURNING;ITCHING

## 2022-05-20 NOTE — PROGRESS NOTES
Pt c/o of extreme itchiness all over body; pt was restless and stated that he couldn't take the pain and itchiness. Pt requested to talk to provider to be given pain med earlier and benadryl. Perfect serve MD on call for IM; Dr. Jag Mirza was on      5/19/22 9:51 PM  [phone] PICU From: [facility] RE: Nancy Gonzalez RM: [id number] pt is complaining of extreme itchiness and pain on groin and generalized. Pt is requesting for benadryl and diludid to be given earlier.  He is due to for those at 11pm.  Read 9:51 PM   5/19/22 9:51 PM   Okay to give now then     5/19/22 9:53 PM  thank you  Read 9:53 PM     2204 Benadryl given earlier; okayed by provider (Dr. Vi Doll)    2205 Dilaudid given earlier; okayed by provider( Dr. Vi Doll)   See [facility]

## 2022-05-20 NOTE — PROGRESS NOTES
YANY PROGRESS NOTE      Chief complaint: Follow-up of latent syphilis    The patient is a 49 y.o. male, MSM, with history of COPD, 5 Leyden deficiency, protein S deficiency, pulmonary embolism, pancreatic cancer s/p chemoradiation therapy, DM, hypertension, hepatitis B (HBsAg and cAb positive, HBsAb negative on 04/23), recently admitted in 04/2022 for left-sided weakness suspected to have stroke for which he received tPA and underwent thrombectomy then signed out AMA, presented on 05/17 with shortness of breath, incidentally found to have ulcer on glans penis. On admission, he was afebrile and hemodynamically stable with no leukocytosis. VQ scan showed low probability for pulmonary embolism. Bilateral venous duplex lower extremity ultrasound showed no evidence for DVT. ID service was consulted for penile ulcer. He reports not having known to have and not have been treated for syphilis in the past. He reports having had sexual intercourse in the past month. He reports having felt burning pain on urination then noticed sore on his penis. He had negative HIV screen and hep C Ab, positive initial syphilis screening test and TP-PA Ab but negative RPR done at [facility] during his hospital admission for stroke in April. He then developed papules on his trunk, sparing palms. Repeat RPR on 05/17 and on 05/18 were nonreactive. Urine GC/Chlamydia PCR was negative. HIV screen was nonreactive. Subjective: Patient was seen and examined. No chills, no abdominal pain, no diarrhea, no rash, no itching. Objective:    Vitals:    05/20/22 0750   BP: 128/77   Pulse: 114   Resp: 18   Temp: 97.3 °F (36.3 °C)   SpO2: 97%     Constitutional: Alert, not in distress  Respiratory: Clear breath sounds, no crackles, no wheezes  Cardiovascular: Regular rate and rhythm, no murmurs  Gastrointestinal: Bowel sounds present, soft, nontender  Skin: Warm and dry, no active dermatoses.  Desquamated sore on glans penis with no pus appreciated  Musculoskeletal: No joint swelling, no joint erythema    Labs, imaging, and medical records/notes were personally reviewed. Assessment:  Latent syphilis of unknown duration    Recommendations:  Continue IM benzathine penicillin 2.4 million units weekly for 3 doses but patient refused further doses. I have explained to him that for latent syphilis, IM penicillin (not IV penicillin) is the treatment of choice. Since he is not amenable to coming to infusion center to continue IM penicillin treatments, will do alternative therapy instead with doxycycline 100 mg BID for 28 days. Follow up hepatitis B eAg and viral load. Keep sore on glans penis clean and dry. Consult Dermatology for rash. Thank you for involving me in the care of Makenna York. I will sign off for now. Please do not hesitate to call for any questions, concerns, or new findings.       Electronically signed by Humza Lane MD on 5/20/2022 at 9:49 AM

## 2022-05-20 NOTE — PLAN OF CARE
Problem: Pain  Goal: Verbalizes/displays adequate comfort level or baseline comfort level  5/19/2022 2339 by Ivette Moreno RN  Outcome: Progressing  Flowsheets (Taken 5/19/2022 2130)  Verbalizes/displays adequate comfort level or baseline comfort level: Encourage patient to monitor pain and request assistance  5/19/2022 1133 by Melony Reid RN  Outcome: Progressing  5/19/2022 1133 by Melony Reid RN  Outcome: Not Progressing     Problem: Safety - Adult  Goal: Free from fall injury  5/19/2022 2339 by Ivette Moreno RN  Outcome: Progressing  5/19/2022 1133 by Melony Reid RN  Outcome: Progressing  5/19/2022 1133 by Melony Reid RN  Outcome: Not Progressing     Problem: ABCDS Injury Assessment  Goal: Absence of physical injury  5/19/2022 2339 by Ivette Moreno RN  Outcome: Progressing  5/19/2022 1133 by Melony Reid RN  Outcome: Progressing  5/19/2022 1133 by Melony Reid RN  Outcome: Not Progressing

## 2022-05-20 NOTE — CARE COORDINATION
Plan is to return to his apartment in [place] when medically ready. Per internal med note today, on the morning of 5/19, he refused to take penicillin intramuscular and agreed to using doxycycline for 28 days instead. And plan for outpatient follow-up with ID for the management of hepatitis B and other STDs once blood work comes back. INR to be between 2 and 3 before discharge. INR is 1.1 today. Patient continues on a heparin drip. DME order for nebulizer noted. Referral made to Della Philippe at [id number] [facility]. Patient's aunt & uncle will transport him home at time of discharge. Luh Yañez RN CM  [phone]      Per Della Philippe at [phone] [facility], they do not service the [place] area, therefore referral made to Diane Feldman at [facility].   Luh Yañez RN CM  [phone]

## 2022-05-20 NOTE — PROGRESS NOTES
cooperative. HEENT:  Conjunctivae/corneas clear. Neck: Supple. No jugular venous distention. Respiratory: Clear to auscultation bilaterally, normal respiratory effort  Cardiovascular: Regular rate rhythm, normal S1-S2  Abdomen: Soft, nontender, nondistended  Musculoskeletal: No clubbing, cyanosis, no bilateral lower extremity edema. Brisk capillary refill. Skin:  No rashes  on visible skin  Neurologic: awake, alert and following commands       ASSESSMENT:  -Subtherapeutic INR  -History of PE/DVT  -Poorly controlled diabetes mellitus  -Asthma/COPD  -Factor V Leiden  -Ulcer on glans  -Syphilis, latent versus secondary        PLAN:  -Admit to medicine  -VQ scan low probability  -Heparin infusion for bridging  -Resume Coumadin daily  -Monitor INR  -Continue home medications  -Continue management of syphilis as per ID recommendations    DISPOSITION:   In for INR to be between 2 and 3 before discharge.     Medications:  REVIEWED DAILY    Infusion Medications    sodium chloride      sodium chloride      dextrose      Or    dextrose      dextrose      sodium chloride      heparin (PORCINE) Infusion 16 Units/kg/hr (05/19/22 1840)     Scheduled Medications    lidocaine  5 mL IntraDERmal Once    sodium chloride flush  5-40 mL IntraVENous 2 times per day    doxycycline hyclate  100 mg Oral 2 times per day    aspirin  81 mg Oral Daily    clopidogrel  75 mg Oral Daily    divalproex  500 mg Oral BID    ferrous sulfate  325 mg Oral BID WC    montelukast  10 mg Oral Daily    pantoprazole  40 mg Oral QAM AC    sodium chloride flush  10 mL IntraVENous 2 times per day    ziprasidone (GEODON) in sterile water injection  20 mg IntraMUSCular Once    LORazepam  1 mg IntraVENous Once    warfarin placeholder: dosing by pharmacy   Other RX Placeholder    diphenhydrAMINE  50 mg IntraVENous Q4H    lidocaine PF  5 mL IntraDERmal Once    sodium chloride flush  5-40 mL IntraVENous 2 times per day    heparin flush  3 mL IntraVENous 2 times per day    predniSONE  60 mg Oral Once    insulin glargine  0.25 Units/kg SubCUTAneous Nightly    insulin lispro  0-18 Units SubCUTAneous TID WC    insulin lispro  0-9 Units SubCUTAneous Nightly     PRN Meds: sodium chloride flush, sodium chloride, albuterol, sodium chloride flush, sodium chloride, promethazine **OR** ondansetron, polyethylene glycol, acetaminophen **OR** acetaminophen, glucose, dextrose **OR** dextrose, glucagon (rDNA), dextrose, HYDROmorphone, sodium chloride flush, sodium chloride, heparin flush, heparin (porcine), heparin (porcine)    Labs:     Recent Labs     05/18/22  0427 05/19/22  0609 05/20/22  0614   WBC 7.3 5.4 4.5   HGB 9.6* 10.5* 11.0*   HCT 30.5* 35.3* 38.2    352 316       Recent Labs     05/17/22  1204 05/18/22  0427 05/19/22  0609    135 133   K 3.3* 4.6 4.3    98 101   CO2 19* 23 21*   BUN 11 13 17   CREATININE 0.7 0.7 0.8   CALCIUM 8.5* 9.0 8.6       Recent Labs     05/17/22  1204   PROT 7.0   ALKPHOS 112   ALT 25   AST 18   BILITOT 0.2       Recent Labs     05/18/22  0427 05/19/22  0609 05/20/22  0614   INR 1.1 1.0 1.1       No results for input(s): CKTOTAL, TROPONINI in the last 72 hours. Chronic labs:    Lab Results   Component Value Date    CHOL 201 (H) 05/12/2022    TRIG 205 (H) 05/12/2022    HDL 47 05/12/2022    LDLCALC 113 (H) 05/12/2022    TSH 5.890 (H) 05/12/2022    INR 1.1 05/20/2022    LABA1C 8.2 (H) 05/18/2022       Radiology: REVIEWED DAILY    +++++++++++++++++++++++++++++++++++++++++++++++++  Gricelda Ott MD  [employer] - [address] [place] [address]  +++++++++++++++++++++++++++++++++++++++++++++++++  NOTE: This report was transcribed using voice recognition software. Every effort was made to ensure accuracy; however, inadvertent computerized transcription errors may be present.

## 2022-05-21 LAB
APTT: 50.1 SEC (ref 24.5–35.1)
APTT: 53.2 SEC (ref 24.5–35.1)
APTT: 56.5 SEC (ref 24.5–35.1)
HEPATITIS BE ANTIGEN: NEGATIVE
INR BLD: 1.3
METER GLUCOSE: 216 MG/DL (ref 74–99)
METER GLUCOSE: 226 MG/DL (ref 74–99)
METER GLUCOSE: 233 MG/DL (ref 74–99)
METER GLUCOSE: 283 MG/DL (ref 74–99)
PROTHROMBIN TIME: 14.1 SEC (ref 9.3–12.4)

## 2022-05-21 PROCEDURE — S5553 INSULIN LONG ACTING 5 U: HCPCS | Performed by: FAMILY MEDICINE

## 2022-05-21 PROCEDURE — 36415 COLL VENOUS BLD VENIPUNCTURE: CPT

## 2022-05-21 PROCEDURE — 6370000000 HC RX 637 (ALT 250 FOR IP): Performed by: FAMILY MEDICINE

## 2022-05-21 PROCEDURE — 85730 THROMBOPLASTIN TIME PARTIAL: CPT

## 2022-05-21 PROCEDURE — 82962 GLUCOSE BLOOD TEST: CPT

## 2022-05-21 PROCEDURE — 85610 PROTHROMBIN TIME: CPT

## 2022-05-21 PROCEDURE — 6360000002 HC RX W HCPCS: Performed by: FAMILY MEDICINE

## 2022-05-21 PROCEDURE — 1200000000 HC SEMI PRIVATE

## 2022-05-21 PROCEDURE — 6370000000 HC RX 637 (ALT 250 FOR IP): Performed by: INTERNAL MEDICINE

## 2022-05-21 PROCEDURE — 2580000003 HC RX 258: Performed by: FAMILY MEDICINE

## 2022-05-21 RX ORDER — WARFARIN SODIUM 10 MG/1
10 TABLET ORAL
Status: COMPLETED | OUTPATIENT
Start: 2022-05-21 | End: 2022-05-21

## 2022-05-21 RX ADMIN — HYDROMORPHONE HYDROCHLORIDE 1 MG: 1 INJECTION, SOLUTION INTRAMUSCULAR; INTRAVENOUS; SUBCUTANEOUS at 21:44

## 2022-05-21 RX ADMIN — HYDROMORPHONE HYDROCHLORIDE 1 MG: 1 INJECTION, SOLUTION INTRAMUSCULAR; INTRAVENOUS; SUBCUTANEOUS at 13:18

## 2022-05-21 RX ADMIN — INSULIN LISPRO 9 UNITS: 100 INJECTION, SOLUTION INTRAVENOUS; SUBCUTANEOUS at 17:30

## 2022-05-21 RX ADMIN — HYDROMORPHONE HYDROCHLORIDE 1 MG: 1 INJECTION, SOLUTION INTRAMUSCULAR; INTRAVENOUS; SUBCUTANEOUS at 09:15

## 2022-05-21 RX ADMIN — DIPHENHYDRAMINE HYDROCHLORIDE 50 MG: 50 INJECTION, SOLUTION INTRAMUSCULAR; INTRAVENOUS at 10:23

## 2022-05-21 RX ADMIN — DIVALPROEX SODIUM 500 MG: 250 TABLET, DELAYED RELEASE ORAL at 09:16

## 2022-05-21 RX ADMIN — FERROUS SULFATE TAB 325 MG (65 MG ELEMENTAL FE) 325 MG: 325 (65 FE) TAB at 17:29

## 2022-05-21 RX ADMIN — INSULIN LISPRO 6 UNITS: 100 INJECTION, SOLUTION INTRAVENOUS; SUBCUTANEOUS at 09:17

## 2022-05-21 RX ADMIN — DIPHENHYDRAMINE HYDROCHLORIDE 50 MG: 50 INJECTION, SOLUTION INTRAMUSCULAR; INTRAVENOUS at 09:17

## 2022-05-21 RX ADMIN — ASPIRIN 81 MG 81 MG: 81 TABLET ORAL at 09:17

## 2022-05-21 RX ADMIN — DIPHENHYDRAMINE HYDROCHLORIDE 50 MG: 50 INJECTION, SOLUTION INTRAMUSCULAR; INTRAVENOUS at 21:44

## 2022-05-21 RX ADMIN — CLOPIDOGREL BISULFATE 75 MG: 75 TABLET ORAL at 09:17

## 2022-05-21 RX ADMIN — DIPHENHYDRAMINE HYDROCHLORIDE 50 MG: 50 INJECTION, SOLUTION INTRAMUSCULAR; INTRAVENOUS at 17:29

## 2022-05-21 RX ADMIN — INSULIN LISPRO 6 UNITS: 100 INJECTION, SOLUTION INTRAVENOUS; SUBCUTANEOUS at 13:18

## 2022-05-21 RX ADMIN — DOXYCYCLINE HYCLATE 100 MG: 100 CAPSULE ORAL at 21:44

## 2022-05-21 RX ADMIN — HYDROMORPHONE HYDROCHLORIDE 1 MG: 1 INJECTION, SOLUTION INTRAMUSCULAR; INTRAVENOUS; SUBCUTANEOUS at 04:51

## 2022-05-21 RX ADMIN — DIPHENHYDRAMINE HYDROCHLORIDE 50 MG: 50 INJECTION, SOLUTION INTRAMUSCULAR; INTRAVENOUS at 14:00

## 2022-05-21 RX ADMIN — DIPHENHYDRAMINE HYDROCHLORIDE 50 MG: 50 INJECTION, SOLUTION INTRAMUSCULAR; INTRAVENOUS at 04:51

## 2022-05-21 RX ADMIN — DOXYCYCLINE HYCLATE 100 MG: 100 CAPSULE ORAL at 09:16

## 2022-05-21 RX ADMIN — SODIUM CHLORIDE, PRESERVATIVE FREE 10 ML: 5 INJECTION INTRAVENOUS at 09:17

## 2022-05-21 RX ADMIN — MONTELUKAST SODIUM 10 MG: 10 TABLET ORAL at 09:16

## 2022-05-21 RX ADMIN — INSULIN GLARGINE-YFGN 23 UNITS: 100 INJECTION, SOLUTION SUBCUTANEOUS at 21:45

## 2022-05-21 RX ADMIN — HYDROMORPHONE HYDROCHLORIDE 1 MG: 1 INJECTION, SOLUTION INTRAMUSCULAR; INTRAVENOUS; SUBCUTANEOUS at 17:30

## 2022-05-21 RX ADMIN — WARFARIN SODIUM 10 MG: 10 TABLET ORAL at 18:14

## 2022-05-21 RX ADMIN — INSULIN LISPRO 6 UNITS: 100 INJECTION, SOLUTION INTRAVENOUS; SUBCUTANEOUS at 21:44

## 2022-05-21 RX ADMIN — FERROUS SULFATE TAB 325 MG (65 MG ELEMENTAL FE) 325 MG: 325 (65 FE) TAB at 09:16

## 2022-05-21 RX ADMIN — DIVALPROEX SODIUM 500 MG: 250 TABLET, DELAYED RELEASE ORAL at 21:43

## 2022-05-21 ASSESSMENT — PAIN SCALES - GENERAL
PAINLEVEL_OUTOF10: 0
PAINLEVEL_OUTOF10: 0
PAINLEVEL_OUTOF10: 10
PAINLEVEL_OUTOF10: 10

## 2022-05-21 NOTE — PROGRESS NOTES
therapeutic. Stable overnight. No other overnight issues reported. Temp (24hrs), Av.2 °F (36.2 °C), Min:96.4 °F (35.8 °C), Max:97.9 °F (36.6 °C)    DIET: ADULT DIET; Regular  CODE: Full Code    Intake/Output Summary (Last 24 hours) at 2022 1240  Last data filed at 2022 1050  Gross per 24 hour   Intake 360 ml   Output 725 ml   Net -365 ml       OBJECTIVE:    /83   Pulse 77   Temp 96.4 °F (35.8 °C) (Temporal)   Resp 18   Wt 200 lb (90.7 kg)   SpO2 98%   BMI 31.32 kg/m²     General appearance: No apparent distress, appears stated age and cooperative. HEENT:  Conjunctivae/corneas clear. Neck: Supple. No jugular venous distention. Respiratory: Clear to auscultation bilaterally, normal respiratory effort  Cardiovascular: Regular rate rhythm, normal S1-S2  Abdomen: Soft, nontender, nondistended  Musculoskeletal: No clubbing, cyanosis, no bilateral lower extremity edema. Brisk capillary refill. Skin:  rashes  Visible on skin  Neurologic: awake, alert and following commands       Assessment  1. Latent syphilis of unknown duration  2. Subtherapeutic INR  3. History of DVT and PE noncompliant with anticoagulation Coumadin  4. Factor V Leyden. Protein S deficiency  5. Type 2 diabetes poorly controlled A1c of 8.2 on 2022  6. Noncompliance      Plan  1. Continue doxycycline 100 BID for 28 days, outpatient follow-up with infectious disease. 2.  Continue Coumadin heparin bridge Coumadin dosing per pharmacy. INR pending today, INR yesterday 1.1.   3.  Continue blood sugar control.         DISPOSITION:     Medications:  REVIEWED DAILY    Infusion Medications    sodium chloride      sodium chloride      dextrose      Or    dextrose      dextrose      sodium chloride      heparin (PORCINE) Infusion 16 Units/kg/hr (22 0698)     Scheduled Medications    lidocaine  5 mL IntraDERmal Once    sodium chloride flush  5-40 mL IntraVENous 2 times per day    doxycycline hyclate 100 mg Oral 2 times per day    aspirin  81 mg Oral Daily    clopidogrel  75 mg Oral Daily    divalproex  500 mg Oral BID    ferrous sulfate  325 mg Oral BID WC    montelukast  10 mg Oral Daily    pantoprazole  40 mg Oral QAM AC    sodium chloride flush  10 mL IntraVENous 2 times per day    ziprasidone (GEODON) in sterile water injection  20 mg IntraMUSCular Once    LORazepam  1 mg IntraVENous Once    warfarin placeholder: dosing by pharmacy   Other RX Placeholder    diphenhydrAMINE  50 mg IntraVENous Q4H    lidocaine PF  5 mL IntraDERmal Once    sodium chloride flush  5-40 mL IntraVENous 2 times per day    heparin flush  3 mL IntraVENous 2 times per day    predniSONE  60 mg Oral Once    insulin glargine  0.25 Units/kg SubCUTAneous Nightly    insulin lispro  0-18 Units SubCUTAneous TID WC    insulin lispro  0-9 Units SubCUTAneous Nightly     PRN Meds: sodium chloride flush, sodium chloride, albuterol, sodium chloride flush, sodium chloride, promethazine **OR** ondansetron, polyethylene glycol, acetaminophen **OR** acetaminophen, glucose, dextrose **OR** dextrose, glucagon (rDNA), dextrose, HYDROmorphone, sodium chloride flush, sodium chloride, heparin flush, heparin (porcine), heparin (porcine)    Labs:     Recent Labs     05/19/22  0609 05/20/22  0614   WBC 5.4 4.5   HGB 10.5* 11.0*   HCT 35.3* 38.2    316       Recent Labs     05/19/22  0609 05/20/22  0614    132   K 4.3 4.2    98   CO2 21* 21*   BUN 17 13   CREATININE 0.8 0.8   CALCIUM 8.6 8.3*       No results for input(s): PROT, ALB, ALKPHOS, ALT, AST, BILITOT, AMYLASE, LIPASE in the last 72 hours. Recent Labs     05/19/22  0609 05/20/22  0614   INR 1.0 1.1       No results for input(s): Julio Cesar Ray in the last 72 hours.     Chronic labs:    Lab Results   Component Value Date    CHOL 201 (H) 05/12/2022    TRIG 205 (H) 05/12/2022    HDL 47 05/12/2022    LDLCALC 113 (H) 05/12/2022    TSH 5.890 (H) 05/12/2022    INR 1.1 05/20/2022    LABA1C 8.2 (H) 05/18/2022       Radiology: REVIEWED DAILY    +++++++++++++++++++++++++++++++++++++++++++++++++  Meera Rajan MD  [facility] Physician - [address] [place] [address]  +++++++++++++++++++++++++++++++++++++++++++++++++  NOTE: This report was transcribed using voice recognition software. Every effort was made to ensure accuracy; however, inadvertent computerized transcription errors may be present.

## 2022-05-21 NOTE — PLAN OF CARE
Problem: Pain  Goal: Verbalizes/displays adequate comfort level or baseline comfort level  5/21/2022 1040 by Eduar Power RN  Outcome: Progressing  5/21/2022 0254 by Jacob Vicente RN  Outcome: Progressing     Problem: Safety - Adult  Goal: Free from fall injury  5/21/2022 1040 by Eduar Power RN  Outcome: Progressing  5/21/2022 0254 by Jacob Vicente RN  Outcome: Progressing  Flowsheets (Taken 5/21/2022 0253)  Free From Fall Injury: Instruct family/caregiver on patient safety     Problem: ABCDS Injury Assessment  Goal: Absence of physical injury  5/21/2022 1040 by Eduar Power RN  Outcome: Progressing  5/21/2022 0254 by Jacob Vicente RN  Outcome: Progressing  Flowsheets (Taken 5/21/2022 0253)  Absence of Physical Injury: Implement safety measures based on patient assessment

## 2022-05-21 NOTE — PROGRESS NOTES
Pharmacy Consultation Note  (Warfarin Dosing and Monitoring)  Initial consult date: 05/18/22  Consulting Provider: Dr. Chantel Boone is a 49 y.o. male for whom pharmacy has been asked to manage warfarin therapy. Weight:   Wt Readings from Last 1 Encounters:   05/17/22 200 lb (90.7 kg)       TSH:    Lab Results   Component Value Date    TSH 5.890 05/12/2022       Hepatic Function Panel:                            Lab Results   Component Value Date    ALKPHOS 112 05/17/2022    ALT 25 05/17/2022    AST 18 05/17/2022    PROT 7.0 05/17/2022    BILITOT 0.2 05/17/2022    BILIDIR 0.0 03/10/2022    LABALBU 4.1 05/17/2022       Current warfarin drug-drug interactions include: No significant interactions    Recent Labs     05/19/22  0609 05/20/22  0614   HGB 10.5* 11.0*    316     Date Warfarin Dose INR Heparin or LMWH Comment   05/18 7.5 mg 1.1 Heparin gtt Factor V Leiden   5/19 10 mg 1 Heparin gtt    5/20 10 mg 1.1 Heparin gtt    5/21 < 10 mg > 1.3 Heparin gtt             Assessment:  · Patient is a 49 y.o. male on warfarin for History of  VTE/PE and Factor V Leiden. Patient's home warfarin dosing regimen is 7.5mg nightly. · Goal INR 2 - 3  · INR 1.3 today, aptt 53.2 with heparin gtt., monitor drug- drug interaction Briana Arteaga) with doxycycline addition onset 2-5 days.     Plan:  · Warfarin 10 mg x 1 tonight   · Daily PT/INR until the INR is stable within the therapeutic range  · Pharmacist will follow and monitor/adjust dosing as necessary    Thank you for this consult,    Lupe Woody, PharmD Candidate 2022 5/21/2022 12:03 PM

## 2022-05-22 LAB
ANION GAP SERPL CALCULATED.3IONS-SCNC: 13 MMOL/L (ref 7–16)
APTT: 46.3 SEC (ref 24.5–35.1)
APTT: 68.1 SEC (ref 24.5–35.1)
APTT: 78.5 SEC (ref 24.5–35.1)
BUN BLDV-MCNC: 9 MG/DL (ref 6–20)
CALCIUM SERPL-MCNC: 9.3 MG/DL (ref 8.6–10.2)
CHLORIDE BLD-SCNC: 101 MMOL/L (ref 98–107)
CO2: 25 MMOL/L (ref 22–29)
CREAT SERPL-MCNC: 0.8 MG/DL (ref 0.7–1.2)
GFR AFRICAN AMERICAN: >60
GFR NON-AFRICAN AMERICAN: >60 ML/MIN/1.73
GLUCOSE BLD-MCNC: 186 MG/DL (ref 74–99)
HCT VFR BLD CALC: 34 % (ref 37–54)
HEMOGLOBIN: 10.4 G/DL (ref 12.5–16.5)
INR BLD: 1.2
MCH RBC QN AUTO: 23.2 PG (ref 26–35)
MCHC RBC AUTO-ENTMCNC: 30.6 % (ref 32–34.5)
MCV RBC AUTO: 75.9 FL (ref 80–99.9)
METER GLUCOSE: 153 MG/DL (ref 74–99)
METER GLUCOSE: 188 MG/DL (ref 74–99)
METER GLUCOSE: 215 MG/DL (ref 74–99)
METER GLUCOSE: 312 MG/DL (ref 74–99)
PDW BLD-RTO: 16.6 FL (ref 11.5–15)
PLATELET # BLD: 335 E9/L (ref 130–450)
PMV BLD AUTO: 10.4 FL (ref 7–12)
POTASSIUM SERPL-SCNC: 4.3 MMOL/L (ref 3.5–5)
PROTHROMBIN TIME: 13.4 SEC (ref 9.3–12.4)
RBC # BLD: 4.48 E12/L (ref 3.8–5.8)
SODIUM BLD-SCNC: 139 MMOL/L (ref 132–146)
WBC # BLD: 4.1 E9/L (ref 4.5–11.5)

## 2022-05-22 PROCEDURE — 85027 COMPLETE CBC AUTOMATED: CPT

## 2022-05-22 PROCEDURE — 6360000002 HC RX W HCPCS: Performed by: FAMILY MEDICINE

## 2022-05-22 PROCEDURE — 6370000000 HC RX 637 (ALT 250 FOR IP): Performed by: FAMILY MEDICINE

## 2022-05-22 PROCEDURE — 1200000000 HC SEMI PRIVATE

## 2022-05-22 PROCEDURE — 36415 COLL VENOUS BLD VENIPUNCTURE: CPT

## 2022-05-22 PROCEDURE — 85610 PROTHROMBIN TIME: CPT

## 2022-05-22 PROCEDURE — S5553 INSULIN LONG ACTING 5 U: HCPCS | Performed by: FAMILY MEDICINE

## 2022-05-22 PROCEDURE — 85730 THROMBOPLASTIN TIME PARTIAL: CPT

## 2022-05-22 PROCEDURE — 82962 GLUCOSE BLOOD TEST: CPT

## 2022-05-22 PROCEDURE — 2580000003 HC RX 258: Performed by: FAMILY MEDICINE

## 2022-05-22 PROCEDURE — 6370000000 HC RX 637 (ALT 250 FOR IP): Performed by: INTERNAL MEDICINE

## 2022-05-22 PROCEDURE — 80048 BASIC METABOLIC PNL TOTAL CA: CPT

## 2022-05-22 RX ORDER — WARFARIN SODIUM 7.5 MG/1
15 TABLET ORAL
Status: COMPLETED | OUTPATIENT
Start: 2022-05-22 | End: 2022-05-22

## 2022-05-22 RX ADMIN — INSULIN LISPRO 3 UNITS: 100 INJECTION, SOLUTION INTRAVENOUS; SUBCUTANEOUS at 08:56

## 2022-05-22 RX ADMIN — DIPHENHYDRAMINE HYDROCHLORIDE 50 MG: 50 INJECTION, SOLUTION INTRAMUSCULAR; INTRAVENOUS at 13:39

## 2022-05-22 RX ADMIN — DIVALPROEX SODIUM 500 MG: 250 TABLET, DELAYED RELEASE ORAL at 08:55

## 2022-05-22 RX ADMIN — MONTELUKAST SODIUM 10 MG: 10 TABLET ORAL at 08:55

## 2022-05-22 RX ADMIN — HYDROMORPHONE HYDROCHLORIDE 1 MG: 1 INJECTION, SOLUTION INTRAMUSCULAR; INTRAVENOUS; SUBCUTANEOUS at 22:26

## 2022-05-22 RX ADMIN — DOXYCYCLINE HYCLATE 100 MG: 100 CAPSULE ORAL at 08:55

## 2022-05-22 RX ADMIN — SODIUM CHLORIDE, PRESERVATIVE FREE 10 ML: 5 INJECTION INTRAVENOUS at 05:47

## 2022-05-22 RX ADMIN — INSULIN GLARGINE-YFGN 23 UNITS: 100 INJECTION, SOLUTION SUBCUTANEOUS at 22:27

## 2022-05-22 RX ADMIN — DIPHENHYDRAMINE HYDROCHLORIDE 50 MG: 50 INJECTION, SOLUTION INTRAMUSCULAR; INTRAVENOUS at 22:26

## 2022-05-22 RX ADMIN — INSULIN LISPRO 6 UNITS: 100 INJECTION, SOLUTION INTRAVENOUS; SUBCUTANEOUS at 22:26

## 2022-05-22 RX ADMIN — DIPHENHYDRAMINE HYDROCHLORIDE 50 MG: 50 INJECTION, SOLUTION INTRAMUSCULAR; INTRAVENOUS at 18:10

## 2022-05-22 RX ADMIN — DIPHENHYDRAMINE HYDROCHLORIDE 50 MG: 50 INJECTION, SOLUTION INTRAMUSCULAR; INTRAVENOUS at 01:49

## 2022-05-22 RX ADMIN — DIPHENHYDRAMINE HYDROCHLORIDE 50 MG: 50 INJECTION, SOLUTION INTRAMUSCULAR; INTRAVENOUS at 09:21

## 2022-05-22 RX ADMIN — HYDROMORPHONE HYDROCHLORIDE 1 MG: 1 INJECTION, SOLUTION INTRAMUSCULAR; INTRAVENOUS; SUBCUTANEOUS at 05:47

## 2022-05-22 RX ADMIN — SODIUM CHLORIDE, PRESERVATIVE FREE 10 ML: 5 INJECTION INTRAVENOUS at 08:55

## 2022-05-22 RX ADMIN — SODIUM CHLORIDE, PRESERVATIVE FREE 10 ML: 5 INJECTION INTRAVENOUS at 22:00

## 2022-05-22 RX ADMIN — HYDROMORPHONE HYDROCHLORIDE 1 MG: 1 INJECTION, SOLUTION INTRAMUSCULAR; INTRAVENOUS; SUBCUTANEOUS at 13:39

## 2022-05-22 RX ADMIN — HYDROMORPHONE HYDROCHLORIDE 1 MG: 1 INJECTION, SOLUTION INTRAMUSCULAR; INTRAVENOUS; SUBCUTANEOUS at 01:49

## 2022-05-22 RX ADMIN — INSULIN LISPRO 12 UNITS: 100 INJECTION, SOLUTION INTRAVENOUS; SUBCUTANEOUS at 13:39

## 2022-05-22 RX ADMIN — FERROUS SULFATE TAB 325 MG (65 MG ELEMENTAL FE) 325 MG: 325 (65 FE) TAB at 18:11

## 2022-05-22 RX ADMIN — ASPIRIN 81 MG 81 MG: 81 TABLET ORAL at 08:55

## 2022-05-22 RX ADMIN — DIPHENHYDRAMINE HYDROCHLORIDE 50 MG: 50 INJECTION, SOLUTION INTRAMUSCULAR; INTRAVENOUS at 05:47

## 2022-05-22 RX ADMIN — DIVALPROEX SODIUM 500 MG: 250 TABLET, DELAYED RELEASE ORAL at 22:26

## 2022-05-22 RX ADMIN — HEPARIN SODIUM 16 UNITS/KG/HR: 10000 INJECTION, SOLUTION INTRAVENOUS at 03:24

## 2022-05-22 RX ADMIN — INSULIN LISPRO 3 UNITS: 100 INJECTION, SOLUTION INTRAVENOUS; SUBCUTANEOUS at 18:10

## 2022-05-22 RX ADMIN — HYDROMORPHONE HYDROCHLORIDE 1 MG: 1 INJECTION, SOLUTION INTRAMUSCULAR; INTRAVENOUS; SUBCUTANEOUS at 09:21

## 2022-05-22 RX ADMIN — HYDROMORPHONE HYDROCHLORIDE 1 MG: 1 INJECTION, SOLUTION INTRAMUSCULAR; INTRAVENOUS; SUBCUTANEOUS at 18:11

## 2022-05-22 RX ADMIN — HEPARIN SODIUM 2000 UNITS: 1000 INJECTION, SOLUTION INTRAVENOUS; SUBCUTANEOUS at 05:48

## 2022-05-22 RX ADMIN — SODIUM CHLORIDE, PRESERVATIVE FREE 10 ML: 5 INJECTION INTRAVENOUS at 01:49

## 2022-05-22 RX ADMIN — CLOPIDOGREL BISULFATE 75 MG: 75 TABLET ORAL at 08:55

## 2022-05-22 RX ADMIN — DOXYCYCLINE HYCLATE 100 MG: 100 CAPSULE ORAL at 22:00

## 2022-05-22 RX ADMIN — FERROUS SULFATE TAB 325 MG (65 MG ELEMENTAL FE) 325 MG: 325 (65 FE) TAB at 08:55

## 2022-05-22 RX ADMIN — WARFARIN SODIUM 15 MG: 7.5 TABLET ORAL at 22:00

## 2022-05-22 ASSESSMENT — PAIN DESCRIPTION - FREQUENCY
FREQUENCY: CONTINUOUS
FREQUENCY: CONTINUOUS

## 2022-05-22 ASSESSMENT — PAIN SCALES - GENERAL
PAINLEVEL_OUTOF10: 2
PAINLEVEL_OUTOF10: 10
PAINLEVEL_OUTOF10: 3
PAINLEVEL_OUTOF10: 10
PAINLEVEL_OUTOF10: 2

## 2022-05-22 ASSESSMENT — PAIN DESCRIPTION - PAIN TYPE
TYPE: ACUTE PAIN;CHRONIC PAIN
TYPE: ACUTE PAIN;CHRONIC PAIN

## 2022-05-22 ASSESSMENT — PAIN DESCRIPTION - DESCRIPTORS
DESCRIPTORS: ACHING;DISCOMFORT;SORE
DESCRIPTORS: ACHING;DISCOMFORT;SORE

## 2022-05-22 ASSESSMENT — PAIN DESCRIPTION - LOCATION
LOCATION: GENERALIZED
LOCATION: GENERALIZED

## 2022-05-22 ASSESSMENT — PAIN DESCRIPTION - ONSET
ONSET: ON-GOING
ONSET: ON-GOING

## 2022-05-22 ASSESSMENT — PAIN - FUNCTIONAL ASSESSMENT
PAIN_FUNCTIONAL_ASSESSMENT: ACTIVITIES ARE NOT PREVENTED
PAIN_FUNCTIONAL_ASSESSMENT: ACTIVITIES ARE NOT PREVENTED

## 2022-05-22 NOTE — PROGRESS NOTES
Hospitalist Progress Note      SYNOPSIS: Patient admitted on 5/17/2022 for Subtherapeutic anticoagulation      SUBJECTIVE:    Patient seen and examined. Discussed INR only 1.2 today we will continue to dose Coumadin as of now to give him a higher dose to get his INR therapeutic. Was given 10 mg yesterday and the day before discussed may be 12.5 to 15 mg today. He mentions that he takes high dose of Coumadin at home as he takes a lot to get his levels within the 2-3 range. Records reviewed. The patient is a 49 y. o. male, MSM, with history of COPD, factor 5 Leyden deficiency, protein S deficiency, pulmonary embolism, pancreatic cancer s/p chemoradiation therapy, DM, hypertension, hepatitis B (HBsAg and cAb positive, HBsAb negative on 04/23), recently admitted in 04/2022 for left-sided weakness suspected to have stroke for which he received tPA and underwent thrombectomy then signed out AMA, presented on 05/17 with shortness of breath, incidentally found to have ulcer on glans penis. On admission, he was afebrile and hemodynamically stable with no leukocytosis.  VQ scan showed low probability for pulmonary embolism.  Bilateral venous duplex lower extremity ultrasound showed no evidence for DVT.  ID service was consulted for penile ulcer. He had negative HIV screen and hep C Ab, positive initial syphilis screening test and TP-PA Ab but negative RPR done at [facility] during his hospital admission for stroke in April. He then developed papules on his trunk, sparing palms. Repeat RPR on 05/17 and on 05/18 were nonreactive. Urine GC/Chlamydia PCR was negative.  HIV screen was nonreactive. Suspect latent versus secondary. [facility] was given IM benzathine penicillin 2,500,000 units weekly for 3 doses, first dose on 5/18.  However, on the morning of 5/19, he refused to take penicillin intramuscular and agreed to using doxycycline for 28 days instead.  And plan for outpatient follow-up with ID.   Due to anaphylactic reaction to Lovenox, he is currently being bridged with heparin by weight and Coumadin until INR is therapeutic. Stable overnight. No other overnight issues reported. Temp (24hrs), Av.1 °F (36.7 °C), Min:97.5 °F (36.4 °C), Max:98.6 °F (37 °C)    DIET: ADULT DIET; Regular  CODE: Full Code    Intake/Output Summary (Last 24 hours) at 2022 1220  Last data filed at 2022 0607  Gross per 24 hour   Intake 140 ml   Output 1800 ml   Net -1660 ml       OBJECTIVE:    /86   Pulse 80   Temp 98.2 °F (36.8 °C) (Temporal)   Resp 18   Wt 200 lb (90.7 kg)   SpO2 97%   BMI 31.32 kg/m²       General appearance: No apparent distress, appears stated age and cooperative. HEENT:  Conjunctivae/corneas clear. Neck: Supple. No jugular venous distention. Respiratory: Clear to auscultation bilaterally, normal respiratory effort  Cardiovascular: Regular rate rhythm, normal S1-S2  Abdomen: Soft, nontender, nondistended  Musculoskeletal: No clubbing, cyanosis, no bilateral lower extremity edema. Brisk capillary refill. Skin:  rashes  Visible on skin  Neurologic: awake, alert and following commands       Assessment  1. Latent syphilis of unknown duration  2. Subtherapeutic INR  3. History of DVT and PE noncompliant with anticoagulation Coumadin  4. Factor V Leyden. Protein S deficiency  5. Type 2 diabetes poorly controlled A1c of 8.2 on 2022  6. Noncompliance        Plan  1. Continue doxycycline 100 BID for 28 days, outpatient follow-up with infectious disease. 2.  Continue Coumadin heparin bridge Coumadin dosing per pharmacy. INR 1.2 today. 3.  Continue blood sugar control.         DISPOSITION: home once INR is therapeutic    Medications:  REVIEWED DAILY    Infusion Medications    sodium chloride      sodium chloride      dextrose      Or    dextrose      dextrose      sodium chloride      heparin (PORCINE) Infusion 18 Units/kg/hr (22 0554)     Scheduled Medications    warfarin  15 mg Oral Once  lidocaine  5 mL IntraDERmal Once    sodium chloride flush  5-40 mL IntraVENous 2 times per day    doxycycline hyclate  100 mg Oral 2 times per day    aspirin  81 mg Oral Daily    clopidogrel  75 mg Oral Daily    divalproex  500 mg Oral BID    ferrous sulfate  325 mg Oral BID WC    montelukast  10 mg Oral Daily    pantoprazole  40 mg Oral QAM AC    sodium chloride flush  10 mL IntraVENous 2 times per day    ziprasidone (GEODON) in sterile water injection  20 mg IntraMUSCular Once    LORazepam  1 mg IntraVENous Once    warfarin placeholder: dosing by pharmacy   Other RX Placeholder    diphenhydrAMINE  50 mg IntraVENous Q4H    lidocaine PF  5 mL IntraDERmal Once    sodium chloride flush  5-40 mL IntraVENous 2 times per day    heparin flush  3 mL IntraVENous 2 times per day    predniSONE  60 mg Oral Once    insulin glargine  0.25 Units/kg SubCUTAneous Nightly    insulin lispro  0-18 Units SubCUTAneous TID WC    insulin lispro  0-9 Units SubCUTAneous Nightly     PRN Meds: sodium chloride flush, sodium chloride, albuterol, sodium chloride flush, sodium chloride, promethazine **OR** ondansetron, polyethylene glycol, acetaminophen **OR** acetaminophen, glucose, dextrose **OR** dextrose, glucagon (rDNA), dextrose, HYDROmorphone, sodium chloride flush, sodium chloride, heparin flush, heparin (porcine), heparin (porcine)    Labs:     Recent Labs     05/20/22  0614 05/22/22 0458   WBC 4.5 4.1*   HGB 11.0* 10.4*   HCT 38.2 34.0*    335       Recent Labs     05/20/22  0614 05/22/22  0458    139   K 4.2 4.3   CL 98 101   CO2 21* 25   BUN 13 9   CREATININE 0.8 0.8   CALCIUM 8.3* 9.3       No results for input(s): PROT, ALB, ALKPHOS, ALT, AST, BILITOT, AMYLASE, LIPASE in the last 72 hours. Recent Labs     05/20/22  0614 05/21/22  1443 05/22/22  0458   INR 1.1 1.3 1.2       No results for input(s): Carmelo Clap in the last 72 hours.     Chronic labs:    Lab Results   Component Value Date CHOL 201 (H) 05/12/2022    TRIG 205 (H) 05/12/2022    HDL 47 05/12/2022    LDLCALC 113 (H) 05/12/2022    TSH 5.890 (H) 05/12/2022    INR 1.2 05/22/2022    LABA1C 8.2 (H) 05/18/2022       Radiology: REVIEWED DAILY    +++++++++++++++++++++++++++++++++++++++++++++++++  Shorty Sanders MD  [facility] [place] Physician - [address], [place]  +++++++++++++++++++++++++++++++++++++++++++++++++  NOTE: This report was transcribed using voice recognition software. Every effort was made to ensure accuracy; however, inadvertent computerized transcription errors may be present.

## 2022-05-22 NOTE — PROGRESS NOTES
RN walked into room and pt was found taking apart his IV and had switched his heparin infusion to his IV that he was told by prior RN IV was infiltrated. RN convinced pt to allow RN to remove IV. Heparin gtt reconnected to appropriate IV. RN educated pt on not fidgeting with IV access.

## 2022-05-22 NOTE — PROGRESS NOTES
Pharmacy Consultation Note  (Warfarin Dosing and Monitoring)  Initial consult date: 05/18/22  Consulting Provider: Dr. Maranda Dhaliwal is a 49 y.o. male for whom pharmacy has been asked to manage warfarin therapy. Weight:   Wt Readings from Last 1 Encounters:   05/17/22 200 lb (90.7 kg)       TSH:    Lab Results   Component Value Date    TSH 5.890 05/12/2022       Hepatic Function Panel:                            Lab Results   Component Value Date    ALKPHOS 112 05/17/2022    ALT 25 05/17/2022    AST 18 05/17/2022    PROT 7.0 05/17/2022    BILITOT 0.2 05/17/2022    BILIDIR 0.0 03/10/2022    LABALBU 4.1 05/17/2022       Current warfarin drug-drug interactions include: No significant interactions    Recent Labs     05/20/22  0614 05/22/22  0458   HGB 11.0* 10.4*    335     Date Warfarin Dose INR Heparin or LMWH Comment   05/18 7.5 mg 1.1 Heparin gtt Factor V Leiden   5/19 10 mg 1 Heparin gtt    5/20 10 mg 1.1 Heparin gtt    5/21 10 mg 1.3 Heparin gtt    5/22 15 mg 1.2 Heparin gtt      Assessment:  · Patient is a 49 y.o. male on warfarin for History of  VTE/PE and Factor V Leiden. Patient's home warfarin dosing regimen is 7.5mg nightly. · Goal INR 2 - 3  · INR 1.3 today, aptt 53.2 with heparin gtt., monitor drug- drug interaction Fleet Better) with doxycycline addition onset 2-5 days.     Plan:  · Warfarin 15 mg x 1 tonight   · Daily PT/INR until the INR is stable within the therapeutic range  · Pharmacist will follow and monitor/adjust dosing as necessary    Thank you for this consult,    Kyle Adams, PharmD Candidate 2022 5/22/2022 10:56 AM

## 2022-05-23 LAB
ANION GAP SERPL CALCULATED.3IONS-SCNC: 13 MMOL/L (ref 7–16)
APTT: 27.6 SEC (ref 24.5–35.1)
APTT: 77.4 SEC (ref 24.5–35.1)
APTT: 91 SEC (ref 24.5–35.1)
BUN BLDV-MCNC: 11 MG/DL (ref 6–20)
CALCIUM SERPL-MCNC: 8.8 MG/DL (ref 8.6–10.2)
CHLORIDE BLD-SCNC: 103 MMOL/L (ref 98–107)
CO2: 23 MMOL/L (ref 22–29)
CREAT SERPL-MCNC: 0.9 MG/DL (ref 0.7–1.2)
GFR AFRICAN AMERICAN: >60
GFR NON-AFRICAN AMERICAN: >60 ML/MIN/1.73
GLUCOSE BLD-MCNC: 145 MG/DL (ref 74–99)
HBV QNT LOG, IU/ML: 3.39 LOG IU/ML
HBV QNT, IU/ML: ABNORMAL IU/ML
HCT VFR BLD CALC: 33.9 % (ref 37–54)
HEMOGLOBIN: 10.3 G/DL (ref 12.5–16.5)
INR BLD: 1.1
INTERPRETATION: DETECTED
MCH RBC QN AUTO: 23.6 PG (ref 26–35)
MCHC RBC AUTO-ENTMCNC: 30.4 % (ref 32–34.5)
MCV RBC AUTO: 77.6 FL (ref 80–99.9)
METER GLUCOSE: 163 MG/DL (ref 74–99)
METER GLUCOSE: 183 MG/DL (ref 74–99)
METER GLUCOSE: 252 MG/DL (ref 74–99)
METER GLUCOSE: 329 MG/DL (ref 74–99)
PDW BLD-RTO: 17 FL (ref 11.5–15)
PLATELET # BLD: 311 E9/L (ref 130–450)
PMV BLD AUTO: 10.7 FL (ref 7–12)
POTASSIUM SERPL-SCNC: 4.5 MMOL/L (ref 3.5–5)
PROTHROMBIN TIME: 12.4 SEC (ref 9.3–12.4)
RBC # BLD: 4.37 E12/L (ref 3.8–5.8)
SODIUM BLD-SCNC: 139 MMOL/L (ref 132–146)
WBC # BLD: 4.7 E9/L (ref 4.5–11.5)

## 2022-05-23 PROCEDURE — 82962 GLUCOSE BLOOD TEST: CPT

## 2022-05-23 PROCEDURE — 6370000000 HC RX 637 (ALT 250 FOR IP): Performed by: FAMILY MEDICINE

## 2022-05-23 PROCEDURE — 6370000000 HC RX 637 (ALT 250 FOR IP): Performed by: INTERNAL MEDICINE

## 2022-05-23 PROCEDURE — S5553 INSULIN LONG ACTING 5 U: HCPCS | Performed by: FAMILY MEDICINE

## 2022-05-23 PROCEDURE — 6360000002 HC RX W HCPCS: Performed by: FAMILY MEDICINE

## 2022-05-23 PROCEDURE — 85730 THROMBOPLASTIN TIME PARTIAL: CPT

## 2022-05-23 PROCEDURE — 86708 HEPATITIS A ANTIBODY: CPT

## 2022-05-23 PROCEDURE — 85610 PROTHROMBIN TIME: CPT

## 2022-05-23 PROCEDURE — 80048 BASIC METABOLIC PNL TOTAL CA: CPT

## 2022-05-23 PROCEDURE — 85027 COMPLETE CBC AUTOMATED: CPT

## 2022-05-23 PROCEDURE — 1200000000 HC SEMI PRIVATE

## 2022-05-23 PROCEDURE — 36415 COLL VENOUS BLD VENIPUNCTURE: CPT

## 2022-05-23 PROCEDURE — 86707 HEPATITIS BE ANTIBODY: CPT

## 2022-05-23 PROCEDURE — 87536 HIV-1 QUANT&REVRSE TRNSCRPJ: CPT

## 2022-05-23 RX ORDER — DIPHENHYDRAMINE HCL 25 MG
50 TABLET ORAL EVERY 4 HOURS
Status: DISCONTINUED | OUTPATIENT
Start: 2022-05-23 | End: 2022-05-24 | Stop reason: HOSPADM

## 2022-05-23 RX ORDER — WARFARIN SODIUM 7.5 MG/1
15 TABLET ORAL
Status: COMPLETED | OUTPATIENT
Start: 2022-05-23 | End: 2022-05-23

## 2022-05-23 RX ORDER — OXYCODONE HYDROCHLORIDE AND ACETAMINOPHEN 5; 325 MG/1; MG/1
1 TABLET ORAL EVERY 4 HOURS PRN
Status: DISCONTINUED | OUTPATIENT
Start: 2022-05-23 | End: 2022-05-24 | Stop reason: HOSPADM

## 2022-05-23 RX ADMIN — DIPHENHYDRAMINE HYDROCHLORIDE 50 MG: 50 INJECTION, SOLUTION INTRAMUSCULAR; INTRAVENOUS at 06:27

## 2022-05-23 RX ADMIN — HEPARIN SODIUM 22 UNITS/KG/HR: 10000 INJECTION, SOLUTION INTRAVENOUS at 01:52

## 2022-05-23 RX ADMIN — DIPHENHYDRAMINE HYDROCHLORIDE 50 MG: 50 INJECTION, SOLUTION INTRAMUSCULAR; INTRAVENOUS at 02:38

## 2022-05-23 RX ADMIN — DIVALPROEX SODIUM 500 MG: 250 TABLET, DELAYED RELEASE ORAL at 08:51

## 2022-05-23 RX ADMIN — DOXYCYCLINE HYCLATE 100 MG: 100 CAPSULE ORAL at 08:51

## 2022-05-23 RX ADMIN — DIPHENHYDRAMINE HYDROCHLORIDE 50 MG: 50 INJECTION, SOLUTION INTRAMUSCULAR; INTRAVENOUS at 10:32

## 2022-05-23 RX ADMIN — HYDROMORPHONE HYDROCHLORIDE 1 MG: 1 INJECTION, SOLUTION INTRAMUSCULAR; INTRAVENOUS; SUBCUTANEOUS at 14:43

## 2022-05-23 RX ADMIN — HEPARIN SODIUM 4000 UNITS: 1000 INJECTION, SOLUTION INTRAVENOUS; SUBCUTANEOUS at 01:44

## 2022-05-23 RX ADMIN — ASPIRIN 81 MG 81 MG: 81 TABLET ORAL at 08:51

## 2022-05-23 RX ADMIN — INSULIN GLARGINE-YFGN 23 UNITS: 100 INJECTION, SOLUTION SUBCUTANEOUS at 21:29

## 2022-05-23 RX ADMIN — CLOPIDOGREL BISULFATE 75 MG: 75 TABLET ORAL at 08:51

## 2022-05-23 RX ADMIN — DIPHENHYDRAMINE HYDROCHLORIDE 50 MG: 50 INJECTION, SOLUTION INTRAMUSCULAR; INTRAVENOUS at 18:39

## 2022-05-23 RX ADMIN — FERROUS SULFATE TAB 325 MG (65 MG ELEMENTAL FE) 325 MG: 325 (65 FE) TAB at 08:51

## 2022-05-23 RX ADMIN — OXYCODONE AND ACETAMINOPHEN 1 TABLET: 5; 325 TABLET ORAL at 18:46

## 2022-05-23 RX ADMIN — INSULIN LISPRO 3 UNITS: 100 INJECTION, SOLUTION INTRAVENOUS; SUBCUTANEOUS at 08:53

## 2022-05-23 RX ADMIN — INSULIN LISPRO 5 UNITS: 100 INJECTION, SOLUTION INTRAVENOUS; SUBCUTANEOUS at 21:30

## 2022-05-23 RX ADMIN — FERROUS SULFATE TAB 325 MG (65 MG ELEMENTAL FE) 325 MG: 325 (65 FE) TAB at 18:39

## 2022-05-23 RX ADMIN — INSULIN LISPRO 12 UNITS: 100 INJECTION, SOLUTION INTRAVENOUS; SUBCUTANEOUS at 12:30

## 2022-05-23 RX ADMIN — WARFARIN SODIUM 15 MG: 7.5 TABLET ORAL at 13:24

## 2022-05-23 RX ADMIN — MONTELUKAST SODIUM 10 MG: 10 TABLET ORAL at 08:51

## 2022-05-23 RX ADMIN — HYDROMORPHONE HYDROCHLORIDE 1 MG: 1 INJECTION, SOLUTION INTRAMUSCULAR; INTRAVENOUS; SUBCUTANEOUS at 06:30

## 2022-05-23 RX ADMIN — HYDROMORPHONE HYDROCHLORIDE 1 MG: 1 INJECTION, SOLUTION INTRAMUSCULAR; INTRAVENOUS; SUBCUTANEOUS at 02:39

## 2022-05-23 RX ADMIN — DIPHENHYDRAMINE HYDROCHLORIDE 50 MG: 50 INJECTION, SOLUTION INTRAMUSCULAR; INTRAVENOUS at 14:43

## 2022-05-23 RX ADMIN — INSULIN LISPRO 3 UNITS: 100 INJECTION, SOLUTION INTRAVENOUS; SUBCUTANEOUS at 18:39

## 2022-05-23 RX ADMIN — DIVALPROEX SODIUM 500 MG: 250 TABLET, DELAYED RELEASE ORAL at 21:29

## 2022-05-23 RX ADMIN — HYDROMORPHONE HYDROCHLORIDE 1 MG: 1 INJECTION, SOLUTION INTRAMUSCULAR; INTRAVENOUS; SUBCUTANEOUS at 10:32

## 2022-05-23 RX ADMIN — DOXYCYCLINE HYCLATE 100 MG: 100 CAPSULE ORAL at 21:29

## 2022-05-23 RX ADMIN — DIPHENHYDRAMINE HYDROCHLORIDE 50 MG: 25 TABLET ORAL at 22:58

## 2022-05-23 ASSESSMENT — PAIN SCALES - GENERAL
PAINLEVEL_OUTOF10: 10
PAINLEVEL_OUTOF10: 9

## 2022-05-23 ASSESSMENT — PAIN DESCRIPTION - FREQUENCY: FREQUENCY: CONTINUOUS

## 2022-05-23 ASSESSMENT — PAIN DESCRIPTION - ONSET: ONSET: ON-GOING

## 2022-05-23 ASSESSMENT — PAIN DESCRIPTION - LOCATION: LOCATION: OTHER (COMMENT)

## 2022-05-23 ASSESSMENT — PAIN SCALES - WONG BAKER: WONGBAKER_NUMERICALRESPONSE: 6

## 2022-05-23 NOTE — PROGRESS NOTES
Hospitalist Progress Note      SYNOPSIS: Patient admitted on 5/17/2022 for Subtherapeutic anticoagulation      SUBJECTIVE:    Patient seen and examined at bedside. INR 1.1 this morning. Received 15 mg Coumadin yesterday. Continues to report itching and bleeding lesion on the penis. Does not appear to be increased pain. Records reviewed. The patient is a 49 y. o. male, MSM, with history of COPD, factor 5 Leiden deficiency, protein S deficiency, pulmonary embolism, pancreatic cancer s/p chemoradiation therapy 2014, history of atrial myxoma/thrombus with partial resection 2014, DM type II, hypertension, hepatitis B (HBsAg and cAb positive, HBsAb negative on 04/23), who was recently admitted in 04/2022 for left-sided weakness suspected to have stroke for which he received tPA and underwent thrombectomy then signed out AMA, again presented on 05/17/2022 with shortness of breath, incidentally found to have ulcer on glans penis. On admission, he was afebrile and hemodynamically stable with no leukocytosis.  VQ scan showed low probability for pulmonary embolism.  Bilateral venous duplex lower extremity ultrasound showed no evidence for DVT.  ID service was consulted for penile ulcer. He had negative HIV screen and hep C Ab, positive initial syphilis screening test and TP-PA Ab but negative RPR done at [facility] during his hospital admission for stroke in April. He then developed papules on his trunk, sparing palms. Repeat RPR on 05/17 and on 05/18 were nonreactive. Urine GC/Chlamydia PCR was negative.  HIV screen was nonreactive. Suspect latent versus secondary. [facility] was given IM benzathine penicillin 2,500,000 units weekly for 3 doses, first dose on 5/18.  However, on the morning of 5/19, he refused to take penicillin intramuscular and agreed to using doxycycline for 28 days instead.  And plan for outpatient follow-up with ID.   Due to anaphylactic reaction to Lovenox, he is currently being bridged with heparin by weight and Coumadin until INR is therapeutic. Stable overnight. No other overnight issues reported. Temp (24hrs), Av.2 °F (36.8 °C), Min:97.9 °F (36.6 °C), Max:98.4 °F (36.9 °C)    DIET: ADULT DIET; Regular  CODE: Full Code    Intake/Output Summary (Last 24 hours) at 2022 1446  Last data filed at 2022 1032  Gross per 24 hour   Intake 1525.24 ml   Output 960 ml   Net 565.24 ml       OBJECTIVE:    /71   Pulse 79   Temp 98.4 °F (36.9 °C) (Oral)   Resp 18   Wt 200 lb (90.7 kg)   SpO2 96%   BMI 31.32 kg/m²       General appearance: No apparent distress, appears stated age. Appears anxious  Respiratory:  Bilateral entry fair. No wheezing or crackles. Cardiovascular: Regular rate rhythm, normal S1-S2  Abdomen: Soft, nontender, nondistended  Musculoskeletal: No clubbing, cyanosis, no bilateral lower extremity edema. Brisk capillary refill. Skin:  Maculopapular lesions over the skin. Bleeding penile lesion  Neurologic: awake, alert and following commands       Assessment  1. Latent syphilis of unknown duration with penile lesion  2. Subtherapeutic INR  3. History of DVT and PE noncompliant with anticoagulation Coumadin  4. Heterozygous factor V Leiden. Protein S deficiency  5. Type 2 diabetes poorly controlled A1c of 8.2 on 2022  6. Noncompliance  7. History of pancreatic cancer status post chemoradiation therapy   8. History of atrial myxoma/thrombus-  9. History of unspecified psychiatric illness, possibly bipolar disorder  10 history of antiretroviral therapy        Plan  - Continue doxycycline 100 BID for 28 days, outpatient follow-up with infectious disease.   -Currently on IV Benadryl for itching  -Urology consulted for penile lesion  -Patient also on IV Dilaudid 20 mg every 4 hourly however at this time, does not have any clear indication for IV narcotics.   Changed to Percocet however patient is not happy about the same.    -Patient was also noted to be on antiretroviral medications as outpatient, after review of outside charts. ID was notified. Ordered HIV Viral load    - Continue Coumadin heparin bridge Coumadin dosing per pharmacy. INR 1.1 today. Patient was started on Coumadin therapy on 5/15/2022 and since then, has received Coumadin 7.5 mg on 5/18/2022, 10 mg on from 5/19/2022 to 5/21/2022 (total 3 doses), and Coumadin 15 mg on 5/22/2022 to 5/23/2022. Concern for warfarin resistance, noncompliance, will consult heme-onc. RN advised to visualize patient's swallowing the Coumadin pill. Patient states that it is because he is not getting enough Coumadin. Patient was told that he is getting 50 mg since past 2 days however patient states he is not getting the said amount. Patient reports anaphylaxis to multiple anticoagulant agents including Lovenox, fondaparinux, Eliquis, Pradaxa. States with Xarelto, his hemoglobin dropped down to about 4 to 5 g. As per [address] [place] [address],[address], records from 2015, documented allergic reaction to apixaban/Pradaxa his edema, however, as per [facility] notes, reaction is anaphylactic shock. It also appears patient has been seen by multiple health systems in the past.  Patient has documented noncompliance with Coumadin as outpatient. Patient also recently signed out AMA from different facilities including including this hospital.     Patient states that he would also like to leave [address] He does not give me a reason at this time. Patient was informed of the risks including leaving AMA, including death which he understands. RN was informed. Continue current treatment.       DISPOSITION: home once INR is therapeutic    Medications:  REVIEWED DAILY    Infusion Medications    sodium chloride      sodium chloride      dextrose      Or    dextrose      dextrose      sodium chloride      heparin (PORCINE) Infusion 22 Units/kg/hr (05/23/22 1941)     Scheduled Medications    lidocaine  5 mL IntraDERmal Once    sodium chloride flush  5-40 mL IntraVENous 2 times per day    doxycycline hyclate  100 mg Oral 2 times per day    aspirin  81 mg Oral Daily    clopidogrel  75 mg Oral Daily    divalproex  500 mg Oral BID    ferrous sulfate  325 mg Oral BID WC    montelukast  10 mg Oral Daily    pantoprazole  40 mg Oral QAM AC    sodium chloride flush  10 mL IntraVENous 2 times per day    ziprasidone (GEODON) in sterile water injection  20 mg IntraMUSCular Once    LORazepam  1 mg IntraVENous Once    warfarin placeholder: dosing by pharmacy   Other RX Placeholder    diphenhydrAMINE  50 mg IntraVENous Q4H    lidocaine PF  5 mL IntraDERmal Once    sodium chloride flush  5-40 mL IntraVENous 2 times per day    heparin flush  3 mL IntraVENous 2 times per day    predniSONE  60 mg Oral Once    insulin glargine  0.25 Units/kg SubCUTAneous Nightly    insulin lispro  0-18 Units SubCUTAneous TID WC    insulin lispro  0-9 Units SubCUTAneous Nightly     PRN Meds: sodium chloride flush, sodium chloride, albuterol, sodium chloride flush, sodium chloride, promethazine **OR** ondansetron, polyethylene glycol, acetaminophen **OR** acetaminophen, glucose, dextrose **OR** dextrose, glucagon (rDNA), dextrose, HYDROmorphone, sodium chloride flush, sodium chloride, heparin flush, heparin (porcine), heparin (porcine)    Labs:     Recent Labs     05/22/22  0458 05/23/22  0721   WBC 4.1* 4.7   HGB 10.4* 10.3*   HCT 34.0* 33.9*    311       Recent Labs     05/22/22  0458 05/23/22  0721    139   K 4.3 4.5    103   CO2 25 23   BUN 9 11   CREATININE 0.8 0.9   CALCIUM 9.3 8.8       No results for input(s): PROT, ALB, ALKPHOS, ALT, AST, BILITOT, AMYLASE, LIPASE in the last 72 hours. Recent Labs     05/21/22  1443 05/22/22  0458 05/23/22  0721   INR 1.3 1.2 1.1       No results for input(s): Les Dom in the last 72 hours.     Chronic labs:    Lab Results   Component Value Date    CHOL 201 (H) 05/12/2022    TRIG 205 (H) 05/12/2022    HDL 47 05/12/2022    LDLCALC 113 (H) 05/12/2022    TSH 5.890 (H) 05/12/2022    INR 1.1 05/23/2022    LABA1C 8.2 (H) 05/18/2022       Radiology: REVIEWED DAILY    +++++++++++++++++++++++++++++++++++++++++++++++++  Missael Olson MD  [facility] Physician - [address] [place] [address] [place], [facility]  +++++++++++++++++++++++++++++++++++++++++++++++++  NOTE: This report was transcribed using voice recognition software. Every effort was made to ensure accuracy; however, inadvertent computerized transcription errors may be present.

## 2022-05-23 NOTE — CARE COORDINATION
5/23 Update CM note. Heparin gtt continues, trying to bridge to Coumadin. INR today is 1.1. Patient receiving Doxycycline PO BID for 28 days per ID for latent syphilis. ID ordered US fibroscan today. Referral was made on 5/20 to [address] [place] [address] for nebulizer as patient lives in [place] area. Dr. Kamilah Collado wanted referral to [facility] but spoke with Summer Loving and he states there is no enough criteria for have admission to [facility]. Dr. Kamilah Collado notified. Plan remains to return home at this time and family will provide transportation. [address] with Rowdy Mcfarland at [facility] regarding nebulizer, they are out of network with [employer]. LM with Moon Wolf at [facility] to see if they service area, will await return call.     Denise Friedman, BSN, RN  [address] Case Management   Cell: [phone]

## 2022-05-23 NOTE — PROGRESS NOTES
Pharmacy Consultation Note  (Warfarin Dosing and Monitoring)  Initial consult date: 05/18/22  Consulting Provider: Dr. 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 Weight:   Wt Readings from Last 1 Encounters:   05/17/22 200 lb (90.7 kg)       TSH:    Lab Results   Component Value Date    TSH 5.890 05/12/2022       Hepatic Function Panel:                            Lab Results   Component Value Date    ALKPHOS 112 05/17/2022    ALT 25 05/17/2022    AST 18 05/17/2022    PROT 7.0 05/17/2022    BILITOT 0.2 05/17/2022    BILIDIR 0.0 03/10/2022    LABALBU 4.1 05/17/2022       Current warfarin drug-drug interactions include: No significant interactions    Recent Labs     05/22/22  0458 05/23/22  0721   HGB 10.4* 10.3*    311     Date Warfarin Dose INR Heparin or LMWH Comment   05/18 7.5 mg 1.1 Heparin gtt Factor V Leiden   5/19 10 mg 1 Heparin gtt    5/20 10 mg 1.1 Heparin gtt    5/21 10 mg 1.3 Heparin gtt    5/22 15 mg 1.2 Heparin gtt    5/23 15 mg 1.1 Heparin gtt      Assessment:  · Patient is a 49 y.o. male on warfarin for History of  VTE/PE and Factor V Leiden. Patient's home warfarin dosing regimen is 7.5mg nightly. · Goal INR 2 - 3  · INR down to 1.1 today, aptt 77.4 with heparin gtt., monitor drug-drug interaction Ramesh Drown) with doxycycline addition onset 2-5 days (non issue currently). · Spoke to charge RN about concern if patient was actually swallowing his warfarin when it is being administered- did not have any evidence that patient was not taking it. Concern w/ current warfarin resistance potential.  Has allergies/intolerances listed to all other anticoagulant options. · Contacted current attending physician Dr. Kyle Todd about above concerns as well. Plan:  · Will give warfarin 15 mg x 1 again today.   · Daily PT/INR until the INR is stable within the therapeutic range  · Pharmacist will follow and monitor/adjust dosing as necessary    Thank you for this consult,  Mar Paredes.  Urszula Centeno, MalD, [facility], BCPS 5/23/2022 12:29 PM

## 2022-05-23 NOTE — PROGRESS NOTES
This RN walked into patient's room to find the patient had removed his only IV access. Patient educated throughout the day that he was a hard stick and that he had to be gentle with his IV. This RN called the ICU team to try to place another one.

## 2022-05-23 NOTE — CONSULTS
5/23/2022 3:32 PM  Service: Urology  Group: [facility] (Valentin/Zabrina/Hallie)    Cresencio Schilder  [id number]     Chief Complaint:    Syphilis     History of Present Illness: The patient is a 49 y.o. male patient who presented to the hospital 6 days ago with chief complaints of chest pain and left sided weakness. Throughout his work up during his admission, he was found to have an ulcer on his glans penis. He is being followed by infectious disease and has been diagnosed with latent syphilis of unknown duration and hepatitis B. We were asked to evaluate secondary to the penile ulcer.       Past Medical History:   Diagnosis Date    Asthma     COPD (chronic obstructive pulmonary disease) (HCC)     Deep vein thrombosis (DVT) (HCC)     Factor V Leiden (HCC)     Protein S deficiency ([address]     Pulmonary emboli (HCC)          Past Surgical History:   Procedure Laterality Date    APPENDECTOMY      IR MECHANICAL ART THROMBECTOMY INTRACRANIAL  5/11/2022    IR MECHANICAL ART THROMBECTOMY INTRACRANIAL 5/11/2022 MD NOE Sanders SPECIAL PROCEDURES    VASCULAR SURGERY      july 2021       Medications Prior to Admission:    Medications Prior to Admission: divalproex (DEPAKOTE) 500 MG DR tablet, Take 500 mg by mouth 2 times daily  insulin glargine (LANTUS) 100 UNIT/ML injection vial, Inject 40 Units into the skin nightly  insulin lispro (HUMALOG) 100 UNIT/ML injection vial, Inject 15 Units into the skin 3 times daily (before meals)  warfarin (COUMADIN) 7.5 MG tablet, Take 7.5 mg by mouth every evening  loratadine (CLARITIN) 10 MG tablet, Take 1 tablet by mouth daily  clopidogrel (PLAVIX) 75 MG tablet, Take 1 tablet by mouth daily  omeprazole (PRILOSEC) 20 MG delayed release capsule, Take 1 capsule by mouth daily  albuterol (PROVENTIL) (2.5 MG/3ML) 0.083% nebulizer solution, Take 3 mLs by nebulization every 6 hours as needed for Wheezing  albuterol sulfate HFA (VENTOLIN HFA) 108 (90 Base) MCG/ACT inhaler, Inhale 2 puffs into the lungs every 4 hours as needed for Wheezing  montelukast (SINGULAIR) 10 MG tablet, Take 1 tablet by mouth daily  ferrous sulfate (IRON 325) 325 (65 Fe) MG tablet, Take 1 tablet by mouth 2 times daily (with meals)  ondansetron (ZOFRAN) 4 MG tablet, Take 1 tablet by mouth daily as needed for Nausea or Vomiting  aspirin 81 MG chewable tablet, Take 81 mg by mouth daily    Allergies:    Lovenox [enoxaparin sodium], Arixtra [fondaparinux], Dye [iodides], Eliquis [apixaban], Fragmin [dalteparin], Iodine, Ipratropium, Motrin [ibuprofen], Robaxin [methocarbamol], Skelaxin [metaxalone], Toradol [ketorolac tromethamine], Tramadol, and Xarelto [rivaroxaban]    Social History:    reports that he has quit smoking. He has never used smokeless tobacco. He reports previous alcohol use. He reports that he does not use drugs. Family History:   Non-contributory to this Urological problem  family history is not on file. Review of Systems:  Constitutional: No fever or chills   Respiratory: negative for cough and hemoptysis  Cardiovascular: negative for chest pain and dyspnea  Gastrointestinal: negative for abdominal pain, diarrhea, nausea and vomiting   Derm: negative for rash and skin lesion(s)  Neurological: negative for seizures and tremors  Musculoskeletal: Negative    Psychiatric: Negative   : As above in the HPI, otherwise negative  All other reviews are negative    Physical Exam:     Vitals:  /71   Pulse 79   Temp 98.4 °F (36.9 °C) (Oral)   Resp 18   Wt 200 lb (90.7 kg)   SpO2 96%   BMI 31.32 kg/m²     General:  Awake, alert, oriented X 3. No apparent distress. HEENT:  Normocephalic, atraumatic. Lungs:  Respirations symmetric and non-labored. Abdomen:  soft, nontender, no masses  Extremities:  No clubbing, cyanosis, or edema  Skin:  Warm and dry, no open lesions or rashes  Neuro:  There are no motor or sensory deficits in the 4 quadrant extremities   Rectal: deferred  Genitourinary: Penile chancre on the glans penis with clean, non-raised edges, minimal bleeding.  No fluctuance or drainage at this time   Labs:     Recent Labs     05/22/22  0458 05/23/22  0721   WBC 4.1* 4.7   RBC 4.48 4.37   HGB 10.4* 10.3*   HCT 34.0* 33.9*   MCV 75.9* 77.6*   MCH 23.2* 23.6*   MCHC 30.6* 30.4*   RDW 16.6* 17.0*    311   MPV 10.4 10.7         Recent Labs     05/22/22  0458 05/23/22  0721   CREATININE 0.8 0.9       No results found for: PSA        Assessment/plan:  Penile Chancre in a patient with syphilis     Keep the ulcer clean and dry   Cont the treatment per infectious disease at this time   There is no surgical debridement needed at this time  Please call with questions or concerns             Electronically signed by AGUILA Manzo CNP on 5/23/2022 at 3:32 PM     Agree withabove assessment and plan
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
## 2022-05-23 NOTE — PROGRESS NOTES
[facility] PROGRESS NOTE      Chief complaint: Follow-up of latent syphilis    The patient is a 49 y.o. male, MSM, with history of COPD, 5 Leyden deficiency, protein S deficiency, pulmonary embolism, pancreatic cancer s/p chemoradiation therapy, DM, hypertension, hepatitis B (HBsAg and cAb positive, HBsAb negative on 04/23), recently admitted in 04/2022 for left-sided weakness suspected to have stroke for which he received tPA and underwent thrombectomy then signed out AMA, presented on 05/17 with shortness of breath, incidentally found to have ulcer on glans penis. On admission, he was afebrile and hemodynamically stable with no leukocytosis. VQ scan showed low probability for pulmonary embolism. Bilateral venous duplex lower extremity ultrasound showed no evidence for DVT. ID service was consulted for penile ulcer. He reports not having known to have and not have been treated for syphilis in the past. He reports having had sexual intercourse in the past month. He reports having felt burning pain on urination then noticed sore on his penis. He had negative HIV screen and hep C Ab, positive initial syphilis screening test and TP-PA Ab but negative RPR done at [facility] during his hospital admission for stroke in April. He then developed papules on his trunk, sparing palms. Repeat RPR on 05/17 and on 05/18 were nonreactive. Urine GC/Chlamydia PCR was negative. HIV screen was nonreactive. Hep B eAg was negative. Hep B viral load was 2429 IU/mL. There was mention on his outside records that in 2018, he was on HIV medication but when asked about it, he refused to answer whether he has been diagnosed with HIV or on HIV medications in the past.    Subjective: Patient was seen and examined. No chills, no abdominal pain, no diarrhea, no rash, no itching.        Objective:    Vitals:    05/23/22 0745   BP: 108/71   Pulse: 79   Resp: 18   Temp: 98.4 °F (36.9 °C)   SpO2: 96%     Constitutional: Alert, not in distress  Respiratory: Clear breath sounds, no crackles, no wheezes  Cardiovascular: Regular rate and rhythm, no murmurs  Gastrointestinal: Bowel sounds present, soft, nontender  Skin: Warm and dry, no active dermatoses. Kin erosion with bleeding on glans penis with no pus appreciated  Musculoskeletal: No joint swelling, no joint erythema    Labs, imaging, and medical records/notes were personally reviewed. Assessment:  Latent syphilis of unknown duration  Chronic hepatitis B (HBsAg and cAb positive, HBeAg negative, viral load of 2429 IU/mL)  Skin erosion, glans penis, suspect traumatic, with no signs of gross infection. Questionable history of HIV. Unclear when, how and if he was diagnosed, what he has been taking and how long he has been off medications if he was taking any ART. Patient refused to clarify. Recommendations:  Continue IM benzathine penicillin 2.4 million units weekly for 3 doses but patient refused further doses. I have explained to him that for latent syphilis, IM penicillin (not IV penicillin) is the treatment of choice. Since he is not amenable to coming to infusion center to continue IM penicillin treatments, will do alternative therapy instead with doxycycline 100 mg BID for 28 days. Check hepatitis B eAb, hepatitis A total Ab, and Fibroscan. Check HIV viral load. Keep sore on glans penis clean and dry. Follow up Urology consult. Consult Dermatology for rash. Case was discussed with Dr. Neil Arceo.    Thank you for involving me in the care of Louie Arroyo. I will continue to follow peripherally. Please do not hesitate to call for any questions, concerns, or new findings.       Electronically signed by Ayesha Rowe MD on 5/23/2022 at 10:42 AM

## 2022-05-24 VITALS
SYSTOLIC BLOOD PRESSURE: 102 MMHG | DIASTOLIC BLOOD PRESSURE: 62 MMHG | TEMPERATURE: 98 F | BODY MASS INDEX: 31.32 KG/M2 | RESPIRATION RATE: 18 BRPM | HEART RATE: 84 BPM | WEIGHT: 200 LBS | OXYGEN SATURATION: 98 %

## 2022-05-24 LAB
ANION GAP SERPL CALCULATED.3IONS-SCNC: 15 MMOL/L (ref 7–16)
BUN BLDV-MCNC: 10 MG/DL (ref 6–20)
CALCIUM SERPL-MCNC: 9.4 MG/DL (ref 8.6–10.2)
CHLORIDE BLD-SCNC: 99 MMOL/L (ref 98–107)
CO2: 22 MMOL/L (ref 22–29)
CREAT SERPL-MCNC: 0.9 MG/DL (ref 0.7–1.2)
GFR AFRICAN AMERICAN: >60
GFR NON-AFRICAN AMERICAN: >60 ML/MIN/1.73
GLUCOSE BLD-MCNC: 140 MG/DL (ref 74–99)
HCT VFR BLD CALC: 36.2 % (ref 37–54)
HEMOGLOBIN: 11 G/DL (ref 12.5–16.5)
INR BLD: 1.3
MCH RBC QN AUTO: 23.6 PG (ref 26–35)
MCHC RBC AUTO-ENTMCNC: 30.4 % (ref 32–34.5)
MCV RBC AUTO: 77.5 FL (ref 80–99.9)
METER GLUCOSE: 130 MG/DL (ref 74–99)
PDW BLD-RTO: 17.9 FL (ref 11.5–15)
PLATELET # BLD: 314 E9/L (ref 130–450)
PMV BLD AUTO: 11 FL (ref 7–12)
POTASSIUM SERPL-SCNC: 4.4 MMOL/L (ref 3.5–5)
PROTHROMBIN TIME: 14 SEC (ref 9.3–12.4)
RBC # BLD: 4.67 E12/L (ref 3.8–5.8)
SODIUM BLD-SCNC: 136 MMOL/L (ref 132–146)
WBC # BLD: 4.6 E9/L (ref 4.5–11.5)

## 2022-05-24 PROCEDURE — 85610 PROTHROMBIN TIME: CPT

## 2022-05-24 PROCEDURE — 6370000000 HC RX 637 (ALT 250 FOR IP): Performed by: INTERNAL MEDICINE

## 2022-05-24 PROCEDURE — 6370000000 HC RX 637 (ALT 250 FOR IP): Performed by: FAMILY MEDICINE

## 2022-05-24 PROCEDURE — 36415 COLL VENOUS BLD VENIPUNCTURE: CPT

## 2022-05-24 PROCEDURE — 85027 COMPLETE CBC AUTOMATED: CPT

## 2022-05-24 PROCEDURE — 80048 BASIC METABOLIC PNL TOTAL CA: CPT

## 2022-05-24 PROCEDURE — 82962 GLUCOSE BLOOD TEST: CPT

## 2022-05-24 RX ORDER — WARFARIN SODIUM 7.5 MG/1
15 TABLET ORAL
Status: DISCONTINUED | OUTPATIENT
Start: 2022-05-24 | End: 2022-05-24 | Stop reason: HOSPADM

## 2022-05-24 RX ADMIN — PANTOPRAZOLE SODIUM 40 MG: 40 TABLET, DELAYED RELEASE ORAL at 05:58

## 2022-05-24 RX ADMIN — OXYCODONE AND ACETAMINOPHEN 1 TABLET: 5; 325 TABLET ORAL at 06:03

## 2022-05-24 RX ADMIN — DIPHENHYDRAMINE HYDROCHLORIDE 50 MG: 25 TABLET ORAL at 05:58

## 2022-05-24 RX ADMIN — CLOPIDOGREL BISULFATE 75 MG: 75 TABLET ORAL at 08:26

## 2022-05-24 RX ADMIN — MONTELUKAST SODIUM 10 MG: 10 TABLET ORAL at 08:26

## 2022-05-24 RX ADMIN — DOXYCYCLINE HYCLATE 100 MG: 100 CAPSULE ORAL at 08:25

## 2022-05-24 RX ADMIN — FERROUS SULFATE TAB 325 MG (65 MG ELEMENTAL FE) 325 MG: 325 (65 FE) TAB at 08:25

## 2022-05-24 RX ADMIN — DIVALPROEX SODIUM 500 MG: 250 TABLET, DELAYED RELEASE ORAL at 08:25

## 2022-05-24 RX ADMIN — ASPIRIN 81 MG 81 MG: 81 TABLET ORAL at 08:26

## 2022-05-24 ASSESSMENT — PAIN SCALES - WONG BAKER: WONGBAKER_NUMERICALRESPONSE: 6

## 2022-05-24 ASSESSMENT — PAIN SCALES - GENERAL: PAINLEVEL_OUTOF10: 8

## 2022-05-24 NOTE — PLAN OF CARE
Problem: Pain  Goal: Verbalizes/displays adequate comfort level or baseline comfort level  5/24/2022 0329 by Pacheco Webber RN  Outcome: Progressing  5/23/2022 1622 by Jairo Khanna RN  Outcome: Progressing     Problem: Safety - Adult  Goal: Free from fall injury  5/24/2022 0329 by Pacheco Webber RN  Outcome: Progressing  5/23/2022 1622 by Jairo Khanna RN  Outcome: Progressing     Problem: ABCDS Injury Assessment  Goal: Absence of physical injury  5/24/2022 0329 by Pacheco Webber RN  Outcome: Progressing  5/23/2022 1622 by Jairo Khanna RN  Outcome: Progressing     Problem: Skin/Tissue Integrity  Goal: Absence of new skin breakdown  Description: 1. Monitor for areas of redness and/or skin breakdown  2. Assess vascular access sites hourly  3. Every 4-6 hours minimum:  Change oxygen saturation probe site  4. Every 4-6 hours:  If on nasal continuous positive airway pressure, respiratory therapy assess nares and determine need for appliance change or resting period.   5/24/2022 0329 by Pacheco Webber RN  Outcome: Progressing  5/23/2022 1622 by Jairo Khanna RN  Outcome: Progressing     Problem: Chronic Conditions and Co-morbidities  Goal: Patient's chronic conditions and co-morbidity symptoms are monitored and maintained or improved  5/24/2022 0329 by Pacheco Webber RN  Outcome: Progressing  5/23/2022 1622 by Jairo Khanna RN  Outcome: Progressing  [facility] (Taken 5/23/2022 0338 by Zuly Ruiz RN)  Care Plan - Patient's Chronic Conditions and Co-Morbidity Symptoms are Monitored and Maintained or Improved: Monitor and assess patient's chronic conditions and comorbid symptoms for stability, deterioration, or improvement

## 2022-05-24 NOTE — PROGRESS NOTES
Pt pulled out his IV at the end of day shift. The day shift nurse called MICU to see if a nurse could come and try to get an IV after several tries. No one has come to try him and Dr. Ella Scott was notified and requested a central line. Surgery notified and when I went in to have him sign his consent he said that he felt that it was too much of a risk for infection. Explained to him that he needed to be on the heparin drip and was at a risk for blood clots. He was very sleepy and dismissive and did not want to discuss. Dr. Ella Scott notified. He does seem to understand the risks and will try again to speak with him when he is more awake.

## 2022-05-24 NOTE — PROGRESS NOTES
Brief note    Patient left AGAINST MEDICAL ADVICE this morning. As per RN, he would not wait for Coumadin dose.   Patient did have an appointment set up with his primary care provider, Dr. Mariely Deshpande at 1:45 PM.

## 2022-05-24 NOTE — PLAN OF CARE
Problem: Pain  Goal: Verbalizes/displays adequate comfort level or baseline comfort level  5/24/2022 0928 by Kaleigh Walton RN  Outcome: Progressing  5/24/2022 0329 by Thierno Powers RN  Outcome: Progressing     Problem: Safety - Adult  Goal: Free from fall injury  5/24/2022 0928 by Kaleigh Walton RN  Outcome: Progressing  5/24/2022 0329 by Thierno Powers RN  Outcome: Progressing     Problem: ABCDS Injury Assessment  Goal: Absence of physical injury  5/24/2022 0928 by Kaleigh Walton RN  Outcome: Progressing  5/24/2022 0329 by Thierno Powers RN  Outcome: Progressing     Problem: Skin/Tissue Integrity  Goal: Absence of new skin breakdown  Description: 1. Monitor for areas of redness and/or skin breakdown  2. Assess vascular access sites hourly  3. Every 4-6 hours minimum:  Change oxygen saturation probe site  4. Every 4-6 hours:  If on nasal continuous positive airway pressure, respiratory therapy assess nares and determine need for appliance change or resting period.   5/24/2022 0928 by Kaleigh Walton RN  Outcome: Progressing  5/24/2022 0329 by Thierno Powers RN  Outcome: Progressing     Problem: Chronic Conditions and Co-morbidities  Goal: Patient's chronic conditions and co-morbidity symptoms are monitored and maintained or improved  5/24/2022 0928 by Kaleigh Walton RN  Outcome: Progressing  5/24/2022 0329 by Thierno Powers RN  Outcome: Progressing

## 2022-05-24 NOTE — PROGRESS NOTES
Patient continues to refuse central line placement after multiple educational attempts. Will give oral coumadin.

## 2022-05-24 NOTE — PROGRESS NOTES
GENERAL SURGERY PROGRESS NOTE    Consult placed for TLC as patient is a hard stick and lost IV access. He is currently on heparin drip and requires IV access. Orders for treatment consent and supplies placed. However, notified that patient refused despite discussion of risks/benefits/alternatives. Informed nursing staff to let [facility] know if patient changes his mind.      Cesia Vides MD  General Surgery PGY-2

## 2022-05-25 LAB
HAV AB SERPL IA-ACNC: NEGATIVE
HEPATITIS BE ANTIBODY: POSITIVE

## 2022-05-30 ENCOUNTER — HOSPITAL ENCOUNTER (EMERGENCY)
Dept: HOSPITAL 83 - ED | Age: 47
Discharge: LEFT BEFORE DISCHARGE | End: 2022-05-30
Payer: COMMERCIAL

## 2022-05-30 VITALS — WEIGHT: 190 LBS | HEIGHT: 60 IN

## 2022-05-30 DIAGNOSIS — R07.9: Primary | ICD-10-CM

## 2022-05-30 DIAGNOSIS — R79.1: ICD-10-CM

## 2022-05-30 LAB
ALP SERPL-CCNC: 100 U/L (ref 45–117)
ALT SERPL W P-5'-P-CCNC: 26 U/L (ref 12–78)
AMPHETAMINES UR QL SCN: < 1000
APTT PPP: 27.6 SECONDS (ref 20–32.1)
AST SERPL-CCNC: 9 IU/L (ref 3–35)
BACTERIA #/AREA URNS HPF: (no result) /[HPF]
BARBITURATES UR QL SCN: > 200
BASOPHILS # BLD AUTO: 0 10*3/UL (ref 0–0.1)
BASOPHILS NFR BLD AUTO: 0.3 % (ref 0–1)
BENZODIAZ UR QL SCN: < 200
BUN SERPL-MCNC: 18 MG/DL (ref 7–24)
BZE UR QL SCN: > 300
CANNABINOIDS UR QL SCN: < 50
CASTS URNS QL MICRO: (no result)
CHLORIDE SERPL-SCNC: 110 MMOL/L (ref 98–107)
CREAT SERPL-MCNC: 1.18 MG/DL (ref 0.7–1.3)
EOSINOPHIL # BLD AUTO: 0.6 10*3/UL (ref 0–0.4)
EOSINOPHIL # BLD AUTO: 8.4 % (ref 1–4)
EPI CELLS #/AREA URNS HPF: (no result) /[HPF]
ERYTHROCYTE [DISTWIDTH] IN BLOOD BY AUTOMATED COUNT: 17.3 % (ref 0–14.5)
HCT VFR BLD AUTO: 36.5 % (ref 42–52)
INR BLD: 1.5 (ref 2–3.5)
LYMPHOCYTES # BLD AUTO: 0.8 10*3/UL (ref 1.3–4.4)
LYMPHOCYTES NFR BLD AUTO: 10.5 % (ref 27–41)
MCH RBC QN AUTO: 24 PG (ref 27–31)
MCHC RBC AUTO-ENTMCNC: 31.5 G/DL (ref 33–37)
MCV RBC AUTO: 76.2 FL (ref 80–94)
METHADONE UR QL SCN: < 300
MONOCYTES # BLD AUTO: 0.6 10*3/UL (ref 0.1–1)
MONOCYTES NFR BLD MANUAL: 7.7 % (ref 3–9)
NEUT #: 5.3 10*3/UL (ref 2.3–7.9)
NEUT %: 72.8 % (ref 47–73)
NRBC BLD QL AUTO: 0 % (ref 0–0)
OPIATES UR QL SCN: < 300
PCP UR QL SCN: <  25
PH UR STRIP: 5 [PH] (ref 4.5–8)
PLATELET # BLD AUTO: 256 10*3/UL (ref 130–400)
PMV BLD AUTO: 10.8 FL (ref 9.6–12.3)
POTASSIUM SERPL-SCNC: 5.2 MMOL/L (ref 3.5–5.1)
PROT SERPL-MCNC: 7 GM/DL (ref 6.4–8.2)
RBC # BLD AUTO: 4.79 10*6/UL (ref 4.5–5.9)
SODIUM SERPL-SCNC: 139 MMOL/L (ref 136–145)
SP GR UR: 1.02 (ref 1–1.03)
UROBILINOGEN UR STRIP-MCNC: 1 E.U./DL (ref 0–1)
WBC #/AREA URNS HPF: (no result) WBC/HPF (ref 0–5)
WBC NRBC COR # BLD AUTO: 7.3 10*3/UL (ref 4.8–10.8)

## 2022-06-03 NOTE — DISCHARGE SUMMARY
Hospitalist Discharge Summary    Patient ID: Bonnie Blair   Patient : 1975  Patient's PCP: Aysha Gomes MD    Admit Date: 2022   Admitting Physician: Ladarius Sesay MD    Discharge Date:  6/3/2022  Discharge Physician: Spencer Acosta MD     Discharge Disposition: [facility] course in brief:  (Please refer to daily progress notes for a comprehensive review of the hospitalization by requesting medical records)    The patient is a 49 y. o. male, MSM, with history of COPD, factor 5 Leiden deficiency, protein S deficiency, pulmonary embolism, pancreatic cancer s/p chemoradiation therapy , history of atrial myxoma/thrombus with partial resection , DM type II, hypertension, hepatitis B (HBsAg and cAb positive, HBsAb negative on ), who was recently admitted in 2022 for left-sided weakness suspected to have stroke for which he received tPA and underwent thrombectomy then signed out AMA, again presented on 2022 with shortness of breath, incidentally found to have ulcer on glans penis. On admission, he was afebrile and hemodynamically stable with no leukocytosis.  VQ scan showed low probability for pulmonary embolism.  Bilateral venous duplex lower extremity ultrasound showed no evidence for DVT.  ID service was consulted for penile ulcer. He had negative HIV screen and hep C Ab, positive initial syphilis screening test and TP-PA Ab but negative RPR done at [facility] during his hospital admission for stroke in April. He then developed papules on his trunk, sparing palms. Repeat RPR on  and on  were nonreactive. Urine GC/Chlamydia PCR was negative.  HIV screen was nonreactive. Suspect latent versus secondary.  He was given IM benzathine penicillin 2,500,000 units weekly for 3 doses, first dose on .  However, on the morning of , he refused to take penicillin intramuscular and agreed to using doxycycline for 28 days instead.  And plan for outpatient follow-up with ID.  Due to anaphylactic reaction to Lovenox, he was being bridged with heparin by weight and Coumadin until INR is therapeutic. Patient left AGAINST MEDICAL ADVICE on the morning of 5/24/2022 and did not wait for my evaluation. Patient also recently signed out AMA from different facilities including including this hospital    Discharge Diagnosis    1.  Latent syphilis of unknown duration with penile lesion  2.  Subtherapeutic INR  3.  History of DVT and PE noncompliant with anticoagulation Coumadin  4.    Heterozygous factor V Leiden. Protein S deficiency  5.  Type 2 diabetes poorly controlled A1c of 8.2 on 5/18/2022  6.  Noncompliance  7. History of pancreatic cancer status post chemoradiation therapy 2014  8. History of atrial myxoma/thrombus-2014  9. History of unspecified psychiatric illness, possibly bipolar disorder  10 history of antiretroviral therapy    Consults:   IP CONSULT TO HOSPITALIST  IP CONSULT TO INFECTIOUS DISEASES  IP CONSULT TO PHARMACY  IP CONSULT TO UROLOGY  IP CONSULT TO ONCOLOGY      Discharge Instructions / Follow up:    Continued appropriate risk factor modification of blood pressure, diabetes and serum lipids will remain essential to reducing risk of future atherosclerotic development    Activity: activity as tolerated    Significant labs:  CBC:   No results for input(s): WBC, RBC, HGB, HCT, MCV, RDW, PLT in the last 72 hours. BMP: No results for input(s): NA, K, CL, CO2, BUN, CREATININE, CA, MG, PHOS in the last 72 hours. LFT:  No results for input(s): PROT, ALB, ALKPHOS, ALT, AST, BILITOT, AMYLASE, LIPASE in the last 72 hours. PT/INR: No results for input(s): INR, APTT in the last 72 hours. BNP: No results for input(s): BNP in the last 72 hours.   Hgb A1C:   Lab Results   Component Value Date    LABA1C 8.2 (H) 05/18/2022     Folate and B12: No results found for: [id number], No results found for: FOLATE  Thyroid Studies:   Lab Results   Component Value Date    TSH 5.890 (H) 05/12/2022 Q5FTWNZ 6.1 05/12/2022       Urinalysis:    Lab Results   Component Value Date    NITRU Negative 05/11/2022    WBCUA 0-2 03/13/2022    BACTERIA Negative 03/13/2022    RBCUA 0-2 03/13/2022    BLOODU Negative 05/11/2022    SPECGRAV >=1.030 05/11/2022    GLUCOSEU Negative 05/11/2022       Imaging:  CT head without contrast    Result Date: 5/12/2022  EXAMINATION: CT OF THE HEAD WITHOUT CONTRAST  5/12/2022 10:28 am TECHNIQUE: CT of the head was performed without the administration of intravenous contrast. Automated exposure control, iterative reconstruction, and/or weight based adjustment of the mA/kV was utilized to reduce the radiation dose to as low as reasonably achievable. COMPARISON: None. HISTORY: ORDERING SYSTEM PROVIDED HISTORY: post thrombectomy TECHNOLOGIST PROVIDED HISTORY: Reason for exam:->post thrombectomy Has a \"code stroke\" or \"stroke alert\" been called? ->No What reading provider will be dictating this exam?->[facility] FINDINGS: The ventricles are normal size, position and contour. There are no masses or mass effect. There are no parenchymal hemorrhages or extra-axial fluid collections. The cerebellum and brainstem are normal. There is mucosal thickening of the maxillary sinuses. The mastoid air cells are clear. There are scattered atherosclerotic calcifications of the intracavernous portions of the internal carotid arteries. Osseous calvarium is normal.     No acute intracranial abnormality. CT HEAD WO CONTRAST    Result Date: 5/11/2022  EXAMINATION: CT OF THE HEAD WITHOUT CONTRAST  5/11/2022 10:34 am TECHNIQUE: CT of the head was performed without the administration of intravenous contrast. Automated exposure control, iterative reconstruction, and/or weight based adjustment of the mA/kV was utilized to reduce the radiation dose to as low as reasonably achievable. COMPARISON: None.  HISTORY: ORDERING SYSTEM PROVIDED HISTORY: cva TECHNOLOGIST PROVIDED HISTORY: Has a \"code stroke\" or \"stroke alert\" been called? ->Yes Reason for exam:->cva Decision Support Exception - unselect if not a suspected or confirmed emergency medical condition->Emergency Medical Condition (MA) What reading provider will be dictating this exam?->CRC FINDINGS: The ventricles are normal size, position and contour. There are no masses or mass effect. There are no parenchymal hemorrhages or extra-axial fluid collections. No evidence of acute CVA. The cerebellum and brainstem are normal.  There is mucosal thickening of the maxillary sinuses. The mastoid air cells are clear. Osseous calvarium is normal.     No acute intracranial abnormality. Mild bilateral maxillary mucosal sinus disease. NM LUNG VENT/PERFUSION (VQ)    Result Date: 2022  Patient MRN: [id number] : 1975 Age:  49 years Gender: Male Order Date: 2022 3:31 PM Exam: NM LUNG VENT/PERFUSION (VQ) Number of Images: 9 views Indication:   SOB history of PE allergy to IV dye SOB history of PE allergy to IV dye What reading provider will be dictating this exam?->[facility] Comparison: None. Findings: The patient received 35 mCi of technetium dtpa Ventilation images  reveal normal ventilation throughout the lung fields The patient received 6 millicuries of technetium MAA. Perfusion images reveal no segmental perfusion defects. Low probability for pulmonary embolism     XR CHEST PORTABLE    Result Date: 2022  EXAMINATION: ONE XRAY VIEW OF THE CHEST 2022 4:40 pm COMPARISON: None. HISTORY: ORDERING SYSTEM PROVIDED HISTORY: weakness, Possible Stroke TECHNOLOGIST PROVIDED HISTORY: Reason for exam:->weakness, Possible Stroke What reading provider will be dictating this exam?->[facility] FINDINGS: The lungs are without acute focal process. There is no effusion or pneumothorax. The cardiomediastinal silhouette is without acute process. The osseous structures are without acute process. No acute process.      US DUP UPPER EXTREMITIES BILATERAL VENOUS    Result Date: 2022  Patient MRN:  [id number] : 1975 Age: 49 years Gender: Male Order Date:  2022 12:12 PM EXAM: US DUP UPPER EXTREMITIES BILATERAL VENOUS NUMBER OF IMAGES:  40 INDICATION:  r/o dvt r/o dvt What reading provider will be dictating this exam?->[facility] COMPARISON: None The cephalic veins are not seen Within the visualized vessels, there is no evidence for deep venous thrombosis There is good compressibility, there is good augmentation, there is good color flow. 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 Currently uncertain if this is chronic or acute however postprocedural Kristi CT does not reveal any evidence of intracranial hemorrhage. RECOMMENDATIONS Patient will be managed medically with blood pressure parameters as given. Will start patient on a heparin drip and transition to Coumadin. STROKE ACUTE TIME STAMPS: Preprocedure NIH stroke scale: 18 Vascular access time: 1100 hours Time First Pass: 1120 solitaire 3-40+ Machelle Time of final recanalization: 1120 Time of vascular closure: 1132 Postprocedure NIH stroke scale: Not assessed due to anesthesia    Result Date: 2022  IR MECHANICAL ART THROMBECTOMY INTRACRANIAL, IR SABINE CATH PLACE VERTEBRAL ART LEFT W OR WO ANGIO, IR SABINE CATH PLACE COM CAROTID INTRACRANIAL LEFT W OR WO ANGIO PROCEDURE PERFORMED: Diagnostic Cerebral angiogram with mechanical thrombectomy Ultrasound guided left femoral artery access Intraoperative Kristi CT Limited CT with interpretation.  COMPLETED DATE:  2022 10:58 AM CLINICAL HISTORY/PRE-PROCEDURE DIAGNOSIS: Acute ischemic stroke. Patient with a NIH stroke scale of 18 presents with acute left-sided symptoms concerning for a right MCA stroke. Of note he recently had a stroke on April 25 and hence was not a TPA candidate. Patient refused CTA and CTP as he is had severe contrast allergy despite preparation per patient. Patient is taken straight to IR with prophylactic intubation and general anesthesia with patient consent. POST-PROCEDURE DIAGNOSIS: Please see below COMPARISON: None ANESTHESIA: General anesthesia VESSELS CATHETERIZED: 1. Left vertebral artery. 2. Left common carotid artery. 3. Right common carotid artery. 4. Right internal carotid artery. 5. Right middle cerebral artery. RADIATION EXPOSURE DATA:  829.8 MG Y TOTAL FLUOROSCOPY TIME: 15 minutes and 3 seconds CONTRAST USED: 80 mL of Isovue-300 PROCEDURE: Following informed consent, the patient was brought to the angiography suite, both groins are prepped and draped in usual sterile manner. Using sonographic assistance Vascular access was obtained in the left common femoral artery with a 8-Kazakh sheath which was connected to continuous heparinized saline flush. A 6F cerebase catheter was prepped and with the support of a diagnostic catheter was brought into the aorta, double flushed and connected to continuous heparinized saline flush. The balloon guide catheter was then telescoped into the Right internal carotid artery. Fluoroscopic imaging performed at that time confirmed the presence of inferior division M2 occlusion. As the CT head did not show any evidence of infarction this is thought to be either an acute or subacute thrombus from his stroke at the end of April however with CT head showing salvageable tissue identified decide to go ahead with the thrombectomy. Along with the Machelle A microcatheter-Marksman was prepped and with the support of a microwire was navigated across the arterial occlusion.   A Solitaire 3 mm x 40 mm device was then deployed into the thrombus and allowed to integrate for approximately 2-3 minutes. The device was then retrieved with flow of contrast reversed by distal aspiration through the Machelle device. Continuous aspiration was performed during the retrieval process. Postretrieval digital subtraction images were obtained and showed revascularization- however on follow-up imaging there is vasospasm of the M1 M2 junction and delayed filling noticed here. As there is vasospasm and the thrombus has been retrieved it was decided to end the procedure here. Intraoperative Kristi CT was obtained which did not show any evidence of intracranial hemorrhage. Anesthesia was reversed patient was extubated and patient was transferred to the postoperative area in a stable condition. He will be transferred to the ICU when a bed is available. INTERPRETATION OF IMAGES: 1. Right internal carotid artery injection: Intracranially, there was normal opacification of the right ophthalmic artery, right middle cerebral artery and its branches are visualized. There is inferior division occlusion in the M2 segment. 2. Postprocedure right internal carotid artery injections reveal recanalization of the right MCA M2 segment followed by subsequent imaging revealing vasospasm in this segment. There is no vasospasm noticed in the superior division. No contrast extravasation is noticed. 3. Left internal carotid artery injection: Intracranially, there was normal opacification of the left ophthalmic artery, left posterior communicating artery, left anterior choroidal artery, left middle cerebral artery and its branches, and left anterior cerebral artery and its branches. The left posterior communicating artery appeared supplying the territory of the left posterior cerebral artery suggesting a fetal type left posterior cerebral artery. Please note that the left PCA also get supply from the basilar artery as indicated below.  Normal capillary phase and venous drainage was noted. No evidence of any aneurysms ,AVMs or dural AV fistulas. 4. Left vertebral artery injection: The origin of the left vertebral artery appear to be free from any atherosclerotic disease. Left vertebral artery appear to be normal in size, caliber and course. Intracranially, there was normal opacification of the left posterior inferior cerebellar artery, bilateral anterior-inferior cerebellar arteries, bilateral superior cerebellar arteries, bilateral posterior cerebral artery, basilar artery lumen and apex. There is some contrast washout seen in the left PCA because the PCA also get supply from the left posterior commuting artery. Normal capillary phase and venous drainage was noted. No evidence of aneurysms, AVMs or dural AV fistulas. 5. 3D rotational DynaCT imaging: The raw images were transferred to an independent workstation, and volume rendered 3D images were generated and reviewed. The images were independently reviewed. Interpretation of the images reveal no evidence of any subarachnoid hemorrhage or intraparenchymal hemorrhage. The right MCA hyperdensity is not visualized. .     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 Patients: If you have questions regarding some of the verbiage in your report, please visit [email or web address] for a definition. If you have any other questions, please contact your physician. 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  Currently uncertain if this is chronic or acute however postprocedural Kristi CT does not reveal any evidence of intracranial hemorrhage. RECOMMENDATIONS Patient will be managed medically with blood pressure parameters as given. Will start patient on a heparin drip and transition to Coumadin. STROKE ACUTE TIME STAMPS: Preprocedure NIH stroke scale: 18 Vascular access time: 1100 hours Time First Pass: 1120 solitaire 3-40+ Machelle Time of final recanalization: 1120 Time of vascular closure: 1132 Postprocedure NIH stroke scale: Not assessed due to anesthesia    Result Date: 5/12/2022  IR MECHANICAL ART THROMBECTOMY INTRACRANIAL, IR SABINE CATH PLACE VERTEBRAL ART LEFT W OR WO ANGIO, IR SABINE CATH PLACE COM CAROTID INTRACRANIAL LEFT W OR WO ANGIO PROCEDURE PERFORMED: Diagnostic Cerebral angiogram with mechanical thrombectomy Ultrasound guided left femoral artery access Intraoperative Kristi CT Limited CT with interpretation. COMPLETED DATE:  5/11/2022 10:58 AM CLINICAL HISTORY/PRE-PROCEDURE DIAGNOSIS: Acute ischemic stroke. Patient with a NIH stroke scale of 18 presents with acute left-sided symptoms concerning for a right MCA stroke. Of note he recently had a stroke on April 25 and hence was not a TPA candidate. Patient refused CTA and CTP as he is had severe contrast allergy despite preparation per patient. Patient is taken straight to IR with prophylactic intubation and general anesthesia with patient consent. POST-PROCEDURE DIAGNOSIS: Please see below COMPARISON: None ANESTHESIA: General anesthesia VESSELS CATHETERIZED: 1. Left vertebral artery. 2. Left common carotid artery. 3. Right common carotid artery. 4. Right internal carotid artery. 5. Right middle cerebral artery. RADIATION EXPOSURE DATA:  829.8 MG Y TOTAL FLUOROSCOPY TIME: 15 minutes and 3 seconds CONTRAST USED: 80 mL of Isovue-300 PROCEDURE: Following informed consent, the patient was brought to the angiography suite, both groins are prepped and draped in usual sterile manner.  Using sonographic assistance Vascular access was obtained in the left common femoral artery with a 8-Hungarian sheath which was connected to continuous heparinized saline flush. A 6F cerebase catheter was prepped and with the support of a diagnostic catheter was brought into the aorta, double flushed and connected to continuous heparinized saline flush. The balloon guide catheter was then telescoped into the Right internal carotid artery. Fluoroscopic imaging performed at that time confirmed the presence of inferior division M2 occlusion. As the CT head did not show any evidence of infarction this is thought to be either an acute or subacute thrombus from his stroke at the end of April however with CT head showing salvageable tissue identified decide to go ahead with the thrombectomy. Along with the Machelle A microcatheter-Marksman was prepped and with the support of a microwire was navigated across the arterial occlusion. A Solitaire 3 mm x 40 mm device was then deployed into the thrombus and allowed to integrate for approximately 2-3 minutes. The device was then retrieved with flow of contrast reversed by distal aspiration through the Machelle device. Continuous aspiration was performed during the retrieval process. Postretrieval digital subtraction images were obtained and showed revascularization- however on follow-up imaging there is vasospasm of the M1 M2 junction and delayed filling noticed here. As there is vasospasm and the thrombus has been retrieved it was decided to end the procedure here. Intraoperative Kristi CT was obtained which did not show any evidence of intracranial hemorrhage. Anesthesia was reversed patient was extubated and patient was transferred to the postoperative area in a stable condition. He will be transferred to the ICU when a bed is available. INTERPRETATION OF IMAGES: 1. Right internal carotid artery injection: Intracranially, there was normal opacification of the right ophthalmic artery, right middle cerebral artery and its branches are visualized.  There is inferior division occlusion in the M2 segment. 2. Postprocedure right internal carotid artery injections reveal recanalization of the right MCA M2 segment followed by subsequent imaging revealing vasospasm in this segment. There is no vasospasm noticed in the superior division. No contrast extravasation is noticed. 3. Left internal carotid artery injection: Intracranially, there was normal opacification of the left ophthalmic artery, left posterior communicating artery, left anterior choroidal artery, left middle cerebral artery and its branches, and left anterior cerebral artery and its branches. The left posterior communicating artery appeared supplying the territory of the left posterior cerebral artery suggesting a fetal type left posterior cerebral artery. Please note that the left PCA also get supply from the basilar artery as indicated below. Normal capillary phase and venous drainage was noted. No evidence of any aneurysms ,AVMs or dural AV fistulas. 4. Left vertebral artery injection: The origin of the left vertebral artery appear to be free from any atherosclerotic disease. Left vertebral artery appear to be normal in size, caliber and course. Intracranially, there was normal opacification of the left posterior inferior cerebellar artery, bilateral anterior-inferior cerebellar arteries, bilateral superior cerebellar arteries, bilateral posterior cerebral artery, basilar artery lumen and apex. There is some contrast washout seen in the left PCA because the PCA also get supply from the left posterior commuting artery. Normal capillary phase and venous drainage was noted. No evidence of aneurysms, AVMs or dural AV fistulas. 5. 3D rotational DynaCT imaging: The raw images were transferred to an independent workstation, and volume rendered 3D images were generated and reviewed. The images were independently reviewed.  Interpretation of the images reveal no evidence of any subarachnoid hemorrhage or intraparenchymal hemorrhage. The right MCA hyperdensity is not visualized. .     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 Patients: If you have questions regarding some of the verbiage in your report, please visit [email or web address] for a definition. If you have any other questions, please contact your physician. US DUP LOWER EXTREMITIES BILATERAL VENOUS    Result Date: 2022  Patient MRN:  [id number] : 1975 Age: 49 years Gender: Male Order Date:  2022 6:46 PM EXAM: US DUP LOWER EXTREMITIES BILATERAL VENOUS NUMBER OF IMAGES:  50 INDICATION:  leg pain and swelling leg pain and swelling What reading provider will be dictating this exam?->[facility] COMPARISON: 2022 There is evidence for venous thrombosis in the greater saphenous vein. There is  no evidence for deep venous thrombosis. There is otherwise good compressibility, there is good augmentation, there is good color flow. Within the visualized vessels there is no evidence for deep venous thrombosis. Findings compatible superficial venous thrombosis     US DUP LOWER EXTREMITIES BILATERAL VENOUS    Result Date: 2022  Patient MRN:  [id number] : 1975 Age: 49 years Gender: Male Order Date:  2022 12:12 PM EXAM: US DUP LOWER EXTREMITIES BILATERAL VENOUS NUMBER OF IMAGES:  39 INDICATION:  r/o dvt r/o dvt What reading provider will be dictating this exam?->[facility] COMPARISON: None The right and left external iliac, the right left common femoral and the right left profunda are not seen due to bilateral inguinal lines and bandages Within the visualized vessels, there is no evidence for deep venous thrombosis There is good compressibility, there is good augmentation, there is good color flow.      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 Currently uncertain if this is chronic or acute however postprocedural Kristi CT does not reveal any evidence of intracranial hemorrhage. RECOMMENDATIONS Patient will be managed medically with blood pressure parameters as given. Will start patient on a heparin drip and transition to Coumadin. STROKE ACUTE TIME STAMPS: Preprocedure NIH stroke scale: 18 Vascular access time: 1100 hours Time First Pass: 1120 solitaire 3-40+ Machelle Time of final recanalization: 1120 Time of vascular closure: 1132 Postprocedure NIH stroke scale: Not assessed due to anesthesia    Result Date: 5/12/2022  IR MECHANICAL ART THROMBECTOMY INTRACRANIAL, IR SABINE CATH PLACE VERTEBRAL ART LEFT W OR WO ANGIO, IR SABINE CATH PLACE COM CAROTID INTRACRANIAL LEFT W OR WO ANGIO PROCEDURE PERFORMED: Diagnostic Cerebral angiogram with mechanical thrombectomy Ultrasound guided left femoral artery access Intraoperative Kristi CT Limited CT with interpretation. COMPLETED DATE:  5/11/2022 10:58 AM CLINICAL HISTORY/PRE-PROCEDURE DIAGNOSIS: Acute ischemic stroke. Patient with a NIH stroke scale of 18 presents with acute left-sided symptoms concerning for a right MCA stroke. Of note he recently had a stroke on April 25 and hence was not a TPA candidate. Patient refused CTA and CTP as he is had severe contrast allergy despite preparation per patient. Patient is taken straight to IR with prophylactic intubation and general anesthesia with patient consent. POST-PROCEDURE DIAGNOSIS: Please see below COMPARISON: None ANESTHESIA: General anesthesia VESSELS CATHETERIZED: 1. Left vertebral artery. 2. Left common carotid artery. 3. Right common carotid artery.  4. Right internal carotid artery. 5. Right middle cerebral artery. RADIATION EXPOSURE DATA:  829.8 MG Y TOTAL FLUOROSCOPY TIME: 15 minutes and 3 seconds CONTRAST USED: 80 mL of Isovue-300 PROCEDURE: Following informed consent, the patient was brought to the angiography suite, both groins are prepped and draped in usual sterile manner. Using sonographic assistance Vascular access was obtained in the left common femoral artery with a 8-Japanese sheath which was connected to continuous heparinized saline flush. A 6F cerebase catheter was prepped and with the support of a diagnostic catheter was brought into the aorta, double flushed and connected to continuous heparinized saline flush. The balloon guide catheter was then telescoped into the Right internal carotid artery. Fluoroscopic imaging performed at that time confirmed the presence of inferior division M2 occlusion. As the CT head did not show any evidence of infarction this is thought to be either an acute or subacute thrombus from his stroke at the end of April however with CT head showing salvageable tissue identified decide to go ahead with the thrombectomy. Along with the Machelle A microcatheter-Marksman was prepped and with the support of a microwire was navigated across the arterial occlusion. A Solitaire 3 mm x 40 mm device was then deployed into the thrombus and allowed to integrate for approximately 2-3 minutes. The device was then retrieved with flow of contrast reversed by distal aspiration through the Machelle device. Continuous aspiration was performed during the retrieval process. Postretrieval digital subtraction images were obtained and showed revascularization- however on follow-up imaging there is vasospasm of the M1 M2 junction and delayed filling noticed here. As there is vasospasm and the thrombus has been retrieved it was decided to end the procedure here. Intraoperative Kristi CT was obtained which did not show any evidence of intracranial hemorrhage. Anesthesia was reversed patient was extubated and patient was transferred to the postoperative area in a stable condition. He will be transferred to the ICU when a bed is available. INTERPRETATION OF IMAGES: 1. Right internal carotid artery injection: Intracranially, there was normal opacification of the right ophthalmic artery, right middle cerebral artery and its branches are visualized. There is inferior division occlusion in the M2 segment. 2. Postprocedure right internal carotid artery injections reveal recanalization of the right MCA M2 segment followed by subsequent imaging revealing vasospasm in this segment. There is no vasospasm noticed in the superior division. No contrast extravasation is noticed. 3. Left internal carotid artery injection: Intracranially, there was normal opacification of the left ophthalmic artery, left posterior communicating artery, left anterior choroidal artery, left middle cerebral artery and its branches, and left anterior cerebral artery and its branches. The left posterior communicating artery appeared supplying the territory of the left posterior cerebral artery suggesting a fetal type left posterior cerebral artery. Please note that the left PCA also get supply from the basilar artery as indicated below. Normal capillary phase and venous drainage was noted. No evidence of any aneurysms ,AVMs or dural AV fistulas. 4. Left vertebral artery injection: The origin of the left vertebral artery appear to be free from any atherosclerotic disease. Left vertebral artery appear to be normal in size, caliber and course. Intracranially, there was normal opacification of the left posterior inferior cerebellar artery, bilateral anterior-inferior cerebellar arteries, bilateral superior cerebellar arteries, bilateral posterior cerebral artery, basilar artery lumen and apex.  There is some contrast washout seen in the left PCA because the PCA also get supply from the left posterior commuting artery. Normal capillary phase and venous drainage was noted. No evidence of aneurysms, AVMs or dural AV fistulas. 5. 3D rotational DynaCT imaging: The raw images were transferred to an independent workstation, and volume rendered 3D images were generated and reviewed. The images were independently reviewed. Interpretation of the images reveal no evidence of any subarachnoid hemorrhage or intraparenchymal hemorrhage. The right MCA hyperdensity is not visualized. .     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 Patients: If you have questions regarding some of the verbiage in your report, please visit [email or web address] for a definition. If you have any other questions, please contact your physician. Discharge Medications:      Medication List      START taking these medications    doxycycline hyclate 100 mg capsule  Commonly known as: VIBRAMYCIN  Take 1 capsule by mouth 2 times daily for 26 days        ASK your doctor about these medications    * albuterol (2.5 MG/3ML) 0.083% nebulizer solution  Commonly known as: PROVENTIL  Take 3 mLs by nebulization every 6 hours as needed for Wheezing     * albuterol sulfate  (90 Base) MCG/ACT inhaler  Commonly known as: Ventolin HFA  Inhale 2 puffs into the lungs every 4 hours as needed for Wheezing     aspirin 81 MG chewable tablet     clopidogrel 75 MG tablet  Commonly known as: PLAVIX  Take 1 tablet by mouth daily     divalproex 500 MG DR tablet  Commonly known as: DEPAKOTE  Ask about: Which instructions should I use?     ferrous sulfate 325 (65 Fe) MG tablet  Commonly known as: IRON 325  Take 1 tablet by mouth 2 times daily (with meals)     insulin glargine 100 UNIT/ML injection vial  Commonly known as: LANTUS  Ask about: Which instructions should I use?      insulin lispro 100 UNIT/ML Soln injection vial  Commonly known as: HUMALOG  Ask about: Which instructions should I use?     loratadine 10 MG tablet  Commonly known as: CLARITIN  Take 1 tablet by mouth daily     montelukast 10 MG tablet  Commonly known as: SINGULAIR  Take 1 tablet by mouth daily     omeprazole 20 MG delayed release capsule  Commonly known as: PRILOSEC  Take 1 capsule by mouth daily     ondansetron 4 MG tablet  Commonly known as: ZOFRAN  Take 1 tablet by mouth daily as needed for Nausea or Vomiting     warfarin 7.5 MG tablet  Commonly known as: COUMADIN  Ask about: Which instructions should I use? * This list has 2 medication(s) that are the same as other medications prescribed for you. Read the directions carefully, and ask your doctor or other care provider to review them with you. Where to Get Your Medications      These medications were sent to [facility] [address] [address]    Phone: [phone]   · doxycycline hyclate 100 mg capsule         Time Spent on discharge is more than 35 minutes in the examination, evaluation, counseling and review of medications and discharge plan.    +++++++++++++++++++++++++++++++++++++++++++++++++  Kostas Alexander MD  [address]  +++++++++++++++++++++++++++++++++++++++++++++++++  NOTE: This report was transcribed using voice recognition software. Every effort was made to ensure accuracy; however, inadvertent computerized transcription errors may be present.

## 2022-06-20 NOTE — DISCHARGE SUMMARY
Hospital Medicine Discharge Summary    Toribio Thornton  :  1975  MRN:  [id number]    Admit date:  2022  Discharge date:  22  Admitting Physician:  Tess Singh DO  Primary Care Physician:  Randolph Noonan MD       Discharge Diagnoses:      As per my colleauges note: left ama before I had a chance to see     1. Right upper extremity DVT in setting of medical noncompliance (patient has not been taking Coumadin)  2. Recent CVA    3. Suspicion for pain seeking behavior    Chief Complaint   Patient presents with    Chest Pain    Blurred Vision   SANCTUARY AT [facility] Course: As per my colleagues note: as pt left AMA before I was able to see   70-year-old male with history of clotting disorder with multiple reported allergies to anticoagulants, PE, recent RUE DVT, recent diagnosis at [address] [place] [address] for suspected CVA for which he left AMA, questionable HIV status presents with multiple complaints. He reports severe pain in his chest, his head, both arms, and his right heel where he had surgery for an ankle fracture in [place] in February. He denies any recent fever, chills, nausea, dyspnea, abdominal pain, change in bowels or urination.     He states he has been unable to move his left leg for the last 2 days and that is why he was at [address] [place] [address]. He says he was able to ambulate without complication prior to his hospitalization at [address] [place] [address].     He is supposed to be on Coumadin but he says he has not been able to get it filled recently from the [facility].     He is an erratic historian with tangential thoughts.      There is extensive documentation in the chart of the patient demanding transfers, accusing staff of being racist, verbal abuse, pulling out IVs, and demands for IV opioid medication without any explanation to where and to what extent his pain is present. He also refused consultation by pain medicine during his recent hospitalization at [address] [place] [address].       Pt left AMA      Exam on discharge:    BP (!) 129/93 Pulse 88   Temp 98.2 °F (36.8 °C) (Oral)   Resp 18   Ht 5' 7\" (1.702 m)   Wt 205 lb 4.8 oz (93.1 kg)   SpO2 96%   BMI 32.15 kg/m²   Left AMA before seen     Patient was seen by the following consultants   Consults:  IP CONSULT TO HEM/ONC  IP CONSULT TO NEUROLOGY  PHARMACY TO DOSE WARFARIN    Significant Diagnostic Studies:    Refer to chart     Please refer to chart if no studies are shown here    No results found. Discharge Medications:         Medication List      ASK your doctor about these medications    * albuterol (2.5 MG/3ML) 0.083% nebulizer solution  Commonly known as: PROVENTIL  Take 3 mLs by nebulization every 6 hours as needed for Wheezing     * albuterol sulfate  (90 Base) MCG/ACT inhaler  Commonly known as: Ventolin HFA  Inhale 2 puffs into the lungs every 4 hours as needed for Wheezing     aspirin 81 MG chewable tablet     clopidogrel 75 MG tablet  Commonly known as: PLAVIX  Take 1 tablet by mouth daily     ferrous sulfate 325 (65 Fe) MG tablet  Commonly known as: IRON 325  Take 1 tablet by mouth 2 times daily (with meals)     loratadine 10 MG tablet  Commonly known as: CLARITIN  Take 1 tablet by mouth daily     montelukast 10 MG tablet  Commonly known as: SINGULAIR  Take 1 tablet by mouth daily     omeprazole 20 MG delayed release capsule  Commonly known as: PRILOSEC  Take 1 capsule by mouth daily     ondansetron 4 MG tablet  Commonly known as: ZOFRAN  Take 1 tablet by mouth daily as needed for Nausea or Vomiting         * This list has 2 medication(s) that are the same as other medications prescribed for you. Read the directions carefully, and ask your doctor or other care provider to review them with you. Disposition:   Left AMA     Condition at discharge:  AMA     Activity:NA     Total time taken for discharging this patient: left AMA before I saw   Signed:  Rafael Franklin DO  6/20/2022, 4:51 PM  ----------------------------------------------------------------------------------------------------------------------    Paula Galan,

## 2022-07-31 PROBLEM — E78.5 HLD (HYPERLIPIDEMIA): Status: ACTIVE | Noted: 2018-05-18

## 2022-07-31 PROBLEM — B18.1 CHRONIC HEPATITIS B WITHOUT HEPATIC COMA (HCC): Status: ACTIVE | Noted: 2022-04-22

## 2022-07-31 PROBLEM — I25.10 ATHEROSCLEROTIC HEART DISEASE OF NATIVE CORONARY ARTERY WITHOUT ANGINA PECTORIS: Status: ACTIVE | Noted: 2022-03-21

## 2022-07-31 PROBLEM — E78.00 HYPERCHOLESTEROLEMIA: Status: ACTIVE | Noted: 2018-05-18

## 2022-07-31 PROBLEM — E11.9 TYPE 2 DIABETES MELLITUS (HCC): Status: ACTIVE | Noted: 2022-04-17
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
## 2022-07-31 PROBLEM — D68.59 PROTEIN S DEFICIENCY (HCC): Status: ACTIVE | Noted: 2022-04-22

## 2022-07-31 PROBLEM — G89.21 CHRONIC PAIN DUE TO TRAUMA: Status: ACTIVE | Noted: 2022-04-17

## 2022-07-31 PROBLEM — D15.1 BENIGN NEOPLASM OF HEART: Status: ACTIVE | Noted: 2022-03-21

## 2022-07-31 PROBLEM — J44.1 CHRONIC OBSTRUCTIVE PULMONARY DISEASE WITH (ACUTE) EXACERBATION (HCC): Status: ACTIVE | Noted: 2022-03-21

## 2022-07-31 PROBLEM — S82.91XD: Status: ACTIVE | Noted: 2022-03-21

## 2022-07-31 PROBLEM — Z95.828 PRESENCE OF IVC FILTER: Status: ACTIVE | Noted: 2022-04-09

## 2022-07-31 PROBLEM — I10 HYPERTENSIVE DISORDER: Status: ACTIVE | Noted: 2018-05-18

## 2022-07-31 PROBLEM — K62.5 RECTAL BLEEDING: Status: ACTIVE | Noted: 2020-02-08

## 2022-07-31 PROBLEM — G81.94 HEMIPARESIS OF LEFT NONDOMINANT SIDE (HCC): Status: ACTIVE | Noted: 2022-04-22

## 2022-07-31 PROBLEM — F31.9 BIPOLAR DISORDER (HCC): Status: ACTIVE | Noted: 2022-03-21

## 2022-07-31 PROBLEM — M25.579 ANKLE PAIN: Status: ACTIVE | Noted: 2022-07-31

## 2022-08-23 PROBLEM — F10.930 ALCOHOL WITHDRAWAL SYNDROME WITHOUT COMPLICATION (HCC): Status: ACTIVE | Noted: 2022-08-23

## 2022-08-23 PROBLEM — F11.93 OPIOID WITHDRAWAL (HCC): Status: ACTIVE | Noted: 2022-08-23

## 2022-08-23 PROBLEM — E11.9 TYPE 2 DIABETES MELLITUS (HCC): Chronic | Status: ACTIVE | Noted: 2022-04-17

## 2022-08-23 PROBLEM — F19.90 SUBSTANCE USE DISORDER: Chronic | Status: ACTIVE | Noted: 2022-08-23

## 2022-08-23 PROBLEM — F19.10 POLYSUBSTANCE ABUSE (HCC): Chronic | Status: ACTIVE | Noted: 2022-08-23

## 2022-08-23 PROBLEM — F10.139 ALCOHOL ABUSE WITH WITHDRAWAL (HCC): Status: ACTIVE | Noted: 2022-08-23
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
## 2023-03-17 ENCOUNTER — APPOINTMENT (OUTPATIENT)
Dept: GENERAL RADIOLOGY | Age: 48
End: 2023-03-17
Payer: COMMERCIAL

## 2023-03-17 ENCOUNTER — HOSPITAL ENCOUNTER (EMERGENCY)
Age: 48
Discharge: HOME | End: 2023-03-17
Payer: COMMERCIAL

## 2023-03-17 VITALS
HEART RATE: 84 BPM | SYSTOLIC BLOOD PRESSURE: 160 MMHG | OXYGEN SATURATION: 100 % | TEMPERATURE: 97.2 F | RESPIRATION RATE: 20 BRPM | DIASTOLIC BLOOD PRESSURE: 96 MMHG

## 2023-03-17 PROCEDURE — 71046 X-RAY EXAM CHEST 2 VIEWS: CPT

## 2023-03-17 PROCEDURE — 99284 EMERGENCY DEPT VISIT MOD MDM: CPT

## 2023-03-17 PROCEDURE — 93005 ELECTROCARDIOGRAM TRACING: CPT | Performed by: NURSE PRACTITIONER

## 2023-03-17 NOTE — ED NOTES
Pt had IV from [facility] placed in arm on arrival. This RN removed IV from arm      Sofia Mg  03/17/23 1826

## 2023-03-17 NOTE — ED NOTES
Patient no longer in chair area; arm bands found torn off and on chair and floor; pivot desk said they saw him wheel out; provider notified     Tay Posadas RN  03/17/23 1939

## 2023-03-17 NOTE — ED NOTES
Went to waiting room called for patient; checked bathroom no answer.      Aixa Vivar RN  03/17/23 1956

## 2023-03-17 NOTE — ED NOTES
Needs ultrasound iv or labs; elopement risk; updated NP about situation     Katherine Hernandez RN  03/17/23 8222

## 2023-03-17 NOTE — ED NOTES
Department of Emergency Medicine  FIRST PROVIDER TRIAGE NOTE             Independent MLP           3/17/23  6:04 PM EDT    Date of Encounter: 3/17/23   MRN: [id number]      HPI: Beverley Valenzuela is a 51 y.o. male who presents to the ED for No chief complaint on file. Patient presents after leaving [facility] ER due to long wait time. He is complaining of chest pain. Frequent falls, most recently fell today. Multiple falls in the last 7 days. Complaining of head and spine pain from falling. States he is on Plavix and Coumadin. Chest pain started 2 hours ago with SOB. ROS: Negative for fever, cough, vomiting, or diarrhea. PE: Gen Appearance/Constitutional: alert  HEENT: NC/NT. PERRLA,  Airway patent. Initial Plan of Care: All treatment areas with department are currently occupied. Plan to order/Initiate the following while awaiting opening in ED: labs, EKG, and imaging studies.   Initiate Treatment-Testing, Proceed toTreatment Area When Bed Available for ED Attending/MLP to Continue Care    Electronically signed by AGUILA Price CNP   DD: 3/17/23      AGUILA Price CNP  03/17/23 4299

## 2023-03-18 LAB
EKG ATRIAL RATE: 87 BPM
EKG P AXIS: 54 DEGREES
EKG P-R INTERVAL: 180 MS
EKG Q-T INTERVAL: 354 MS
EKG QRS DURATION: 80 MS
EKG QTC CALCULATION (BAZETT): 425 MS
EKG R AXIS: -5 DEGREES
EKG T AXIS: 3 DEGREES
EKG VENTRICULAR RATE: 87 BPM

## 2023-03-18 PROCEDURE — 93010 ELECTROCARDIOGRAM REPORT: CPT | Performed by: INTERNAL MEDICINE

## 2023-03-21 ENCOUNTER — ANESTHESIA EVENT (OUTPATIENT)
Dept: OPERATING ROOM | Age: 48
End: 2023-03-21
Payer: COMMERCIAL

## 2023-03-21 ENCOUNTER — APPOINTMENT (OUTPATIENT)
Dept: GENERAL RADIOLOGY | Age: 48
End: 2023-03-21
Payer: COMMERCIAL

## 2023-03-21 ENCOUNTER — APPOINTMENT (OUTPATIENT)
Dept: ULTRASOUND IMAGING | Age: 48
End: 2023-03-21
Payer: COMMERCIAL

## 2023-03-21 ENCOUNTER — ANESTHESIA (OUTPATIENT)
Dept: OPERATING ROOM | Age: 48
End: 2023-03-21
Payer: COMMERCIAL

## 2023-03-21 ENCOUNTER — APPOINTMENT (OUTPATIENT)
Dept: CT IMAGING | Age: 48
End: 2023-03-21
Payer: COMMERCIAL

## 2023-03-21 ENCOUNTER — HOSPITAL ENCOUNTER (INPATIENT)
Age: 48
LOS: 3 days | Discharge: LEFT BEFORE DISCHARGE | End: 2023-03-24
Attending: EMERGENCY MEDICINE | Admitting: HOSPITALIST
Payer: COMMERCIAL

## 2023-03-21 DIAGNOSIS — M60.004 INFECTIVE MYOSITIS OF LEFT LOWER EXTREMITY: ICD-10-CM

## 2023-03-21 DIAGNOSIS — E87.1 HYPONATREMIA: ICD-10-CM

## 2023-03-21 DIAGNOSIS — L02.91 ABSCESS: ICD-10-CM

## 2023-03-21 DIAGNOSIS — E87.5 HYPERKALEMIA: ICD-10-CM

## 2023-03-21 DIAGNOSIS — S81.802A LEG WOUND, LEFT, INITIAL ENCOUNTER: Primary | ICD-10-CM

## 2023-03-21 DIAGNOSIS — Z86.2 HISTORY OF HYPERCOAGULABLE STATE: ICD-10-CM

## 2023-03-21 DIAGNOSIS — B20 HISTORY OF HIV INFECTION (HCC): ICD-10-CM

## 2023-03-21 DIAGNOSIS — R79.1 SUBTHERAPEUTIC INTERNATIONAL NORMALIZED RATIO (INR): ICD-10-CM

## 2023-03-21 DIAGNOSIS — E11.65 UNCONTROLLED TYPE 2 DIABETES MELLITUS WITH HYPERGLYCEMIA (HCC): ICD-10-CM

## 2023-03-21 LAB
ANION GAP SERPL CALCULATED.3IONS-SCNC: 16 MMOL/L (ref 7–16)
BASOPHILS # BLD: 0.04 E9/L (ref 0–0.2)
BASOPHILS # BLD: 0.05 E9/L (ref 0–0.2)
BASOPHILS NFR BLD: 0.3 % (ref 0–2)
BASOPHILS NFR BLD: 0.5 % (ref 0–2)
BETA-HYDROXYBUTYRATE: 0.8 MMOL/L (ref 0.02–0.27)
BUN SERPL-MCNC: 18 MG/DL (ref 6–20)
CALCIUM SERPL-MCNC: 9.6 MG/DL (ref 8.6–10.2)
CHLORIDE SERPL-SCNC: 87 MMOL/L (ref 98–107)
CHP ED QC CHECK: YES
CK SERPL-CCNC: 69 U/L (ref 20–200)
CO2 SERPL-SCNC: 20 MMOL/L (ref 22–29)
CREAT SERPL-MCNC: 0.7 MG/DL (ref 0.7–1.2)
CRP SERPL HS-MCNC: 2.2 MG/DL (ref 0–0.4)
EOSINOPHIL # BLD: 0.03 E9/L (ref 0.05–0.5)
EOSINOPHIL # BLD: 0.07 E9/L (ref 0.05–0.5)
EOSINOPHIL NFR BLD: 0.3 % (ref 0–6)
EOSINOPHIL NFR BLD: 0.5 % (ref 0–6)
ERYTHROCYTE [DISTWIDTH] IN BLOOD BY AUTOMATED COUNT: 13.4 FL (ref 11.5–15)
ERYTHROCYTE [DISTWIDTH] IN BLOOD BY AUTOMATED COUNT: 13.5 FL (ref 11.5–15)
ERYTHROCYTE [SEDIMENTATION RATE] IN BLOOD BY WESTERGREN METHOD: 6 MM/HR (ref 0–15)
GLUCOSE BLD-MCNC: 433 MG/DL
GLUCOSE SERPL-MCNC: 515 MG/DL (ref 74–99)
HCT VFR BLD AUTO: 48.6 % (ref 37–54)
HCT VFR BLD AUTO: 49.6 % (ref 37–54)
HGB BLD-MCNC: 17.3 G/DL (ref 12.5–16.5)
HGB BLD-MCNC: 18 G/DL (ref 12.5–16.5)
IMM GRANULOCYTES # BLD: 0.08 E9/L
IMM GRANULOCYTES # BLD: 0.1 E9/L
IMM GRANULOCYTES NFR BLD: 0.8 % (ref 0–5)
IMM GRANULOCYTES NFR BLD: 0.8 % (ref 0–5)
INR BLD: 1.1
LACTATE BLDV-SCNC: 2.1 MMOL/L (ref 0.5–2.2)
LACTATE BLDV-SCNC: 2.5 MMOL/L (ref 0.5–2.2)
LYMPHOCYTES # BLD: 1.16 E9/L (ref 1.5–4)
LYMPHOCYTES # BLD: 1.38 E9/L (ref 1.5–4)
LYMPHOCYTES NFR BLD: 13 % (ref 20–42)
LYMPHOCYTES NFR BLD: 9 % (ref 20–42)
MCH RBC QN AUTO: 28.7 PG (ref 26–35)
MCH RBC QN AUTO: 29.5 PG (ref 26–35)
MCHC RBC AUTO-ENTMCNC: 34.9 % (ref 32–34.5)
MCHC RBC AUTO-ENTMCNC: 37 % (ref 32–34.5)
MCV RBC AUTO: 79.5 FL (ref 80–99.9)
MCV RBC AUTO: 82.3 FL (ref 80–99.9)
METER GLUCOSE: 433 MG/DL (ref 74–99)
MONOCYTES # BLD: 0.67 E9/L (ref 0.1–0.95)
MONOCYTES # BLD: 0.93 E9/L (ref 0.1–0.95)
MONOCYTES NFR BLD: 6.3 % (ref 2–12)
MONOCYTES NFR BLD: 7.2 % (ref 2–12)
NEUTROPHILS # BLD: 10.61 E9/L (ref 1.8–7.3)
NEUTROPHILS # BLD: 8.44 E9/L (ref 1.8–7.3)
NEUTS SEG NFR BLD: 79.1 % (ref 43–80)
NEUTS SEG NFR BLD: 82.2 % (ref 43–80)
PLATELET # BLD AUTO: 252 E9/L (ref 130–450)
PLATELET # BLD AUTO: 273 E9/L (ref 130–450)
PMV BLD AUTO: 10.3 FL (ref 7–12)
PMV BLD AUTO: 10.5 FL (ref 7–12)
POTASSIUM SERPL-SCNC: 5.4 MMOL/L (ref 3.5–5)
PROTHROMBIN TIME: 12 SEC (ref 9.3–12.4)
RBC # BLD AUTO: 6.03 E12/L (ref 3.8–5.8)
RBC # BLD AUTO: 6.11 E12/L (ref 3.8–5.8)
REASON FOR REJECTION: NORMAL
REASON FOR REJECTION: NORMAL
REJECTED TEST: NORMAL
REJECTED TEST: NORMAL
SODIUM SERPL-SCNC: 123 MMOL/L (ref 132–146)
TSH SERPL-MCNC: 4.88 UIU/ML (ref 0.27–4.2)
WBC # BLD: 10.7 E9/L (ref 4.5–11.5)
WBC # BLD: 12.9 E9/L (ref 4.5–11.5)

## 2023-03-21 PROCEDURE — 87077 CULTURE AEROBIC IDENTIFY: CPT

## 2023-03-21 PROCEDURE — 36415 COLL VENOUS BLD VENIPUNCTURE: CPT

## 2023-03-21 PROCEDURE — 99285 EMERGENCY DEPT VISIT HI MDM: CPT

## 2023-03-21 PROCEDURE — 2500000003 HC RX 250 WO HCPCS: Performed by: EMERGENCY MEDICINE

## 2023-03-21 PROCEDURE — 87186 SC STD MICRODIL/AGAR DIL: CPT

## 2023-03-21 PROCEDURE — 6360000002 HC RX W HCPCS: Performed by: EMERGENCY MEDICINE

## 2023-03-21 PROCEDURE — 3600000012 HC SURGERY LEVEL 2 ADDTL 15MIN: Performed by: SURGERY

## 2023-03-21 PROCEDURE — 87040 BLOOD CULTURE FOR BACTERIA: CPT

## 2023-03-21 PROCEDURE — 96375 TX/PRO/DX INJ NEW DRUG ADDON: CPT

## 2023-03-21 PROCEDURE — 3600000002 HC SURGERY LEVEL 2 BASE: Performed by: SURGERY

## 2023-03-21 PROCEDURE — 85651 RBC SED RATE NONAUTOMATED: CPT

## 2023-03-21 PROCEDURE — 73590 X-RAY EXAM OF LOWER LEG: CPT

## 2023-03-21 PROCEDURE — 2709999900 HC NON-CHARGEABLE SUPPLY: Performed by: SURGERY

## 2023-03-21 PROCEDURE — 2580000003 HC RX 258

## 2023-03-21 PROCEDURE — 2500000003 HC RX 250 WO HCPCS

## 2023-03-21 PROCEDURE — 73700 CT LOWER EXTREMITY W/O DYE: CPT

## 2023-03-21 PROCEDURE — 85025 COMPLETE CBC W/AUTO DIFF WBC: CPT

## 2023-03-21 PROCEDURE — 84443 ASSAY THYROID STIM HORMONE: CPT

## 2023-03-21 PROCEDURE — 70450 CT HEAD/BRAIN W/O DYE: CPT

## 2023-03-21 PROCEDURE — 6360000002 HC RX W HCPCS

## 2023-03-21 PROCEDURE — 82962 GLUCOSE BLOOD TEST: CPT

## 2023-03-21 PROCEDURE — 80048 BASIC METABOLIC PNL TOTAL CA: CPT

## 2023-03-21 PROCEDURE — 73610 X-RAY EXAM OF ANKLE: CPT

## 2023-03-21 PROCEDURE — 7100000001 HC PACU RECOVERY - ADDTL 15 MIN: Performed by: SURGERY

## 2023-03-21 PROCEDURE — 96376 TX/PRO/DX INJ SAME DRUG ADON: CPT

## 2023-03-21 PROCEDURE — 82010 KETONE BODYS QUAN: CPT

## 2023-03-21 PROCEDURE — 0J9M0ZZ DRAINAGE OF LEFT UPPER LEG SUBCUTANEOUS TISSUE AND FASCIA, OPEN APPROACH: ICD-10-PCS | Performed by: STUDENT IN AN ORGANIZED HEALTH CARE EDUCATION/TRAINING PROGRAM

## 2023-03-21 PROCEDURE — 87075 CULTR BACTERIA EXCEPT BLOOD: CPT

## 2023-03-21 PROCEDURE — 87205 SMEAR GRAM STAIN: CPT

## 2023-03-21 PROCEDURE — 1200000000 HC SEMI PRIVATE

## 2023-03-21 PROCEDURE — 86140 C-REACTIVE PROTEIN: CPT

## 2023-03-21 PROCEDURE — 7100000000 HC PACU RECOVERY - FIRST 15 MIN: Performed by: SURGERY

## 2023-03-21 PROCEDURE — 2580000003 HC RX 258: Performed by: EMERGENCY MEDICINE

## 2023-03-21 PROCEDURE — 73552 X-RAY EXAM OF FEMUR 2/>: CPT

## 2023-03-21 PROCEDURE — 85610 PROTHROMBIN TIME: CPT

## 2023-03-21 PROCEDURE — 73630 X-RAY EXAM OF FOOT: CPT

## 2023-03-21 PROCEDURE — 2580000003 HC RX 258: Performed by: HOSPITALIST

## 2023-03-21 PROCEDURE — 83605 ASSAY OF LACTIC ACID: CPT

## 2023-03-21 PROCEDURE — 3700000000 HC ANESTHESIA ATTENDED CARE: Performed by: SURGERY

## 2023-03-21 PROCEDURE — 96365 THER/PROPH/DIAG IV INF INIT: CPT

## 2023-03-21 PROCEDURE — 2500000003 HC RX 250 WO HCPCS: Performed by: SURGERY

## 2023-03-21 PROCEDURE — 3700000001 HC ADD 15 MINUTES (ANESTHESIA): Performed by: SURGERY

## 2023-03-21 PROCEDURE — 6360000002 HC RX W HCPCS: Performed by: HOSPITALIST

## 2023-03-21 PROCEDURE — 87070 CULTURE OTHR SPECIMN AEROBIC: CPT

## 2023-03-21 PROCEDURE — 82550 ASSAY OF CK (CPK): CPT

## 2023-03-21 PROCEDURE — 93971 EXTREMITY STUDY: CPT

## 2023-03-21 PROCEDURE — 6370000000 HC RX 637 (ALT 250 FOR IP): Performed by: EMERGENCY MEDICINE

## 2023-03-21 RX ORDER — PANTOPRAZOLE SODIUM 40 MG/1
40 TABLET, DELAYED RELEASE ORAL DAILY
COMMUNITY

## 2023-03-21 RX ORDER — ABACAVIR AND LAMIVUDINE 600; 300 MG/1; MG/1
1 TABLET, FILM COATED ORAL DAILY
Status: ON HOLD | COMMUNITY
End: 2023-03-23 | Stop reason: HOSPADM

## 2023-03-21 RX ORDER — OXYCODONE HYDROCHLORIDE 5 MG/1
5 TABLET ORAL EVERY 4 HOURS PRN
Status: DISCONTINUED | OUTPATIENT
Start: 2023-03-21 | End: 2023-03-24 | Stop reason: HOSPADM

## 2023-03-21 RX ORDER — OXYCODONE HYDROCHLORIDE 10 MG/1
10 TABLET ORAL EVERY 4 HOURS PRN
Status: DISCONTINUED | OUTPATIENT
Start: 2023-03-21 | End: 2023-03-24 | Stop reason: HOSPADM

## 2023-03-21 RX ORDER — SODIUM CHLORIDE 0.9 % (FLUSH) 0.9 %
10 SYRINGE (ML) INJECTION EVERY 12 HOURS SCHEDULED
Status: DISCONTINUED | OUTPATIENT
Start: 2023-03-21 | End: 2023-03-24 | Stop reason: HOSPADM

## 2023-03-21 RX ORDER — LIDOCAINE HYDROCHLORIDE 10 MG/ML
20 INJECTION, SOLUTION INFILTRATION; PERINEURAL ONCE
Status: DISCONTINUED | OUTPATIENT
Start: 2023-03-21 | End: 2023-03-21

## 2023-03-21 RX ORDER — THIAMINE HYDROCHLORIDE 100 MG/ML
100 INJECTION, SOLUTION INTRAMUSCULAR; INTRAVENOUS DAILY
Status: DISCONTINUED | OUTPATIENT
Start: 2023-03-21 | End: 2023-03-22

## 2023-03-21 RX ORDER — ATORVASTATIN CALCIUM 20 MG/1
20 TABLET, FILM COATED ORAL DAILY
Status: DISCONTINUED | OUTPATIENT
Start: 2023-03-22 | End: 2023-03-22

## 2023-03-21 RX ORDER — MEPERIDINE HYDROCHLORIDE 25 MG/ML
12.5 INJECTION INTRAMUSCULAR; INTRAVENOUS; SUBCUTANEOUS
Status: DISCONTINUED | OUTPATIENT
Start: 2023-03-21 | End: 2023-03-21

## 2023-03-21 RX ORDER — BUDESONIDE 0.25 MG/2ML
250 INHALANT ORAL 2 TIMES DAILY
Status: DISCONTINUED | OUTPATIENT
Start: 2023-03-21 | End: 2023-03-24 | Stop reason: HOSPADM

## 2023-03-21 RX ORDER — ALBUTEROL SULFATE 2.5 MG/3ML
2.5 SOLUTION RESPIRATORY (INHALATION) EVERY 6 HOURS PRN
Status: DISCONTINUED | OUTPATIENT
Start: 2023-03-21 | End: 2023-03-24 | Stop reason: HOSPADM

## 2023-03-21 RX ORDER — ACETAMINOPHEN 325 MG/1
650 TABLET ORAL EVERY 4 HOURS PRN
Status: DISCONTINUED | OUTPATIENT
Start: 2023-03-21 | End: 2023-03-22

## 2023-03-21 RX ORDER — FLUTICASONE PROPIONATE AND SALMETEROL 250; 50 UG/1; UG/1
1 POWDER RESPIRATORY (INHALATION) EVERY 12 HOURS
COMMUNITY

## 2023-03-21 RX ORDER — ALBUTEROL SULFATE 90 UG/1
2 AEROSOL, METERED RESPIRATORY (INHALATION) EVERY 6 HOURS PRN
COMMUNITY

## 2023-03-21 RX ORDER — ATORVASTATIN CALCIUM 20 MG/1
20 TABLET, FILM COATED ORAL DAILY
Status: ON HOLD | COMMUNITY
End: 2023-03-24 | Stop reason: HOSPADM

## 2023-03-21 RX ORDER — SODIUM CHLORIDE 9 MG/ML
INJECTION, SOLUTION INTRAVENOUS PRN
Status: DISCONTINUED | OUTPATIENT
Start: 2023-03-21 | End: 2023-03-23 | Stop reason: SDUPTHER

## 2023-03-21 RX ORDER — SODIUM CHLORIDE 9 MG/ML
INJECTION, SOLUTION INTRAVENOUS PRN
Status: DISCONTINUED | OUTPATIENT
Start: 2023-03-21 | End: 2023-03-21

## 2023-03-21 RX ORDER — INSULIN GLARGINE 100 [IU]/ML
40 INJECTION, SOLUTION SUBCUTANEOUS NIGHTLY
Status: ON HOLD | COMMUNITY
End: 2023-03-24 | Stop reason: HOSPADM

## 2023-03-21 RX ORDER — ALBUTEROL SULFATE 90 UG/1
2 AEROSOL, METERED RESPIRATORY (INHALATION) EVERY 6 HOURS PRN
Status: DISCONTINUED | OUTPATIENT
Start: 2023-03-21 | End: 2023-03-21 | Stop reason: CLARIF

## 2023-03-21 RX ORDER — ONDANSETRON 2 MG/ML
4 INJECTION INTRAMUSCULAR; INTRAVENOUS
Status: DISCONTINUED | OUTPATIENT
Start: 2023-03-21 | End: 2023-03-21

## 2023-03-21 RX ORDER — CLOPIDOGREL BISULFATE 75 MG/1
75 TABLET ORAL DAILY
Status: ON HOLD | COMMUNITY
End: 2023-03-22 | Stop reason: HOSPADM

## 2023-03-21 RX ORDER — SODIUM CHLORIDE 9 MG/ML
INJECTION, SOLUTION INTRAVENOUS ONCE
Status: DISCONTINUED | OUTPATIENT
Start: 2023-03-21 | End: 2023-03-21

## 2023-03-21 RX ORDER — PANTOPRAZOLE SODIUM 40 MG/1
40 TABLET, DELAYED RELEASE ORAL DAILY
Status: DISCONTINUED | OUTPATIENT
Start: 2023-03-22 | End: 2023-03-24 | Stop reason: HOSPADM

## 2023-03-21 RX ORDER — CEFTRIAXONE 2 G/1
2000 INJECTION, POWDER, FOR SOLUTION INTRAMUSCULAR; INTRAVENOUS ONCE
Status: DISCONTINUED | OUTPATIENT
Start: 2023-03-21 | End: 2023-03-21

## 2023-03-21 RX ORDER — KETAMINE HCL IN NACL, ISO-OSM 100MG/10ML
SYRINGE (ML) INJECTION PRN
Status: DISCONTINUED | OUTPATIENT
Start: 2023-03-21 | End: 2023-03-21 | Stop reason: SDUPTHER

## 2023-03-21 RX ORDER — POLYETHYLENE GLYCOL 3350 17 G/17G
17 POWDER, FOR SOLUTION ORAL DAILY PRN
Status: DISCONTINUED | OUTPATIENT
Start: 2023-03-21 | End: 2023-03-22

## 2023-03-21 RX ORDER — ISOSORBIDE MONONITRATE 30 MG/1
30 TABLET, EXTENDED RELEASE ORAL DAILY
Status: DISCONTINUED | OUTPATIENT
Start: 2023-03-22 | End: 2023-03-23

## 2023-03-21 RX ORDER — GLYCOPYRROLATE 0.2 MG/ML
INJECTION INTRAMUSCULAR; INTRAVENOUS PRN
Status: DISCONTINUED | OUTPATIENT
Start: 2023-03-21 | End: 2023-03-21 | Stop reason: SDUPTHER

## 2023-03-21 RX ORDER — SODIUM CHLORIDE 0.9 % (FLUSH) 0.9 %
5-40 SYRINGE (ML) INJECTION EVERY 12 HOURS SCHEDULED
Status: DISCONTINUED | OUTPATIENT
Start: 2023-03-21 | End: 2023-03-21

## 2023-03-21 RX ORDER — DICYCLOMINE HYDROCHLORIDE 10 MG/1
20 CAPSULE ORAL
COMMUNITY

## 2023-03-21 RX ORDER — SODIUM CHLORIDE 0.9 % (FLUSH) 0.9 %
10 SYRINGE (ML) INJECTION PRN
Status: DISCONTINUED | OUTPATIENT
Start: 2023-03-21 | End: 2023-03-24 | Stop reason: HOSPADM

## 2023-03-21 RX ORDER — DIPHENHYDRAMINE HCL 25 MG
25 TABLET ORAL EVERY 4 HOURS PRN
COMMUNITY

## 2023-03-21 RX ORDER — SODIUM CHLORIDE 9 MG/ML
INJECTION, SOLUTION INTRAVENOUS CONTINUOUS
Status: DISCONTINUED | OUTPATIENT
Start: 2023-03-21 | End: 2023-03-22

## 2023-03-21 RX ORDER — SODIUM CHLORIDE 9 MG/ML
INJECTION, SOLUTION INTRAVENOUS ONCE
Status: COMPLETED | OUTPATIENT
Start: 2023-03-21 | End: 2023-03-21

## 2023-03-21 RX ORDER — MIDAZOLAM HYDROCHLORIDE 1 MG/ML
INJECTION INTRAMUSCULAR; INTRAVENOUS PRN
Status: DISCONTINUED | OUTPATIENT
Start: 2023-03-21 | End: 2023-03-21 | Stop reason: SDUPTHER

## 2023-03-21 RX ORDER — PROPOFOL 10 MG/ML
INJECTION, EMULSION INTRAVENOUS CONTINUOUS PRN
Status: DISCONTINUED | OUTPATIENT
Start: 2023-03-21 | End: 2023-03-21 | Stop reason: SDUPTHER

## 2023-03-21 RX ORDER — HYDROMORPHONE HCL 110MG/55ML
PATIENT CONTROLLED ANALGESIA SYRINGE INTRAVENOUS PRN
Status: DISCONTINUED | OUTPATIENT
Start: 2023-03-21 | End: 2023-03-21 | Stop reason: SDUPTHER

## 2023-03-21 RX ORDER — INSULIN GLARGINE-YFGN 100 [IU]/ML
20 INJECTION, SOLUTION SUBCUTANEOUS 2 TIMES DAILY
Status: DISCONTINUED | OUTPATIENT
Start: 2023-03-21 | End: 2023-03-22

## 2023-03-21 RX ORDER — ASPIRIN 81 MG/1
81 TABLET, CHEWABLE ORAL DAILY
COMMUNITY

## 2023-03-21 RX ORDER — INSULIN LISPRO 100 [IU]/ML
0-4 INJECTION, SOLUTION INTRAVENOUS; SUBCUTANEOUS NIGHTLY
Status: DISCONTINUED | OUTPATIENT
Start: 2023-03-21 | End: 2023-03-23

## 2023-03-21 RX ORDER — FOLIC ACID 1 MG/1
1 TABLET ORAL DAILY
Status: DISCONTINUED | OUTPATIENT
Start: 2023-03-21 | End: 2023-03-24 | Stop reason: HOSPADM

## 2023-03-21 RX ORDER — INSULIN LISPRO 100 [IU]/ML
0-4 INJECTION, SOLUTION INTRAVENOUS; SUBCUTANEOUS
Status: DISCONTINUED | OUTPATIENT
Start: 2023-03-21 | End: 2023-03-23

## 2023-03-21 RX ORDER — WARFARIN SODIUM 5 MG/1
5 TABLET ORAL DAILY
COMMUNITY

## 2023-03-21 RX ORDER — LORATADINE 10 MG/1
10 TABLET ORAL DAILY
COMMUNITY

## 2023-03-21 RX ORDER — CLINDAMYCIN PHOSPHATE 900 MG/50ML
900 INJECTION INTRAVENOUS ONCE
Status: COMPLETED | OUTPATIENT
Start: 2023-03-21 | End: 2023-03-21

## 2023-03-21 RX ORDER — SODIUM CHLORIDE 0.9 % (FLUSH) 0.9 %
5-40 SYRINGE (ML) INJECTION PRN
Status: DISCONTINUED | OUTPATIENT
Start: 2023-03-21 | End: 2023-03-21

## 2023-03-21 RX ORDER — OLANZAPINE AND FLUOXETINE 12; 25 MG/1; MG/1
1 CAPSULE ORAL NIGHTLY
COMMUNITY

## 2023-03-21 RX ORDER — TENOFOVIR DISOPROXIL FUMARATE 300 MG/1
300 TABLET, FILM COATED ORAL DAILY
Status: ON HOLD | COMMUNITY
End: 2023-03-23 | Stop reason: HOSPADM

## 2023-03-21 RX ORDER — SODIUM CHLORIDE 9 MG/ML
INJECTION, SOLUTION INTRAVENOUS CONTINUOUS PRN
Status: DISCONTINUED | OUTPATIENT
Start: 2023-03-21 | End: 2023-03-21 | Stop reason: SDUPTHER

## 2023-03-21 RX ORDER — ACETAMINOPHEN 500 MG
1000 TABLET ORAL ONCE
Status: COMPLETED | OUTPATIENT
Start: 2023-03-21 | End: 2023-03-21

## 2023-03-21 RX ORDER — PROPOFOL 10 MG/ML
INJECTION, EMULSION INTRAVENOUS PRN
Status: DISCONTINUED | OUTPATIENT
Start: 2023-03-21 | End: 2023-03-21 | Stop reason: SDUPTHER

## 2023-03-21 RX ORDER — CHLORPROMAZINE HYDROCHLORIDE 100 MG/1
100 TABLET, FILM COATED ORAL 2 TIMES DAILY
Status: ON HOLD | COMMUNITY
End: 2023-03-24 | Stop reason: HOSPADM

## 2023-03-21 RX ORDER — ARFORMOTEROL TARTRATE 15 UG/2ML
15 SOLUTION RESPIRATORY (INHALATION) 2 TIMES DAILY
Status: DISCONTINUED | OUTPATIENT
Start: 2023-03-21 | End: 2023-03-24 | Stop reason: HOSPADM

## 2023-03-21 RX ORDER — ISOSORBIDE MONONITRATE 30 MG/1
30 TABLET, EXTENDED RELEASE ORAL DAILY
COMMUNITY

## 2023-03-21 RX ADMIN — CEFTRIAXONE SODIUM 2000 MG: 2 INJECTION, POWDER, FOR SOLUTION INTRAMUSCULAR; INTRAVENOUS at 10:03

## 2023-03-21 RX ADMIN — SODIUM CHLORIDE: 9 INJECTION, SOLUTION INTRAVENOUS at 12:02

## 2023-03-21 RX ADMIN — INSULIN HUMAN 10 UNITS: 100 INJECTION, SOLUTION PARENTERAL at 10:26

## 2023-03-21 RX ADMIN — GLYCOPYRROLATE 0.2 MG: 0.2 INJECTION INTRAMUSCULAR; INTRAVENOUS at 20:36

## 2023-03-21 RX ADMIN — SODIUM CHLORIDE: 9 INJECTION, SOLUTION INTRAVENOUS at 15:49

## 2023-03-21 RX ADMIN — SODIUM CHLORIDE: 9 INJECTION, SOLUTION INTRAVENOUS at 15:30

## 2023-03-21 RX ADMIN — Medication 50 MG: at 20:39

## 2023-03-21 RX ADMIN — HYDROMORPHONE HYDROCHLORIDE 0.5 MG: 1 INJECTION, SOLUTION INTRAMUSCULAR; INTRAVENOUS; SUBCUTANEOUS at 16:08

## 2023-03-21 RX ADMIN — MIDAZOLAM 2 MG: 1 INJECTION INTRAMUSCULAR; INTRAVENOUS at 20:36

## 2023-03-21 RX ADMIN — HYDROMORPHONE HYDROCHLORIDE 0.5 MG: 1 INJECTION, SOLUTION INTRAMUSCULAR; INTRAVENOUS; SUBCUTANEOUS at 12:02

## 2023-03-21 RX ADMIN — HYDROMORPHONE HYDROCHLORIDE 2 MG: 2 INJECTION, SOLUTION INTRAMUSCULAR; INTRAVENOUS; SUBCUTANEOUS at 20:36

## 2023-03-21 RX ADMIN — CLINDAMYCIN PHOSPHATE 900 MG: 900 INJECTION, SOLUTION INTRAVENOUS at 10:27

## 2023-03-21 RX ADMIN — SODIUM CHLORIDE: 9 INJECTION, SOLUTION INTRAVENOUS at 20:36

## 2023-03-21 RX ADMIN — PROPOFOL 150 MCG/KG/MIN: 10 INJECTION, EMULSION INTRAVENOUS at 20:36

## 2023-03-21 RX ADMIN — ACETAMINOPHEN 1000 MG: 500 TABLET ORAL at 08:27

## 2023-03-21 RX ADMIN — CEFEPIME 2000 MG: 2 INJECTION, POWDER, FOR SOLUTION INTRAVENOUS at 16:12

## 2023-03-21 RX ADMIN — HYDROMORPHONE HYDROCHLORIDE 2 MG: 2 INJECTION, SOLUTION INTRAMUSCULAR; INTRAVENOUS; SUBCUTANEOUS at 20:43

## 2023-03-21 RX ADMIN — VANCOMYCIN HYDROCHLORIDE 1750 MG: 1 INJECTION, POWDER, LYOPHILIZED, FOR SOLUTION INTRAVENOUS at 12:06

## 2023-03-21 RX ADMIN — HYDROMORPHONE HYDROCHLORIDE 0.5 MG: 1 INJECTION, SOLUTION INTRAMUSCULAR; INTRAVENOUS; SUBCUTANEOUS at 10:04

## 2023-03-21 RX ADMIN — PROPOFOL 100 MG: 10 INJECTION, EMULSION INTRAVENOUS at 20:36

## 2023-03-21 ASSESSMENT — PAIN DESCRIPTION - ORIENTATION
ORIENTATION: LEFT;UPPER;POSTERIOR
ORIENTATION: LEFT

## 2023-03-21 ASSESSMENT — PAIN - FUNCTIONAL ASSESSMENT: PAIN_FUNCTIONAL_ASSESSMENT: PREVENTS OR INTERFERES SOME ACTIVE ACTIVITIES AND ADLS

## 2023-03-21 ASSESSMENT — PAIN DESCRIPTION - DESCRIPTORS
DESCRIPTORS: ACHING;DISCOMFORT;THROBBING
DESCRIPTORS: ACHING;THROBBING
DESCRIPTORS: THROBBING;SQUEEZING;ACHING
DESCRIPTORS: ACHING;DISCOMFORT;THROBBING

## 2023-03-21 ASSESSMENT — PAIN SCALES - GENERAL
PAINLEVEL_OUTOF10: 10
PAINLEVEL_OUTOF10: 8
PAINLEVEL_OUTOF10: 10

## 2023-03-21 ASSESSMENT — ENCOUNTER SYMPTOMS
ABDOMINAL PAIN: 0
COUGH: 0
SHORTNESS OF BREATH: 0
NAUSEA: 0
EYE REDNESS: 0
BACK PAIN: 0
VOMITING: 0

## 2023-03-21 ASSESSMENT — PAIN DESCRIPTION - PAIN TYPE: TYPE: SURGICAL PAIN

## 2023-03-21 ASSESSMENT — PAIN DESCRIPTION - ONSET: ONSET: ON-GOING

## 2023-03-21 ASSESSMENT — PAIN DESCRIPTION - LOCATION
LOCATION: INCISION;LEG
LOCATION: LEG
LOCATION: LEG;BUTTOCKS
LOCATION: LEG;BACK
LOCATION: LEG

## 2023-03-21 ASSESSMENT — PAIN DESCRIPTION - FREQUENCY: FREQUENCY: INTERMITTENT

## 2023-03-21 NOTE — ED PROVIDER NOTES
and Pertinent negatives as per HPI.      SURGICAL HISTORY     Past Surgical History:   Procedure Laterality Date    APPENDECTOMY      IR MECHANICAL ART THROMBECTOMY INTRACRANIAL  5/11/2022    IR MECHANICAL ART THROMBECTOMY INTRACRANIAL 5/11/2022 Johnny Vasquez MD [facility]    VASCULAR SURGERY      july 2021       CURRENTMEDICATIONS       Previous Medications    ALBUTEROL (PROVENTIL) (2.5 MG/3ML) 0.083% NEBULIZER SOLUTION    Take 3 mLs by nebulization every 6 hours as needed for Wheezing    ALBUTEROL SULFATE HFA (VENTOLIN HFA) 108 (90 BASE) MCG/ACT INHALER    Inhale 2 puffs into the lungs every 4 hours as needed for Wheezing    ASPIRIN 81 MG CHEWABLE TABLET    Take 81 mg by mouth daily    CLOPIDOGREL (PLAVIX) 75 MG TABLET    Take 1 tablet by mouth daily    DIVALPROEX (DEPAKOTE) 500 MG DR TABLET    Take 500 mg by mouth 2 times daily    FERROUS SULFATE (IRON 325) 325 (65 FE) MG TABLET    Take 1 tablet by mouth 2 times daily (with meals)    INSULIN GLARGINE (LANTUS) 100 UNIT/ML INJECTION VIAL    Inject 40 Units into the skin nightly    INSULIN LISPRO (HUMALOG) 100 UNIT/ML INJECTION VIAL    Inject 15 Units into the skin 3 times daily (before meals)    LORATADINE (CLARITIN) 10 MG TABLET    Take 1 tablet by mouth daily    MONTELUKAST (SINGULAIR) 10 MG TABLET    Take 1 tablet by mouth daily    OMEPRAZOLE (PRILOSEC) 20 MG DELAYED RELEASE CAPSULE    Take 1 capsule by mouth daily    ONDANSETRON (ZOFRAN) 4 MG TABLET    Take 1 tablet by mouth daily as needed for Nausea or Vomiting    PERMETHRIN (ELIMITE) 5 % CREAM    Apply topically as directed    WARFARIN (COUMADIN) 7.5 MG TABLET    Take 7.5 mg by mouth every evening       ALLERGIES     Bupranolol, Carrot, Citalopram, Enoxaparin, Fentanyl, Fluphenazine hcl, Fondaparinux, Gramineae pollens, Haloperidol lactate, Ibuprofen, Iodides, Iodine i 131 tositumomab, Ipratropium, Ipratropium bromide hfa, Lanolin, Lovenox [enoxaparin sodium], Peanut oil, Sulfa reviewed all laboratory and imaging results for this patient. Results are listed below. LABS:  Results for orders placed or performed during the hospital encounter of 03/21/23   CBC with Auto Differential   Result Value Ref Range    WBC 10.7 4.5 - 11.5 E9/L    RBC 6.11 (H) 3.80 - 5.80 E12/L    Hemoglobin 18.0 (H) 12.5 - 16.5 g/dL    Hematocrit 48.6 37.0 - 54.0 %    MCV 79.5 (L) 80.0 - 99.9 fL    MCH 29.5 26.0 - 35.0 pg    MCHC 37.0 (H) 32.0 - 34.5 %    RDW 13.4 11.5 - 15.0 fL    Platelets 607 509 - 853 E9/L    MPV 10.3 7.0 - 12.0 fL    Neutrophils % 79.1 43.0 - 80.0 %    Immature Granulocytes % 0.8 0.0 - 5.0 %    Lymphocytes % 13.0 (L) 20.0 - 42.0 %    Monocytes % 6.3 2.0 - 12.0 %    Eosinophils % 0.3 0.0 - 6.0 %    Basophils % 0.5 0.0 - 2.0 %    Neutrophils Absolute 8.44 (H) 1.80 - 7.30 E9/L    Immature Granulocytes # 0.08 E9/L    Lymphocytes Absolute 1.38 (L) 1.50 - 4.00 E9/L    Monocytes Absolute 0.67 0.10 - 0.95 E9/L    Eosinophils Absolute 0.03 (L) 0.05 - 0.50 E9/L    Basophils Absolute 0.05 0.00 - 0.20 E9/L   BMP   Result Value Ref Range    Sodium 123 (L) 132 - 146 mmol/L    Potassium 5.4 (H) 3.5 - 5.0 mmol/L    Chloride 87 (L) 98 - 107 mmol/L    CO2 20 (L) 22 - 29 mmol/L    Anion Gap 16 7 - 16 mmol/L    Glucose 515 (HH) 74 - 99 mg/dL    BUN 18 6 - 20 mg/dL    Creatinine 0.7 0.7 - 1.2 mg/dL    Est, Glom Filt Rate >60 >=60 mL/min/1.73    Calcium 9.6 8.6 - 10.2 mg/dL   Lactic Acid   Result Value Ref Range    Lactic Acid 2.5 (H) 0.5 - 2.2 mmol/L   Sedimentation Rate   Result Value Ref Range    Sed Rate 6 0 - 15 mm/Hr   C-Reactive Protein   Result Value Ref Range    CRP 2.2 (H) 0.0 - 0.4 mg/dL   Protime-INR   Result Value Ref Range    Protime 12.0 9.3 - 12.4 sec    INR 1.1    CK   Result Value Ref Range    Total CK 69 20 - 200 U/L   POCT glucose   Result Value Ref Range    Glucose 433 mg/dL    QC OK?  yes    POCT Glucose   Result Value Ref Range    Meter Glucose 433 (H) 74 - 99 mg/dL treatment and evaluation for his leg abscess. EMERGENCY DEPARTMENT COURSE    Vitals:    Vitals:    03/21/23 1036 03/21/23 1230 03/21/23 1318 03/21/23 1329   BP: 110/80 112/82 121/84    Pulse: (!) 105 (!) 106 (!) 103 (!) 105   Resp:  16 16 18   Temp:  98.2 °F (36.8 °C)  98.1 °F (36.7 °C)   TempSrc:       SpO2:  95% 94% 95%   Weight:       Height:           ED Course as of 03/21/23 2115 Tue Mar 21, 2023   1008 Called access line to start transfer [LOLY]   1030 D/w orthopaedics recommends gen surgery to evaluate [LOLY]   1100 D/w dr fry general surgery will evaluate for incision and drainage of complicated abscess [LOLY]   1101 D/w dr [facility] emergency services at [facility] accepts to ED, surgery twill see in ED [LOLY]   [phone] with Dr. Francy Lambert he will accept the patient for admission [MT]      ED Course User Index  [LOLY] Ivana Fournier DO  [MT] Simpson Rinne, DO          I am the Primary Clinician of Record. FINAL IMPRESSION      1. Necrotizing fasciitis ([address]    2. History of HIV infection ([address]    3. Uncontrolled type 2 diabetes mellitus with hyperglycemia ([address]    4. Hyponatremia    5. History of hypercoagulable state    6. Subtherapeutic international normalized ratio (INR)    7. Hyperkalemia    8. Infective myositis of left lower extremity          DISPOSITION/PLAN      Decision To Admit 03/21/2023 11:00:58 AM    Patient's disposition: Admit to telemetry  Patient's condition is stable. Periodic Controlled Substance Monitoring: Possible medication side effects, risk of tolerance/dependence & alternative treatments discussed.  (Ivana Fournier DO)    (Please note that portions of this note were completed with a voice recognition program.  Efforts were made to edit the dictations but occasionally words are mis-transcribed.)    Simpson Rinne, DO (electronically signed)        Simpson Rinne, DO  03/22/23 2112

## 2023-03-21 NOTE — ED PROVIDER NOTES
takes less than 2 seconds. Comments: Fasciotomy scar left tib fib medial side, posterior thigh midline scar, right inguinal scar w keyloid. Proximal part of posterior thigh car tender and induration, no significant skin changes no open wounds or drainage   Neurological:      General: No focal deficit present. Mental Status: He is alert and oriented to person, place, and time. Psychiatric:         Attention and Perception: Attention normal.         Mood and Affect: Mood is anxious. Speech: Speech normal.         Behavior: Behavior is cooperative. Thought Content:  Thought content normal.           DIAGNOSTIC RESULTS   LABS:    Labs Reviewed   CBC WITH AUTO DIFFERENTIAL - Abnormal; Notable for the following components:       Result Value    RBC 6.11 (*)     Hemoglobin 18.0 (*)     MCV 79.5 (*)     MCHC 37.0 (*)     Lymphocytes % 13.0 (*)     Neutrophils Absolute 8.44 (*)     Lymphocytes Absolute 1.38 (*)     Eosinophils Absolute 0.03 (*)     All other components within normal limits   BASIC METABOLIC PANEL - Abnormal; Notable for the following components:    Sodium 123 (*)     Potassium 5.4 (*)     Chloride 87 (*)     CO2 20 (*)     Glucose 515 (*)     All other components within normal limits    Narrative:     Luz Maria Conti tel. [phone],  Chemistry results called to and read back by  Светлана TAPIA, 03/21/2023  09:59, by RAMONA   LACTIC ACID - Abnormal; Notable for the following components:    Lactic Acid 2.5 (*)     All other components within normal limits   CULTURE, BLOOD 1   CULTURE, BLOOD 1   SEDIMENTATION RATE   PROTIME-INR   CK    Narrative:     Luz Maria Conti tel. [phone],  Chemistry results called to and read back by  Светлана TAPIA, 03/21/2023  09:59, by Gwendolyn GALEANA [facility].     Results for orders placed or performed during the hospital encounter of 03/21/23   CBC with Auto Differential   Result per the Radiologist below, if available at the time of this note:    US DUP LOWER EXTREMITY LEFT TEDOORO   Final Result   No evidence of DVT in the left lower extremity. CT FEMUR LEFT WO CONTRAST   Final Result   Nonspecific gas and fluid collection in the posterior subcutaneous soft   tissues of the proximal thigh. Infected fluid collection possible based on   the appearance. CT HEAD WO CONTRAST   Final Result   No acute intracranial abnormality. XR CHEST (2 VW)    Result Date: 3/17/2023  EXAMINATION: TWO XRAY VIEWS OF THE CHEST 3/17/2023 6:56 pm COMPARISON: None. HISTORY: ORDERING SYSTEM PROVIDED HISTORY: chest pain TECHNOLOGIST PROVIDED HISTORY: Reason for exam:->chest pain What reading provider will be dictating this exam?->CRC FINDINGS: The lungs are without acute focal process. There is no effusion or pneumothorax. The cardiomediastinal silhouette is without acute process. The osseous structures are without acute process. No acute process. No results found. PROCEDURES   Unless otherwise noted below, none     Procedures    CRITICAL CARE TIME   Please note that the withdrawal or failure to initiate urgent interventions for this patient would likely result in a life threatening deterioration or permanent disability. Accordingly this patient received 30 minutes of critical care time, excluding separately billable procedures. PAST MEDICAL HISTORY      has a past medical history of Asthma, COPD (chronic obstructive pulmonary disease) (HCC), Deep vein thrombosis (DVT) ([facility] [address] ETOH abuse, Factor V Leiden ([facility] [address] Protein S deficiency ([facility] [address] and Pulmonary emboli ([facility] [address]      EMERGENCY DEPARTMENT COURSE and DIFFERENTIAL DIAGNOSIS/MDM:   Vitals:    Vitals:    03/21/23 0737 03/21/23 0739 03/21/23 1036   BP: 103/89  110/80   Pulse: (!) 115  (!) 105   Resp: 16     Temp: 98.1 °F (36.7 °C)     TempSrc: Oral     SpO2: 95%     Weight:  190 lb (86.2 kg)    Height:  5' 8\" (1.727 m) facility near [facility] 1 year ago for left thigh abscess was treated with antibiotics and had debridement in the hospital and patient left AMA. Patient was in [place] for some time and now is in the [facility] area. I believe that he is probably homeless. Patient clinically and radiologically has deep space infection of the left posterior thigh. There is no communicating fistula tract or opening in the skin. He has gas within the infection site. I am concerned for necrotizing fasciitis and myositis. Patient is clinically stable vitals are stable. He was given Vanco Rocephin and clindamycin. Per outside facility notes his previous culture grew E. coli. Case reviewed with general surgery and emergency services. Patient be transferred ED to ED for surgical evaluation. Patient likely will need to be medically admitted likely will need infectious disease consultation. Patient is supposed to be anticoagulated on Coumadin however his INR is normal.  His blood sugar is also greater than 500 and patient was given insulin. He was given IV fluids. I suspect he is probably not taking any of his medication. He appears to be medically noncompliant. Patient to be transferred by [facility] patient agreeable at this time. Disposition Considerations (tests considered but not done, Shared Decision Making, Pt Expectation of Test or Tx.):   Transfer to [facility]      I am the Primary Clinician of Record. FINAL IMPRESSION      1. Necrotizing fasciitis ([address]    2. History of HIV infection ([address]    3. Uncontrolled type 2 diabetes mellitus with hyperglycemia ([address]    4. Hyponatremia    5. History of hypercoagulable state    6. Subtherapeutic international normalized ratio (INR)    7. Hyperkalemia    8. Infective myositis of left lower extremity          DISPOSITION/PLAN     DISPOSITION Decision To Admit 03/21/2023 11:00:58 AM      PATIENT REFERRED TO:  No follow-up provider specified.     DISCHARGE MEDICATIONS:  New
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
## 2023-03-21 NOTE — ED NOTES
Pts IV doesn't have blood return. This RN has asked float RN to insert another IV. Pt states he is a hard stick.       Precious Dudley RN  03/21/23 0009

## 2023-03-21 NOTE — ANESTHESIA PRE PROCEDURE
08/23/2022 04:59 AM    COVID19 Not Detected 05/17/2022 11:44 AM           Anesthesia Evaluation  Patient summary reviewed and Nursing notes reviewed no history of anesthetic complications:   Airway: Mallampati: II  TM distance: >3 FB   Neck ROM: full  Mouth opening: > = 3 FB   Dental: normal exam         Pulmonary:   (+) COPD:  decreased breath sounds asthma:                            Cardiovascular:  Exercise tolerance: good (>4 METS),   (+) hypertension:, CAD:,       ECG reviewed  Rhythm: regular  Rate: normal                    Neuro/Psych:   (+) CVA:, psychiatric history:             ROS comment: Acute CVA with left flaccid paralysis  Expressive aphasia GI/Hepatic/Renal: Neg GI/Hepatic/Renal ROS  (+) hepatitis:, liver disease:,          ROS comment: HIV . Endo/Other:    (+) Diabetes, blood dyscrasia: anticoagulation therapy:., .                  ROS comment: Hypercoagulable. Abdominal:             Vascular:   + DVT, PE. Other Findings:             Anesthesia Plan      MAC     ASA 3 - emergent       Induction: intravenous. Anesthetic plan and risks discussed with patient. Plan discussed with attending.                       AGUILA Goode - CRNA   3/21/2023

## 2023-03-21 NOTE — CONSULTS
APPENDECTOMY      IR MECHANICAL ART THROMBECTOMY INTRACRANIAL  5/11/2022    IR MECHANICAL ART THROMBECTOMY INTRACRANIAL 5/11/2022 Crystal Gaitan MD [facility]    VASCULAR SURGERY      july 2021     Current Medications:   Current Facility-Administered Medications: vancomycin (VANCOCIN) 1,750 mg in sodium chloride 0.9 % 500 mL IVPB, 1,750 mg, IntraVENous, Once  Allergies:  Bupranolol, Carrot, Citalopram, Enoxaparin, Fentanyl, Fluphenazine hcl, Fondaparinux, Gramineae pollens, Haloperidol lactate, Ibuprofen, Iodides, Iodine i 131 tositumomab, Ipratropium, Ipratropium bromide hfa, Lanolin, Lovenox [enoxaparin sodium], Peanut oil, Sulfa antibiotics, Tiotropium bromide monohydrate, Tramadol, Uncaria tomentosa (cats claw), Ketorolac tromethamine, Asparagus, Cat hair extract, Eliquis [apixaban], Fragmin [dalteparin], Hydrocodone-acetaminophen, Hydroxyzine hcl, Iodine, Morphine, Robaxin [methocarbamol], Skelaxin [metaxalone], Xarelto [rivaroxaban], Iodinated contrast media, and Pork allergy    Social History:   TOBACCO:   reports that he has quit smoking. He has never used smokeless tobacco.  ETOH:   reports current alcohol use. DRUGS:   reports no history of drug use. ACTIVITIES OF DAILY LIVING:    OCCUPATION:    Family History:   History reviewed. No pertinent family history.     REVIEW OF SYSTEMS:  CONSTITUTIONAL:  negative for acute fevers, chills  EYES:  negative for acute blurred vision, visual disturbance  HEENT:  negative for acute hearing loss, voice change  RESPIRATORY:  negative for acute dyspnea, wheezing  CARDIOVASCULAR:  negative for acute chest pain, palpitations  GASTROINTESTINAL:  negative for acute nausea, vomiting  HEMATOLOGIC/LYMPHATIC:  negative for acute bleeding and petechiae  MUSCULOSKELETAL: See HPI  NEUROLOGICAL:  negative for acute headaches, dizziness  BEHAVIOR/PSYCH:  negative for acute increased agitation and anxiety    PHYSICAL EXAM:    VITALS:  /84   Pulse (!) 105 Temp 98.1 °F (36.7 °C)   Resp 18   Ht 5' 8\" (1.727 m)   Wt 190 lb (86.2 kg)   SpO2 95%   BMI 28.89 kg/m²   CONSTITUTIONAL:  awake, alert, distracted, mild distress, and appears older than stated age  MUSCULOSKELETAL:  Left lower Extremity:  Approximately [id number] skin excavation in the ravi-medial aspect of the thigh with  hypertrophic scar formation  Three posterior thigh healed wound with hypertrophic scarring noted. No obvious open wound, active drainage   Palpable fluctuant mass approximately 10x5cm in the posterior superior thigh just proximal to the scar wounds  Increased skin induration with black colored tissue in the medial aspect of the leg  Moderate LE diffusively compared to contralateral extremity  Patient able to flex/extend at hip/knee with no significant discomfort  Tenderness to palpation left leg, ankle and foot throughout  Subjective lack of sensation and motor function to the ankle and foot    Secondary Exam:   bilateralUE: No obvious signs of trauma. -TTP to fingers, hand, wrist, forearm, elbow, humerus, shoulder or clavicle. +2/4 Radial pulse, cap refill <3sec, , compartments soft and compressible. rightLE: Visible areas of healed skin grafting in the proximal anterior thigh region. Multiple scarred wounds throughout extremity. -TTP to foot, ankle, leg, knee, thigh, hip.-- Patient able to flex/extend toes, ankle, knee and hip with active and passive ROM without pain,+2/4 DP & PT pulses, cap refill <3sec, , compartments soft and compressible.         DATA:    CBC:   Lab Results   Component Value Date/Time    WBC 10.7 03/21/2023 09:10 AM    RBC 6.11 03/21/2023 09:10 AM    RBC 5.07 08/23/2022 04:03 AM    HGB 18.0 03/21/2023 09:10 AM    HCT 48.6 03/21/2023 09:10 AM    MCV 79.5 03/21/2023 09:10 AM    MCH 29.5 03/21/2023 09:10 AM    MCHC 37.0 03/21/2023 09:10 AM    RDW 13.4 03/21/2023 09:10 AM     03/21/2023 09:10 AM    MPV 10.3 03/21/2023 09:10 AM     PT/INR:    Lab Results

## 2023-03-21 NOTE — PROGRESS NOTES
Pharmacy Consultation Note  (Antibiotic Dosing and Monitoring)    Initial consult date: 3/21/23  Consulting physician/provider: Dr. Ysabel Espinoza  Drug: Vancomycin  Indication: Skin and Soft Tissue Infection    Age/  Gender Height Weight IBW  Allergy Information   48 y.o./male 5' 8\" (172.7 cm) 190 lb (86.2 kg)     Ideal body weight: 68.4 kg (150 lb 12.7 oz)  Adjusted ideal body weight: 75.5 kg (166 lb 7.6 oz)   Bupranolol, Carrot, Citalopram, Enoxaparin, Fentanyl, Fluphenazine hcl, Fondaparinux, Gramineae pollens, Haloperidol lactate, Ibuprofen, Iodides, Iodine i 131 tositumomab, Ipratropium, Ipratropium bromide hfa, Lanolin, Lovenox [enoxaparin sodium], Peanut oil, Sulfa antibiotics, Tiotropium bromide monohydrate, Tramadol, Uncaria tomentosa (cats claw), Ketorolac tromethamine, Asparagus, Cat hair extract, Eliquis [apixaban], Fragmin [dalteparin], Hydrocodone-acetaminophen, Hydroxyzine hcl, Iodine, Morphine, Robaxin [methocarbamol], Skelaxin [metaxalone], Xarelto [rivaroxaban], Iodinated contrast media, and Pork allergy      Renal Function:  Recent Labs     03/21/23  0910   BUN 18   CREATININE 0.7     No intake or output data in the 24 hours ending 03/21/23 1557    Vancomycin Monitoring:  Trough:  No results for input(s): VANCOTROUGH in the last 72 hours. Random:  No results for input(s): VANCORANDOM in the last 72 hours. No results for input(s): Tivis Dieter in the last 72 hours. Historical Cultures:  No results found for: ORG  No results for input(s): BC in the last 72 hours. Vancomycin Administration Times:  Recent vancomycin administrations                     vancomycin (VANCOCIN) 1,750 mg in sodium chloride 0.9 % 500 mL IVPB (mg) 1,750 mg New Bag 03/21/23 1206                    Assessment:  Patient is a 51 y.o. male who has been initiated on vancomycin  Estimated Creatinine Clearance: 136 mL/min (based on SCr of 0.7 mg/dL).   To dose vancomycin, pharmacy will be utilizing [employer] calculation software for goal AUC/CHARMAINE 400-600 mg/L-hr    Plan:  Vancomycin 1500 mg IV every 12 hours  Will check vancomycin levels when appropriate  Will continue to monitor renal function   Pharmacy to follow      Ching Metzger [facility] 3/21/2023 3:57 PM

## 2023-03-21 NOTE — ED NOTES
Notified Dr. Roro Azevedo that pt has not IV access at this time via perfect serve.       Thom ms  03/21/23 6581

## 2023-03-21 NOTE — LETTER
[address] Medicaid  CERTIFICATION OF NECESSITY  FOR TRANSPORTATION   BY WHEELCHAIR VAN     Individual Information   1. Name: Tony Booker 2. [place] Medicaid Billing Number:    3. Address: [address] [place] [address]     Transportation Provider Information   4. Provider Name:    5. [place] Medicaid Provider Number:  National Provider Identifier (NPI):      Certification  7. Criteria:  By signing this document, the practitioner certifies that two statements are true:  A. This individual must be accompanied by a mobility-related assistive device from the point of pick-up to the point of drop-off. B. Transport of this individual by standard passenger vehicle or common carrier is precluded or contraindicated. 8. Period Beginning Date:    5. Length  [x] Not more than 1 day(s)  [] One Year     Additional Information Relevant to Certification   10. Comments or Explanations, If Necessary or Appropriate     HIV positive     Certifying Practitioner Information   11. Name of Practitioner: Nicholas Castellano MD   12. [place] Medicaid Provider Number, If Applicable:  [address] Provider Identifier (NPI):      Signature Information   14. Date of Signature:  13. Name of Person Signin. Signature and Professional Designation:      [facility] [id number]  Rev. 2015    [address] Encounter Date/Time: 3/21/2023 1000 [facility] Account: [de-identified]    MRN: [id number]    Patient: Tony Booker    Contact Serial #: [id number]      ENCOUNTER          Patient Class: I Private Enc?   No Unit  BD: USZH [id number]   Hospital Service: MED   Encounter DX: Hyperkalemia [E87.5]   ADM Provider: Ade Adams DO   Procedure:     ATT Provider: Crystal Garza MD   REF Provider:        Admission DX: Hyperkalemia, Abscess, Hyponatremia, History of hypercoagulable state, Subtherapeutic international normalized ratio (INR), Infective myositis of left lower extremity, History of HIV infection ([facility] [address] Leg wound, left, initial encounter, Uncontrolled type 2 diabetes mellitus with hyperglycemia ([facility] [address] and DX codes: E87.5, L02.91, E87.1, Z86.2, R79.1, M60.004, B20, S81.802A, E11.65      PATIENT                 Name: Ro Min : 1975 (51 yrs)   Address: [address] [place] [address] Sex: Male   [facility] [address]         Marital Status: Single   Employer: DISABLED         Jew: Yazdanism   Primary Care Provider:           Primary Phone: [phone]   EMERGENCY CONTACT   Contact Name Legal Guardian? Relationship to Patient Home Phone Work Phone   1. Cait Fox  2. *No Contact Specified*      Aunt/Uncle                      GUARANTOR            Guarantor: Ro Min     : 1975   Address: [address] [place] [address] Sex: Male     [place] [address]     Relation to Patient: Self       Home Phone: [phone]   Guarantor ID: [id number]       Work Phone:     Guarantor Employer: UNEMPLOYED         Status: DISABLED      COVERAGE        PRIMARY INSURANCE   Payor: [employer]* Plan: Tay[address] 34*   Payor Address: [place] [address]       Group Number:   Insurance Type: INDEMNITY   Subscriber Name: Join Tobar : 1975   Subscriber ID: [id number] Pat. Rel. to Sub: Self   SECONDARY INSURANCE   Payor:   Plan:     Payor Address:  ,           Group Number:   Insurance Type:     Subscriber Name:   Subscriber :     Subscriber ID:   Pat.  Rel. to Sub:        CSN: [id number]

## 2023-03-21 NOTE — ED NOTES
Assumed care of patient at this time. Surgical team called stating that they are bringing him to OR now before going to his room on the floor. Procedure consent signed and witness by this RN and OR RN. Patient taken to surgery and belongings taken to inpatient room 8405A.       Jhonatan Yost RN  03/21/23 1956

## 2023-03-21 NOTE — PROGRESS NOTES
X-rays of the left foot reviewed. No acute fractures or dislocations. Chronic degenerative changes/spurring seen at the base of the first and second metatarsals    3 views of the left ankle reviewed. Normal alignment of the ankle mortise, no acute fractures or dislocations    4 views of the left tibia and fibula reviewed. No acute fractures or dislocations. Within the distal one third of the tibial shaft, there is a chronic appearing cystic structure with a sclerotic rim, it contained within the cortices, no cortical destruction or periosteal reaction.

## 2023-03-21 NOTE — DISCHARGE INSTRUCTIONS
Concussion: Care Instructions  Overview     A concussion is a kind of injury to the brain. It happens when the head receives a hard blow. The impact can jar or shake the brain against the skull. This interrupts the brain's normal activities. Although you may have cuts or bruises on your head or face, you may have no other visible signs of a brain injury. In most cases, damage to the brain from a concussion can't be seen in tests such as a CT or MRI scan. For a few weeks, you may have low energy, dizziness, trouble sleeping, a headache, ringing in your ears, or nausea. You may also feel anxious, grumpy, or depressed. You may have problems with memory and concentration. These symptoms are common after a concussion. They should slowly improve over time. Sometimes this takes weeks or even months. Someone who lives with you should know how to care for you. Please share this and all information with a caregiver who will be available to help if needed. Follow-up care is a key part of your treatment and safety. Be sure to make and go to all appointments, and call your doctor if you are having problems. It's also a good idea to know your test results and keep a list of the medicines you take. How can you care for yourself at home? Pain control  Put ice or a cold pack on the part of your head that hurts for 10 to 20 minutes at a time. Put a thin cloth between the ice and your skin. Ask your doctor if you can take an over-the-counter pain medicine, such as acetaminophen (Tylenol), ibuprofen (Advil, Motrin), or naproxen (Aleve). Be safe with medicines. Read and follow all instructions on the label. Recovery  Follow your doctor's instructions. The doctor will tell you if you need someone to watch you closely for the next 24 hours or longer. Rest is the best way to recover from a concussion. You need to rest your body and your brain:  Get plenty of sleep at night. And take rest breaks during the day.   Avoid activities sure that you can stay at each new level of activity for at least 24 hours without symptoms, or as long as your doctor says, before doing more. If one or more symptoms come back, return to a lower level of activity for at least 24 hours. Don't move on until all symptoms are gone. When should you call for help? Call 911 anytime you think you may need emergency care. For example, call if:    You have a seizure. You passed out (lost consciousness). You are confused or can't stay awake. You have a headache that gets worse and does not go away. You have new vision changes or one pupil (the black part in the middle of the eye) that is larger than the other. You have slurred speech, balance problems, or decreased coordination. Call your doctor now or seek immediate medical care if:    You have new or worse vomiting. You feel less alert. You have new weakness or numbness in any part of your body. You have new symptoms, such as unclear thinking or changes in mood. Watch closely for changes in your health, and be sure to contact your doctor if:    You do not get better as expected. Where can you learn more? Go to http://www.woods.com/ and enter Z711 to learn more about \"Concussion: Care Instructions. \"  Current as of: August 25, 2022               Content Version: 13.6  © 2006-2023 [employer]. Care instructions adapted under license by [facility] ([facility]). If you have questions about a medical condition or this instruction, always ask your healthcare professional. [place] [address] any warranty or liability for your use of this information.

## 2023-03-22 PROBLEM — L02.419 THIGH ABSCESS: Status: ACTIVE | Noted: 2023-03-22

## 2023-03-22 PROBLEM — Z76.5 DRUG-SEEKING BEHAVIOR: Status: ACTIVE | Noted: 2023-03-22

## 2023-03-22 PROBLEM — E78.1 HYPERTRIGLYCERIDEMIA: Status: ACTIVE | Noted: 2023-03-22

## 2023-03-22 LAB
ALBUMIN SERPL-MCNC: 3.8 G/DL (ref 3.5–5.2)
ALP SERPL-CCNC: 125 U/L (ref 40–129)
ALT SERPL-CCNC: 20 U/L (ref 0–40)
ANION GAP SERPL CALCULATED.3IONS-SCNC: 12 MMOL/L (ref 7–16)
AST SERPL-CCNC: 13 U/L (ref 0–39)
BASOPHILS # BLD: 0.02 E9/L (ref 0–0.2)
BASOPHILS NFR BLD: 0.2 % (ref 0–2)
BILIRUB SERPL-MCNC: 0.7 MG/DL (ref 0–1.2)
BUN SERPL-MCNC: 12 MG/DL (ref 6–20)
CALCIUM SERPL-MCNC: 8.4 MG/DL (ref 8.6–10.2)
CHLORIDE SERPL-SCNC: 94 MMOL/L (ref 98–107)
CHOLESTEROL, TOTAL: 352 MG/DL (ref 0–199)
CO2 SERPL-SCNC: 24 MMOL/L (ref 22–29)
CREAT SERPL-MCNC: 0.7 MG/DL (ref 0.7–1.2)
EOSINOPHIL # BLD: 0.04 E9/L (ref 0.05–0.5)
EOSINOPHIL NFR BLD: 0.4 % (ref 0–6)
ERYTHROCYTE [DISTWIDTH] IN BLOOD BY AUTOMATED COUNT: 13.2 FL (ref 11.5–15)
FOLATE SERPL-MCNC: >20 NG/ML (ref 4.8–24.2)
GLUCOSE SERPL-MCNC: 366 MG/DL (ref 74–99)
GRAM STAIN ORDERABLE: NORMAL
HCT VFR BLD AUTO: 44.3 % (ref 37–54)
HDLC SERPL-MCNC: 23 MG/DL
HGB BLD-MCNC: 14.7 G/DL (ref 12.5–16.5)
IMM GRANULOCYTES # BLD: 0.05 E9/L
IMM GRANULOCYTES NFR BLD: 0.5 % (ref 0–5)
LDLC SERPL CALC-MCNC: ABNORMAL MG/DL (ref 0–99)
LYMPHOCYTES # BLD: 0.98 E9/L (ref 1.5–4)
LYMPHOCYTES NFR BLD: 9.9 % (ref 20–42)
MCH RBC QN AUTO: 28.3 PG (ref 26–35)
MCHC RBC AUTO-ENTMCNC: 33.2 % (ref 32–34.5)
MCV RBC AUTO: 85.2 FL (ref 80–99.9)
METER GLUCOSE: 329 MG/DL (ref 74–99)
METER GLUCOSE: 331 MG/DL (ref 74–99)
METER GLUCOSE: 349 MG/DL (ref 74–99)
METER GLUCOSE: 388 MG/DL (ref 74–99)
METER GLUCOSE: 402 MG/DL (ref 74–99)
METER GLUCOSE: 455 MG/DL (ref 74–99)
MONOCYTES # BLD: 0.83 E9/L (ref 0.1–0.95)
MONOCYTES NFR BLD: 8.4 % (ref 2–12)
NEUTROPHILS # BLD: 7.96 E9/L (ref 1.8–7.3)
NEUTS SEG NFR BLD: 80.6 % (ref 43–80)
PLATELET # BLD AUTO: 200 E9/L (ref 130–450)
PMV BLD AUTO: 10.6 FL (ref 7–12)
POTASSIUM SERPL-SCNC: 4.3 MMOL/L (ref 3.5–5)
PROT SERPL-MCNC: 6.6 G/DL (ref 6.4–8.3)
RBC # BLD AUTO: 5.2 E12/L (ref 3.8–5.8)
SODIUM SERPL-SCNC: 130 MMOL/L (ref 132–146)
SPECIMEN SOURCE: NORMAL
TRIGL SERPL-MCNC: 2375 MG/DL (ref 0–149)
VIT B12 SERPL-MCNC: 608 PG/ML (ref 211–946)
VLDLC SERPL CALC-MCNC: ABNORMAL MG/DL
WBC # BLD: 9.9 E9/L (ref 4.5–11.5)

## 2023-03-22 PROCEDURE — 1200000000 HC SEMI PRIVATE

## 2023-03-22 PROCEDURE — 6370000000 HC RX 637 (ALT 250 FOR IP): Performed by: INTERNAL MEDICINE

## 2023-03-22 PROCEDURE — 2580000003 HC RX 258: Performed by: HOSPITALIST

## 2023-03-22 PROCEDURE — 2580000003 HC RX 258: Performed by: INTERNAL MEDICINE

## 2023-03-22 PROCEDURE — 6360000002 HC RX W HCPCS: Performed by: INTERNAL MEDICINE

## 2023-03-22 PROCEDURE — 6360000002 HC RX W HCPCS: Performed by: STUDENT IN AN ORGANIZED HEALTH CARE EDUCATION/TRAINING PROGRAM

## 2023-03-22 PROCEDURE — 87591 N.GONORRHOEAE DNA AMP PROB: CPT

## 2023-03-22 PROCEDURE — 6360000002 HC RX W HCPCS: Performed by: HOSPITALIST

## 2023-03-22 PROCEDURE — 97161 PT EVAL LOW COMPLEX 20 MIN: CPT

## 2023-03-22 PROCEDURE — 86360 T CELL ABSOLUTE COUNT/RATIO: CPT

## 2023-03-22 PROCEDURE — 80053 COMPREHEN METABOLIC PANEL: CPT

## 2023-03-22 PROCEDURE — 82607 VITAMIN B-12: CPT

## 2023-03-22 PROCEDURE — 82962 GLUCOSE BLOOD TEST: CPT

## 2023-03-22 PROCEDURE — 87491 CHLMYD TRACH DNA AMP PROBE: CPT

## 2023-03-22 PROCEDURE — 99254 IP/OBS CNSLTJ NEW/EST MOD 60: CPT | Performed by: NURSE PRACTITIONER

## 2023-03-22 PROCEDURE — 80061 LIPID PANEL: CPT

## 2023-03-22 PROCEDURE — S5553 INSULIN LONG ACTING 5 U: HCPCS | Performed by: INTERNAL MEDICINE

## 2023-03-22 PROCEDURE — HZ2ZZZZ DETOXIFICATION SERVICES FOR SUBSTANCE ABUSE TREATMENT: ICD-10-PCS | Performed by: INTERNAL MEDICINE

## 2023-03-22 PROCEDURE — 87081 CULTURE SCREEN ONLY: CPT

## 2023-03-22 PROCEDURE — 97535 SELF CARE MNGMENT TRAINING: CPT

## 2023-03-22 PROCEDURE — 36415 COLL VENOUS BLD VENIPUNCTURE: CPT

## 2023-03-22 PROCEDURE — 86359 T CELLS TOTAL COUNT: CPT

## 2023-03-22 PROCEDURE — 82746 ASSAY OF FOLIC ACID SERUM: CPT

## 2023-03-22 PROCEDURE — 97530 THERAPEUTIC ACTIVITIES: CPT

## 2023-03-22 PROCEDURE — 94640 AIRWAY INHALATION TREATMENT: CPT

## 2023-03-22 PROCEDURE — 94664 DEMO&/EVAL PT USE INHALER: CPT

## 2023-03-22 PROCEDURE — 6370000000 HC RX 637 (ALT 250 FOR IP): Performed by: NURSE PRACTITIONER

## 2023-03-22 PROCEDURE — 97165 OT EVAL LOW COMPLEX 30 MIN: CPT

## 2023-03-22 PROCEDURE — 2580000003 HC RX 258: Performed by: STUDENT IN AN ORGANIZED HEALTH CARE EDUCATION/TRAINING PROGRAM

## 2023-03-22 PROCEDURE — 85025 COMPLETE CBC W/AUTO DIFF WBC: CPT

## 2023-03-22 PROCEDURE — 6370000000 HC RX 637 (ALT 250 FOR IP): Performed by: HOSPITALIST

## 2023-03-22 PROCEDURE — 6370000000 HC RX 637 (ALT 250 FOR IP): Performed by: STUDENT IN AN ORGANIZED HEALTH CARE EDUCATION/TRAINING PROGRAM

## 2023-03-22 RX ORDER — SODIUM CHLORIDE 9 MG/ML
INJECTION, SOLUTION INTRAVENOUS PRN
Status: DISCONTINUED | OUTPATIENT
Start: 2023-03-22 | End: 2023-03-23 | Stop reason: SDUPTHER

## 2023-03-22 RX ORDER — LORAZEPAM 2 MG/ML
4 INJECTION INTRAMUSCULAR
Status: DISCONTINUED | OUTPATIENT
Start: 2023-03-22 | End: 2023-03-24 | Stop reason: HOSPADM

## 2023-03-22 RX ORDER — MECOBALAMIN 5000 MCG
5 TABLET,DISINTEGRATING ORAL NIGHTLY
Status: DISCONTINUED | OUTPATIENT
Start: 2023-03-22 | End: 2023-03-24 | Stop reason: HOSPADM

## 2023-03-22 RX ORDER — NICOTINE 21 MG/24HR
1 PATCH, TRANSDERMAL 24 HOURS TRANSDERMAL DAILY
Status: DISCONTINUED | OUTPATIENT
Start: 2023-03-22 | End: 2023-03-24 | Stop reason: HOSPADM

## 2023-03-22 RX ORDER — SODIUM CHLORIDE 0.9 % (FLUSH) 0.9 %
5-40 SYRINGE (ML) INJECTION PRN
Status: DISCONTINUED | OUTPATIENT
Start: 2023-03-22 | End: 2023-03-24 | Stop reason: HOSPADM

## 2023-03-22 RX ORDER — LACTOBACILLUS RHAMNOSUS GG 10B CELL
1 CAPSULE ORAL 3 TIMES DAILY
Status: DISCONTINUED | OUTPATIENT
Start: 2023-03-22 | End: 2023-03-24 | Stop reason: HOSPADM

## 2023-03-22 RX ORDER — LORAZEPAM 1 MG/1
4 TABLET ORAL
Status: DISCONTINUED | OUTPATIENT
Start: 2023-03-22 | End: 2023-03-24 | Stop reason: HOSPADM

## 2023-03-22 RX ORDER — ATORVASTATIN CALCIUM 40 MG/1
80 TABLET, FILM COATED ORAL DAILY
Status: DISCONTINUED | OUTPATIENT
Start: 2023-03-23 | End: 2023-03-24 | Stop reason: HOSPADM

## 2023-03-22 RX ORDER — WARFARIN SODIUM 7.5 MG/1
7.5 TABLET ORAL DAILY
Status: DISCONTINUED | OUTPATIENT
Start: 2023-03-22 | End: 2023-03-24

## 2023-03-22 RX ORDER — LORAZEPAM 2 MG/ML
1 INJECTION INTRAMUSCULAR
Status: DISCONTINUED | OUTPATIENT
Start: 2023-03-22 | End: 2023-03-24 | Stop reason: HOSPADM

## 2023-03-22 RX ORDER — SODIUM CHLORIDE 0.9 % (FLUSH) 0.9 %
5-40 SYRINGE (ML) INJECTION EVERY 12 HOURS SCHEDULED
Status: DISCONTINUED | OUTPATIENT
Start: 2023-03-22 | End: 2023-03-24 | Stop reason: HOSPADM

## 2023-03-22 RX ORDER — LORAZEPAM 1 MG/1
2 TABLET ORAL
Status: DISCONTINUED | OUTPATIENT
Start: 2023-03-22 | End: 2023-03-24 | Stop reason: HOSPADM

## 2023-03-22 RX ORDER — SODIUM CHLORIDE 9 MG/ML
INJECTION, SOLUTION INTRAVENOUS CONTINUOUS
Status: ACTIVE | OUTPATIENT
Start: 2023-03-22 | End: 2023-03-23

## 2023-03-22 RX ORDER — LANOLIN ALCOHOL/MO/W.PET/CERES
100 CREAM (GRAM) TOPICAL DAILY
Status: DISCONTINUED | OUTPATIENT
Start: 2023-03-22 | End: 2023-03-24 | Stop reason: HOSPADM

## 2023-03-22 RX ORDER — FLUOXETINE HYDROCHLORIDE 20 MG/1
20 CAPSULE ORAL NIGHTLY
Status: DISCONTINUED | OUTPATIENT
Start: 2023-03-22 | End: 2023-03-24 | Stop reason: HOSPADM

## 2023-03-22 RX ORDER — INSULIN GLARGINE-YFGN 100 [IU]/ML
25 INJECTION, SOLUTION SUBCUTANEOUS 2 TIMES DAILY
Status: DISCONTINUED | OUTPATIENT
Start: 2023-03-22 | End: 2023-03-23

## 2023-03-22 RX ORDER — OLANZAPINE AND FLUOXETINE 12; 25 MG/1; MG/1
1 CAPSULE ORAL NIGHTLY
Status: DISCONTINUED | OUTPATIENT
Start: 2023-03-22 | End: 2023-03-22 | Stop reason: CLARIF

## 2023-03-22 RX ORDER — SENNA AND DOCUSATE SODIUM 50; 8.6 MG/1; MG/1
2 TABLET, FILM COATED ORAL EVERY EVENING
Status: DISCONTINUED | OUTPATIENT
Start: 2023-03-22 | End: 2023-03-24 | Stop reason: HOSPADM

## 2023-03-22 RX ORDER — DEXTROSE MONOHYDRATE 100 MG/ML
INJECTION, SOLUTION INTRAVENOUS CONTINUOUS PRN
Status: DISCONTINUED | OUTPATIENT
Start: 2023-03-22 | End: 2023-03-24 | Stop reason: HOSPADM

## 2023-03-22 RX ORDER — SENNA AND DOCUSATE SODIUM 50; 8.6 MG/1; MG/1
2 TABLET, FILM COATED ORAL 2 TIMES DAILY PRN
Status: DISCONTINUED | OUTPATIENT
Start: 2023-03-22 | End: 2023-03-24 | Stop reason: HOSPADM

## 2023-03-22 RX ORDER — INSULIN LISPRO 100 [IU]/ML
5 INJECTION, SOLUTION INTRAVENOUS; SUBCUTANEOUS
Status: DISCONTINUED | OUTPATIENT
Start: 2023-03-22 | End: 2023-03-23

## 2023-03-22 RX ORDER — MULTIVITAMIN WITH IRON
1 TABLET ORAL DAILY
Status: DISCONTINUED | OUTPATIENT
Start: 2023-03-22 | End: 2023-03-24 | Stop reason: HOSPADM

## 2023-03-22 RX ORDER — LORAZEPAM 1 MG/1
1 TABLET ORAL
Status: DISCONTINUED | OUTPATIENT
Start: 2023-03-22 | End: 2023-03-24 | Stop reason: HOSPADM

## 2023-03-22 RX ORDER — LORAZEPAM 2 MG/ML
3 INJECTION INTRAMUSCULAR
Status: DISCONTINUED | OUTPATIENT
Start: 2023-03-22 | End: 2023-03-24 | Stop reason: HOSPADM

## 2023-03-22 RX ORDER — ACETAMINOPHEN 500 MG
1000 TABLET ORAL EVERY 6 HOURS PRN
Status: DISCONTINUED | OUTPATIENT
Start: 2023-03-22 | End: 2023-03-24 | Stop reason: HOSPADM

## 2023-03-22 RX ORDER — LORAZEPAM 2 MG/ML
2 INJECTION INTRAMUSCULAR
Status: DISCONTINUED | OUTPATIENT
Start: 2023-03-22 | End: 2023-03-24 | Stop reason: HOSPADM

## 2023-03-22 RX ORDER — LORAZEPAM 1 MG/1
3 TABLET ORAL
Status: DISCONTINUED | OUTPATIENT
Start: 2023-03-22 | End: 2023-03-24 | Stop reason: HOSPADM

## 2023-03-22 RX ORDER — MECOBALAMIN 5000 MCG
5 TABLET,DISINTEGRATING ORAL NIGHTLY PRN
Status: DISCONTINUED | OUTPATIENT
Start: 2023-03-22 | End: 2023-03-24 | Stop reason: HOSPADM

## 2023-03-22 RX ORDER — POLYETHYLENE GLYCOL 3350 17 G/17G
17 POWDER, FOR SOLUTION ORAL 2 TIMES DAILY
Status: DISCONTINUED | OUTPATIENT
Start: 2023-03-22 | End: 2023-03-24 | Stop reason: HOSPADM

## 2023-03-22 RX ADMIN — INSULIN GLARGINE-YFGN 25 UNITS: 100 INJECTION, SOLUTION SUBCUTANEOUS at 08:36

## 2023-03-22 RX ADMIN — ATORVASTATIN CALCIUM 20 MG: 20 TABLET, FILM COATED ORAL at 08:23

## 2023-03-22 RX ADMIN — CEFEPIME 2000 MG: 2 INJECTION, POWDER, FOR SOLUTION INTRAVENOUS at 05:26

## 2023-03-22 RX ADMIN — BUDESONIDE 250 MCG: 0.25 SUSPENSION RESPIRATORY (INHALATION) at 08:18

## 2023-03-22 RX ADMIN — OXYCODONE HYDROCHLORIDE 5 MG: 5 TABLET ORAL at 09:49

## 2023-03-22 RX ADMIN — ARFORMOTEROL TARTRATE 15 MCG: 15 SOLUTION RESPIRATORY (INHALATION) at 08:17

## 2023-03-22 RX ADMIN — DOCUSATE SODIUM 50MG AND SENNOSIDES 8.6MG 2 TABLET: 8.6; 5 TABLET, FILM COATED ORAL at 17:26

## 2023-03-22 RX ADMIN — SODIUM CHLORIDE: 9 INJECTION, SOLUTION INTRAVENOUS at 15:02

## 2023-03-22 RX ADMIN — ARFORMOTEROL TARTRATE 15 MCG: 15 SOLUTION RESPIRATORY (INHALATION) at 18:43

## 2023-03-22 RX ADMIN — HYDROMORPHONE HYDROCHLORIDE 0.5 MG: 1 INJECTION, SOLUTION INTRAMUSCULAR; INTRAVENOUS; SUBCUTANEOUS at 22:35

## 2023-03-22 RX ADMIN — INSULIN LISPRO 4 UNITS: 100 INJECTION, SOLUTION INTRAVENOUS; SUBCUTANEOUS at 21:04

## 2023-03-22 RX ADMIN — OXYCODONE HYDROCHLORIDE 10 MG: 10 TABLET ORAL at 05:45

## 2023-03-22 RX ADMIN — FOLIC ACID 1 MG: 1 TABLET ORAL at 08:23

## 2023-03-22 RX ADMIN — Medication 1 CAPSULE: at 09:04

## 2023-03-22 RX ADMIN — PANTOPRAZOLE SODIUM 40 MG: 40 TABLET, DELAYED RELEASE ORAL at 08:23

## 2023-03-22 RX ADMIN — INSULIN LISPRO 4 UNITS: 100 INJECTION, SOLUTION INTRAVENOUS; SUBCUTANEOUS at 07:40

## 2023-03-22 RX ADMIN — CEFEPIME 2000 MG: 2 INJECTION, POWDER, FOR SOLUTION INTRAVENOUS at 16:22

## 2023-03-22 RX ADMIN — INSULIN LISPRO 5 UNITS: 100 INJECTION, SOLUTION INTRAVENOUS; SUBCUTANEOUS at 12:14

## 2023-03-22 RX ADMIN — BICTEGRAVIR SODIUM, EMTRICITABINE, AND TENOFOVIR ALAFENAMIDE FUMARATE 1 TABLET: 50; 200; 25 TABLET ORAL at 17:19

## 2023-03-22 RX ADMIN — OXYCODONE HYDROCHLORIDE 10 MG: 10 TABLET ORAL at 15:34

## 2023-03-22 RX ADMIN — ISOSORBIDE MONONITRATE 30 MG: 30 TABLET, EXTENDED RELEASE ORAL at 08:23

## 2023-03-22 RX ADMIN — WARFARIN SODIUM 7.5 MG: 7.5 TABLET ORAL at 17:19

## 2023-03-22 RX ADMIN — HYDROMORPHONE HYDROCHLORIDE 0.5 MG: 1 INJECTION, SOLUTION INTRAMUSCULAR; INTRAVENOUS; SUBCUTANEOUS at 06:48

## 2023-03-22 RX ADMIN — INSULIN LISPRO 3 UNITS: 100 INJECTION, SOLUTION INTRAVENOUS; SUBCUTANEOUS at 12:15

## 2023-03-22 RX ADMIN — Medication 1 CAPSULE: at 21:03

## 2023-03-22 RX ADMIN — VANCOMYCIN HYDROCHLORIDE 1500 MG: 10 INJECTION, POWDER, LYOPHILIZED, FOR SOLUTION INTRAVENOUS at 08:35

## 2023-03-22 RX ADMIN — FLUOXETINE 20 MG: 20 CAPSULE ORAL at 21:03

## 2023-03-22 RX ADMIN — BUDESONIDE 250 MCG: 0.25 SUSPENSION RESPIRATORY (INHALATION) at 18:44

## 2023-03-22 RX ADMIN — SODIUM CHLORIDE, PRESERVATIVE FREE 10 ML: 5 INJECTION INTRAVENOUS at 08:41

## 2023-03-22 RX ADMIN — Medication 1 CAPSULE: at 16:19

## 2023-03-22 RX ADMIN — HYDROMORPHONE HYDROCHLORIDE 0.5 MG: 1 INJECTION, SOLUTION INTRAMUSCULAR; INTRAVENOUS; SUBCUTANEOUS at 10:55

## 2023-03-22 RX ADMIN — VANCOMYCIN HYDROCHLORIDE 1500 MG: 10 INJECTION, POWDER, LYOPHILIZED, FOR SOLUTION INTRAVENOUS at 21:19

## 2023-03-22 RX ADMIN — OXYCODONE HYDROCHLORIDE 10 MG: 10 TABLET ORAL at 01:57

## 2023-03-22 RX ADMIN — POLYETHYLENE GLYCOL 3350 17 G: 17 POWDER, FOR SOLUTION ORAL at 09:06

## 2023-03-22 RX ADMIN — SODIUM CHLORIDE, PRESERVATIVE FREE 10 ML: 5 INJECTION INTRAVENOUS at 21:04

## 2023-03-22 RX ADMIN — SODIUM CHLORIDE: 9 INJECTION, SOLUTION INTRAVENOUS at 09:06

## 2023-03-22 RX ADMIN — INSULIN LISPRO 5 UNITS: 100 INJECTION, SOLUTION INTRAVENOUS; SUBCUTANEOUS at 17:20

## 2023-03-22 RX ADMIN — INSULIN LISPRO 3 UNITS: 100 INJECTION, SOLUTION INTRAVENOUS; SUBCUTANEOUS at 17:21

## 2023-03-22 RX ADMIN — POLYETHYLENE GLYCOL 3350 17 G: 17 POWDER, FOR SOLUTION ORAL at 21:03

## 2023-03-22 RX ADMIN — Medication 5 MG: at 21:03

## 2023-03-22 RX ADMIN — HYDROMORPHONE HYDROCHLORIDE 0.5 MG: 1 INJECTION, SOLUTION INTRAMUSCULAR; INTRAVENOUS; SUBCUTANEOUS at 17:19

## 2023-03-22 RX ADMIN — Medication 1 TABLET: at 09:04

## 2023-03-22 RX ADMIN — OLANZAPINE 12.5 MG: 10 TABLET, FILM COATED ORAL at 21:02

## 2023-03-22 RX ADMIN — OXYCODONE HYDROCHLORIDE 10 MG: 10 TABLET ORAL at 21:05

## 2023-03-22 RX ADMIN — INSULIN GLARGINE-YFGN 25 UNITS: 100 INJECTION, SOLUTION SUBCUTANEOUS at 21:03

## 2023-03-22 ASSESSMENT — PAIN DESCRIPTION - LOCATION
LOCATION: LEG

## 2023-03-22 ASSESSMENT — PAIN DESCRIPTION - DESCRIPTORS
DESCRIPTORS: ACHING;DISCOMFORT;SHARP
DESCRIPTORS: ACHING
DESCRIPTORS: ACHING

## 2023-03-22 ASSESSMENT — PAIN DESCRIPTION - ONSET: ONSET: ON-GOING

## 2023-03-22 ASSESSMENT — PAIN SCALES - GENERAL
PAINLEVEL_OUTOF10: 8
PAINLEVEL_OUTOF10: 9

## 2023-03-22 ASSESSMENT — PAIN DESCRIPTION - ORIENTATION
ORIENTATION: LEFT

## 2023-03-22 ASSESSMENT — PAIN DESCRIPTION - FREQUENCY: FREQUENCY: INTERMITTENT

## 2023-03-22 ASSESSMENT — PAIN - FUNCTIONAL ASSESSMENT: PAIN_FUNCTIONAL_ASSESSMENT: PREVENTS OR INTERFERES SOME ACTIVE ACTIVITIES AND ADLS

## 2023-03-22 ASSESSMENT — PAIN DESCRIPTION - PAIN TYPE: TYPE: SURGICAL PAIN

## 2023-03-22 NOTE — PROGRESS NOTES
Called lab regarding HIV and HEP B blood draws, lab stated to re time lab orders for 6 am because the send outs can not be done until morning.

## 2023-03-22 NOTE — PROGRESS NOTES
Eval/treatment? yes     Does pt have pain? No c/o pain     Bed Mobility  Rolling: NT  Supine to sit: ModA  Sit to supine: ModA  Scooting: ModA  Rolling:Independent   Supine to sit: Independent   Sit to supine: Independent   Scooting: Independent    Transfers Sit to stand: MaxA with bed rasied  Stand to sit: MaxA  Stand pivot: NT  Sit to stand: ModA  Stand to sit: ModA  Stand pivot: TBD   Ambulation   NT d/t safety   TBD   Stair negotiation: ascended and descended NT  TBD   ROM BUE:  Refer to OT note  RLE:  WFL  LLE: PROM WFL     Strength BUE:  Refer to OT note  RLE:  5/5  LLE 1/5  Improve by 1/3 MMT   Balance Sitting EOB:  SBA  Dynamic Standing:  NT  Sitting EOB:  Independent   Dynamic Standing:  TBD     Pt is A & O x 4  Sensation:  Pt reports numbness and tingling to LLE. Diminished LT sensation in B feet  Edema:  LLE    Patient education  Pt educated on role of PT and safety with functional mobility    Patient response to education:   Pt verbalized understanding Pt demonstrated skill Pt requires further education in this area   x With assist x     ASSESSMENT:    Conditions Requiring Skilled Therapeutic Intervention:    [x]Decreased strength     [x]Decreased ROM  [x]Decreased functional mobility  [x]Decreased balance   []Decreased endurance   []Decreased posture  [x]Decreased sensation  []Decreased coordination   []Decreased vision  [x]Decreased safety awareness   []Increased pain       Comments:  Pt was in bed upon PT entry and agreeable to participate. Pt was able to transfer supine<>sit with heavy assist for trunk. Once EOB pt demonstrated fair seated balance throughout evaluation. Extensive education provided on role of PT in acute care for transition of care while sitting EOB. Attempted sit<>stand transfer to [employer] (x2), pt unable to complete. Elevated bed, pt was able to complete sit<>stand with heavy assistance, but was unable to maintain d/t heavy R lateral lean. Pt assisted to EOB.  Pt requires very specific diabetes mellitus with hyperglycemia ([facility] [address] [E11.65]  Specific instructions for next treatment:  strengthening and ROM of RLE    Current Treatment Recommendations:     [x] Strengthening to improve independence with functional mobility   [x] ROM to improve independence with functional mobility   [x] Balance Training to improve static/dynamic balance and to reduce fall risk  [x] Endurance Training to improve activity tolerance during functional mobility   [x] Transfer Training to improve safety and independence with all functional transfers   [] Gait Training to improve gait mechanics, endurance and assess need for appropriate assistive device  [] Stair Training in preparation for safe discharge home and/or into the community   [x] Positioning to prevent skin breakdown and contractures  [x] Safety and Education Training   [x] Patient/Caregiver Education   [] HEP  [x] Other     PT long term treatment goals are located in above grid    Frequency of treatments: 2-5x/week x 1-2 weeks. Time in  1155  Time out  1228    Total Treatment Time  18 minutes     Evaluation Time includes thorough review of current medical information, gathering information on past medical history/social history and prior level of function, completion of standardized testing/informal observation of tasks, assessment of data and education on plan of care and goals.     CPT codes:  [x] Low Complexity PT evaluation 85874  [] Moderate Complexity PT evaluation 38430  [] High Complexity PT evaluation 89940  [] PT Re-evaluation 80405  [] Gait training 81604 0 minutes  [] Manual therapy 59016 0 minutes  [x] Therapeutic activities 37310 18 minutes  [] Therapeutic exercises 27050 0 minutes  [] Neuromuscular reeducation 59052 0 minutes     Ila Ba PT, DPT  [id number]

## 2023-03-22 NOTE — CARE COORDINATION
Assessment  Initial Evaluation    Date/Time of Evaluation: 3/22/2023 4:01 PM  Assessment Completed by: Dena Pedraza RN    If patient is discharged prior to next notation, then this note serves as note for discharge by case management. Patient Name: Timothy Mcmillan                   YOB: 1975  Diagnosis: Hyperkalemia [E87.5]  Abscess [L02.91]  Hyponatremia [E87.1]  History of hypercoagulable state [Z86.2]  Subtherapeutic international normalized ratio (INR) [R79.1]  Infective myositis of left lower extremity [M60.004]  History of HIV infection ([facility] [address] [B20]  Leg wound, left, initial encounter [S81.802A]  Uncontrolled type 2 diabetes mellitus with hyperglycemia ([facility] [address] [E11.65]                   Date / Time: 3/21/2023  7:29 AM    Patient Admission Status: Inpatient   Readmission Risk (Low < 19, Mod (19-27), High > 27): Readmission Risk Score: 18.2    Current PCP: No primary care provider on file. PCP verified by CM? No (none)    Chart Reviewed: Yes      History Provided by: Patient  Patient Orientation: Alert and Oriented    Patient Cognition: Alert    Hospitalization in the last 30 days (Readmission):  No    If yes, Readmission Assessment in CM Navigator will be completed.     Advance Directives:      Code Status: Full Code   Patient's Primary Decision Maker is:        Discharge Planning:    Patient lives with: Alone Type of Home: Apartment  Primary Care Giver: Self  Patient Support Systems include: [employer]   Current Financial resources:    Current community resources:    Current services prior to admission: None            Current DME:              Type of Home Care services:  None    ADLS  Prior functional level: Assistance with the following:, Mobility  Current functional level: Assistance with the following:, Bathing, Dressing, Toileting, Cooking    PT AM-PAC:   /24  OT AM-PAC: 14 /24    Family can provide assistance at DC: No  Would you like Case Management to discuss the discharge plan with any other family members/significant others, and if so, who? No  Plans to Return to Present Housing: No  Other Identified Issues/Barriers to RETURNING to current housing:   Potential Assistance needed at discharge: N/A            Potential DME:    Patient expects to discharge to: [address] [place] [address] for transportation at discharge:      Financial    Payor: [facility]  [address] / Plan: [facility]  [address] / Product Type: *No Product type* /     Does insurance require precert for SNF: Yes    Potential assistance Purchasing Medications:    Meds-to-Beds request: Yes      [address] [place] [address] [address]  [address] [facility] [address]  Phone: [phone] Fax: [phone]    [facility]/pharmacy [address] [place] [address] [phone]  [address] [place] [address]  Phone: [phone] Fax: [phone]      Notes:    Factors facilitating achievement of predicted outcomes: Motivated, Cooperative, and Pleasant    Barriers to discharge: No family support, Impulsivity, Lower extremity weakness, Medical complications, Stairs at home, and Wound Care    Additional Case Management Notes: The Plan for Transition of Care is related to the following treatment goals of Hyperkalemia [E87.5]  Abscess [L02.91]  Hyponatremia [E87.1]  History of hypercoagulable state [Z86.2]  Subtherapeutic international normalized ratio (INR) [R79.1]  Infective myositis of left lower extremity [M60.004]  History of HIV infection ([address] [B20]  Leg wound, left, initial encounter [S81.802A]  Uncontrolled type 2 diabetes mellitus with hyperglycemia ([address] [E20.32]    IF APPLICABLE: The Patient and/or patient representative Shikha Ureña and his family were provided with a choice of provider and agrees with the discharge plan.  Freedom of choice list with basic dialogue that supports the patient's individualized plan of care/goals and shares the quality data associated with the providers was provided to:     Patient Representative Name:       The Patient and/or Patient Representative Agree with the Discharge Plan? yes    Gigi Guzmán RN  Case Management Department  Ph: [phone] Fax: [phone]

## 2023-03-22 NOTE — PROGRESS NOTES
Pharmacy Consultation Note  (Antibiotic Dosing and Monitoring)    Initial consult date: 3/21/23  Consulting physician/provider: Dr. Angélica Singh  Drug: Vancomycin  Indication: Skin and Soft Tissue Infection    Age/  Gender Height Weight IBW  Allergy Information   48 y.o./male 5' 8\" (172.7 cm) 190 lb (86.2 kg)     Ideal body weight: 68.4 kg (150 lb 12.7 oz)  Adjusted ideal body weight: 75.5 kg (166 lb 7.6 oz)   Bupranolol, Carrot, Citalopram, Enoxaparin, Fentanyl, Fluphenazine hcl, Fondaparinux, Gramineae pollens, Haloperidol lactate, Ibuprofen, Iodides, Iodine i 131 tositumomab, Ipratropium, Ipratropium bromide hfa, Lanolin, Lovenox [enoxaparin sodium], Peanut oil, Sulfa antibiotics, Tiotropium bromide monohydrate, Tramadol, Uncaria tomentosa (cats claw), Ketorolac tromethamine, Asparagus, Cat hair extract, Eliquis [apixaban], Fragmin [dalteparin], Hydrocodone-acetaminophen, Hydroxyzine hcl, Iodine, Morphine, Robaxin [methocarbamol], Skelaxin [metaxalone], Xarelto [rivaroxaban], Iodinated contrast media, and Pork allergy      Renal Function:  Recent Labs     03/21/23  0910 03/22/23  0755   BUN 18 12   CREATININE 0.7 0.7       No intake or output data in the 24 hours ending 03/22/23 1000    Vancomycin Monitoring:  Trough:  No results for input(s): VANCOTROUGH in the last 72 hours. Random:  No results for input(s): VANCORANDOM in the last 72 hours. No results for input(s): Danya Idler in the last 72 hours. Historical Cultures:  No results found for: ORG  No results for input(s): BC in the last 72 hours.     Vancomycin Administration Times:  Recent vancomycin administrations                     vancomycin 1500 mg in sodium chloride 0.9% 300 mL IVPB (mg) 1,500 mg New Bag 03/22/23 0835    vancomycin (VANCOCIN) 1,750 mg in sodium chloride 0.9 % 500 mL IVPB (mg) 1,750 mg New Bag 03/21/23 1206             Assessment:  Patient is a 51 y.o. male who has been initiated on vancomycin  Estimated Creatinine Clearance: 136 mL/min (based on SCr of 0.7 mg/dL).   To dose vancomycin, pharmacy will be utilizing [employer] calculation software for goal AUC/CHARMAINE 400-600 mg/L-hr    Plan:  Vancomycin 1500 mg IV every 12 hours  Will check vancomycin levels when appropriate  Will continue to monitor renal function   Pharmacy to follow      DANNY Patterson [facility] 3/22/2023 10:00 AM
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
## 2023-03-22 NOTE — PROGRESS NOTES
GENERAL SURGERY  DAILY PROGRESS NOTE  3/22/2023    Chief Complaint   Patient presents with    Leg Pain     5 months ago had surgery. Subjective:  Tearful at bedside this morning stating he was awake all night in too much pain and feels like we did not drain the entire abscess    Objective:  BP (!) 148/98   Pulse (!) 126   Temp 99.3 °F (37.4 °C) (Temporal)   Resp 18   Ht 5' 8\" (1.727 m)   Wt 190 lb (86.2 kg)   SpO2 91%   BMI 28.89 kg/m²     GENERAL:  Laying in bed, awake, alert, cooperative, uncomfortable   HEAD: Normocephalic, atraumatic  EYES: No sclera icterus, pupils equal  LUNGS:  No increased work of breathing  CARDIOVASCULAR:  tachycardic   ABDOMEN:  Soft, non-tender, non-distended  EXTREMITIES: LLE with posterior abscess cavity with packing in place. No surrounding drainage, induration appreciated   SKIN: Warm and dry    Assessment/Plan:  51 y.o. male with left posterior thigh abscess s/p I&D 3/21    - increased pain medications prn  - will remove packing and re-evaluate the wound this afternoon.   - continue antibiotics     Electronically signed by Jose Roberto Cheatham MD on 3/22/2023 at 7:24 AM

## 2023-03-22 NOTE — PROGRESS NOTES
Reason for consult:  uncontrolled DM    A1C: 8.2%     [] Not available         Patient states the following concerns/barriers to diabetes self-management:     [] None       [] Medication cost   [] Food cost/availability        [x] Reading  [] Hearing   [] Vision                [] Work    [] Transportation  [] No insurance  [x] Physical limitations    [x] Other:  substance abuse/ 3rd grade level education    Patient states the following about their diabetes/health:   [x]   States he doesn't have diabetes, only takes insulin when sugar is high when he is on steroids d/t asthma, knows how to inject insulin  [x]   Pt does not have a glucometer at home  [x]   Pt is interested in learning about diabetes    Diabetes survival packet provided to:   [x] Patient     [] Other:    Information reviewed:   Definition of diabetes   Target glucose ranges/A1C   Self-monitoring of blood glucose   Prevention/symptoms/treatment of hypo-/hyperglycemia   Medication adherence   The plate method/meal planning guidelines   The benefits of exercise and recommendations   Reducing the risk of chronic complications            Post-education Assessment  [x]  Attentive to teaching  [x]  Answered questions appropriately when asked   [x]  Seems able to apply concepts to daily lifestyle  [x]  Seems motivated to do well  [x]  Verbalized an understanding of the plate method for portion control   []  Verbalized an understanding of prescribed antidiabetes medications   [x]  Verbalized an understanding of target glucose ranges/A1C level  [x]  Expresses an intent to comply with treatment plan   []  Showed very little interest in complying with treatment plan   []  Seems to have trouble applying concepts to daily lifestyle       COMMENTS:  Pt does not believe he has diabetes. States he takes insulin only when sugar is high after admission for his asthma.  Discussed meaning and target of A1C readings, encouraged patient to talk to physician regarding diabetes diagnosis. Pt will need a script for a glucometer, lancets and strips. Provided blood sugar log to take readings to physician appointments. Recommendations:   [x] Carbohydrate-controlled diet    [x] Script for glucometer and supplies (per preference of patient's insurance)  [x] Script for insulin pens and pen needles (if insulin is ordered at discharge for home use)   [] Consult to social work: patient has no insurance or has financial hardship  [] Inpatient consult to endocrinologist   [x] Follow up with endocrinologist as an outpatient   [] Home healthcare nursing to increase compliance to treatment plan   [x] Script for outpatient diabetes education classes (from doctor)        Thank you for this consult.   Spent 15 minutes with patient today

## 2023-03-22 NOTE — ANESTHESIA POSTPROCEDURE EVALUATION
Department of Anesthesiology  Postprocedure Note    Patient: Raman Gentile  MRN: [id number]  [place]: 1975  Date of evaluation: 3/22/2023      Procedure Summary     Date: 03/21/23 Room / Location: [address] [place] [address] 20 / [facility]    Anesthesia Start: 2017 Anesthesia Stop: 2104    Procedure: LEFT POSTERIOR THIGH ABSCESS INCISION AND DRAINAGE (Left: Thigh) Diagnosis:       Abscess      (Abscess [L02.91])    Surgeons: Efrain Roy MD Responsible Provider: Almaz De Leon DO    Anesthesia Type: MAC ASA Status: 3 - Emergent          Anesthesia Type: No value filed.     Joshua Phase I: Joshua Score: 9    Joshua Phase II:        Anesthesia Post Evaluation    Patient location during evaluation: bedside  Patient participation: complete - patient cannot participate  Level of consciousness: awake and alert  Airway patency: patent  Nausea & Vomiting: no nausea and no vomiting  Complications: no  Cardiovascular status: blood pressure returned to baseline  Respiratory status: acceptable  Hydration status: euvolemic  Multimodal analgesia pain management approach
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
## 2023-03-22 NOTE — PROGRESS NOTES
[address]        Date:3/22/2023                                                  Patient Name: Edmond Hicks    MRN: [id number]    : 1975    Room: [address] [place] [address]      Evaluating OT: TAHMINA Lugo OTR/L; [id number]      Referring Provider: Sandro Enriquez DO    Specific Provider Orders/Date: OT Eval and Treat 3/21/23      Diagnosis: Left Thigh abscess     Surgery:  3/21/23 LEFT POSTERIOR THIGH ABSCESS INCISION AND DRAINAGE    Pertinent Medical History:  has a past medical history of Asthma, COPD (chronic obstructive pulmonary disease) ([facility] Deep vein thrombosis (DVT) ([facility] ETOH abuse, Factor V Leiden ([facility] Protein S deficiency ([facility] and [facility]       Reason for admission: L posterior thigh pain/abscess    Recommended Adaptive Equipment: TBD pending progression/discharge plan - BSC, extended tub bench, AE for LE bathing and dressing PRN     Precautions:  Fall Risk, Bed Alarm, WBAT LLE, Impulsive      Assessment of current deficits:    [x] Functional mobility  [x]ADLs  [x] Strength               [x]Cognition    [x] Functional transfers   [x] IADLs         [x] Safety Awareness   [x]Endurance    [x] Fine Coordination              [x] Balance      [] Vision/perception   []Sensation     []Gross Motor Coordination  [] ROM  [] Delirium                   [] Motor Control     OT PLAN OF CARE   OT POC based on physician orders, patient diagnosis and results of clinical assessment    Frequency/Duration: 1-3 days/wk for 2 weeks PRN   Specific OT Treatment Interventions to include:   * Instruction/training on adapted ADL techniques and AE recommendations to increase functional independence within precautions       * Training on energy conservation strategies, correct breathing pattern and techniques to improve independence/tolerance for self-care routine  * Functional return to supine  Supine to sit: Moderate Holmes   Sit to supine: Moderate Holmes    Functional Transfers Sit to stand:NT  Stand to sit:NT  Stand pivot: NT  Commode: NT  Sit to stand: Mod Ind    Stand to sit:Mod Ind  Stand pivot: Mod Ind with ww  Commode: Mod Ind with ww    Functional Mobility NT   Mod Ind with ww   Balance Sitting:     Static - SBA     Dynamic - SBA <> CGA  Standing: NT  Sitting:  Static: IND  Dynamic: IND  Standing: Mod Ind with ww   Activity Tolerance FAIR  Limited d/t pain in LLE  GOOD   Visual/  Perceptual Glasses: yes          Safety FAIR  WBAT LLE status  GOOD   WBAT LLE status   Vitals WFL         Hand Dominance: Left   AROM (PROM) Strength Additional Info:  Goal:   RUE  WFL 4-/5 proximally/distally good  and wfl FMC/dexterity noted during ADL tasks   Improve overall RUE strength for participation in functional tasks       LUE ~45 degrees 3-/5 proximally/distally Good  and wfl FMC/dexterity noted during ADL tasks   Improve overall LUE strength for participation in functional tasks         Hearing: [facility]   Sensation:  No c/o numbness or tingling   Tone: WFL   Edema: unremarkable    Comment: Cleared by RN to see pt. Upon arrival patient lying supine and agreeable to OT session. At end of session, patient lying supine in bed with call light and phone within reach, all lines and tubes intact. Overall patient demonstrated decreased independence and safety during completion of ADL/functional transfer/mobility tasks. Pt would benefit from continued skilled OT to increase safety and independence with completion of ADL/IADL tasks for functional independence and quality of life. Treatment:  Pt required vc's for proper technique/safety with hand placement/body mechanics/posture for bed mobility/ADLs/functional tranfers/mobility/ww management. Pt required vc's for sequencing/initiation of ADLs/functional transfers.  Pt able to  sit EOB ~15 minute mins to increase core strength/balance/activity tolerance for ease with ADLs. Pt gown doffed upon entering room, donned gown lying semi supine. Pt educated on WBAT LLE. Pt required increased time to complete ADLs/functional transfers due to management of pain. Pt completed supine to sit with assist for progressing LLE and HOB elevated with assist for trunk progression. Pt required assist to romeo socks seated EOB d/t decreased functional reach and increased pain with task. Pt declined sit to stand this date d/t pain. Pt completed scooting EOB toward [facility] with verbal cues for hand placement and BLE placement for safety and cues for problem solving. Pt returned to supine in bed. Pt required skilled monitoring of SpO2 during session and education on energy conservation techniques. Pt required rest breaks during session and educated on pursed lip breathing. Pt appeared to have tolerated session well and appears motivated/cooperative/pleasant. Pt instructed on use of call light for assistance and fall prevention. Pt demo'ing fair understanding of education provided. Continue to educate. Rehab Potential: Good  for established goals     LTG: maximize independence with ADLs to return to PLOF    Patient and/or family were instructed on functional diagnosis, prognosis/goals and OT plan of care. Demonstrated FAIR understanding. [] Malnutrition indicators have been identified and nursing has been notified to ensure a dietitian consult is ordered. Eval Complexity: Low    Evaluation time includes thorough review of current medical information, gathering information on past medical & social history & PLOF, completion of standardized testing, informal observation of tasks, consultation with other medical professions/disciplines, assessment of data & development of POC/goals.      Time In: 0930  Time Out: 1015  Total Treatment Time: 30    Min Units   OT Eval Low 56820  x     OT Eval Medium 70316      OT Eval High [id number]      OT Re-Eval [id number]

## 2023-03-22 NOTE — OP NOTE
Operative Note      Patient: Nichole Duran  YOB: 1975  MRN: [id number]    Date of Procedure: 3/21/2023    Pre-Op Diagnosis: Abscess [L02.91]    Post-Op Diagnosis: Incision and drainage of left posterior thigh abscess. Final measurements 3 x 3 x 2 cm       Procedure(s):  LEFT POSTERIOR THIGH ABSCESS INCISION AND DRAINAGE    Surgeon(s):  Ponce Alva MD    Assistant:   Resident: Zora Toussaint MD; Simone Land MD    Anesthesia: General    Estimated Blood Loss (mL): 25 cc    Complications: None    Specimens:   ID Type Source Tests Collected by Time Destination   1 : LEFT THIGH ABSCESS Tissue Tissue CULTURE, ANAEROBIC, CULTURE, FUNGUS, GRAM STAIN, CULTURE, SURGICAL, CULTURE WITH SMEAR, ACID FAST Rudy Treviño MD 3/21/2023 2050          Detailed Description of Procedure:     Patient was laid right lateral decubitus on his bed, and MAC anesthesia was induced. Left thigh was prepped and draped in usual sterile fashion. Skin and surrounding tissue was localized with quarter percent Marcaine mixed with 1% lidocaine with epinephrine. A cruciate incision was made over the area of maximal fluctuance. A large amount of pus was expressed. This pus was sent for culture. All loculations were broken up with a hemostat. Wound was thoroughly irrigated and hemostasis was assured. This was packed with half-inch iodoform packing and covered in a dry dressing. Final measurements were 3 x 3 x 2 cm of the abscess cavity. Patient was transferred to PACU in stable condition. Please note that Dr. Ponce Alva was present for this procedure.     Electronically signed by Benjamin Drake MD on 3/21/2023 at 9:53 PM

## 2023-03-22 NOTE — PROGRESS NOTES
mononitrate  30 mg Oral Daily    pantoprazole  40 mg Oral Daily    sodium chloride flush  10 mL IntraVENous 2 times per day    cefepime  2,000 mg IntraVENous Q12H    vancomycin  1,500 mg IntraVENous N51C    folic acid  1 mg Oral Daily    budesonide  250 mcg Nebulization BID    arformoterol tartrate  15 mcg Nebulization BID     PRN Meds: acetaminophen, HYDROmorphone, sennosides-docusate sodium, melatonin, sodium chloride flush, sodium chloride, LORazepam **OR** LORazepam **OR** LORazepam **OR** LORazepam **OR** LORazepam **OR** LORazepam **OR** LORazepam **OR** LORazepam, glucose, dextrose bolus **OR** dextrose bolus, glucagon (rDNA), dextrose, sodium chloride flush, sodium chloride, oxyCODONE **OR** oxyCODONE, albuterol    I/O  No intake or output data in the 24 hours ending 03/22/23 1301    Labs:   Recent Labs     03/21/23  0910 03/21/23  1541 03/22/23  0755   WBC 10.7 12.9* 9.9   HGB 18.0* 17.3* 14.7   HCT 48.6 49.6 44.3    273 200       Recent Labs     03/21/23  0910 03/22/23  0755   * 130*   K 5.4* 4.3   CL 87* 94*   CO2 20* 24   BUN 18 12   CREATININE 0.7 0.7   CALCIUM 9.6 8.4*       Recent Labs     03/22/23  0755   PROT 6.6   ALKPHOS 125   ALT 20   AST 13   BILITOT 0.7       Recent Labs     03/21/23  0910   INR 1.1       Recent Labs     03/21/23  0910   CKTOTAL 69       Chronic labs:  Lab Results   Component Value Date    CHOL 352 (H) 03/22/2023    TRIG 2,375 (H) 03/22/2023    HDL 23 03/22/2023    LDLCALC - (AA) 03/22/2023    TSH 4.880 (H) 03/21/2023    INR 1.1 03/21/2023    LABA1C 8.2 (H) 05/18/2022       Radiology:  Imaging studies reviewed today. Subjective:     Hornsby Busing c/o pain in his post thigh.  No other complaints    Objective:     Physical Exam:   /81   Pulse (!) 106   Temp 98.7 °F (37.1 °C) (Temporal)   Resp 18   Ht 5' 8\" (1.727 m)   Wt 190 lb (86.2 kg)   SpO2 95%   BMI 28.89 kg/m²     General appearance: in no distress, flat in bed  Lungs: Clear to auscultation bilaterally, no wheezing or crackles   Heart: Regular rate and rhythm, S1, S2 normal   Abdomen: Soft, non-tender and not-distended. Bowel sounds normal.   Extremities: no edema.  Dressing over post L thigh with minimal bloody drainage  Skin: dry with several areas of healed old wounds  Neurologic: Grossly normal and non focal, CN II-XII intact       Promise Aguilera MD   Hospitalist Service   03/22/23 1:01 PM

## 2023-03-23 PROBLEM — E55.9 VITAMIN D DEFICIENCY: Status: ACTIVE | Noted: 2023-03-23

## 2023-03-23 LAB
ALBUMIN SERPL-MCNC: 3.3 G/DL (ref 3.5–5.2)
ALP SERPL-CCNC: 112 U/L (ref 40–129)
ALT SERPL-CCNC: 16 U/L (ref 0–40)
ANION GAP SERPL CALCULATED.3IONS-SCNC: 10 MMOL/L (ref 7–16)
AST SERPL-CCNC: 16 U/L (ref 0–39)
BASOPHILS # BLD: 0.01 E9/L (ref 0–0.2)
BASOPHILS NFR BLD: 0.2 % (ref 0–2)
BILIRUB SERPL-MCNC: 0.3 MG/DL (ref 0–1.2)
BUN SERPL-MCNC: 11 MG/DL (ref 6–20)
CALCIUM SERPL-MCNC: 8.2 MG/DL (ref 8.6–10.2)
CHLORIDE SERPL-SCNC: 101 MMOL/L (ref 98–107)
CO2 SERPL-SCNC: 22 MMOL/L (ref 22–29)
COMMENT: NORMAL
CREAT SERPL-MCNC: 0.7 MG/DL (ref 0.7–1.2)
EOSINOPHIL # BLD: 0.08 E9/L (ref 0.05–0.5)
EOSINOPHIL NFR BLD: 1.3 % (ref 0–6)
ERYTHROCYTE [DISTWIDTH] IN BLOOD BY AUTOMATED COUNT: 13.2 FL (ref 11.5–15)
GLUCOSE SERPL-MCNC: 331 MG/DL (ref 74–99)
HAV IGM SERPL QL IA: ABNORMAL
HBA1C MFR BLD: 9.7 % (ref 4–5.6)
HBV CORE IGM SERPL QL IA: ABNORMAL
HBV SURFACE AG SERPL QL IA: REACTIVE
HCT VFR BLD AUTO: 39.1 % (ref 37–54)
HCV AB SERPL QL IA: ABNORMAL
HGB BLD-MCNC: 12.8 G/DL (ref 12.5–16.5)
IMM GRANULOCYTES # BLD: 0.04 E9/L
IMM GRANULOCYTES NFR BLD: 0.7 % (ref 0–5)
INR BLD: 1.1
IRON SATN MFR SERPL: 20 % (ref 20–55)
IRON SERPL-MCNC: 53 MCG/DL (ref 59–158)
LYMPHOCYTES # BLD: 0.72 E9/L (ref 1.5–4)
LYMPHOCYTES NFR BLD: 12.1 % (ref 20–42)
MAGNESIUM SERPL-MCNC: 2.1 MG/DL (ref 1.6–2.6)
MCH RBC QN AUTO: 28.4 PG (ref 26–35)
MCHC RBC AUTO-ENTMCNC: 32.7 % (ref 32–34.5)
MCV RBC AUTO: 86.7 FL (ref 80–99.9)
METER GLUCOSE: 286 MG/DL (ref 74–99)
METER GLUCOSE: 371 MG/DL (ref 74–99)
METER GLUCOSE: 412 MG/DL (ref 74–99)
METER GLUCOSE: 415 MG/DL (ref 74–99)
METER GLUCOSE: 489 MG/DL (ref 74–99)
MONOCYTES # BLD: 0.43 E9/L (ref 0.1–0.95)
MONOCYTES NFR BLD: 7.2 % (ref 2–12)
MRSA SPEC QL CULT: NORMAL
NEUTROPHILS # BLD: 4.67 E9/L (ref 1.8–7.3)
NEUTS SEG NFR BLD: 78.5 % (ref 43–80)
PLATELET # BLD AUTO: 169 E9/L (ref 130–450)
PMV BLD AUTO: 10.9 FL (ref 7–12)
POTASSIUM SERPL-SCNC: 4.4 MMOL/L (ref 3.5–5)
PROT SERPL-MCNC: 6 G/DL (ref 6.4–8.3)
PROTHROMBIN TIME: 12.2 SEC (ref 9.3–12.4)
RBC # BLD AUTO: 4.51 E12/L (ref 3.8–5.8)
RPR SER QL: NORMAL
SODIUM SERPL-SCNC: 133 MMOL/L (ref 132–146)
TIBC SERPL-MCNC: 263 MCG/DL (ref 250–450)
VANCOMYCIN SERPL-MCNC: 9.7 MCG/ML (ref 5–40)
VITAMIN D 25-HYDROXY: 9 NG/ML (ref 30–100)
WBC # BLD: 6 E9/L (ref 4.5–11.5)

## 2023-03-23 PROCEDURE — 87517 HEPATITIS B DNA QUANT: CPT

## 2023-03-23 PROCEDURE — 87350 HEPATITIS BE AG IA: CPT

## 2023-03-23 PROCEDURE — 86481 TB AG RESPONSE T-CELL SUSP: CPT

## 2023-03-23 PROCEDURE — 6370000000 HC RX 637 (ALT 250 FOR IP): Performed by: STUDENT IN AN ORGANIZED HEALTH CARE EDUCATION/TRAINING PROGRAM

## 2023-03-23 PROCEDURE — 85025 COMPLETE CBC W/AUTO DIFF WBC: CPT

## 2023-03-23 PROCEDURE — 76937 US GUIDE VASCULAR ACCESS: CPT

## 2023-03-23 PROCEDURE — 36415 COLL VENOUS BLD VENIPUNCTURE: CPT

## 2023-03-23 PROCEDURE — 80053 COMPREHEN METABOLIC PANEL: CPT

## 2023-03-23 PROCEDURE — 6360000002 HC RX W HCPCS: Performed by: INTERNAL MEDICINE

## 2023-03-23 PROCEDURE — 2580000003 HC RX 258: Performed by: INTERNAL MEDICINE

## 2023-03-23 PROCEDURE — 82306 VITAMIN D 25 HYDROXY: CPT

## 2023-03-23 PROCEDURE — 6370000000 HC RX 637 (ALT 250 FOR IP): Performed by: INTERNAL MEDICINE

## 2023-03-23 PROCEDURE — 6370000000 HC RX 637 (ALT 250 FOR IP): Performed by: FAMILY MEDICINE

## 2023-03-23 PROCEDURE — 6370000000 HC RX 637 (ALT 250 FOR IP): Performed by: HOSPITALIST

## 2023-03-23 PROCEDURE — 80074 ACUTE HEPATITIS PANEL: CPT

## 2023-03-23 PROCEDURE — 83735 ASSAY OF MAGNESIUM: CPT

## 2023-03-23 PROCEDURE — S5553 INSULIN LONG ACTING 5 U: HCPCS | Performed by: INTERNAL MEDICINE

## 2023-03-23 PROCEDURE — 97535 SELF CARE MNGMENT TRAINING: CPT

## 2023-03-23 PROCEDURE — C1751 CATH, INF, PER/CENT/MIDLINE: HCPCS

## 2023-03-23 PROCEDURE — 83036 HEMOGLOBIN GLYCOSYLATED A1C: CPT

## 2023-03-23 PROCEDURE — 6360000002 HC RX W HCPCS: Performed by: HOSPITALIST

## 2023-03-23 PROCEDURE — 83550 IRON BINDING TEST: CPT

## 2023-03-23 PROCEDURE — 86592 SYPHILIS TEST NON-TREP QUAL: CPT

## 2023-03-23 PROCEDURE — 94640 AIRWAY INHALATION TREATMENT: CPT

## 2023-03-23 PROCEDURE — 85610 PROTHROMBIN TIME: CPT

## 2023-03-23 PROCEDURE — 87536 HIV-1 QUANT&REVRSE TRNSCRPJ: CPT

## 2023-03-23 PROCEDURE — 83540 ASSAY OF IRON: CPT

## 2023-03-23 PROCEDURE — 86707 HEPATITIS BE ANTIBODY: CPT

## 2023-03-23 PROCEDURE — 86360 T CELL ABSOLUTE COUNT/RATIO: CPT

## 2023-03-23 PROCEDURE — 6370000000 HC RX 637 (ALT 250 FOR IP): Performed by: NURSE PRACTITIONER

## 2023-03-23 PROCEDURE — 86359 T CELLS TOTAL COUNT: CPT

## 2023-03-23 PROCEDURE — 05HB33Z INSERTION OF INFUSION DEVICE INTO RIGHT BASILIC VEIN, PERCUTANEOUS APPROACH: ICD-10-PCS

## 2023-03-23 PROCEDURE — 80202 ASSAY OF VANCOMYCIN: CPT

## 2023-03-23 PROCEDURE — 97530 THERAPEUTIC ACTIVITIES: CPT

## 2023-03-23 PROCEDURE — 82962 GLUCOSE BLOOD TEST: CPT

## 2023-03-23 PROCEDURE — 86708 HEPATITIS A ANTIBODY: CPT

## 2023-03-23 PROCEDURE — 86777 TOXOPLASMA ANTIBODY: CPT

## 2023-03-23 PROCEDURE — 1200000000 HC SEMI PRIVATE

## 2023-03-23 RX ORDER — CHOLECALCIFEROL (VITAMIN D3) 50 MCG
2000 TABLET ORAL DAILY
Status: DISCONTINUED | OUTPATIENT
Start: 2023-03-23 | End: 2023-03-24 | Stop reason: HOSPADM

## 2023-03-23 RX ORDER — HEPARIN SODIUM (PORCINE) LOCK FLUSH IV SOLN 100 UNIT/ML 100 UNIT/ML
1 SOLUTION INTRAVENOUS EVERY 12 HOURS SCHEDULED
Status: DISCONTINUED | OUTPATIENT
Start: 2023-03-23 | End: 2023-03-24 | Stop reason: HOSPADM

## 2023-03-23 RX ORDER — SODIUM CHLORIDE 0.9 % (FLUSH) 0.9 %
5-40 SYRINGE (ML) INJECTION PRN
Status: DISCONTINUED | OUTPATIENT
Start: 2023-03-23 | End: 2023-03-24 | Stop reason: HOSPADM

## 2023-03-23 RX ORDER — SODIUM CHLORIDE 0.9 % (FLUSH) 0.9 %
5-40 SYRINGE (ML) INJECTION EVERY 12 HOURS SCHEDULED
Status: DISCONTINUED | OUTPATIENT
Start: 2023-03-23 | End: 2023-03-24 | Stop reason: HOSPADM

## 2023-03-23 RX ORDER — ISOSORBIDE MONONITRATE 30 MG/1
30 TABLET, EXTENDED RELEASE ORAL DAILY
Status: DISCONTINUED | OUTPATIENT
Start: 2023-03-24 | End: 2023-03-24 | Stop reason: HOSPADM

## 2023-03-23 RX ORDER — INSULIN LISPRO 100 [IU]/ML
0-16 INJECTION, SOLUTION INTRAVENOUS; SUBCUTANEOUS
Status: DISCONTINUED | OUTPATIENT
Start: 2023-03-23 | End: 2023-03-24 | Stop reason: HOSPADM

## 2023-03-23 RX ORDER — SODIUM CHLORIDE 9 MG/ML
INJECTION, SOLUTION INTRAVENOUS PRN
Status: DISCONTINUED | OUTPATIENT
Start: 2023-03-23 | End: 2023-03-24 | Stop reason: HOSPADM

## 2023-03-23 RX ORDER — ERGOCALCIFEROL 1.25 MG/1
50000 CAPSULE ORAL WEEKLY
Status: DISCONTINUED | OUTPATIENT
Start: 2023-03-23 | End: 2023-03-24 | Stop reason: HOSPADM

## 2023-03-23 RX ORDER — INSULIN LISPRO 100 [IU]/ML
10 INJECTION, SOLUTION INTRAVENOUS; SUBCUTANEOUS
Status: DISCONTINUED | OUTPATIENT
Start: 2023-03-24 | End: 2023-03-24 | Stop reason: HOSPADM

## 2023-03-23 RX ORDER — INSULIN LISPRO 100 [IU]/ML
8 INJECTION, SOLUTION INTRAVENOUS; SUBCUTANEOUS
Status: DISCONTINUED | OUTPATIENT
Start: 2023-03-23 | End: 2023-03-23

## 2023-03-23 RX ORDER — HEPARIN SODIUM (PORCINE) LOCK FLUSH IV SOLN 100 UNIT/ML 100 UNIT/ML
1 SOLUTION INTRAVENOUS PRN
Status: DISCONTINUED | OUTPATIENT
Start: 2023-03-23 | End: 2023-03-24 | Stop reason: HOSPADM

## 2023-03-23 RX ORDER — INSULIN GLARGINE-YFGN 100 [IU]/ML
30 INJECTION, SOLUTION SUBCUTANEOUS 2 TIMES DAILY
Status: DISCONTINUED | OUTPATIENT
Start: 2023-03-23 | End: 2023-03-24

## 2023-03-23 RX ORDER — INSULIN LISPRO 100 [IU]/ML
10 INJECTION, SOLUTION INTRAVENOUS; SUBCUTANEOUS ONCE
Status: COMPLETED | OUTPATIENT
Start: 2023-03-23 | End: 2023-03-23

## 2023-03-23 RX ORDER — INSULIN LISPRO 100 [IU]/ML
0-4 INJECTION, SOLUTION INTRAVENOUS; SUBCUTANEOUS NIGHTLY
Status: DISCONTINUED | OUTPATIENT
Start: 2023-03-23 | End: 2023-03-24 | Stop reason: HOSPADM

## 2023-03-23 RX ADMIN — CEFEPIME 2000 MG: 2 INJECTION, POWDER, FOR SOLUTION INTRAVENOUS at 02:03

## 2023-03-23 RX ADMIN — FOLIC ACID 1 MG: 1 TABLET ORAL at 09:16

## 2023-03-23 RX ADMIN — BICTEGRAVIR SODIUM, EMTRICITABINE, AND TENOFOVIR ALAFENAMIDE FUMARATE 1 TABLET: 50; 200; 25 TABLET ORAL at 09:11

## 2023-03-23 RX ADMIN — Medication 1 TABLET: at 09:12

## 2023-03-23 RX ADMIN — OXYCODONE HYDROCHLORIDE 10 MG: 10 TABLET ORAL at 17:52

## 2023-03-23 RX ADMIN — Medication 1 CAPSULE: at 15:00

## 2023-03-23 RX ADMIN — Medication 5 MG: at 21:19

## 2023-03-23 RX ADMIN — Medication 1 CAPSULE: at 21:19

## 2023-03-23 RX ADMIN — BUDESONIDE 250 MCG: 0.25 SUSPENSION RESPIRATORY (INHALATION) at 08:36

## 2023-03-23 RX ADMIN — INSULIN LISPRO 4 UNITS: 100 INJECTION, SOLUTION INTRAVENOUS; SUBCUTANEOUS at 21:31

## 2023-03-23 RX ADMIN — INSULIN LISPRO 10 UNITS: 100 INJECTION, SOLUTION INTRAVENOUS; SUBCUTANEOUS at 06:01

## 2023-03-23 RX ADMIN — WARFARIN SODIUM 7.5 MG: 7.5 TABLET ORAL at 17:53

## 2023-03-23 RX ADMIN — FLUOXETINE 20 MG: 20 CAPSULE ORAL at 21:19

## 2023-03-23 RX ADMIN — ARFORMOTEROL TARTRATE 15 MCG: 15 SOLUTION RESPIRATORY (INHALATION) at 08:35

## 2023-03-23 RX ADMIN — ATORVASTATIN CALCIUM 80 MG: 40 TABLET, FILM COATED ORAL at 09:16

## 2023-03-23 RX ADMIN — PANTOPRAZOLE SODIUM 40 MG: 40 TABLET, DELAYED RELEASE ORAL at 09:16

## 2023-03-23 RX ADMIN — INSULIN LISPRO 8 UNITS: 100 INJECTION, SOLUTION INTRAVENOUS; SUBCUTANEOUS at 12:26

## 2023-03-23 RX ADMIN — OXYCODONE HYDROCHLORIDE 10 MG: 10 TABLET ORAL at 13:20

## 2023-03-23 RX ADMIN — Medication 2000 UNITS: at 10:32

## 2023-03-23 RX ADMIN — INSULIN LISPRO 2 UNITS: 100 INJECTION, SOLUTION INTRAVENOUS; SUBCUTANEOUS at 09:20

## 2023-03-23 RX ADMIN — POLYETHYLENE GLYCOL 3350 17 G: 17 POWDER, FOR SOLUTION ORAL at 21:19

## 2023-03-23 RX ADMIN — INSULIN GLARGINE-YFGN 30 UNITS: 100 INJECTION, SOLUTION SUBCUTANEOUS at 21:31

## 2023-03-23 RX ADMIN — OXYCODONE HYDROCHLORIDE 10 MG: 10 TABLET ORAL at 01:40

## 2023-03-23 RX ADMIN — ARFORMOTEROL TARTRATE 15 MCG: 15 SOLUTION RESPIRATORY (INHALATION) at 18:51

## 2023-03-23 RX ADMIN — ISOSORBIDE MONONITRATE 30 MG: 30 TABLET, EXTENDED RELEASE ORAL at 09:12

## 2023-03-23 RX ADMIN — OLANZAPINE 12.5 MG: 10 TABLET, FILM COATED ORAL at 21:27

## 2023-03-23 RX ADMIN — BUDESONIDE 250 MCG: 0.25 SUSPENSION RESPIRATORY (INHALATION) at 18:53

## 2023-03-23 RX ADMIN — Medication 100 MG: at 09:15

## 2023-03-23 RX ADMIN — HYDROMORPHONE HYDROCHLORIDE 0.5 MG: 1 INJECTION, SOLUTION INTRAMUSCULAR; INTRAVENOUS; SUBCUTANEOUS at 02:42

## 2023-03-23 RX ADMIN — INSULIN LISPRO 16 UNITS: 100 INJECTION, SOLUTION INTRAVENOUS; SUBCUTANEOUS at 17:53

## 2023-03-23 RX ADMIN — INSULIN GLARGINE-YFGN 30 UNITS: 100 INJECTION, SOLUTION SUBCUTANEOUS at 09:19

## 2023-03-23 RX ADMIN — INSULIN LISPRO 4 UNITS: 100 INJECTION, SOLUTION INTRAVENOUS; SUBCUTANEOUS at 12:27

## 2023-03-23 RX ADMIN — OXYCODONE HYDROCHLORIDE 10 MG: 10 TABLET ORAL at 09:16

## 2023-03-23 RX ADMIN — DOCUSATE SODIUM 50MG AND SENNOSIDES 8.6MG 2 TABLET: 8.6; 5 TABLET, FILM COATED ORAL at 17:52

## 2023-03-23 RX ADMIN — ERGOCALCIFEROL 50000 UNITS: 1.25 CAPSULE ORAL at 10:32

## 2023-03-23 RX ADMIN — Medication 1 CAPSULE: at 09:15

## 2023-03-23 ASSESSMENT — PAIN SCALES - GENERAL
PAINLEVEL_OUTOF10: 0
PAINLEVEL_OUTOF10: 0
PAINLEVEL_OUTOF10: 10
PAINLEVEL_OUTOF10: 0
PAINLEVEL_OUTOF10: 0

## 2023-03-23 ASSESSMENT — PAIN DESCRIPTION - FREQUENCY: FREQUENCY: CONTINUOUS

## 2023-03-23 ASSESSMENT — PAIN DESCRIPTION - LOCATION
LOCATION: OTHER (COMMENT)

## 2023-03-23 ASSESSMENT — PAIN DESCRIPTION - DESCRIPTORS
DESCRIPTORS: ACHING
DESCRIPTORS: ACHING;STABBING
DESCRIPTORS: ACHING;STABBING
DESCRIPTORS: STABBING;ACHING

## 2023-03-23 ASSESSMENT — PAIN DESCRIPTION - ORIENTATION: ORIENTATION: OTHER (COMMENT)

## 2023-03-23 ASSESSMENT — PAIN SCALES - WONG BAKER
WONGBAKER_NUMERICALRESPONSE: 0

## 2023-03-23 ASSESSMENT — PAIN DESCRIPTION - ONSET: ONSET: ON-GOING

## 2023-03-23 NOTE — PROGRESS NOTES
mg New Bag 03/21/23 1206          Assessment:  Patient is a 51 y.o. male who has been initiated on vancomycin  Estimated Creatinine Clearance: 136 mL/min (based on SCr of 0.7 mg/dL).   To dose vancomycin, pharmacy will be utilizing i[employer] calculation software for goal AUC/CHARMAINE 400-600 mg/L-hr  Random level 9.7 (8 hr post dose)    Plan:  Vancomycin 1750 mg IV every 12 hours (Predicted AUC/CHARMAINE = 492, Tr = 12.2)  Will check vancomycin levels when appropriate  Will continue to monitor renal function   Pharmacy to follow      Agnes Sampson [facility] 3/23/2023 8:59 AM

## 2023-03-23 NOTE — PROGRESS NOTES
Midline attempted in right arm. Accessed vein four times with good blood return, but when placing guidewire, guidewire would not advance more than a few inches. Veins scarred and tortuous. Placed a 20guage diffusics iv in right upper arm instead. Iv flushes easily with brisk blood return.

## 2023-03-23 NOTE — CARE COORDINATION
I met with patient in room to obtain Jolene choice. He said he reviewed the list of facilities but said he does not have a preference. He said he would just like a facility that accepts his insurance. Referral made to [facility] at [facility] to see if they could accept him at one of their facilities. Per ID note today, Continue cefepime at 2g q8h and vancomycin dosed according to level per Pharmacy pending abscess fluid culture results. Follow up abscess fluid cultures. Follow up HIV viral load with GART, CD4 count, Toxoplasma IgG, urine GC/Chlamydia PCR. Follow up hepatitis panel, hep B viral load, hep B e Ag and eAb, hep A total Ab. Stop Biktarvy. Hold ART for now until able to consistently obtain this medication (per health insurance coverage requirements) and follow-up for HIV and hep B. Script for [employer] provided by ID. [facility] at will check to see if the patient can receive this medication at the facility as well. ID said if his [employer] is not covered, patient can follow up outpatient at the [address] [place] [address] made aware. Await acceptance and response about the medication. PT/OT notes are in from yesterday. A 7000 will need to be started for the accepting facility and then completed once the discharge order goes in.    Tanesha Sorenson RN CM  [phone]

## 2023-03-23 NOTE — PROGRESS NOTES
Occupational Therapy  OT BEDSIDE TREATMENT NOTE   [address]  [address], [place]       Date:3/23/2023  Patient Name: Karen Tabor  MRN: [id number]  : 1975  Room: [address] [place] [address]A     Per OT Eval:    Evaluating OT: TAHMINA Ho OTR/L; [id number]       Referring Provider: Andriy Friedman DO    Specific Provider Orders/Date: OT Eval and Treat 3/21/23       Diagnosis: Left Thigh abscess     Surgery:  3/21/23 LEFT POSTERIOR THIGH ABSCESS INCISION AND DRAINAGE    Pertinent Medical History:  has a past medical history of Asthma, COPD (chronic obstructive pulmonary disease) ([facility] Deep vein thrombosis (DVT) ([facility] [address] [facility] ETOH abuse, Factor V Leiden ([facility] [address] Protein S deficiency ([facility] [address] [facility] and Pulmonary emboli ([facility] [address] [facility]        Reason for admission: L posterior thigh pain/abscess     Recommended Adaptive Equipment: TBD pending progression/discharge plan - BSC, extended tub bench, AE for LE bathing and dressing PRN      Precautions:  Fall Risk, Bed Alarm, WBAT LLE, Impulsive, HIV, HEP B      Assessment of current deficits:    [x] Functional mobility            [x]ADLs           [x] Strength                  [x]Cognition    [x] Functional transfers          [x] IADLs         [x] Safety Awareness   [x]Endurance    [x] Fine Coordination                         [x] Balance      [] Vision/perception   []Sensation      []Gross Motor Coordination             [] ROM           [] Delirium                   [] Motor Control      OT PLAN OF CARE   OT POC based on physician orders, patient diagnosis and results of clinical assessment     Frequency/Duration: 1-3 days/wk for 2 weeks PRN   Specific OT Treatment Interventions to include:   * Instruction/training on adapted ADL techniques and AE recommendations to increase functional independence within precautions       * Training on energy conservation strategies, correct breathing pattern and techniques to improve independence/tolerance for self-care routine  * Functional transfer/mobility training/DME recommendations for increased independence, safety, and fall prevention  * Patient/Family education to increase follow through with safety techniques and functional independence  * Recommendation of environmental modifications for increased safety with functional transfers/mobility and ADLs  * Therapeutic exercise to improve motor endurance, ROM, and functional strength for ADLs/functional transfers  * Therapeutic activities to facilitate/challenge dynamic balance, stand tolerance for increased safety and independence with ADLs     Home Living: Pt lives alone in 16th floor apartment with elevator broken to access unit and level entry into building. Laundry on basement floor.    Bathroom setup: walkin shower with shower chair   Equipment owned: shower chair, ww, w/c     Prior Level of Function: IND with ADLs   Min A <> Mod A with IADLs  Ambulation squat pivot to w/c with assist from neighbor recently d/t pain  Driving: no  Occupation: none stated     Pain Level: 8/10 L thigh pain, pain meds on board & nsg aware, pt off loads onto R hip with improvement, pillow underneath L hip & between legs with good results     Cognition: A&O: 4/4 pleasant & cooperative, motivated   Follows single step directions -fair+              Memory:  good              Sequencing:  fair               Problem solving:  fair              Judgement/safety:  fair                Functional Assessment:  AM-PAC Daily Activity Raw Score: 14/24    Initial Eval Status  Date: 3/22/23 Treatment Status  Date:  3/23/23 STGs = LTGs  Time frame: 10-14 days   Feeding Set Up   Mod Indep  Semi-upright in bed, laying towards R eating food, unable to sit upright due to L thigh wound  Independent    Grooming Stand by Assist   Simulated functional reach using RUE  SBA  Seated at EOB to wash off face  Independent    UB Dressing Minimal Assist   Gopi gown lying semi supine  Min

## 2023-03-23 NOTE — PROGRESS NOTES
Spoke with RN regarding patient's ordered. Ordering provider will need to order new INR, keep patient NPO after midnight, and hold all thinners for procedure. Patient is able to sign consent. INR to be reviewed when resulted and IR physician to assess for regular PICC vs tunneled if unable to access vein in upper extremities due to scarring reported by IV team and bedside RN.

## 2023-03-23 NOTE — PROGRESS NOTES
mixed HLD/Hypertriglyceridemia  increased statin    Vitamin D deficiency  Start replacement    Physical deconditioning  PT/OT    DVT ppx:  resumed coaumdin  Code status: Full code    Plan discharge ? SNF pend cx and ID recommendations on discharge abx    Plan of care discussed with: patient and bedside RN, DAVIDE    Patient Active Problem List   Diagnosis    DVT, lower extremity, recurrent, right (HCC)    Chest pain    Acute pulmonary embolism with acute cor pulmonale (HCC)    Gastroenteritis    Hypercoagulable state ([address]    Acute deep vein thrombosis (DVT) of brachial vein of right upper extremity (HCC)    Medically noncompliant    Acute CVA (cerebrovascular accident) ([address]    Subtherapeutic anticoagulation    Subtherapeutic international normalized ratio (INR)    Popliteal DVT (deep venous thrombosis) ([address]    Unspecified fracture of right lower leg, subsequent encounter for closed fracture with routine healing    Type 2 diabetes mellitus ([address]    Rectal bleeding    Protein S deficiency ([address]    Presence of IVC filter    Hypertensive disorder    Hypercholesterolemia    HLD (hyperlipidemia)    Hemiparesis of left nondominant side (HCC)    Factor 5 Leiden mutation, heterozygous ([address]    Chronic obstructive pulmonary disease with (acute) exacerbation (HCC)    Chronic pain due to trauma    Chronic hepatitis B without hepatic coma (HCC)    Bipolar disorder ([address]    Benign neoplasm of heart    Atherosclerotic heart disease of native coronary artery without angina pectoris    Ankle pain    Substance use disorder    Polysubstance abuse ([address]    Alcohol withdrawal syndrome without complication ([address]    Opioid withdrawal ([address]    Leg wound, left, initial encounter    Left Thigh abscess    Drug-seeking behavior    Hypertriglyceridemia    Vitamin D deficiency        Medications:  Reviewed    Infusion Medications    sodium chloride      dextrose       Scheduled Medications    insulin glargine  30 Units SubCUTAneous BID    insulin lispro  8 Units SubCUTAneous TID     vitamin D  50,000 Units Oral Weekly    vitamin D  2,000 Units Oral Daily    vancomycin  1,750 mg IntraVENous Q12H    lidocaine  5 mL IntraDERmal Once    sodium chloride flush  5-40 mL IntraVENous 2 times per day    heparin flush  1 mL IntraVENous 2 times per day    lactobacillus  1 capsule Oral TID    polyethylene glycol  17 g Oral BID    sennosides-docusate sodium  2 tablet Oral QPM    melatonin  5 mg Oral Nightly    sodium chloride flush  5-40 mL IntraVENous 2 times per day    thiamine  100 mg Oral Daily    multivitamin  1 tablet Oral Daily    nicotine  1 patch TransDERmal Daily    warfarin  7.5 mg Oral Daily    atorvastatin  80 mg Oral Daily    cefepime  2,000 mg IntraVENous Q8H    OLANZapine  12.5 mg Oral Nightly    And    FLUoxetine  20 mg Oral Nightly    insulin lispro  0-4 Units SubCUTAneous TID     insulin lispro  0-4 Units SubCUTAneous Nightly    isosorbide mononitrate  30 mg Oral Daily    pantoprazole  40 mg Oral Daily    sodium chloride flush  10 mL IntraVENous 2 times per day    folic acid  1 mg Oral Daily    budesonide  250 mcg Nebulization BID    arformoterol tartrate  15 mcg Nebulization BID     PRN Meds: sodium chloride flush, sodium chloride, heparin flush, acetaminophen, sennosides-docusate sodium, melatonin, sodium chloride flush, LORazepam **OR** LORazepam **OR** LORazepam **OR** LORazepam **OR** LORazepam **OR** LORazepam **OR** LORazepam **OR** LORazepam, glucose, dextrose bolus **OR** dextrose bolus, glucagon (rDNA), dextrose, sodium chloride flush, oxyCODONE **OR** oxyCODONE, albuterol    I/O    Intake/Output Summary (Last 24 hours) at 3/23/2023 1334  Last data filed at 3/23/2023 0755  Gross per 24 hour   Intake --   Output 1650 ml   Net -1650 ml       Labs:   Recent Labs     03/21/23  1541 03/22/23  0755 03/23/23  0509   WBC 12.9* 9.9 6.0   HGB 17.3* 14.7 12.8   HCT 49.6 44.3 39.1    200 169       Recent Labs     03/21/23  0910 03/22/23  0755

## 2023-03-23 NOTE — PROGRESS NOTES
Physical Therapy  Physical Therapy treatment note    Name: Jo-Ann Becerril  : 1975  MRN: [id number]      Date of Service: 3/23/2023    Evaluating PT:  Maci Randhawa PT, DPT, [id number]    Room #:  7479/9100-Z  Diagnosis:  Hyperkalemia [E87.5]  Abscess [L02.91]  Hyponatremia [E87.1]  History of hypercoagulable state [Z86.2]  Subtherapeutic international normalized ratio (INR) [R79.1]  Infective myositis of left lower extremity [M60.004]  History of HIV infection ([address] [B20]  Leg wound, left, initial encounter [S81.802A]  Uncontrolled type 2 diabetes mellitus with hyperglycemia ([address] [E11.65]  PMHx/PSHx:    Past Medical History:   Diagnosis Date    Asthma     COPD (chronic obstructive pulmonary disease) ([address]     Deep vein thrombosis (DVT) ([facility])     ETOH abuse     Factor V Leiden ([address]     Protein S deficiency ([address]     Pulmonary emboli ([address]       Past Surgical History:   Procedure Laterality Date    APPENDECTOMY      IR MECHANICAL ART THROMBECTOMY INTRACRANIAL  2022    IR MECHANICAL ART THROMBECTOMY INTRACRANIAL 2022 Marcela Alanis MD [facility] PROCED[facility]    SKIN BIOPSY Left 3/21/2023    LEFT POSTERIOR THIGH ABSCESS INCISION AND DRAINAGE performed by Dee Hernandez MD at [address] [place] [address]      2021      Procedure/Surgery:  LEFT POSTERIOR THIGH ABSCESS INCISION AND DRAINAGE 3/21/23  Precautions:  Fall Risk, WBAT LLE, Bed Alarm, Impulsive, HIV, Hep B  Equipment Needs:  TBD    SUBJECTIVE:    Pt is questionable historian. Pt lives alone in a 16th floor apartment with 16 flights of stairs to enter and 1 rail. Pt is scooting up the stairs, reports multiple falls on stairs. Elevator is broken, pt reports that it is being fixed within 30 days. Pt ambulated with [employer] for short distances within home and w/c PTA. Pt reports transferring from floor to w/c.     OBJECTIVE:   Initial Evaluation  Date: 3/22/23 Treatment  Date: 3/23/23 Short Term/ Long Term   Goals   AM-PAC 6 Clicks  37/05 Was pt agreeable to Eval/treatment? yes Yes     Does pt have pain? No c/o pain 8/10 L thigh pain   Improvement with positioning post session    Bed Mobility  Rolling: NT  Supine to sit: ModA  Sit to supine: ModA  Scooting: ModA Rolling: NT  Supine to sit: Prieto  Sit to supine: Prieto  Scooting: Prieto Rolling:Independent   Supine to sit: Independent   Sit to supine: Independent   Scooting: Independent    Transfers Sit to stand: MaxA with bed rasied  Stand to sit: MaxA  Stand pivot: NT Sit to stand: ModA x2 with bed raised   (Second person for safety)  Stand to sit: [facility] x2 with bed rasied   (Second person for safety)  Stand pivot: NT Sit to stand: ModA  Stand to sit: ModA  Stand pivot: TBD   Ambulation   NT d/t safety  Side steps toward [facility] with ModA x2 with assistance [employer]  (Second person or safety)  TBD   Stair negotiation: ascended and descended NT NT TBD   ROM BUE:  Refer to OT note  RLE:  WFL  LLE: PROM WFL     Strength BUE:  Refer to OT note  RLE:  5/5  LLE 1/5  Improve by 1/3 MMT   Balance Sitting EOB:  SBA  Dynamic Standing:  NT Sitting EOB:  SBA  Dynamic Standing:  ModA at [employer] Sitting EOB:  Independent   Dynamic Standing:  TBD   Pt is A & O x 4. Pt able to follow 1-2-step commands with increased cueing. Sensation:  Pt denies numbness and tingling to extremities  Edema:  none noted. Vitals:   Pt asymptomatic during session. Therapeutic Exercises:  none this session. Patient education  Pt educated on supine therapeutic exercise to complete within tolerance -ankle pumps, heel slide, glute sets. Patient response to education:   Pt verbalized understanding Pt demonstrated skill Pt requires further education in this area   Yes  Yes ,cues  Reinforce      ASSESSMENT:    Comments:  Pt supine in bed and agreeable to PT treatment. Pt completed all mobility with the assistance listed above. Pt impulsive at times, requires frequent cues for redirection and safety.  Pt completed STS x2 from elevated bed with assistance and cues for hand placement. Pt able to take side steps this session with assistance at WW for navigation. Pt skillfully positioned in supine with pillows under L thigh for comfort and pain relief, HOB elevated to facilitate oxygenation and comfort. Pt left supine with all needs in place prior to PT exit. Patient would benefit from continued skilled PT to maximize functional mobility independence. Bed alarm activated at end of session.       Treatment:  Patient practiced and was instructed in the following treatment:    Bed Mobility: Verbal cues for proper positioning and sequencing to perform bed mobility to facilitate maximum independence.   Transfers: Verbal cues for proper positioning and sequencing to perform transfers safely with maximum independence. STS x2 from elevated EOB.   Gait Training: Verbal cues for proper positioning and sequencing using assistive device to maximize functional mobility independence. Side steps toward HOB, cues for WW and safety.   Skillful positioning in bed to protect skin and joint integrity.   Pt education for therapeutic exercise to complete in supine as tolerated - B ankle pumps, heel slides, and glute sets.   Vitals and symptoms monitored during session.    PLAN:    Patient is making good progress towards established goals.  Will continue with current POC.      Time in  1415  Time out  1440    Total Treatment Time  25 minutes     CPT codes:  [] Gait training 38737 0 minutes  [] Manual therapy 67071 0 minutes  [x] Therapeutic activities 25521 25 minutes  [] Therapeutic exercises 25027 0 minutes  [] Neuromuscular reeducation 26463 0 minutes    Collette Parker PT, DPT  [id number]

## 2023-03-23 NOTE — PROGRESS NOTES
medical records/notes were personally reviewed. Assessment:  Left posterior thigh abscess, s/p I&D on 03/21  HIV  Chronic hepatitis B (HBsAg, cAb, eAb positive as of 01/14/2023)  History of syphilis  Alcohol abuse  Substance abuse    Recommendations:  Continue cefepime at 2g q8h pending abscess fluid culture results. Stop vancomycin. Follow up abscess fluid cultures. Follow up HIV viral load with GART, CD4 count, Toxoplasma IgG, urine GC/Chlamydia PCR. Follow up hepatitis panel, hep B viral load, hep B e Ag and eAb, hep A total Ab. Stop Biktarvy. Hold ART for now until able to consistently obtain this medication (per health insurance coverage requirements) and follow-up for HIV and hep B. Thank you for involving me in the care of Jaquan Henderson. I will continue to follow. Please do not hesitate to call for any questions or concerns.        Electronically signed by Rivka Hunter MD on 3/23/2023 at 10:08 AM

## 2023-03-24 VITALS
SYSTOLIC BLOOD PRESSURE: 122 MMHG | BODY MASS INDEX: 28.79 KG/M2 | RESPIRATION RATE: 18 BRPM | DIASTOLIC BLOOD PRESSURE: 92 MMHG | TEMPERATURE: 97.5 F | HEIGHT: 68 IN | HEART RATE: 87 BPM | WEIGHT: 190 LBS | OXYGEN SATURATION: 91 %

## 2023-03-24 PROBLEM — F69 BEHAVIOR PROBLEM, ADULT: Status: ACTIVE | Noted: 2023-03-24

## 2023-03-24 LAB
BACTERIA SPEC AEROBE CULT: ABNORMAL
BACTERIA SPEC ANAEROBE CULT: NORMAL
CD3 CELLS # BLD: 636 CELLS/UL (ref 570–2400)
CD3 CELLS # BLD: 798 CELLS/UL (ref 570–2400)
CD3+CD4+ CELLS # BLD: 364 CELLS/UL (ref 430–1800)
CD3+CD4+ CELLS # BLD: 432 CELLS/UL (ref 430–1800)
CD3+CD4+ CELLS/CD3+CD8+ CLL BLD: 1.21 RATIO (ref 0.8–3.9)
CD3+CD4+ CELLS/CD3+CD8+ CLL BLD: 1.37 RATIO (ref 0.8–3.9)
CD3+CD8+ CELLS # BLD: 265 CELLS/UL (ref 210–1200)
CD3+CD8+ CELLS # BLD: 357 CELLS/UL (ref 210–1200)
INR BLD: 1.5
LYMPH SUBSET INFORMATION: ABNORMAL
LYMPH SUBSET INFORMATION: NORMAL
METER GLUCOSE: 328 MG/DL (ref 74–99)
ORGANISM: ABNORMAL
PROTHROMBIN TIME: 16.1 SEC (ref 9.3–12.4)

## 2023-03-24 PROCEDURE — 6370000000 HC RX 637 (ALT 250 FOR IP): Performed by: HOSPITALIST

## 2023-03-24 PROCEDURE — 6370000000 HC RX 637 (ALT 250 FOR IP): Performed by: STUDENT IN AN ORGANIZED HEALTH CARE EDUCATION/TRAINING PROGRAM

## 2023-03-24 PROCEDURE — 82962 GLUCOSE BLOOD TEST: CPT

## 2023-03-24 PROCEDURE — 36415 COLL VENOUS BLD VENIPUNCTURE: CPT

## 2023-03-24 PROCEDURE — 6370000000 HC RX 637 (ALT 250 FOR IP): Performed by: INTERNAL MEDICINE

## 2023-03-24 PROCEDURE — 6360000002 HC RX W HCPCS: Performed by: INTERNAL MEDICINE

## 2023-03-24 PROCEDURE — 85610 PROTHROMBIN TIME: CPT

## 2023-03-24 PROCEDURE — 6360000002 HC RX W HCPCS: Performed by: FAMILY MEDICINE

## 2023-03-24 PROCEDURE — 6360000002 HC RX W HCPCS: Performed by: HOSPITALIST

## 2023-03-24 PROCEDURE — S5553 INSULIN LONG ACTING 5 U: HCPCS | Performed by: INTERNAL MEDICINE

## 2023-03-24 PROCEDURE — 2580000003 HC RX 258: Performed by: HOSPITALIST

## 2023-03-24 PROCEDURE — 94640 AIRWAY INHALATION TREATMENT: CPT

## 2023-03-24 PROCEDURE — 2580000003 HC RX 258: Performed by: INTERNAL MEDICINE

## 2023-03-24 RX ORDER — INSULIN GLARGINE-YFGN 100 [IU]/ML
35 INJECTION, SOLUTION SUBCUTANEOUS 2 TIMES DAILY
Qty: 1 EACH | Refills: 0
Start: 2023-03-24

## 2023-03-24 RX ORDER — CIPROFLOXACIN 500 MG/1
500 TABLET, FILM COATED ORAL 2 TIMES DAILY
Qty: 20 TABLET | Refills: 0 | Status: SHIPPED | OUTPATIENT
Start: 2023-03-24 | End: 2023-04-03

## 2023-03-24 RX ORDER — OXYCODONE HYDROCHLORIDE 5 MG/1
5 TABLET ORAL EVERY 4 HOURS PRN
Qty: 5 TABLET | Refills: 0 | Status: SHIPPED | OUTPATIENT
Start: 2023-03-24 | End: 2023-03-27

## 2023-03-24 RX ORDER — LACTOBACILLUS RHAMNOSUS GG 10B CELL
1 CAPSULE ORAL 3 TIMES DAILY
Qty: 1 CAPSULE | Refills: 0
Start: 2023-03-24

## 2023-03-24 RX ORDER — CIPROFLOXACIN 500 MG/1
500 TABLET, FILM COATED ORAL EVERY 12 HOURS SCHEDULED
Status: DISCONTINUED | OUTPATIENT
Start: 2023-03-24 | End: 2023-03-24 | Stop reason: HOSPADM

## 2023-03-24 RX ORDER — WARFARIN SODIUM 5 MG/1
5 TABLET ORAL DAILY
Status: DISCONTINUED | OUTPATIENT
Start: 2023-03-24 | End: 2023-03-24 | Stop reason: HOSPADM

## 2023-03-24 RX ORDER — INSULIN GLARGINE-YFGN 100 [IU]/ML
35 INJECTION, SOLUTION SUBCUTANEOUS 2 TIMES DAILY
Status: DISCONTINUED | OUTPATIENT
Start: 2023-03-24 | End: 2023-03-24 | Stop reason: HOSPADM

## 2023-03-24 RX ORDER — ERGOCALCIFEROL 1.25 MG/1
50000 CAPSULE ORAL WEEKLY
Qty: 12 CAPSULE | Refills: 0
Start: 2023-03-30

## 2023-03-24 RX ORDER — CHOLECALCIFEROL (VITAMIN D3) 50 MCG
2000 TABLET ORAL DAILY
Qty: 30 TABLET | Refills: 0
Start: 2023-03-25

## 2023-03-24 RX ORDER — ATORVASTATIN CALCIUM 80 MG/1
80 TABLET, FILM COATED ORAL DAILY
Qty: 30 TABLET | Refills: 3
Start: 2023-03-25

## 2023-03-24 RX ADMIN — BUDESONIDE 250 MCG: 0.25 SUSPENSION RESPIRATORY (INHALATION) at 08:06

## 2023-03-24 RX ADMIN — Medication 1 TABLET: at 08:24

## 2023-03-24 RX ADMIN — CEFEPIME 2000 MG: 2 INJECTION, POWDER, FOR SOLUTION INTRAVENOUS at 01:34

## 2023-03-24 RX ADMIN — Medication 2000 UNITS: at 08:24

## 2023-03-24 RX ADMIN — ARFORMOTEROL TARTRATE 15 MCG: 15 SOLUTION RESPIRATORY (INHALATION) at 08:05

## 2023-03-24 RX ADMIN — FOLIC ACID 1 MG: 1 TABLET ORAL at 08:24

## 2023-03-24 RX ADMIN — Medication 1 CAPSULE: at 08:25

## 2023-03-24 RX ADMIN — INSULIN GLARGINE-YFGN 30 UNITS: 100 INJECTION, SOLUTION SUBCUTANEOUS at 08:29

## 2023-03-24 RX ADMIN — OXYCODONE HYDROCHLORIDE 10 MG: 10 TABLET ORAL at 08:23

## 2023-03-24 RX ADMIN — INSULIN LISPRO 10 UNITS: 100 INJECTION, SOLUTION INTRAVENOUS; SUBCUTANEOUS at 08:29

## 2023-03-24 RX ADMIN — Medication 100 MG: at 08:24

## 2023-03-24 RX ADMIN — HYDROMORPHONE HYDROCHLORIDE 1 MG: 1 INJECTION, SOLUTION INTRAMUSCULAR; INTRAVENOUS; SUBCUTANEOUS at 01:30

## 2023-03-24 RX ADMIN — ISOSORBIDE MONONITRATE 30 MG: 30 TABLET, EXTENDED RELEASE ORAL at 08:24

## 2023-03-24 RX ADMIN — SODIUM CHLORIDE, PRESERVATIVE FREE 10 ML: 5 INJECTION INTRAVENOUS at 08:25

## 2023-03-24 RX ADMIN — ATORVASTATIN CALCIUM 80 MG: 40 TABLET, FILM COATED ORAL at 08:24

## 2023-03-24 RX ADMIN — PANTOPRAZOLE SODIUM 40 MG: 40 TABLET, DELAYED RELEASE ORAL at 08:25

## 2023-03-24 RX ADMIN — HYDROMORPHONE HYDROCHLORIDE 1 MG: 1 INJECTION, SOLUTION INTRAMUSCULAR; INTRAVENOUS; SUBCUTANEOUS at 05:35

## 2023-03-24 ASSESSMENT — PAIN SCALES - GENERAL
PAINLEVEL_OUTOF10: 7
PAINLEVEL_OUTOF10: 10
PAINLEVEL_OUTOF10: 7
PAINLEVEL_OUTOF10: 8
PAINLEVEL_OUTOF10: 8

## 2023-03-24 ASSESSMENT — PAIN DESCRIPTION - LOCATION
LOCATION: HIP
LOCATION: LEG;NECK
LOCATION: LEG

## 2023-03-24 ASSESSMENT — PAIN DESCRIPTION - DESCRIPTORS
DESCRIPTORS: DISCOMFORT;ACHING
DESCRIPTORS: ACHING;DISCOMFORT;THROBBING
DESCRIPTORS: ACHING;BURNING

## 2023-03-24 ASSESSMENT — PAIN DESCRIPTION - ORIENTATION
ORIENTATION: LEFT
ORIENTATION: LEFT

## 2023-03-24 ASSESSMENT — PAIN DESCRIPTION - ONSET
ONSET: ON-GOING

## 2023-03-24 ASSESSMENT — PAIN - FUNCTIONAL ASSESSMENT: PAIN_FUNCTIONAL_ASSESSMENT: ACTIVITIES ARE NOT PREVENTED

## 2023-03-24 ASSESSMENT — PAIN DESCRIPTION - FREQUENCY: FREQUENCY: CONTINUOUS

## 2023-03-24 ASSESSMENT — PAIN SCALES - WONG BAKER: WONGBAKER_NUMERICALRESPONSE: 0

## 2023-03-24 NOTE — PROGRESS NOTES
SIGN OFF CONSULT    Pharmacy no longer has an active consult to dose and follow vancomycin. Pharmacy signing off this patient. Please re-consult pharmacy if future dosing is needed.     Thank you for the consult,    Silvano John, PharmD 3/24/2023 10:55 AM

## 2023-03-24 NOTE — PROGRESS NOTES
Patient dressed and walking off unit. Unit Manager, RN, LPN approached patient. Patient insisting on leaving AMA. Education provided with no effect, patient insisting on leaving and has already called for a . IV d/c'd from right forearm. Dressing applied. Patient assisted to wheelchair and escorted out by RN and LPN.  picked up patient and belongings. Dr. Anayeli Lyons informed of AMA via Perfect Serve.
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
## 2023-03-24 NOTE — CARE COORDINATION
Per Compa Penaloza from [email or web address], patient has been accepted to [facility] and they will start precert today. They will not be able to provide HIV med Biktarvy so therefore patient will need to follow up outpatient at the [address] [place] [address]. Compa Penalo[facility] said they can transport patient there. Patient made aware and is agreeable. 7000 completed and in envelope in soft chart. Ambulette form in envelope in soft chart will need to be completed and signed by nursing on day of discharge.   Miky Mancilla RN   [phone]

## 2023-03-24 NOTE — PROGRESS NOTES
Perfect Serve message sent to Dr Kristine Jamison. Patient has blood sugar over 300, however remains NPO. Per Dr Kristine Jamison- okay to give ordered insulin except hold insulin coverage per scale while NPO.

## 2023-03-24 NOTE — PLAN OF CARE
Problem: Pain  Goal: Verbalizes/displays adequate comfort level or baseline comfort level  3/24/2023 1008 by Ayse Styles RN  Outcome: Progressing  3/24/2023 0258 by Libby Burk RN  Outcome: Progressing     Problem: Confusion  Goal: Confusion, delirium, dementia, or psychosis is improved or at baseline  Description: INTERVENTIONS:  1. Assess for possible contributors to thought disturbance, including medications, impaired vision or hearing, underlying metabolic abnormalities, dehydration, psychiatric diagnoses, and notify attending LIP  2. [place] high risk fall precautions, as indicated  3. Provide frequent short contacts to provide reality reorientation, refocusing and direction  4. Decrease environmental stimuli, including noise as appropriate  5. Monitor and intervene to maintain adequate nutrition, hydration, elimination, sleep and activity  6. If unable to ensure safety without constant attention obtain sitter and review sitter guidelines with assigned personnel  7.  Initiate Psychosocial CNS and Spiritual Care consult, as indicated  Outcome: Progressing     Problem: Safety - Adult  Goal: Free from fall injury  3/24/2023 1008 by Ayse Styles RN  Outcome: Progressing  3/24/2023 0258 by Libby Burk RN  Outcome: Progressing

## 2023-03-24 NOTE — PROGRESS NOTES
Patient is refusing IV antibiotics and PICC line insertion until he speaks to the doctor. He is angry that his dilaudid was discontinued. Patient also requesting to speak to case management and wants discharged to a facility in [place]. RN and charge nurse notified.

## 2023-03-24 NOTE — PROGRESS NOTES
Spoke with RN regarding patient's ordered PICC. Ordering provider will need to order new INR, keep patient NPO, and hold all thinners for procedure. Patient is able to sign consent.

## 2023-03-25 LAB
HBV DNA SERPL NAA+PROBE-ACNC: ABNORMAL IU/ML
HBV DNA SERPL NAA+PROBE-LOG IU: 3.64 LOG IU/ML
HBV DNA SERPL QL NAA+PROBE: DETECTED

## 2023-03-26 LAB
BACTERIA BLD CULT: NORMAL
BACTERIA BLD CULT: NORMAL
HAV AB SER QL IA: NEGATIVE
HBV E AB SERPL QL IA: POSITIVE
HBV E AG SERPL QL IA: NEGATIVE

## 2023-03-27 LAB
CHLAMYDIA DNA UR QL NAA+PROBE: NEGATIVE
HIV-1 RNA BY PCR, QN: NOT DETECTED
N GONORRHOEA DNA UR QL NAA+PROBE: NEGATIVE
SOURCE: NORMAL
SPECIMEN SOURCE: NORMAL

## 2023-03-28 LAB
COMMENT: NORMAL
REPORT: NORMAL

## 2023-03-29 LAB — TOXOPLASMOSIS IGG AB: NORMAL

## 2023-04-14 NOTE — PROGRESS NOTES
Physician Progress Note      Lucian Jin  [facility] #:                  [id number]  :                       1975  ADMIT DATE:       3/21/2023 7:29 AM  DISCH DATE:        3/24/2023 12:15 PM  RESPONDING  PROVIDER #:        Lauren Marti MD          QUERY TEXT:    Pt admitted with increasing pain and erythema/edema to left thigh with left   posterior thigh abscess. Noted documentation of Infective myositis of left   lower extremity. If possible, please document in progress notes and discharge   summary:    The medical record reflects the following:  Risk Factors: COPD, factor v deficiency, hyperkalemia, HIV, left leg wound,   T2DM, Polysubstance abuse  Clinical Indicators: 3/22 & 3/23 PN- \"Infective myositis of left lower   extremity\" ID consult- CT left thigh showed nonspecific gas and fluid   collection measuring approximately 3.4 x 1.9 cm in the posterior subcutaneous   soft tissue of the proximal thigh. He underwent I&D of left posterior thigh   abscess on  during which large amount of pus was expressed. Abscess   fluid Gram stain and culture showed few polymorphonuclear leukocytes, no   epithelial cells, few gram-negative rods, heavy growth of Gram-negative rods. He received a dose of ceftriaxone and clindamycin on admiss  tissues of the proximal thigh. Infected fluid collection possible based on the   appearance.  Culture of left thigh- Heavy growth Escherichia coli,   Trimethoprim/Sulfamethoxazole >=320 R  Treatment: I&D, consults, monitoring, labs, IV antibiotics    Thank you,  Mateo Helm RN, CRCR  Clinical Documentation Improvement  Options provided:  -- Infective myositis of left lower extremity not clinically significant  -- Infective myositis of left lower extremity is clinically significant  -- Other - I will add my own diagnosis  -- Disagree - Not applicable / Not valid  -- Disagree - Clinically unable to determine / Unknown  -- Refer to Clinical Documentation

## 2023-05-05 NOTE — PLAN OF CARE
Problem: Pain  Goal: Verbalizes/displays adequate comfort level or baseline comfort level  Outcome: Not Progressing     Problem: Skin/Tissue Integrity  Goal: Absence of new skin breakdown  Description: 1. Monitor for areas of redness and/or skin breakdown  2. Assess vascular access sites hourly  3. Every 4-6 hours minimum:  Change oxygen saturation probe site  4. Every 4-6 hours:  If on nasal continuous positive airway pressure, respiratory therapy assess nares and determine need for appliance change or resting period.   Outcome: Progressing     Problem: Safety - Adult  Goal: Free from fall injury  Outcome: Progressing     Problem: Chronic Conditions and Co-morbidities  Goal: Patient's chronic conditions and co-morbidity symptoms are monitored and maintained or improved  Outcome: Progressing     Problem: ABCDS Injury Assessment  Goal: Absence of physical injury  Recent Flowsheet Documentation  Taken 5/12/2022 5247 by Sohail Monique RN  Absence of Physical Injury: Implement safety measures based on patient assessme Rhofade Counseling: Rhofade is a topical medication which can decrease superficial blood flow where applied. Side effects are uncommon and include stinging, redness and allergic reactions.

## 2023-11-04 ENCOUNTER — APPOINTMENT (OUTPATIENT)
Dept: CT IMAGING | Age: 48
DRG: 203 | End: 2023-11-04
Payer: MEDICAID

## 2023-11-04 ENCOUNTER — HOSPITAL ENCOUNTER (INPATIENT)
Age: 48
LOS: 1 days | Discharge: LEFT BEFORE DISCHARGE | DRG: 203 | End: 2023-11-05
Attending: EMERGENCY MEDICINE | Admitting: FAMILY MEDICINE
Payer: MEDICAID

## 2023-11-04 ENCOUNTER — APPOINTMENT (OUTPATIENT)
Dept: GENERAL RADIOLOGY | Age: 48
DRG: 203 | End: 2023-11-04
Payer: MEDICAID

## 2023-11-04 DIAGNOSIS — R07.9 CHEST PAIN, UNSPECIFIED TYPE: ICD-10-CM

## 2023-11-04 DIAGNOSIS — R07.89 OTHER CHEST PAIN: Primary | ICD-10-CM

## 2023-11-04 LAB
ALBUMIN SERPL-MCNC: 3.8 G/DL (ref 3.5–5.2)
ALP SERPL-CCNC: 107 U/L (ref 40–129)
ALT SERPL-CCNC: 15 U/L (ref 5–41)
ANION GAP SERPL CALCULATED.3IONS-SCNC: 8 MMOL/L (ref 9–17)
AST SERPL-CCNC: 19 U/L
BASOPHILS # BLD: <0.03 K/UL (ref 0–0.2)
BASOPHILS NFR BLD: 0 % (ref 0–2)
BILIRUB DIRECT SERPL-MCNC: 0.1 MG/DL
BILIRUB INDIRECT SERPL-MCNC: 0.2 MG/DL (ref 0–1)
BILIRUB SERPL-MCNC: 0.3 MG/DL (ref 0.3–1.2)
BUN SERPL-MCNC: 15 MG/DL (ref 6–20)
BUN/CREAT SERPL: 17 (ref 9–20)
CALCIUM SERPL-MCNC: 9 MG/DL (ref 8.6–10.4)
CHLORIDE SERPL-SCNC: 106 MMOL/L (ref 98–107)
CO2 SERPL-SCNC: 24 MMOL/L (ref 20–31)
CREAT SERPL-MCNC: 0.9 MG/DL (ref 0.7–1.2)
EOSINOPHIL # BLD: 0.05 K/UL (ref 0–0.44)
EOSINOPHILS RELATIVE PERCENT: 1 % (ref 1–4)
ERYTHROCYTE [DISTWIDTH] IN BLOOD BY AUTOMATED COUNT: 13.2 % (ref 11.8–14.4)
ETHANOL PERCENT: <0.01 %
ETHANOLAMINE SERPL-MCNC: <10 MG/DL
GFR SERPL CREATININE-BSD FRML MDRD: >60 ML/MIN/1.73M2
GLUCOSE SERPL-MCNC: 94 MG/DL (ref 70–99)
HCT VFR BLD AUTO: 40.9 % (ref 40.7–50.3)
HGB BLD-MCNC: 14.1 G/DL (ref 13–17)
IMM GRANULOCYTES # BLD AUTO: 0.01 K/UL (ref 0–0.3)
IMM GRANULOCYTES NFR BLD: 0 %
INR PPP: 1.1
LYMPHOCYTES NFR BLD: 1.16 K/UL (ref 1.1–3.7)
LYMPHOCYTES RELATIVE PERCENT: 25 % (ref 24–43)
MCH RBC QN AUTO: 29.6 PG (ref 25.2–33.5)
MCHC RBC AUTO-ENTMCNC: 34.5 G/DL (ref 28.4–34.8)
MCV RBC AUTO: 85.7 FL (ref 82.6–102.9)
MONOCYTES NFR BLD: 0.28 K/UL (ref 0.1–1.2)
MONOCYTES NFR BLD: 6 % (ref 3–12)
MYOGLOBIN SERPL-MCNC: 33 NG/ML (ref 28–72)
MYOGLOBIN SERPL-MCNC: 36 NG/ML (ref 28–72)
NEUTROPHILS NFR BLD: 68 % (ref 36–65)
NEUTS SEG NFR BLD: 3.12 K/UL (ref 1.5–8.1)
NRBC BLD-RTO: 0 PER 100 WBC
PARTIAL THROMBOPLASTIN TIME: 21.4 SEC (ref 23.9–33.8)
PLATELET # BLD AUTO: 151 K/UL (ref 138–453)
PMV BLD AUTO: 9.7 FL (ref 8.1–13.5)
POTASSIUM SERPL-SCNC: 4.2 MMOL/L (ref 3.7–5.3)
PROT SERPL-MCNC: 6.5 G/DL (ref 6.4–8.3)
PROTHROMBIN TIME: 13.7 SEC (ref 11.5–14.2)
RBC # BLD AUTO: 4.77 M/UL (ref 4.21–5.77)
SODIUM SERPL-SCNC: 138 MMOL/L (ref 135–144)
TROPONIN I SERPL HS-MCNC: 6 NG/L (ref 0–22)
TROPONIN I SERPL HS-MCNC: 8 NG/L (ref 0–22)
TROPONIN I SERPL HS-MCNC: 8 NG/L (ref 0–22)
WBC OTHER # BLD: 4.6 K/UL (ref 3.5–11.3)

## 2023-11-04 PROCEDURE — 99285 EMERGENCY DEPT VISIT HI MDM: CPT

## 2023-11-04 PROCEDURE — 96375 TX/PRO/DX INJ NEW DRUG ADDON: CPT

## 2023-11-04 PROCEDURE — 85730 THROMBOPLASTIN TIME PARTIAL: CPT

## 2023-11-04 PROCEDURE — 85025 COMPLETE CBC W/AUTO DIFF WBC: CPT

## 2023-11-04 PROCEDURE — 99222 1ST HOSP IP/OBS MODERATE 55: CPT | Performed by: NURSE PRACTITIONER

## 2023-11-04 PROCEDURE — G0480 DRUG TEST DEF 1-7 CLASSES: HCPCS

## 2023-11-04 PROCEDURE — 80048 BASIC METABOLIC PNL TOTAL CA: CPT

## 2023-11-04 PROCEDURE — 80076 HEPATIC FUNCTION PANEL: CPT

## 2023-11-04 PROCEDURE — 2580000003 HC RX 258: Performed by: NURSE PRACTITIONER

## 2023-11-04 PROCEDURE — 70450 CT HEAD/BRAIN W/O DYE: CPT

## 2023-11-04 PROCEDURE — 93005 ELECTROCARDIOGRAM TRACING: CPT | Performed by: PHYSICIAN ASSISTANT

## 2023-11-04 PROCEDURE — 85610 PROTHROMBIN TIME: CPT

## 2023-11-04 PROCEDURE — 84484 ASSAY OF TROPONIN QUANT: CPT

## 2023-11-04 PROCEDURE — 96374 THER/PROPH/DIAG INJ IV PUSH: CPT

## 2023-11-04 PROCEDURE — 1200000000 HC SEMI PRIVATE

## 2023-11-04 PROCEDURE — 83874 ASSAY OF MYOGLOBIN: CPT

## 2023-11-04 PROCEDURE — 71045 X-RAY EXAM CHEST 1 VIEW: CPT

## 2023-11-04 PROCEDURE — 6360000002 HC RX W HCPCS: Performed by: PHYSICIAN ASSISTANT

## 2023-11-04 RX ORDER — ALBUTEROL SULFATE 90 UG/1
2 AEROSOL, METERED RESPIRATORY (INHALATION) PRN
Status: DISCONTINUED | OUTPATIENT
Start: 2023-11-05 | End: 2023-11-05 | Stop reason: HOSPADM

## 2023-11-04 RX ORDER — MAGNESIUM SULFATE 1 G/100ML
1000 INJECTION INTRAVENOUS PRN
Status: DISCONTINUED | OUTPATIENT
Start: 2023-11-04 | End: 2023-11-05 | Stop reason: HOSPADM

## 2023-11-04 RX ORDER — METHYLPREDNISOLONE SODIUM SUCCINATE 125 MG/2ML
60 INJECTION, POWDER, LYOPHILIZED, FOR SOLUTION INTRAMUSCULAR; INTRAVENOUS ONCE
Status: COMPLETED | OUTPATIENT
Start: 2023-11-04 | End: 2023-11-04

## 2023-11-04 RX ORDER — ONDANSETRON 4 MG/1
4 TABLET, ORALLY DISINTEGRATING ORAL EVERY 8 HOURS PRN
Status: DISCONTINUED | OUTPATIENT
Start: 2023-11-04 | End: 2023-11-05 | Stop reason: HOSPADM

## 2023-11-04 RX ORDER — VITAMIN B COMPLEX
2000 TABLET ORAL DAILY
Status: DISCONTINUED | OUTPATIENT
Start: 2023-11-05 | End: 2023-11-05 | Stop reason: HOSPADM

## 2023-11-04 RX ORDER — ACETAMINOPHEN 650 MG/1
650 SUPPOSITORY RECTAL EVERY 6 HOURS PRN
Status: DISCONTINUED | OUTPATIENT
Start: 2023-11-04 | End: 2023-11-05 | Stop reason: HOSPADM

## 2023-11-04 RX ORDER — BUDESONIDE AND FORMOTEROL FUMARATE DIHYDRATE 160; 4.5 UG/1; UG/1
2 AEROSOL RESPIRATORY (INHALATION)
Status: DISCONTINUED | OUTPATIENT
Start: 2023-11-04 | End: 2023-11-05 | Stop reason: HOSPADM

## 2023-11-04 RX ORDER — ASPIRIN 81 MG/1
81 TABLET, CHEWABLE ORAL DAILY
Status: DISCONTINUED | OUTPATIENT
Start: 2023-11-05 | End: 2023-11-05 | Stop reason: HOSPADM

## 2023-11-04 RX ORDER — POTASSIUM CHLORIDE 7.45 MG/ML
10 INJECTION INTRAVENOUS PRN
Status: DISCONTINUED | OUTPATIENT
Start: 2023-11-04 | End: 2023-11-05 | Stop reason: HOSPADM

## 2023-11-04 RX ORDER — SODIUM CHLORIDE 9 MG/ML
500 INJECTION, SOLUTION INTRAVENOUS CONTINUOUS PRN
Status: DISCONTINUED | OUTPATIENT
Start: 2023-11-05 | End: 2023-11-05 | Stop reason: HOSPADM

## 2023-11-04 RX ORDER — NITROGLYCERIN 0.4 MG/1
0.4 TABLET SUBLINGUAL EVERY 5 MIN PRN
Status: DISCONTINUED | OUTPATIENT
Start: 2023-11-05 | End: 2023-11-05 | Stop reason: HOSPADM

## 2023-11-04 RX ORDER — LACTOBACILLUS RHAMNOSUS GG 10B CELL
1 CAPSULE ORAL 3 TIMES DAILY
Status: DISCONTINUED | OUTPATIENT
Start: 2023-11-04 | End: 2023-11-05

## 2023-11-04 RX ORDER — OXCARBAZEPINE 300 MG/1
300 TABLET, FILM COATED ORAL 2 TIMES DAILY
COMMUNITY

## 2023-11-04 RX ORDER — AMINOPHYLLINE 25 MG/ML
50 INJECTION, SOLUTION INTRAVENOUS PRN
Status: DISCONTINUED | OUTPATIENT
Start: 2023-11-05 | End: 2023-11-05 | Stop reason: HOSPADM

## 2023-11-04 RX ORDER — SODIUM CHLORIDE 9 MG/ML
INJECTION, SOLUTION INTRAVENOUS PRN
Status: DISCONTINUED | OUTPATIENT
Start: 2023-11-04 | End: 2023-11-05 | Stop reason: HOSPADM

## 2023-11-04 RX ORDER — SODIUM CHLORIDE 0.9 % (FLUSH) 0.9 %
10 SYRINGE (ML) INJECTION PRN
Status: DISCONTINUED | OUTPATIENT
Start: 2023-11-04 | End: 2023-11-05 | Stop reason: HOSPADM

## 2023-11-04 RX ORDER — OLANZAPINE AND FLUOXETINE 12; 25 MG/1; MG/1
1 CAPSULE ORAL NIGHTLY
Status: DISCONTINUED | OUTPATIENT
Start: 2023-11-04 | End: 2023-11-05

## 2023-11-04 RX ORDER — ACETAMINOPHEN 325 MG/1
650 TABLET ORAL EVERY 6 HOURS PRN
Status: DISCONTINUED | OUTPATIENT
Start: 2023-11-04 | End: 2023-11-05 | Stop reason: HOSPADM

## 2023-11-04 RX ORDER — SODIUM CHLORIDE 0.9 % (FLUSH) 0.9 %
5-40 SYRINGE (ML) INJECTION EVERY 12 HOURS SCHEDULED
Status: DISCONTINUED | OUTPATIENT
Start: 2023-11-04 | End: 2023-11-05 | Stop reason: HOSPADM

## 2023-11-04 RX ORDER — ISOSORBIDE MONONITRATE 30 MG/1
30 TABLET, EXTENDED RELEASE ORAL DAILY
Status: DISCONTINUED | OUTPATIENT
Start: 2023-11-05 | End: 2023-11-05 | Stop reason: HOSPADM

## 2023-11-04 RX ORDER — DIPHENHYDRAMINE HCL 25 MG
25 TABLET ORAL EVERY 4 HOURS PRN
Status: DISCONTINUED | OUTPATIENT
Start: 2023-11-04 | End: 2023-11-05 | Stop reason: HOSPADM

## 2023-11-04 RX ORDER — METOPROLOL TARTRATE 5 MG/5ML
5 INJECTION INTRAVENOUS EVERY 5 MIN PRN
Status: DISCONTINUED | OUTPATIENT
Start: 2023-11-05 | End: 2023-11-05 | Stop reason: HOSPADM

## 2023-11-04 RX ORDER — WARFARIN SODIUM 5 MG/1
5 TABLET ORAL DAILY
Status: DISCONTINUED | OUTPATIENT
Start: 2023-11-05 | End: 2023-11-04 | Stop reason: DRUGHIGH

## 2023-11-04 RX ORDER — ONDANSETRON 2 MG/ML
4 INJECTION INTRAMUSCULAR; INTRAVENOUS EVERY 6 HOURS PRN
Status: DISCONTINUED | OUTPATIENT
Start: 2023-11-04 | End: 2023-11-05 | Stop reason: HOSPADM

## 2023-11-04 RX ORDER — SODIUM CHLORIDE 0.9 % (FLUSH) 0.9 %
5-40 SYRINGE (ML) INJECTION PRN
Status: DISCONTINUED | OUTPATIENT
Start: 2023-11-05 | End: 2023-11-05 | Stop reason: HOSPADM

## 2023-11-04 RX ORDER — DICYCLOMINE HYDROCHLORIDE 10 MG/1
20 CAPSULE ORAL
Status: DISCONTINUED | OUTPATIENT
Start: 2023-11-04 | End: 2023-11-05

## 2023-11-04 RX ORDER — ALBUTEROL SULFATE 90 UG/1
2 AEROSOL, METERED RESPIRATORY (INHALATION) EVERY 6 HOURS PRN
Status: DISCONTINUED | OUTPATIENT
Start: 2023-11-04 | End: 2023-11-05 | Stop reason: HOSPADM

## 2023-11-04 RX ORDER — NITROGLYCERIN 0.4 MG/1
0.4 TABLET SUBLINGUAL EVERY 5 MIN PRN
Status: DISCONTINUED | OUTPATIENT
Start: 2023-11-04 | End: 2023-11-05 | Stop reason: HOSPADM

## 2023-11-04 RX ORDER — POTASSIUM CHLORIDE 20 MEQ/1
40 TABLET, EXTENDED RELEASE ORAL PRN
Status: DISCONTINUED | OUTPATIENT
Start: 2023-11-04 | End: 2023-11-05 | Stop reason: HOSPADM

## 2023-11-04 RX ORDER — ATORVASTATIN CALCIUM 80 MG/1
80 TABLET, FILM COATED ORAL DAILY
Status: DISCONTINUED | OUTPATIENT
Start: 2023-11-05 | End: 2023-11-05 | Stop reason: HOSPADM

## 2023-11-04 RX ORDER — INSULIN GLARGINE-YFGN 100 [IU]/ML
35 INJECTION, SOLUTION SUBCUTANEOUS 2 TIMES DAILY
Status: DISCONTINUED | OUTPATIENT
Start: 2023-11-04 | End: 2023-11-05

## 2023-11-04 RX ORDER — CARISOPRODOL 250 MG/1
250 TABLET ORAL 2 TIMES DAILY
COMMUNITY

## 2023-11-04 RX ORDER — OMEPRAZOLE 20 MG/1
20 CAPSULE, DELAYED RELEASE ORAL DAILY
COMMUNITY

## 2023-11-04 RX ORDER — REGADENOSON 0.08 MG/ML
0.4 INJECTION, SOLUTION INTRAVENOUS
Status: DISCONTINUED | OUTPATIENT
Start: 2023-11-04 | End: 2023-11-05 | Stop reason: HOSPADM

## 2023-11-04 RX ORDER — ATROPINE SULFATE 0.1 MG/ML
0.5 INJECTION INTRAVENOUS EVERY 5 MIN PRN
Status: SHIPPED | OUTPATIENT
Start: 2023-11-04 | End: 2023-11-05

## 2023-11-04 RX ORDER — DIPHENHYDRAMINE HYDROCHLORIDE 50 MG/ML
50 INJECTION INTRAMUSCULAR; INTRAVENOUS ONCE
Status: COMPLETED | OUTPATIENT
Start: 2023-11-04 | End: 2023-11-04

## 2023-11-04 RX ORDER — PANTOPRAZOLE SODIUM 40 MG/1
40 TABLET, DELAYED RELEASE ORAL
Status: DISCONTINUED | OUTPATIENT
Start: 2023-11-05 | End: 2023-11-05 | Stop reason: HOSPADM

## 2023-11-04 RX ADMIN — DIPHENHYDRAMINE HYDROCHLORIDE 50 MG: 50 INJECTION INTRAMUSCULAR; INTRAVENOUS at 17:39

## 2023-11-04 RX ADMIN — METHYLPREDNISOLONE SODIUM SUCCINATE 60 MG: 125 INJECTION, POWDER, LYOPHILIZED, FOR SOLUTION INTRAMUSCULAR; INTRAVENOUS at 17:39

## 2023-11-04 RX ADMIN — SODIUM CHLORIDE, PRESERVATIVE FREE 10 ML: 5 INJECTION INTRAVENOUS at 23:00

## 2023-11-04 ASSESSMENT — PAIN SCALES - GENERAL: PAINLEVEL_OUTOF10: 7

## 2023-11-04 ASSESSMENT — HEART SCORE: ECG: 0

## 2023-11-04 ASSESSMENT — PAIN DESCRIPTION - DESCRIPTORS: DESCRIPTORS: HEAVINESS

## 2023-11-04 ASSESSMENT — PAIN DESCRIPTION - ORIENTATION: ORIENTATION: MID

## 2023-11-04 ASSESSMENT — PAIN DESCRIPTION - LOCATION: LOCATION: CHEST

## 2023-11-04 NOTE — ED NOTES
Pt now complaining of worsening weakness to the left side of his body. Pt states that he had a stroke in May of this year and has known deficits to the left side. Pt states that he has a hard time getting around his home. Pt states that she would like to be transferred to his \"medical team\" at [facility]. Pt is very difficult to understand, but he would like us to call a Dr. lAireza Chairez. Dr. Bridget Valentin at bedside, and explained to patient that we will attempt to contact on call physician, but it is not up to her what they decide to do.  Pt verbalized understanding        Faraz Ma RN  11/04/23 1918

## 2023-11-05 VITALS
WEIGHT: 202.1 LBS | BODY MASS INDEX: 29.93 KG/M2 | OXYGEN SATURATION: 96 % | HEIGHT: 69 IN | DIASTOLIC BLOOD PRESSURE: 66 MMHG | TEMPERATURE: 97.7 F | SYSTOLIC BLOOD PRESSURE: 110 MMHG | HEART RATE: 78 BPM | RESPIRATION RATE: 18 BRPM

## 2023-11-05 PROBLEM — E66.9 DIABETES MELLITUS TYPE 2 IN OBESE (HCC): Status: ACTIVE | Noted: 2022-04-17

## 2023-11-05 PROBLEM — E11.69 DIABETES MELLITUS TYPE 2 IN OBESE (HCC): Status: ACTIVE | Noted: 2022-04-17

## 2023-11-05 LAB
ANION GAP SERPL CALCULATED.3IONS-SCNC: 12 MMOL/L (ref 9–17)
BUN SERPL-MCNC: 16 MG/DL (ref 6–20)
BUN/CREAT SERPL: 16 (ref 9–20)
CALCIUM SERPL-MCNC: 9.2 MG/DL (ref 8.6–10.4)
CHLORIDE SERPL-SCNC: 103 MMOL/L (ref 98–107)
CHOLEST SERPL-MCNC: 178 MG/DL (ref 0–199)
CHOLESTEROL/HDL RATIO: 4
CO2 SERPL-SCNC: 22 MMOL/L (ref 20–31)
CREAT SERPL-MCNC: 1 MG/DL (ref 0.7–1.2)
ERYTHROCYTE [DISTWIDTH] IN BLOOD BY AUTOMATED COUNT: 12.8 % (ref 11.8–14.4)
EST. AVERAGE GLUCOSE BLD GHB EST-MCNC: 134 MG/DL
GFR SERPL CREATININE-BSD FRML MDRD: >60 ML/MIN/1.73M2
GLUCOSE SERPL-MCNC: 352 MG/DL (ref 70–99)
HBA1C MFR BLD: 6.3 % (ref 4–6)
HCT VFR BLD AUTO: 41.2 % (ref 40.7–50.3)
HDLC SERPL-MCNC: 44 MG/DL
HGB BLD-MCNC: 14.4 G/DL (ref 13–17)
INR PPP: 1.1
LDLC SERPL CALC-MCNC: 112 MG/DL (ref 0–100)
MAGNESIUM SERPL-MCNC: 1.4 MG/DL (ref 1.6–2.6)
MCH RBC QN AUTO: 29.4 PG (ref 25.2–33.5)
MCHC RBC AUTO-ENTMCNC: 35 G/DL (ref 28.4–34.8)
MCV RBC AUTO: 84.1 FL (ref 82.6–102.9)
NRBC BLD-RTO: 0 PER 100 WBC
PLATELET # BLD AUTO: 187 K/UL (ref 138–453)
PMV BLD AUTO: 10 FL (ref 8.1–13.5)
POTASSIUM SERPL-SCNC: 4.3 MMOL/L (ref 3.7–5.3)
PROTHROMBIN TIME: 13.9 SEC (ref 11.5–14.2)
RBC # BLD AUTO: 4.9 M/UL (ref 4.21–5.77)
SODIUM SERPL-SCNC: 137 MMOL/L (ref 135–144)
TRIGL SERPL-MCNC: 111 MG/DL (ref 0–149)
VLDLC SERPL CALC-MCNC: 22 MG/DL
WBC OTHER # BLD: 9.8 K/UL (ref 3.5–11.3)

## 2023-11-05 PROCEDURE — 83036 HEMOGLOBIN GLYCOSYLATED A1C: CPT

## 2023-11-05 PROCEDURE — 85027 COMPLETE CBC AUTOMATED: CPT

## 2023-11-05 PROCEDURE — 36415 COLL VENOUS BLD VENIPUNCTURE: CPT

## 2023-11-05 PROCEDURE — 83735 ASSAY OF MAGNESIUM: CPT

## 2023-11-05 PROCEDURE — 2580000003 HC RX 258: Performed by: NURSE PRACTITIONER

## 2023-11-05 PROCEDURE — 80048 BASIC METABOLIC PNL TOTAL CA: CPT

## 2023-11-05 PROCEDURE — 80061 LIPID PANEL: CPT

## 2023-11-05 PROCEDURE — 85610 PROTHROMBIN TIME: CPT

## 2023-11-05 PROCEDURE — 6370000000 HC RX 637 (ALT 250 FOR IP): Performed by: NURSE PRACTITIONER

## 2023-11-05 RX ORDER — DICYCLOMINE HYDROCHLORIDE 10 MG/1
10 CAPSULE ORAL DAILY
Status: DISCONTINUED | OUTPATIENT
Start: 2023-11-05 | End: 2023-11-05 | Stop reason: HOSPADM

## 2023-11-05 RX ADMIN — Medication 2000 UNITS: at 08:55

## 2023-11-05 RX ADMIN — ATORVASTATIN CALCIUM 80 MG: 80 TABLET, FILM COATED ORAL at 08:56

## 2023-11-05 RX ADMIN — ASPIRIN 81 MG CHEWABLE TABLET 81 MG: 81 TABLET CHEWABLE at 08:55

## 2023-11-05 RX ADMIN — ISOSORBIDE MONONITRATE 30 MG: 30 TABLET, EXTENDED RELEASE ORAL at 08:56

## 2023-11-05 RX ADMIN — DICYCLOMINE HYDROCHLORIDE 10 MG: 10 CAPSULE ORAL at 08:55

## 2023-11-05 RX ADMIN — SODIUM CHLORIDE, PRESERVATIVE FREE 10 ML: 5 INJECTION INTRAVENOUS at 08:56

## 2023-11-05 ASSESSMENT — ENCOUNTER SYMPTOMS
BACK PAIN: 0
EYES NEGATIVE: 1
GASTROINTESTINAL NEGATIVE: 1
RESPIRATORY NEGATIVE: 1

## 2023-11-05 NOTE — DISCHARGE SUMMARY
Non-Reactive 03/23/2023    HEPBIGM Non-Reactive 03/23/2023    HBEAG Negative 03/23/2023    HEPCAB NONREACTIVE 04/11/2022     Lab Results   Component Value Date/Time    APPEARANCE Clear 08/23/2022 04:59 AM    COLORU Light-Yellow 08/23/2022 04:59 AM    COLORU Yellow 05/11/2022 03:19 PM    LABSPEC 1.018 08/23/2022 04:59 AM    LABPH 5.0 08/23/2022 04:59 AM    NITRU Negative 08/23/2022 04:59 AM    GLUCOSEU Normal 08/23/2022 04:59 AM    KETUA 40 08/23/2022 04:59 AM    UROBILINOGEN Normal 08/23/2022 04:59 AM    BILIRUBINUR Negative 08/23/2022 04:59 AM    OCBU Negative 08/23/2022 04:59 AM     Lab Results   Component Value Date    LABA1C 6.3 (H) 11/05/2023     Lab Results   Component Value Date     11/05/2023     Lab Results   Component Value Date    INR 1.1 11/05/2023    INR 1.1 11/04/2023    INR 1.5 03/24/2023    PROTIME 13.9 11/05/2023    PROTIME 13.7 11/04/2023    PROTIME 16.1 (H) 03/24/2023       XR CHEST PORTABLE    Result Date: 11/4/2023  No acute process. CT HEAD WO CONTRAST    Result Date: 11/4/2023  Technically suboptimal study demonstrates no definite acute intracranial abnormality. ,    Consultations:    Consults:     Final Specialist Recommendations/Findings:   IP CONSULT TO HOSPITALIST  PHARMACY TO DOSE WARFARIN      The patient was seen and examined on day of discharge and this discharge summary is in conjunction with any daily progress note from day of discharge. Discharge plan:     Disposition: AMA    Physician Follow Up:     No follow-up provider specified. Requiring Further Evaluation/Follow Up POST HOSPITALIZATION/Incidental Findings:     Diet:     Activity: As tolerated    Instructions to Patient: Return to the emergency room if symptoms worsen.     Discharge Medications:      Medication List        ASK your doctor about these medications      Advair Diskus 250-50 MCG/ACT Aepb diskus inhaler  Generic drug: fluticasone-salmeterol     aspirin 81 MG chewable tablet     carisoprodol

## 2023-11-05 NOTE — CARE COORDINATION
Case Management Assessment  Initial Evaluation    Date/Time of Evaluation: 11/5/2023 11:40 AM  Assessment Completed by: Lily Sesay RN    If patient is discharged prior to next notation, then this note serves as note for discharge by case management. Patient Name: Brigitte Mao                   YOB: 1975  Diagnosis: Other chest pain [R07.89]  Chest pain [R07.9]  Chest pain, unspecified type [R07.9]                   Date / Time: 11/4/2023  4:26 PM    Patient Admission Status: Inpatient   Readmission Risk (Low < 19, Mod (19-27), High > 27): Readmission Risk Score: 7.7    Current PCP: No primary care provider on file. PCP verified by CM? No (has none)    Chart Reviewed: Yes      History Provided by: Patient  Patient Orientation: Alert and Oriented    Patient Cognition: Alert    Hospitalization in the last 30 days (Readmission):  No    If yes, Readmission Assessment in CM Navigator will be completed. Advance Directives:      Code Status: Full Code   Patient's Primary Decision Maker is: Legal Next of Kin      Discharge Planning:    Patient lives with: Family Members Type of Home: House (staying at his sisters see notes)  Primary Care Giver: Self  Patient Support Systems include: Family Members   Current Financial resources: HELP, Medicaid  Current community resources: Lodging, Housing  Current services prior to admission: Durable Medical Equipment            Current DME: Shower Chair, Walker            Type of Home Care services:  None    ADLS  Prior functional level: Independent in ADLs/IADLs  Current functional level: Independent in ADLs/IADLs    PT AM-PAC:   /24  OT AM-PAC:   /24    Family can provide assistance at DC: Yes  Would you like Case Management to discuss the discharge plan with any other family members/significant others, and if so, who?  No  Plans to Return to Present Housing: Yes  Other Identified Issues/Barriers to RETURNING to current housing: housing, insurance,

## 2023-11-05 NOTE — PROGRESS NOTES
Pt arrived on unit via trolley accompanied by RN and admitted to room #2004. Nil signs of distress noted. He was made comfortable in bed, side rails x 2, brakes applied, bed placed in lowest position and call light given.

## 2023-11-05 NOTE — H&P
6 3 - 12 %    Eosinophils % 1 1 - 4 %    Basophils % 0 0 - 2 %    Immature Granulocytes 0 0 %    Neutrophils Absolute 3.12 1.50 - 8.10 k/uL    Lymphocytes Absolute 1.16 1.10 - 3.70 k/uL    Monocytes Absolute 0.28 0.10 - 1.20 k/uL    Eosinophils Absolute 0.05 0.00 - 0.44 k/uL    Basophils Absolute <0.03 0.00 - 0.20 k/uL    Absolute Immature Granulocyte 0.01 0.00 - 0.30 k/uL   Basic Metabolic Panel    Collection Time: 11/04/23  5:17 PM   Result Value Ref Range    Sodium 138 135 - 144 mmol/L    Potassium 4.2 3.7 - 5.3 mmol/L    Chloride 106 98 - 107 mmol/L    CO2 24 20 - 31 mmol/L    Anion Gap 8 (L) 9 - 17 mmol/L    Glucose 94 70 - 99 mg/dL    BUN 15 6 - 20 mg/dL    Creatinine 0.9 0.7 - 1.2 mg/dL    Est, Glom Filt Rate >60 >60 mL/min/1.73m2    Bun/Cre Ratio 17 9 - 20    Calcium 9.0 8.6 - 10.4 mg/dL   TROP/MYOGLOBIN    Collection Time: 11/04/23  5:17 PM   Result Value Ref Range    Troponin, High Sensitivity 8 0 - 22 ng/L    Myoglobin 36 28 - 72 ng/mL   Ethanol    Collection Time: 11/04/23  5:17 PM   Result Value Ref Range    Ethanol <10 <10 mg/dL    Ethanol percent <0.010 <0.010 %   Hepatic Function Panel    Collection Time: 11/04/23  5:17 PM   Result Value Ref Range    Albumin 3.8 3.5 - 5.2 g/dL    Alkaline Phosphatase 107 40 - 129 U/L    ALT 15 5 - 41 U/L    AST 19 <40 U/L    Total Bilirubin 0.3 0.3 - 1.2 mg/dL    Bilirubin, Direct 0.1 <0.3 mg/dL    Bilirubin, Indirect 0.2 0.0 - 1.0 mg/dL    Total Protein 6.5 6.4 - 8.3 g/dL   Protime-INR    Collection Time: 11/04/23  5:17 PM   Result Value Ref Range    Protime 13.7 11.5 - 14.2 sec    INR 1.1    APTT    Collection Time: 11/04/23  5:17 PM   Result Value Ref Range    PTT 21.4 (L) 23.9 - 33.8 sec   TROP/MYOGLOBIN    Collection Time: 11/04/23  7:08 PM   Result Value Ref Range    Troponin, High Sensitivity 8 0 - 22 ng/L    Myoglobin 33 28 - 72 ng/mL   Troponin    Collection Time: 11/04/23 10:15 PM   Result Value Ref Range    Troponin, High Sensitivity 6 0 - 22 ng/L

## 2023-11-05 NOTE — CONSULTS
Warfarin Dosing - Pharmacy Consult Note  Consulting Provider: Hesham Mccormick CNP  Indication:  History of  VTE/PE, CVA, and Factor 5 Leiden mutation  Warfarin Dose prior to admission: 5mg daily   Concurrent anticoagulants/antiplatelets: none  Significant Drug Interactions: No obvious interactions  Recent Labs     11/04/23  1717   INR 1.1   HGB 14.1      LABALBU 3.8     Recent warfarin administrations        No warfarin orders with administrations found. Orders not given:            warfarin (COUMADIN) tablet 5 mg    warfarin placeholder: dosing by pharmacy                   Date   INR    Dose  11/4/23   1.1    Assessment/Plan  (Goal INR: 2 - 3)  Pt has taken a dose today at home. No warfarin tonight. Active problem list reviewed. INR orders are placed. Chart reviewed for pertinent labs, drug/diet interactions, and past doses. Documentation of patient's clinical condition was reviewed. Pharmacy Dosing:  Pharmacy will continue to follow.

## 2023-11-05 NOTE — CARE COORDINATION
Discharge planning    Notified per RN that patient left AMA. He stated to RN that \"discharge planner told him that we dont have his insurance and we cant do anything so he is leaving \"    Writer did not say that to patient. In fact told him that we would verify his insurance on Monday but unable to do so on Sunday. . had explained HELP department to him. Patent left before writer could come up and talk to him again about plan of care.

## 2023-11-05 NOTE — PLAN OF CARE
Problem: Discharge Planning  Goal: Discharge to home or other facility with appropriate resources  11/5/2023 1006 by Ludin Gross RN  Outcome: Progressing  Flowsheets (Taken 11/5/2023 1018)  Discharge to home or other facility with appropriate resources:   Identify barriers to discharge with patient and caregiver   Arrange for needed discharge resources and transportation as appropriate   Identify discharge learning needs (meds, wound care, etc)  11/5/2023 0537 by Kristi Hill RN  Outcome: Progressing     Problem: Safety - Adult  Goal: Free from fall injury  11/5/2023 1006 by Ludin Gross RN  Outcome: Progressing  11/5/2023 0537 by Kristi Hill RN  Outcome: Progressing     Problem: Pain  Goal: Verbalizes/displays adequate comfort level or baseline comfort level  11/5/2023 1006 by Ludin Gross RN  Outcome: Progressing  11/5/2023 0537 by Kristi Hill RN  Outcome: Progressing     Problem: Chronic Conditions and Co-morbidities  Goal: Patient's chronic conditions and co-morbidity symptoms are monitored and maintained or improved  Outcome: Progressing  Flowsheets (Taken 11/5/2023 0850)  Care Plan - Patient's Chronic Conditions and Co-Morbidity Symptoms are Monitored and Maintained or Improved: Monitor and assess patient's chronic conditions and comorbid symptoms for stability, deterioration, or improvement     Problem: Respiratory - Adult  Goal: Achieves optimal ventilation and oxygenation  Outcome: Progressing
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
## 2023-11-05 NOTE — PROGRESS NOTES
Writer found patient in the hallway all dressed up walking with his belongings. Asked where the patient was going and the patient stated that his sister is on his way and he is going to noodls. Patient pulled out his IV line and telemetry himself when enquired about it. Writer asked what happened since earlier the patient had changed his mind and decided to stay when the writer did the assessment and gave meds. Patient told that discharge planner told him that we don't have his insurance and we can't do anything so he is leaving. Explained to him if he leaves, then it will be leaving AMA. He said that he understands and he is ready to sign the paperwork. Told the patient to wait until I get things cleared from discharge planner but patient left AMA after signing the paperwork while writer was on the phone talking to the discharge planner. Lead nurse, Dr José Simmons, and house supervisor notified.

## 2023-11-07 LAB
EKG ATRIAL RATE: 79 BPM
EKG P AXIS: 54 DEGREES
EKG P-R INTERVAL: 180 MS
EKG Q-T INTERVAL: 378 MS
EKG QRS DURATION: 88 MS
EKG QTC CALCULATION (BAZETT): 433 MS
EKG R AXIS: -23 DEGREES
EKG T AXIS: 29 DEGREES
EKG VENTRICULAR RATE: 79 BPM
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
## 2024-02-01 NOTE — XR
EXAMINATION TYPE: XR pelvis AP view

 

DATE OF EXAM: 11/5/2019

 

CLINICAL HISTORY: Fall injury with back pain.

 

TECHNIQUE: A single AP view of the pelvis is obtained.

 

COMPARISON: CT March 4, 2019.

 

FINDINGS:  There is no acute fracture/dislocation evident in the pelvis.  The sacroiliac joints appea
r symmetric and unremarkable. Symmetric mild superior joint space loss in both hips. Persistent serpi
ginous sclerotic areas superior femoral heads consistent with bilateral avascular necrosis. No new briseida
ny fragmentation clearly seen. Scattered bilateral pelvic phleboliths. Rounded densities projecting o
jyoti left proximal femur consistent with partial visualization of old posttraumatic healed fracture james
btrochanteric level. IVC filter right L3 level redemonstrated.

 

IMPRESSION:  There is no new acute fracture or dislocation in the pelvis. [see HPI] : see HPI [Sexually Transmitted Disease (STD)] : sexually transmitted disease [Seen by urologist before (Name)  ___] : Preciously seen by a urologist: [unfilled] [History of kidney stones] : history of kidney stones [Itching] : itching [Negative] : Heme/Lymph [FreeTextEntry1] : herpes

## 2024-04-02 ENCOUNTER — HOSPITAL ENCOUNTER (INPATIENT)
Age: 49
LOS: 2 days | Discharge: HOME | DRG: 753 | End: 2024-04-04
Attending: EMERGENCY MEDICINE | Admitting: PSYCHIATRY & NEUROLOGY
Payer: MEDICAID

## 2024-04-02 DIAGNOSIS — F10.930 ALCOHOL WITHDRAWAL SYNDROME WITHOUT COMPLICATION (HCC): ICD-10-CM

## 2024-04-02 DIAGNOSIS — R45.851 SUICIDAL IDEATION: Primary | ICD-10-CM

## 2024-04-02 PROBLEM — F23 ACUTE PSYCHOSIS (HCC): Status: ACTIVE | Noted: 2024-04-02

## 2024-04-02 LAB
ALBUMIN SERPL-MCNC: 3.9 G/DL (ref 3.5–5.2)
ALP SERPL-CCNC: 98 U/L (ref 40–129)
ALT SERPL-CCNC: 20 U/L (ref 0–40)
AMPHET UR QL SCN: NEGATIVE
ANION GAP SERPL CALCULATED.3IONS-SCNC: 13 MMOL/L (ref 7–16)
APAP SERPL-MCNC: <5 UG/ML (ref 10–30)
AST SERPL-CCNC: 23 U/L (ref 0–39)
BARBITURATES UR QL SCN: POSITIVE
BASOPHILS # BLD: 0.01 K/UL (ref 0–0.2)
BASOPHILS NFR BLD: 0 % (ref 0–2)
BENZODIAZ UR QL: POSITIVE
BILIRUB SERPL-MCNC: 0.2 MG/DL (ref 0–1.2)
BUN SERPL-MCNC: 9 MG/DL (ref 6–20)
BUPRENORPHINE UR QL: NEGATIVE
CALCIUM SERPL-MCNC: 8.7 MG/DL (ref 8.6–10.2)
CANNABINOIDS UR QL SCN: NEGATIVE
CHLORIDE SERPL-SCNC: 104 MMOL/L (ref 98–107)
CK SERPL-CCNC: 283 U/L (ref 20–200)
CO2 SERPL-SCNC: 22 MMOL/L (ref 22–29)
COCAINE UR QL SCN: POSITIVE
CREAT SERPL-MCNC: 0.7 MG/DL (ref 0.7–1.2)
EOSINOPHIL # BLD: 0.13 K/UL (ref 0.05–0.5)
EOSINOPHILS RELATIVE PERCENT: 4 % (ref 0–6)
ERYTHROCYTE [DISTWIDTH] IN BLOOD BY AUTOMATED COUNT: 14.4 % (ref 11.5–15)
ETHANOLAMINE SERPL-MCNC: <10 MG/DL
FENTANYL UR QL: NEGATIVE
GFR SERPL CREATININE-BSD FRML MDRD: >90 ML/MIN/1.73M2
GLUCOSE SERPL-MCNC: 231 MG/DL (ref 74–99)
HCT VFR BLD AUTO: 41.2 % (ref 37–54)
HGB BLD-MCNC: 14.1 G/DL (ref 12.5–16.5)
IMM GRANULOCYTES # BLD AUTO: <0.03 K/UL (ref 0–0.58)
IMM GRANULOCYTES NFR BLD: 0 % (ref 0–5)
LYMPHOCYTES NFR BLD: 0.93 K/UL (ref 1.5–4)
LYMPHOCYTES RELATIVE PERCENT: 25 % (ref 20–42)
MCH RBC QN AUTO: 29.2 PG (ref 26–35)
MCHC RBC AUTO-ENTMCNC: 34.2 G/DL (ref 32–34.5)
MCV RBC AUTO: 85.3 FL (ref 80–99.9)
METHADONE UR QL: NEGATIVE
MONOCYTES NFR BLD: 0.33 K/UL (ref 0.1–0.95)
MONOCYTES NFR BLD: 9 % (ref 2–12)
NEUTROPHILS NFR BLD: 62 % (ref 43–80)
NEUTS SEG NFR BLD: 2.26 K/UL (ref 1.8–7.3)
OPIATES UR QL SCN: NEGATIVE
OXYCODONE UR QL SCN: NEGATIVE
PCP UR QL SCN: NEGATIVE
PLATELET # BLD AUTO: 166 K/UL (ref 130–450)
PMV BLD AUTO: 10.5 FL (ref 7–12)
POTASSIUM SERPL-SCNC: 3.6 MMOL/L (ref 3.5–5)
PROT SERPL-MCNC: 6.3 G/DL (ref 6.4–8.3)
RBC # BLD AUTO: 4.83 M/UL (ref 3.8–5.8)
SALICYLATES SERPL-MCNC: <0.3 MG/DL (ref 0–30)
SODIUM SERPL-SCNC: 139 MMOL/L (ref 132–146)
TEST INFORMATION: ABNORMAL
TOXIC TRICYCLIC SC,BLOOD: NEGATIVE
WBC OTHER # BLD: 3.7 K/UL (ref 4.5–11.5)

## 2024-04-02 PROCEDURE — 80143 DRUG ASSAY ACETAMINOPHEN: CPT

## 2024-04-02 PROCEDURE — 6370000000 HC RX 637 (ALT 250 FOR IP): Performed by: STUDENT IN AN ORGANIZED HEALTH CARE EDUCATION/TRAINING PROGRAM

## 2024-04-02 PROCEDURE — 99285 EMERGENCY DEPT VISIT HI MDM: CPT

## 2024-04-02 PROCEDURE — 80179 DRUG ASSAY SALICYLATE: CPT

## 2024-04-02 PROCEDURE — 85025 COMPLETE CBC W/AUTO DIFF WBC: CPT

## 2024-04-02 PROCEDURE — 80307 DRUG TEST PRSMV CHEM ANLYZR: CPT

## 2024-04-02 PROCEDURE — 1240000000 HC EMOTIONAL WELLNESS R&B

## 2024-04-02 PROCEDURE — G0480 DRUG TEST DEF 1-7 CLASSES: HCPCS

## 2024-04-02 PROCEDURE — 82550 ASSAY OF CK (CPK): CPT

## 2024-04-02 PROCEDURE — 6370000000 HC RX 637 (ALT 250 FOR IP): Performed by: PSYCHIATRY & NEUROLOGY

## 2024-04-02 PROCEDURE — 80053 COMPREHEN METABOLIC PANEL: CPT

## 2024-04-02 RX ORDER — LANOLIN ALCOHOL/MO/W.PET/CERES
3 CREAM (GRAM) TOPICAL NIGHTLY PRN
Status: DISCONTINUED | OUTPATIENT
Start: 2024-04-02 | End: 2024-04-04 | Stop reason: HOSPADM

## 2024-04-02 RX ORDER — LORAZEPAM 1 MG/1
1 TABLET ORAL ONCE
Status: COMPLETED | OUTPATIENT
Start: 2024-04-02 | End: 2024-04-02

## 2024-04-02 RX ORDER — DIVALPROEX SODIUM 250 MG/1
250 TABLET, DELAYED RELEASE ORAL 2 TIMES DAILY
Status: DISCONTINUED | OUTPATIENT
Start: 2024-04-02 | End: 2024-04-04 | Stop reason: HOSPADM

## 2024-04-02 RX ORDER — NICOTINE 21 MG/24HR
1 PATCH, TRANSDERMAL 24 HOURS TRANSDERMAL DAILY
Status: DISCONTINUED | OUTPATIENT
Start: 2024-04-03 | End: 2024-04-04 | Stop reason: HOSPADM

## 2024-04-02 RX ORDER — FOLIC ACID 1 MG/1
1 TABLET ORAL ONCE
Status: COMPLETED | OUTPATIENT
Start: 2024-04-03 | End: 2024-04-02

## 2024-04-02 RX ORDER — MULTIVITAMIN WITH IRON
1 TABLET ORAL DAILY
Status: DISCONTINUED | OUTPATIENT
Start: 2024-04-03 | End: 2024-04-04 | Stop reason: HOSPADM

## 2024-04-02 RX ORDER — OLANZAPINE 5 MG/1
5 TABLET ORAL EVERY 4 HOURS PRN
Status: DISCONTINUED | OUTPATIENT
Start: 2024-04-02 | End: 2024-04-04 | Stop reason: HOSPADM

## 2024-04-02 RX ORDER — CHLORDIAZEPOXIDE HYDROCHLORIDE 25 MG/1
25 CAPSULE, GELATIN COATED ORAL EVERY 4 HOURS
Status: DISCONTINUED | OUTPATIENT
Start: 2024-04-02 | End: 2024-04-04 | Stop reason: HOSPADM

## 2024-04-02 RX ORDER — MAGNESIUM HYDROXIDE/ALUMINUM HYDROXICE/SIMETHICONE 120; 1200; 1200 MG/30ML; MG/30ML; MG/30ML
30 SUSPENSION ORAL PRN
Status: DISCONTINUED | OUTPATIENT
Start: 2024-04-02 | End: 2024-04-04 | Stop reason: HOSPADM

## 2024-04-02 RX ORDER — LANOLIN ALCOHOL/MO/W.PET/CERES
100 CREAM (GRAM) TOPICAL DAILY
Status: DISCONTINUED | OUTPATIENT
Start: 2024-04-03 | End: 2024-04-04 | Stop reason: HOSPADM

## 2024-04-02 RX ADMIN — CHLORDIAZEPOXIDE HYDROCHLORIDE 25 MG: 25 CAPSULE ORAL at 23:21

## 2024-04-02 RX ADMIN — FOLIC ACID 1 MG: 1 TABLET ORAL at 23:28

## 2024-04-02 RX ADMIN — Medication 3 MG: at 23:21

## 2024-04-02 RX ADMIN — LORAZEPAM 1 MG: 1 TABLET ORAL at 18:57

## 2024-04-02 RX ADMIN — DIVALPROEX SODIUM 250 MG: 250 TABLET, DELAYED RELEASE ORAL at 23:21

## 2024-04-02 ASSESSMENT — LIFESTYLE VARIABLES
HOW MANY STANDARD DRINKS CONTAINING ALCOHOL DO YOU HAVE ON A TYPICAL DAY: 3 OR 4
HOW OFTEN DO YOU HAVE A DRINK CONTAINING ALCOHOL: 4 OR MORE TIMES A WEEK

## 2024-04-02 ASSESSMENT — PAIN - FUNCTIONAL ASSESSMENT: PAIN_FUNCTIONAL_ASSESSMENT: NONE - DENIES PAIN
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
## 2024-04-02 NOTE — ED PROVIDER NOTES
tromethamine, Lanolin, Lovenox [enoxaparin sodium], Peanut oil, Pork-derived products, Rivaroxaban, Sulfa antibiotics, Tiotropium bromide monohydrate, Tramadol, Uncaria tomentosa (cats claw), Alfentanil, Asparagus, Cat hair extract, Hydrocodone-acetaminophen, Hydroxyzine, Hydroxyzine hcl, Morphine, Peanut (diagnostic), Phenytoin, Robaxin [methocarbamol], Rosemary oil, Skelaxin [metaxalone], Tiotropium, Heparin, and Pork allergy    -------------------------------------------------- RESULTS -------------------------------------------------  All laboratory and radiology results have been personally reviewed by myself   LABS:  Results for orders placed or performed during the hospital encounter of 04/02/24   CBC with Auto Differential   Result Value Ref Range    WBC 3.7 (L) 4.5 - 11.5 k/uL    RBC 4.83 3.80 - 5.80 m/uL    Hemoglobin 14.1 12.5 - 16.5 g/dL    Hematocrit 41.2 37.0 - 54.0 %    MCV 85.3 80.0 - 99.9 fL    MCH 29.2 26.0 - 35.0 pg    MCHC 34.2 32.0 - 34.5 g/dL    RDW 14.4 11.5 - 15.0 %    Platelets 166 130 - 450 k/uL    MPV 10.5 7.0 - 12.0 fL    Neutrophils % 62 43.0 - 80.0 %    Lymphocytes % 25 20.0 - 42.0 %    Monocytes % 9 2.0 - 12.0 %    Eosinophils % 4 0 - 6 %    Basophils % 0 0.0 - 2.0 %    Immature Granulocytes 0 0.0 - 5.0 %    Neutrophils Absolute 2.26 1.80 - 7.30 k/uL    Lymphocytes Absolute 0.93 (L) 1.50 - 4.00 k/uL    Monocytes Absolute 0.33 0.10 - 0.95 k/uL    Eosinophils Absolute 0.13 0.05 - 0.50 k/uL    Basophils Absolute 0.01 0.00 - 0.20 k/uL    Absolute Immature Granulocyte <0.03 0.00 - 0.58 k/uL   Comprehensive Metabolic Panel   Result Value Ref Range    Sodium 139 132 - 146 mmol/L    Potassium 3.6 3.5 - 5.0 mmol/L    Chloride 104 98 - 107 mmol/L    CO2 22 22 - 29 mmol/L    Anion Gap 13 7 - 16 mmol/L    Glucose 231 (H) 74 - 99 mg/dL    BUN 9 6 - 20 mg/dL    Creatinine 0.7 0.70 - 1.20 mg/dL    Est, Glom Filt Rate >90 >60 mL/min/1.73m2    Calcium 8.7 8.6 - 10.2 mg/dL    Total Protein 6.3 (L) 6.4 -

## 2024-04-02 NOTE — ED NOTES
Pt accepted to Dr Augustin's service per Section G nurse Kendra Pastor in admitting advised of assigned bed [address] aware of pending admission, ED Charge Rebecca aware of pending admission.

## 2024-04-03 PROCEDURE — 1240000000 HC EMOTIONAL WELLNESS R&B

## 2024-04-03 PROCEDURE — 6370000000 HC RX 637 (ALT 250 FOR IP): Performed by: PSYCHIATRY & NEUROLOGY

## 2024-04-03 PROCEDURE — 90792 PSYCH DIAG EVAL W/MED SRVCS: CPT | Performed by: NURSE PRACTITIONER

## 2024-04-03 RX ADMIN — CHLORDIAZEPOXIDE HYDROCHLORIDE 25 MG: 25 CAPSULE ORAL at 06:13

## 2024-04-03 RX ADMIN — CHLORDIAZEPOXIDE HYDROCHLORIDE 25 MG: 25 CAPSULE ORAL at 17:22

## 2024-04-03 RX ADMIN — DIVALPROEX SODIUM 250 MG: 250 TABLET, DELAYED RELEASE ORAL at 22:29

## 2024-04-03 RX ADMIN — DIVALPROEX SODIUM 250 MG: 250 TABLET, DELAYED RELEASE ORAL at 09:06

## 2024-04-03 RX ADMIN — CHLORDIAZEPOXIDE HYDROCHLORIDE 25 MG: 25 CAPSULE ORAL at 09:06

## 2024-04-03 RX ADMIN — CHLORDIAZEPOXIDE HYDROCHLORIDE 25 MG: 25 CAPSULE ORAL at 22:29

## 2024-04-03 RX ADMIN — MULTIVITAMIN TABLET 1 TABLET: TABLET at 09:06

## 2024-04-03 RX ADMIN — CHLORDIAZEPOXIDE HYDROCHLORIDE 25 MG: 25 CAPSULE ORAL at 14:28

## 2024-04-03 RX ADMIN — Medication 100 MG: at 09:06

## 2024-04-03 ASSESSMENT — PATIENT HEALTH QUESTIONNAIRE - PHQ9
SUM OF ALL RESPONSES TO PHQ QUESTIONS 1-9: 2
1. LITTLE INTEREST OR PLEASURE IN DOING THINGS: SEVERAL DAYS
1. LITTLE INTEREST OR PLEASURE IN DOING THINGS: NOT AT ALL
SUM OF ALL RESPONSES TO PHQ QUESTIONS 1-9: 0
SUM OF ALL RESPONSES TO PHQ QUESTIONS 1-9: 2
SUM OF ALL RESPONSES TO PHQ9 QUESTIONS 1 & 2: 2
SUM OF ALL RESPONSES TO PHQ QUESTIONS 1-9: 0
SUM OF ALL RESPONSES TO PHQ QUESTIONS 1-9: 2
2. FEELING DOWN, DEPRESSED OR HOPELESS: SEVERAL DAYS
SUM OF ALL RESPONSES TO PHQ9 QUESTIONS 1 & 2: 0
SUM OF ALL RESPONSES TO PHQ QUESTIONS 1-9: 0
SUM OF ALL RESPONSES TO PHQ QUESTIONS 1-9: 0
SUM OF ALL RESPONSES TO PHQ QUESTIONS 1-9: 2
2. FEELING DOWN, DEPRESSED OR HOPELESS: NOT AT ALL

## 2024-04-03 ASSESSMENT — PAIN SCALES - GENERAL
PAINLEVEL_OUTOF10: 0

## 2024-04-03 ASSESSMENT — SLEEP AND FATIGUE QUESTIONNAIRES
SLEEP PATTERN: DIFFICULTY FALLING ASLEEP;DISTURBED/INTERRUPTED SLEEP;INSOMNIA;RESTLESSNESS;NIGHTMARES/TERRORS
AVERAGE NUMBER OF SLEEP HOURS: 4
DO YOU USE A SLEEP AID: NO
DO YOU USE A SLEEP AID: YES
DO YOU HAVE DIFFICULTY SLEEPING: YES
AVERAGE NUMBER OF SLEEP HOURS: 6
DO YOU HAVE DIFFICULTY SLEEPING: NO

## 2024-04-03 ASSESSMENT — LIFESTYLE VARIABLES
HOW OFTEN DO YOU HAVE A DRINK CONTAINING ALCOHOL: 4 OR MORE TIMES A WEEK
HOW MANY STANDARD DRINKS CONTAINING ALCOHOL DO YOU HAVE ON A TYPICAL DAY: PATIENT UNABLE TO ANSWER

## 2024-04-03 NOTE — PLAN OF CARE
Problem: Self Harm/Suicidality  Goal: Will have no self-injury during hospital stay  Description: INTERVENTIONS:  1.  Ensure constant observer at bedside with Q15M safety checks  2.  Maintain a safe environment  3.  Secure patient belongings  4.  Ensure family/visitors adhere to safety recommendations  5.  Ensure safety tray has been added to patient's diet order  6.  Every shift and PRN: Re-assess suicidal risk via Frequent Screener    Outcome: Progressing     Problem: Psychosis  Goal: Will report no hallucinations or delusions  Description: INTERVENTIONS:  1. Administer medication as  ordered  2. Assist with reality testing to support increasing orientation  3. Assess if patient's hallucinations or delusions are encouraging self harm or harm to others and intervene as appropriate  Outcome: Progressing     Problem: Behavior  Goal: Pt/Family maintain appropriate behavior and adhere to behavioral management agreement, if implemented  Description: INTERVENTIONS:  1. Assess patient/family's coping skills and  non-compliant behavior (including use of illegal substances)  2. Notify security of behavior or suspected illegal substances which indicate the need for search of the family and/or belongings  3. Encourage verbalization of thoughts and concerns in a socially appropriate manner  4. Utilize positive, consistent limit setting strategies supporting safety of patient, staff and others  5. Encourage participation in the decision making process about the behavioral management agreement  6. If a visitor's behavior poses a threat to safety call refer to organization policy.  7. Initiate consult with , Psychosocial CNS, Spiritual Care as appropriate  Outcome: Progressing     Problem: Anxiety  Goal: Will report anxiety at manageable levels  Description: INTERVENTIONS:  1. Administer medication as ordered  2. Teach and rehearse alternative coping skills  3. Provide emotional support with 1:1 interaction with

## 2024-04-03 NOTE — PROGRESS NOTES
Patient declined to attend the following groups:    Community Meeting  Psychoeducation       Will continue to encourage patient to attend programming.

## 2024-04-03 NOTE — PROGRESS NOTES
4 Eyes Skin Assessment     NAME:  Pepe Em  YOB: 1975  MEDICAL RECORD NUMBER:  [id number]    The patient is being assessed for  Admission    I agree that at least one RN has performed a thorough Head to Toe Skin Assessment on the patient. ALL assessment sites listed below have been assessed.      Areas assessed by both nurses:    Head, Face, Ears, Shoulders, Back, Chest, Arms, Elbows, Hands, Sacrum. Buttock, Coccyx, Ischium, and Legs. Feet and Heels        Does the Patient have a Wound? No noted wound(s)       Cal Prevention initiated by RN: Yes  Wound Care Orders initiated by RN: No    Pressure Injury (Stage 3,4, Unstageable, DTI, NWPT, and Complex wounds) if present, place Wound referral order by RN under : No    New Ostomies, if present place, Ostomy referral order under : No     Nurse 1 eSignature: Electronically signed by Migdalia Aleman RN on 4/3/24 at 2:25 AM EDT    **SHARE this note so that the co-signing nurse can place an eSignature**    Nurse 2 eSignature: {Esignature:[id number]}
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
## 2024-04-03 NOTE — PLAN OF CARE
Patient denies SI/HI/Hallucinations. Patient in control of behavior. Patient states that he has been off his medications for a long time and needs to set up an outpatient pharmacy and doctor to control his medications. Patient is in control of behavior. No active distress noted, respirations even and unlabored. Will continue to monitor and will intervene as needed with close 15 minute rounds.   Problem: Risk for Elopement  Goal: Patient will not exit the unit/facility without proper excort  Outcome: Progressing     Problem: Behavior  Goal: Pt/Family maintain appropriate behavior and adhere to behavioral management agreement, if implemented  Description: INTERVENTIONS:  1. Assess patient/family's coping skills and  non-compliant behavior (including use of illegal substances)  2. Notify security of behavior or suspected illegal substances which indicate the need for search of the family and/or belongings  3. Encourage verbalization of thoughts and concerns in a socially appropriate manner  4. Utilize positive, consistent limit setting strategies supporting safety of patient, staff and others  5. Encourage participation in the decision making process about the behavioral management agreement  6. If a visitor's behavior poses a threat to safety call refer to organization policy.  7. Initiate consult with , Psychosocial CNS, Spiritual Care as appropriate  4/3/2024 1303 by Maria C Meng, RN  Outcome: Progressing     Problem: ABCDS Injury Assessment  Goal: Absence of physical injury  Outcome: Progressing

## 2024-04-03 NOTE — PROGRESS NOTES
Spoke with [facility] ([phone]) and spoke with Eva she states that she messaged her supervisor to see if there was anyway tomorrow they could most likely drop off his wallet and ID card.

## 2024-04-03 NOTE — PROGRESS NOTES
[facility] [place]  Admission Note     50yo male admitted from ED. A&Ox4. Ambulates independently. Affect is flat and mood is anxious. Speech is somewhat garbled. He was guarded. States that he has been off his medications for five days. He reports having command auditory hallucinations telling him to \"hurt myself and other people\". He has no plan or intent. Patient states that he has been having auditory hallucinations since he was 15yo, but they were \"just regular voices telling me to die\". Patient is endorsing suicidal ideations with no plan. Denies homicidal ideations and visual hallucinations. Main stressor he reported was losing multiple family members d/t Covid. He does have support from \"people in the community and friends\". Patient shown unit and room. Snack was provided. Purposeful rounding continued.    Admission Type:   Admission Type: Involuntary    Reason for admission:  Reason for Admission: \"Im hearing voices real bad\"      Addictive Behavior:   Addictive Behavior  In the Past 3 Months, Have You Felt or Has Someone Told You That You Have a Problem With  : None    Medical Problems:   Past Medical History:   Diagnosis Date    Asthma     COPD (chronic obstructive pulmonary disease) ([facility])     Deep vein thrombosis (DVT) ([facility])     ETOH abuse     Factor V Leiden ([facility])     Protein S deficiency ([facility])     Pulmonary emboli ([facility])        Status EXAM:  Mental Status and Behavioral Exam  Normal: No  Level of Assistance: Independent/Self  Facial Expression: Avoids Gaze, Flat  Affect: Blunt  Level of Consciousness: Alert  Frequency of Checks: 4 times per hour, close  Mood:Normal: No  Mood: Anxious, Depressed  Motor Activity:Normal: Yes  Eye Contact: Fair  Observed Behavior: Withdrawn, Cooperative, Guarded  Sexual Misconduct History: Current - no  Preception: [place] to person, [place] to time, [place] to place, [place] to situation  Attention:Normal: No  Attention: Distractible  Thought Processes: Other

## 2024-04-03 NOTE — PROGRESS NOTES
[facility]  Initial Interdisciplinary Treatment Plan NOTE    Review Date & Time: 04/03/2024    Patient was not in treatment team    Admission Type:   Admission Type: Involuntary    Reason for admission:  Reason for Admission: \"Im hearing voices real bad\"      Estimated Length of Stay Update:  3-5 days  Estimated Discharge Date Update: 04/08/2024    EDUCATION:   Learner Progress Toward Treatment Goals: Reviewed results and recommendations of this team, Reviewed group plan and strategies, Reviewed signs, symptoms and risk of self harm and violent behavior, and Reviewed goals and plan of care    Method: Individual    Outcome: Verbalized understanding and Demonstrated Understanding    PATIENT GOALS: To feel better      PLAN/TREATMENT RECOMMENDATIONS UPDATE:04/06/2024    GOALS UPDATE:   Time frame for Short-Term Goals: 3 days      Maria C Meng RN

## 2024-04-03 NOTE — GROUP NOTE
Group Therapy Note    Date: 4/3/2024    Group Start Time: 1410  Group End Time: 1448  Group Topic: Cognitive Skills    [facility]    Milind Diane MSW        Group Therapy Note    Attendees: 9       Patient's Goal: To actively participate in group discussion on using Meditation to assist with controlling your emotions.     Notes: Pt was an active listener in group discussion    Status After Intervention:  Unchanged    Participation Level: Active Listener    Participation Quality: Attentive and Inappropriate      Speech:  pressured and slurred      Thought Process/Content: Delusional      Affective Functioning: Flat      Mood: anxious      Level of consciousness:  Alert and Attentive      Response to Learning: Able to verbalize current knowledge/experience, Able to verbalize/acknowledge new learning, and Able to retain information      Endings: None Reported    Modes of Intervention: Education, Support, Socialization, Exploration, Clarifying, and Problem-solving      Discipline Responsible: /Counselor      Signature:  ODESSA Ibanez

## 2024-04-03 NOTE — H&P
found.    EKG: TRACING REVIEWED    TREATMENT PLAN:    The patient's diagnosis, treatment plan, medication management were formulated after patient was seen directly by the attending physician and myself and all relevant documentation was reviewed.    Risk, benefit, side effects, possible outcomes of the medication and alternatives discussed with the patient and the patient demonstrated understanding.  The patient was also educated that the outcome of treatment will depend on the medication compliance as directed by the prescribers along with regular follow-up, compliance with the labs and other work-up, as clinically indicated.    The patient was referred to outpatient/inpatient substance abuse rehabilitation programming.  He was educated multiple times during the hospitalization that if he chooses to continue to use drugs or alcohol, he may potentially act out impulsively, resulting in serious harm to self or others, even though unintentional.  He was also educated that mental health treatment cannot be optimized with ongoing use of drugs.  He demonstrated understanding has the capacity to understand that.  Pepe reports that he plans to return to [place] upon discharge from the hospital here.    The patients risk factors have been mitigated as they have been admitted to inpatient behavioral health in an emotionally supportive environment with q 15 minute safety checks. Okay to discontinue the 1:1 low risk for suicide. They have the following    Risk Factors:  Previous suicide attempt  Auditory hallucinations  Recent suicidal ideation  No current outpatient mental health provider  Poor insight and judgement  Access to guns at his apartment in [place]  History of substance abuse  Recent losses of his parents and grandparents to COVID-19    Protective Factors:  Help-seeking behavior  Able to communicate needs  Access to essential needs  Risk Management:  suicide risk and homocidal risk    Collateral Information:  Will

## 2024-04-04 VITALS
SYSTOLIC BLOOD PRESSURE: 122 MMHG | WEIGHT: 192 LBS | HEART RATE: 84 BPM | TEMPERATURE: 97.3 F | DIASTOLIC BLOOD PRESSURE: 82 MMHG | HEIGHT: 69 IN | RESPIRATION RATE: 16 BRPM | OXYGEN SATURATION: 100 % | BODY MASS INDEX: 28.44 KG/M2

## 2024-04-04 PROCEDURE — 6370000000 HC RX 637 (ALT 250 FOR IP): Performed by: PSYCHIATRY & NEUROLOGY

## 2024-04-04 PROCEDURE — 99239 HOSP IP/OBS DSCHRG MGMT >30: CPT | Performed by: NURSE PRACTITIONER

## 2024-04-04 RX ORDER — CHLORDIAZEPOXIDE HYDROCHLORIDE 25 MG/1
25 CAPSULE, GELATIN COATED ORAL EVERY 4 HOURS
Qty: 30 CAPSULE | Refills: 0 | Status: SHIPPED | OUTPATIENT
Start: 2024-04-04 | End: 2024-04-09

## 2024-04-04 RX ORDER — LANOLIN ALCOHOL/MO/W.PET/CERES
100 CREAM (GRAM) TOPICAL DAILY
Qty: 30 TABLET | Refills: 0 | Status: SHIPPED | OUTPATIENT
Start: 2024-04-05 | End: 2024-05-05

## 2024-04-04 RX ORDER — MULTIVITAMIN WITH IRON
1 TABLET ORAL DAILY
Qty: 30 TABLET | Refills: 0 | Status: SHIPPED | OUTPATIENT
Start: 2024-04-05 | End: 2024-05-05

## 2024-04-04 RX ORDER — DIVALPROEX SODIUM 250 MG/1
250 TABLET, DELAYED RELEASE ORAL 2 TIMES DAILY
Qty: 60 TABLET | Refills: 0 | Status: SHIPPED | OUTPATIENT
Start: 2024-04-04 | End: 2024-05-04

## 2024-04-04 RX ADMIN — Medication 100 MG: at 09:14

## 2024-04-04 RX ADMIN — CHLORDIAZEPOXIDE HYDROCHLORIDE 25 MG: 25 CAPSULE ORAL at 02:02

## 2024-04-04 RX ADMIN — MULTIVITAMIN TABLET 1 TABLET: TABLET at 09:14

## 2024-04-04 RX ADMIN — DIVALPROEX SODIUM 250 MG: 250 TABLET, DELAYED RELEASE ORAL at 09:14

## 2024-04-04 RX ADMIN — CHLORDIAZEPOXIDE HYDROCHLORIDE 25 MG: 25 CAPSULE ORAL at 05:58

## 2024-04-04 RX ADMIN — Medication 3 MG: at 02:02

## 2024-04-04 ASSESSMENT — PAIN SCALES - GENERAL
PAINLEVEL_OUTOF10: 0
PAINLEVEL_OUTOF10: 0

## 2024-04-04 NOTE — BH NOTE
[facility] [place]  Discharge Note    Pt discharged with followings belongings:   Dental Appliances: None  Vision - Corrective Lenses: None  Hearing Aid: None  Jewelry: None  Body Piercings Removed: N/A  Clothing: Jacket/Coat, Footwear, Shirt, Pants  Other Valuables: Other (Comment) (none)   Valuables returned to patient. Patient educated on aftercare instructions: yes  Patient verbalize understanding of AVS:  yes.    Status EXAM upon discharge:  Mental Status and Behavioral Exam  Normal: No  Level of Assistance: Independent/Self  Facial Expression: Brightened  Affect: Congruent  Level of Consciousness: Alert  Frequency of Checks: 4 times per hour, close  Mood:Normal: No  Mood: Anxious  Motor Activity:Normal: Yes  Motor Activity: Decreased  Eye Contact: Good  Observed Behavior: Cooperative, Friendly  Sexual Misconduct History: Current - no  Preception: [place] to person, [place] to time, [place] to place, [place] to situation  Attention:Normal: No  Attention: Distractible  Thought Processes: Unremarkable  Thought Content:Normal: No  Thought Content: Preoccupations  Depression Symptoms: Impaired concentration  Anxiety Symptoms: Generalized  Roxanne Symptoms: Poor judgment  Hallucinations: None  Delusions: No  Delusions: Paranoid  Memory:Normal: No  Memory: Poor recent, Poor remote  Insight and Judgment: No  Insight and Judgment: Poor judgment, Poor insight    Tobacco Screening:  Practical Counseling, on admission, glenn X, if applicable and completed (first 3 are required if patient doesn't refuse)yes:            ( x) Recognizing danger situations (included triggers and roadblocks)                    (x ) Coping skills (new ways to manage stress,relaxation techniques, changing routine, distraction)                                                           ( x) Basic information about quitting (benefits of quitting, techniques in how to quit, available resources  ( ) Referral for counseling faxed to Tobacco

## 2024-04-04 NOTE — GROUP NOTE
Shared goal for the day as to to be humble and kind to others.                                                                       Group Therapy Note    Date: 4/4/2024    Group Start Time: 0830  Group End Time: 0900  Group Topic: Community Meeting    [facility]    Ami Judge, TAPANS    Date: 4/4/2024  Module Name:  Community Meeting     Patient's Goal:  Patient will be able to id daily routine, staffing assignments and floor rules. Will be prompted to share goal for the day.    Notes:  Appeared to be an active listener, able to set goal for the day.     Status After Intervention:  Improved    Participation Level: Active Listener and Interactive    Participation Quality: Appropriate, Attentive, and Sharing      Speech:  normal      Thought Process/Content: Logical      Affective Functioning: Congruent      Mood: euthymic      Level of consciousness:  Alert, Oriented x4, and Attentive      Response to Learning: Able to verbalize/acknowledge new learning and Able to retain information      Endings: None Reported    Modes of Intervention: Education, Support, Socialization, and Clarifying      Discipline Responsible: Psychoeducational Specialist      Signature:  NOLBERTO Holloway

## 2024-04-04 NOTE — CARE COORDINATION
As BRIE was walking through the unit the pt yelled for SW to talk with Óscar, a manager at [facility], on the pt phone. Óscar stated they cannot guarantee a bed for the pt however he is always welcome to stay in their emergency shelter room until he can get a bed in the main facility. Óscar is aware the pt is a tentative discharge for today.

## 2024-04-04 NOTE — CARE COORDINATION
Pt is not willing to wait for appointments to be scheduled with [facility]. SW contacted [facility] and was advised the pt can walk in Monday-Friday 8:00 am- 12:30 pm to be reconnected for services. [facility] is only 4 minutes from [facility]. The walk in information was placed in his discharge paperwork. SW confirmed with the pt that he no longer wants to go to a substance abuse rehab and he does not want any outpatient substance use treatment at this time.     SW contacted pt insurance Replaced by [facility] [phone] and scheduled transportation [place] this [place] [place] [address]) to [email or web address] ([address] [place] [address]). They are aware the  will need to go to the main entrance off of [facility] and call the nurses station upon arrival. Transportation can arrive anytime within the next 3 hours. Confirmation #[id number].     In order to ensure appropriate transition and discharge planning is in place, the following documents have been transmitted to [facility], as the new outpatient provider:    The d/c diagnosis was transmitted to the next care provider  The reason for hospitalization was transmitted to the next care provider  The d/c medications (dosage and indication) were transmitted to the next care provider   The continuing care plan was transmitted to the next care provider

## 2024-04-04 NOTE — DISCHARGE SUMMARY
DISCHARGE SUMMARY      Patient ID:  Pepe Em  [id number]  49 y.o.  1975    Admit date: 4/2/2024    Discharge date and time: 4/4/2024    Admitting Physician: Miles Augustin MD     Discharge Physician: Dr Charli ALMAZAN    Discharge Diagnoses:   Patient Active Problem List   Diagnosis    DVT, lower extremity, recurrent, right (HCC)    Chest pain    Acute pulmonary embolism with acute cor pulmonale (HCC)    Gastroenteritis    Hypercoagulable state (HCC)    Acute deep vein thrombosis (DVT) of brachial vein of right upper extremity (HCC)    Medically noncompliant    Acute CVA (cerebrovascular accident) (HCC)    Subtherapeutic anticoagulation    Subtherapeutic international normalized ratio (INR)    Popliteal DVT (deep venous thrombosis) (HCC)    Unspecified fracture of right lower leg, subsequent encounter for closed fracture with routine healing    Diabetes mellitus type 2 in obese    Rectal bleeding    Protein S deficiency (HCC)    Presence of IVC filter    Hypertensive disorder    Hypercholesterolemia    HLD (hyperlipidemia)    Hemiparesis of left nondominant side (HCC)    Factor 5 Leiden mutation, heterozygous (HCC)    Chronic obstructive pulmonary disease with (acute) exacerbation (HCC)    Chronic pain due to trauma    Chronic hepatitis B without hepatic coma (HCC)    Bipolar disorder (HCC)    Benign neoplasm of heart    Atherosclerotic heart disease of native coronary artery without angina pectoris    Ankle pain    Substance use disorder    Polysubstance abuse (HCC)    Alcohol withdrawal syndrome without complication (HCC)    Opioid withdrawal (HCC)    Leg wound, left, initial encounter    Left Thigh abscess    Drug-seeking behavior    Hypertriglyceridemia    Vitamin D deficiency    Behavior problem, adult    Acute psychosis (HCC)       Admission Condition: poor    Discharged Condition: stable    Admission Circumstance:   Pepe Em is a 49 year old male with past psychiatric history of mood disorder, multiple

## 2024-04-04 NOTE — PLAN OF CARE
Pt denies suicidal ideation, homicidal ideation, and AV hallucinations. He is brightened, pleasant, and hoping to be discharged today. He reports not having any withdrawal symptoms. He is compliant with medications and attending groups. He is in control of his behaviors at this time.     Problem: Risk for Elopement  Goal: Patient will not exit the unit/facility without proper excort  Outcome: Progressing     Problem: Chronic Conditions and Co-morbidities  Goal: Patient's chronic conditions and co-morbidity symptoms are monitored and maintained or improved  Outcome: Progressing     Problem: Self Harm/Suicidality  Goal: Will have no self-injury during hospital stay  Description: INTERVENTIONS:  1.  Ensure constant observer at bedside with Q15M safety checks  2.  Maintain a safe environment  3.  Secure patient belongings  4.  Ensure family/visitors adhere to safety recommendations  5.  Ensure safety tray has been added to patient's diet order  6.  Every shift and PRN: Re-assess suicidal risk via Frequent Screener    Outcome: Progressing     Problem: Psychosis  Goal: Will report no hallucinations or delusions  Description: INTERVENTIONS:  1. Administer medication as  ordered  2. Assist with reality testing to support increasing orientation  3. Assess if patient's hallucinations or delusions are encouraging self harm or harm to others and intervene as appropriate  Outcome: Progressing     Problem: Behavior  Goal: Pt/Family maintain appropriate behavior and adhere to behavioral management agreement, if implemented  Description: INTERVENTIONS:  1. Assess patient/family's coping skills and  non-compliant behavior (including use of illegal substances)  2. Notify security of behavior or suspected illegal substances which indicate the need for search of the family and/or belongings  3. Encourage verbalization of thoughts and concerns in a socially appropriate manner  4. Utilize positive, consistent limit setting strategies

## 2024-04-04 NOTE — TRANSITION OF CARE
MG chewable tablet     carisoprodol 250 MG tablet  Commonly known as: SOMA     dicyclomine 10 MG capsule  Commonly known as: BENTYL     diphenhydrAMINE 25 MG tablet  Commonly known as: BENADRYL     omeprazole 20 MG delayed release capsule  Commonly known as: PRILOSEC     OXcarbazepine 300 MG tablet  Commonly known as: TRILEPTAL     tiotropium 18 MCG inhalation capsule  Commonly known as: SPIRIVA     Ventolin  (90 Base) MCG/ACT inhaler  Generic drug: albuterol sulfate HFA     warfarin 5 MG tablet  Commonly known as: COUMADIN               Where to Get Your Medications        You can get these medications from any pharmacy    Bring a paper prescription for each of these medications  chlordiazePOXIDE 25 MG capsule  divalproex 250 MG DR tablet  multivitamin Tabs tablet  thiamine 100 MG tablet         Unresulted Labs (24h ago, onward)      None            To obtain results of studies pending at discharge, please contact [phone]    Follow-up Information       Follow up With Specialties Details Why Contact Info    [facility]  Go to Walk in Monday-Friday 8:00 am- 12:30 pm to be reconnected for services. [address] [place] [address]    Phone: [phone]    Fax: [phone]    [facility]  Go to  Address: [address] [id number]  Phone: [phone]             Advanced Directive:   Does the patient have an appointed surrogate decision maker? No  Does the patient have a Medical Advance Directive? No  Does the patient have a Psychiatric Advance Directive? No  If the patient does not have a surrogate or Medical Advance Directive AND Psychiatric Advance Directive, the patient was offered information on these advance directives Patient declined to complete    Patient Instructions: Please continue all medications until otherwise directed by physician.     Tobacco Cessation Discharge Plan:   Is the patient a tobacco user  and needs referral for tobacco cessation?

## 2024-04-04 NOTE — DISCHARGE INSTRUCTIONS
Patient was offered a referral to substance abuse treatment, patient declined referral at this time.      Follow up for Tobacco Cessation at:    [facility]                                 Date:  Friday 4/5 at 10am              [address] [address]   (Inside [facility]    take B elevators to 7th floor)   Phone: [phone]   Fax: [phone]

## 2024-04-04 NOTE — CARE COORDINATION
BRIE met with the pt to discuss his discharge. Pt reports feeling alright today, denied SI/HI/AVH. Pt plans to discharge to the [facility] in [place]. Pt stated he already talked with them and they have a bed for him today. Pt plans to use his insurance to transport him there. Pt reports he has used his insurance for transportation in the past. Pt will continue to treat with [facility] at time of discharge. Collateral was unable to be obtained during this admission. Pt cooperative, pleasant, with good eye contact, clear speech, fair insight/judgement.     BRIE contacted [facility] [phone] to confirm bed availability for the pt. BRIE was advised to call back after 12:00 pm to confirm this information.     BRIE contacted [facility] [phone] to schedule appointments. No answer, a voicemail was left.      BRIE spoke with Eva at [facility] [phone] who confirmed pt belongings are being brought to the hospital at this time.

## 2024-04-04 NOTE — GROUP NOTE
Group Therapy Note    Date: 4/4/2024    Group Start Time: 1000  Group End Time: 1115  Group Topic: Psychoeducation    [facility] 7SE ACUTE BH 1    Ami Judge, NOLBERTO        Group Therapy Note      Module Name:  coping skills for stress management     Patient's Goal:  patient will be able to id 6 coping skills for managing stress levels.     Notes:  pt observed as an active listener, engaged in group activity sharing appropriately, and accepting of handout.     Status After Intervention:  Improved    Participation Level: Active Listener and Interactive    Participation Quality: Appropriate, Attentive, and Sharing      Speech:  normal      Thought Process/Content: Logical      Affective Functioning: Congruent      Mood: euphoric      Level of consciousness:  Alert, Oriented x4, and Attentive      Response to Learning: Able to retain information and Progressing to goal      Endings: None Reported    Modes of Intervention: Education, Support, and Exploration      Discipline Responsible: Psychoeducational Specialist      Signature:  NOLBERTO Holloway

## 2024-04-04 NOTE — PLAN OF CARE
Problem: Behavior  Goal: Pt/Family maintain appropriate behavior and adhere to behavioral management agreement, if implemented  Description: INTERVENTIONS:  1. Assess patient/family's coping skills and  non-compliant behavior (including use of illegal substances)  2. Notify security of behavior or suspected illegal substances which indicate the need for search of the family and/or belongings  3. Encourage verbalization of thoughts and concerns in a socially appropriate manner  4. Utilize positive, consistent limit setting strategies supporting safety of patient, staff and others  5. Encourage participation in the decision making process about the behavioral management agreement  6. If a visitor's behavior poses a threat to safety call refer to organization policy.  7. Initiate consult with , Psychosocial CNS, Spiritual Care as appropriate  4/3/2024 7226 by Migdalia Aleman, RN  Outcome: Progressing     Problem: Anxiety  Goal: Will report anxiety at manageable levels  Description: INTERVENTIONS:  1. Administer medication as ordered  2. Teach and rehearse alternative coping skills  3. Provide emotional support with 1:1 interaction with staff  Outcome: Progressing     Patient has been withdrawn to his room. Avoided eye contact during assessment. Responses were minimal. States that he feels ready for discharge. Denies suicidal/homicidal ideations and hallucinations at this time. Purposeful rounding continued.

## 2024-04-05 NOTE — CARE COORDINATION
SW attempted to contact pt post discharge for follow up call. Pt was unable to be reached at this time.   Pt received semisupine, +telemetry, NAD. Pt agreeable to participate in PT evaluation.

## 2024-04-21 LAB
EKG ATRIAL RATE: 83 BPM
EKG P AXIS: 68 DEGREES
EKG P-R INTERVAL: 180 MS
EKG Q-T INTERVAL: 374 MS
EKG QRS DURATION: 88 MS
EKG QTC CALCULATION (BAZETT): 439 MS
EKG R AXIS: 34 DEGREES
EKG T AXIS: 26 DEGREES
EKG VENTRICULAR RATE: 83 BPM

## 2024-04-29 ENCOUNTER — HOSPITAL ENCOUNTER (INPATIENT)
Age: 49
LOS: 1 days | Discharge: LEFT BEFORE DISCHARGE | DRG: 756 | End: 2024-05-01
Attending: STUDENT IN AN ORGANIZED HEALTH CARE EDUCATION/TRAINING PROGRAM | Admitting: INTERNAL MEDICINE
Payer: MEDICAID

## 2024-04-29 ENCOUNTER — APPOINTMENT (OUTPATIENT)
Dept: CT IMAGING | Age: 49
DRG: 756 | End: 2024-04-29
Payer: MEDICAID

## 2024-04-29 DIAGNOSIS — M79.5 FOREIGN BODY (FB) IN SOFT TISSUE: ICD-10-CM

## 2024-04-29 DIAGNOSIS — I63.9 CEREBROVASCULAR ACCIDENT (CVA), UNSPECIFIED MECHANISM (HCC): Primary | ICD-10-CM

## 2024-04-29 DIAGNOSIS — I73.9 PVD (PERIPHERAL VASCULAR DISEASE) (HCC): ICD-10-CM

## 2024-04-29 LAB
BASOPHILS # BLD: 0.03 K/UL (ref 0–0.2)
BASOPHILS NFR BLD: 0 % (ref 0–2)
EOSINOPHIL # BLD: 0.24 K/UL (ref 0.05–0.5)
EOSINOPHILS RELATIVE PERCENT: 3 % (ref 0–6)
ERYTHROCYTE [DISTWIDTH] IN BLOOD BY AUTOMATED COUNT: 13.8 % (ref 11.5–15)
GLUCOSE BLD-MCNC: 176 MG/DL (ref 74–99)
HCT VFR BLD AUTO: 42.3 % (ref 37–54)
HGB BLD-MCNC: 14.5 G/DL (ref 12.5–16.5)
IMM GRANULOCYTES # BLD AUTO: 0.03 K/UL (ref 0–0.58)
IMM GRANULOCYTES NFR BLD: 0 % (ref 0–5)
INR PPP: 1.4
LYMPHOCYTES NFR BLD: 1 K/UL (ref 1.5–4)
LYMPHOCYTES RELATIVE PERCENT: 14 % (ref 20–42)
MCH RBC QN AUTO: 28.8 PG (ref 26–35)
MCHC RBC AUTO-ENTMCNC: 34.3 G/DL (ref 32–34.5)
MCV RBC AUTO: 83.9 FL (ref 80–99.9)
MONOCYTES NFR BLD: 0.29 K/UL (ref 0.1–0.95)
MONOCYTES NFR BLD: 4 % (ref 2–12)
NEUTROPHILS NFR BLD: 78 % (ref 43–80)
NEUTS SEG NFR BLD: 5.65 K/UL (ref 1.8–7.3)
PARTIAL THROMBOPLASTIN TIME: 22.6 SEC (ref 24.5–35.1)
PLATELET # BLD AUTO: 286 K/UL (ref 130–450)
PMV BLD AUTO: 9.4 FL (ref 7–12)
PROTHROMBIN TIME: 15.5 SEC (ref 9.3–12.4)
RBC # BLD AUTO: 5.04 M/UL (ref 3.8–5.8)
WBC OTHER # BLD: 7.2 K/UL (ref 4.5–11.5)

## 2024-04-29 PROCEDURE — 2580000003 HC RX 258

## 2024-04-29 PROCEDURE — 0042T CT BRAIN PERFUSION: CPT

## 2024-04-29 PROCEDURE — 85730 THROMBOPLASTIN TIME PARTIAL: CPT

## 2024-04-29 PROCEDURE — 99291 CRITICAL CARE FIRST HOUR: CPT

## 2024-04-29 PROCEDURE — 96375 TX/PRO/DX INJ NEW DRUG ADDON: CPT

## 2024-04-29 PROCEDURE — 85025 COMPLETE CBC W/AUTO DIFF WBC: CPT

## 2024-04-29 PROCEDURE — 85610 PROTHROMBIN TIME: CPT

## 2024-04-29 PROCEDURE — 70498 CT ANGIOGRAPHY NECK: CPT

## 2024-04-29 PROCEDURE — 6360000004 HC RX CONTRAST MEDICATION: Performed by: RADIOLOGY

## 2024-04-29 PROCEDURE — 3E03317 INTRODUCTION OF OTHER THROMBOLYTIC INTO PERIPHERAL VEIN, PERCUTANEOUS APPROACH: ICD-10-PCS

## 2024-04-29 PROCEDURE — 96374 THER/PROPH/DIAG INJ IV PUSH: CPT

## 2024-04-29 PROCEDURE — 70496 CT ANGIOGRAPHY HEAD: CPT

## 2024-04-29 PROCEDURE — 6360000002 HC RX W HCPCS

## 2024-04-29 PROCEDURE — 82962 GLUCOSE BLOOD TEST: CPT

## 2024-04-29 PROCEDURE — 70450 CT HEAD/BRAIN W/O DYE: CPT

## 2024-04-29 PROCEDURE — 99285 EMERGENCY DEPT VISIT HI MDM: CPT

## 2024-04-29 PROCEDURE — 93005 ELECTROCARDIOGRAM TRACING: CPT

## 2024-04-29 PROCEDURE — 37195 THROMBOLYTIC THERAPY STROKE: CPT

## 2024-04-29 RX ORDER — DIPHENHYDRAMINE HYDROCHLORIDE 50 MG/ML
50 INJECTION INTRAMUSCULAR; INTRAVENOUS ONCE
Status: COMPLETED | OUTPATIENT
Start: 2024-04-29 | End: 2024-04-29

## 2024-04-29 RX ORDER — SODIUM CHLORIDE 0.9 % (FLUSH) 0.9 %
5-40 SYRINGE (ML) INJECTION PRN
Status: DISCONTINUED | OUTPATIENT
Start: 2024-04-29 | End: 2024-05-01 | Stop reason: HOSPADM

## 2024-04-29 RX ORDER — SODIUM CHLORIDE 0.9 % (FLUSH) 0.9 %
5-40 SYRINGE (ML) INJECTION EVERY 12 HOURS SCHEDULED
Status: DISCONTINUED | OUTPATIENT
Start: 2024-04-29 | End: 2024-05-01 | Stop reason: HOSPADM

## 2024-04-29 RX ORDER — SODIUM CHLORIDE 9 MG/ML
INJECTION, SOLUTION INTRAVENOUS PRN
Status: DISCONTINUED | OUTPATIENT
Start: 2024-04-29 | End: 2024-04-30 | Stop reason: SDUPTHER

## 2024-04-29 RX ORDER — SODIUM CHLORIDE 0.9 % (FLUSH) 0.9 %
10 SYRINGE (ML) INJECTION ONCE
Status: DISCONTINUED | OUTPATIENT
Start: 2024-04-29 | End: 2024-05-01 | Stop reason: HOSPADM

## 2024-04-29 RX ORDER — SODIUM CHLORIDE 0.9 % (FLUSH) 0.9 %
10 SYRINGE (ML) INJECTION ONCE
Status: COMPLETED | OUTPATIENT
Start: 2024-04-29 | End: 2024-04-29

## 2024-04-29 RX ADMIN — SODIUM CHLORIDE, PRESERVATIVE FREE 10 ML: 5 INJECTION INTRAVENOUS at 23:24

## 2024-04-29 RX ADMIN — SODIUM CHLORIDE, PRESERVATIVE FREE 10 ML: 5 INJECTION INTRAVENOUS at 23:10

## 2024-04-29 RX ADMIN — DIPHENHYDRAMINE HYDROCHLORIDE 50 MG: 50 INJECTION INTRAMUSCULAR; INTRAVENOUS at 21:51

## 2024-04-29 RX ADMIN — IOPAMIDOL 60 ML: 755 INJECTION, SOLUTION INTRAVENOUS at 22:09

## 2024-04-29 RX ADMIN — WATER 40 MG: 1 INJECTION INTRAMUSCULAR; INTRAVENOUS; SUBCUTANEOUS at 21:50

## 2024-04-29 RX ADMIN — IOPAMIDOL 40 ML: 755 INJECTION, SOLUTION INTRAVENOUS at 22:45

## 2024-04-29 RX ADMIN — Medication 22 MG: at 23:15

## 2024-04-29 ASSESSMENT — PAIN - FUNCTIONAL ASSESSMENT: PAIN_FUNCTIONAL_ASSESSMENT: NONE - DENIES PAIN

## 2024-04-30 ENCOUNTER — APPOINTMENT (OUTPATIENT)
Dept: GENERAL RADIOLOGY | Age: 49
DRG: 756 | End: 2024-04-30
Payer: MEDICAID

## 2024-04-30 ENCOUNTER — APPOINTMENT (OUTPATIENT)
Dept: CT IMAGING | Age: 49
DRG: 756 | End: 2024-04-30
Payer: MEDICAID

## 2024-04-30 PROBLEM — L03.032 CELLULITIS OF LEFT TOE: Status: ACTIVE | Noted: 2024-04-30

## 2024-04-30 PROBLEM — F44.9 CONVERSION DISORDER: Status: ACTIVE | Noted: 2024-04-30

## 2024-04-30 PROBLEM — R29.90 STROKE-LIKE SYMPTOMS: Status: ACTIVE | Noted: 2024-04-30

## 2024-04-30 PROBLEM — F19.90 POLYSUBSTANCE USE DISORDER: Status: ACTIVE | Noted: 2022-08-23

## 2024-04-30 PROBLEM — L03.039 PARONYCHIA OF GREAT TOE: Status: ACTIVE | Noted: 2024-04-30

## 2024-04-30 LAB
ALBUMIN SERPL-MCNC: 3.7 G/DL (ref 3.5–5.2)
ALP SERPL-CCNC: 101 U/L (ref 40–129)
ALT SERPL-CCNC: 18 U/L (ref 0–40)
AMPHET UR QL SCN: NEGATIVE
ANION GAP SERPL CALCULATED.3IONS-SCNC: 11 MMOL/L (ref 7–16)
APAP SERPL-MCNC: <5 UG/ML (ref 10–30)
AST SERPL-CCNC: 13 U/L (ref 0–39)
BARBITURATES UR QL SCN: POSITIVE
BASOPHILS # BLD: 0 K/UL (ref 0–0.2)
BASOPHILS NFR BLD: 0 % (ref 0–2)
BENZODIAZ UR QL: POSITIVE
BILIRUB SERPL-MCNC: 0.3 MG/DL (ref 0–1.2)
BUN SERPL-MCNC: 12 MG/DL (ref 6–20)
BUPRENORPHINE UR QL: NEGATIVE
CALCIUM SERPL-MCNC: 8.7 MG/DL (ref 8.6–10.2)
CANNABINOIDS UR QL SCN: NEGATIVE
CHLORIDE SERPL-SCNC: 103 MMOL/L (ref 98–107)
CHOLEST SERPL-MCNC: 116 MG/DL
CO2 SERPL-SCNC: 19 MMOL/L (ref 22–29)
COCAINE UR QL SCN: POSITIVE
CREAT SERPL-MCNC: 0.7 MG/DL (ref 0.7–1.2)
CRP SERPL HS-MCNC: 13 MG/L (ref 0–5)
EOSINOPHIL # BLD: 0 K/UL (ref 0.05–0.5)
EOSINOPHILS RELATIVE PERCENT: 0 % (ref 0–6)
ERYTHROCYTE [DISTWIDTH] IN BLOOD BY AUTOMATED COUNT: 13.6 % (ref 11.5–15)
ERYTHROCYTE [SEDIMENTATION RATE] IN BLOOD BY WESTERGREN METHOD: 44 MM/HR (ref 0–15)
ETHANOLAMINE SERPL-MCNC: <10 MG/DL
FENTANYL UR QL: NEGATIVE
GFR SERPL CREATININE-BSD FRML MDRD: >90 ML/MIN/1.73M2
GLUCOSE BLD-MCNC: 142 MG/DL (ref 74–99)
GLUCOSE BLD-MCNC: 252 MG/DL (ref 74–99)
GLUCOSE BLD-MCNC: 332 MG/DL (ref 74–99)
GLUCOSE BLD-MCNC: 341 MG/DL (ref 74–99)
GLUCOSE BLD-MCNC: 393 MG/DL (ref 74–99)
GLUCOSE SERPL-MCNC: 316 MG/DL (ref 74–99)
HBA1C MFR BLD: 7.9 % (ref 4–5.6)
HCT VFR BLD AUTO: 38.6 % (ref 37–54)
HDLC SERPL-MCNC: 34 MG/DL
HGB BLD-MCNC: 13.3 G/DL (ref 12.5–16.5)
LDLC SERPL CALC-MCNC: 69 MG/DL
LYMPHOCYTES NFR BLD: 0.28 K/UL (ref 1.5–4)
LYMPHOCYTES RELATIVE PERCENT: 4 % (ref 20–42)
MCH RBC QN AUTO: 28.5 PG (ref 26–35)
MCHC RBC AUTO-ENTMCNC: 34.5 G/DL (ref 32–34.5)
MCV RBC AUTO: 82.8 FL (ref 80–99.9)
METHADONE UR QL: NEGATIVE
MONOCYTES NFR BLD: 0.11 K/UL (ref 0.1–0.95)
MONOCYTES NFR BLD: 2 % (ref 2–12)
NEUTROPHILS NFR BLD: 94 % (ref 43–80)
NEUTS SEG NFR BLD: 6.01 K/UL (ref 1.8–7.3)
OPIATES UR QL SCN: NEGATIVE
OXYCODONE UR QL SCN: NEGATIVE
PCP UR QL SCN: NEGATIVE
PLATELET # BLD AUTO: 241 K/UL (ref 130–450)
PMV BLD AUTO: 9.5 FL (ref 7–12)
POTASSIUM SERPL-SCNC: 4.5 MMOL/L (ref 3.5–5)
PROCALCITONIN SERPL-MCNC: 0.04 NG/ML (ref 0–0.08)
PROT SERPL-MCNC: 6.8 G/DL (ref 6.4–8.3)
RBC # BLD AUTO: 4.66 M/UL (ref 3.8–5.8)
RBC # BLD: ABNORMAL 10*6/UL
SALICYLATES SERPL-MCNC: <0.3 MG/DL (ref 0–30)
SODIUM SERPL-SCNC: 133 MMOL/L (ref 132–146)
TEST INFORMATION: ABNORMAL
TOXIC TRICYCLIC SC,BLOOD: NEGATIVE
TRIGL SERPL-MCNC: 67 MG/DL
TROPONIN I SERPL HS-MCNC: 8 NG/L (ref 0–11)
VLDLC SERPL CALC-MCNC: 13 MG/DL
WBC OTHER # BLD: 6.4 K/UL (ref 4.5–11.5)

## 2024-04-30 PROCEDURE — 2580000003 HC RX 258

## 2024-04-30 PROCEDURE — 70450 CT HEAD/BRAIN W/O DYE: CPT

## 2024-04-30 PROCEDURE — 87040 BLOOD CULTURE FOR BACTERIA: CPT

## 2024-04-30 PROCEDURE — 80143 DRUG ASSAY ACETAMINOPHEN: CPT

## 2024-04-30 PROCEDURE — 6360000002 HC RX W HCPCS

## 2024-04-30 PROCEDURE — 86140 C-REACTIVE PROTEIN: CPT

## 2024-04-30 PROCEDURE — 80307 DRUG TEST PRSMV CHEM ANLYZR: CPT

## 2024-04-30 PROCEDURE — 6370000000 HC RX 637 (ALT 250 FOR IP)

## 2024-04-30 PROCEDURE — 85025 COMPLETE CBC W/AUTO DIFF WBC: CPT

## 2024-04-30 PROCEDURE — 84145 PROCALCITONIN (PCT): CPT

## 2024-04-30 PROCEDURE — G0480 DRUG TEST DEF 1-7 CLASSES: HCPCS

## 2024-04-30 PROCEDURE — 80053 COMPREHEN METABOLIC PANEL: CPT

## 2024-04-30 PROCEDURE — 80179 DRUG ASSAY SALICYLATE: CPT

## 2024-04-30 PROCEDURE — 84484 ASSAY OF TROPONIN QUANT: CPT

## 2024-04-30 PROCEDURE — 82962 GLUCOSE BLOOD TEST: CPT

## 2024-04-30 PROCEDURE — 6370000000 HC RX 637 (ALT 250 FOR IP): Performed by: CLINICAL NURSE SPECIALIST

## 2024-04-30 PROCEDURE — 85652 RBC SED RATE AUTOMATED: CPT

## 2024-04-30 PROCEDURE — 80061 LIPID PANEL: CPT

## 2024-04-30 PROCEDURE — 2000000000 HC ICU R&B

## 2024-04-30 PROCEDURE — 99291 CRITICAL CARE FIRST HOUR: CPT | Performed by: CLINICAL NURSE SPECIALIST

## 2024-04-30 PROCEDURE — 73620 X-RAY EXAM OF FOOT: CPT

## 2024-04-30 PROCEDURE — 83036 HEMOGLOBIN GLYCOSYLATED A1C: CPT

## 2024-04-30 PROCEDURE — 99223 1ST HOSP IP/OBS HIGH 75: CPT | Performed by: INTERNAL MEDICINE

## 2024-04-30 RX ORDER — HYDRALAZINE HYDROCHLORIDE 20 MG/ML
10 INJECTION INTRAMUSCULAR; INTRAVENOUS EVERY 10 MIN PRN
Status: DISCONTINUED | OUTPATIENT
Start: 2024-04-30 | End: 2024-05-01 | Stop reason: HOSPADM

## 2024-04-30 RX ORDER — ONDANSETRON 2 MG/ML
4 INJECTION INTRAMUSCULAR; INTRAVENOUS EVERY 6 HOURS PRN
Status: DISCONTINUED | OUTPATIENT
Start: 2024-04-30 | End: 2024-05-01 | Stop reason: HOSPADM

## 2024-04-30 RX ORDER — LIDOCAINE 4 G/G
1 PATCH TOPICAL DAILY
Status: DISCONTINUED | OUTPATIENT
Start: 2024-04-30 | End: 2024-05-01 | Stop reason: HOSPADM

## 2024-04-30 RX ORDER — DICYCLOMINE HYDROCHLORIDE 10 MG/1
20 CAPSULE ORAL
Status: DISCONTINUED | OUTPATIENT
Start: 2024-04-30 | End: 2024-05-01 | Stop reason: HOSPADM

## 2024-04-30 RX ORDER — INSULIN LISPRO 100 [IU]/ML
0-4 INJECTION, SOLUTION INTRAVENOUS; SUBCUTANEOUS EVERY 6 HOURS
Status: DISCONTINUED | OUTPATIENT
Start: 2024-04-30 | End: 2024-04-30

## 2024-04-30 RX ORDER — SODIUM CHLORIDE 0.9 % (FLUSH) 0.9 %
5-40 SYRINGE (ML) INJECTION PRN
Status: DISCONTINUED | OUTPATIENT
Start: 2024-04-30 | End: 2024-04-30

## 2024-04-30 RX ORDER — LANOLIN ALCOHOL/MO/W.PET/CERES
100 CREAM (GRAM) TOPICAL DAILY
Status: DISCONTINUED | OUTPATIENT
Start: 2024-04-30 | End: 2024-05-01 | Stop reason: HOSPADM

## 2024-04-30 RX ORDER — INSULIN LISPRO 100 [IU]/ML
0-16 INJECTION, SOLUTION INTRAVENOUS; SUBCUTANEOUS EVERY 4 HOURS
Status: DISCONTINUED | OUTPATIENT
Start: 2024-04-30 | End: 2024-04-30

## 2024-04-30 RX ORDER — ONDANSETRON 4 MG/1
4 TABLET, ORALLY DISINTEGRATING ORAL EVERY 8 HOURS PRN
Status: DISCONTINUED | OUTPATIENT
Start: 2024-04-30 | End: 2024-05-01 | Stop reason: HOSPADM

## 2024-04-30 RX ORDER — DIVALPROEX SODIUM 250 MG/1
250 TABLET, DELAYED RELEASE ORAL 2 TIMES DAILY
Status: DISCONTINUED | OUTPATIENT
Start: 2024-04-30 | End: 2024-04-30

## 2024-04-30 RX ORDER — ATORVASTATIN CALCIUM 40 MG/1
40 TABLET, FILM COATED ORAL NIGHTLY
Status: DISCONTINUED | OUTPATIENT
Start: 2024-04-30 | End: 2024-05-01 | Stop reason: HOSPADM

## 2024-04-30 RX ORDER — SODIUM CHLORIDE 0.9 % (FLUSH) 0.9 %
5-40 SYRINGE (ML) INJECTION EVERY 12 HOURS SCHEDULED
Status: DISCONTINUED | OUTPATIENT
Start: 2024-04-30 | End: 2024-04-30

## 2024-04-30 RX ORDER — INSULIN LISPRO 100 [IU]/ML
0-16 INJECTION, SOLUTION INTRAVENOUS; SUBCUTANEOUS EVERY 4 HOURS
Status: DISCONTINUED | OUTPATIENT
Start: 2024-04-30 | End: 2024-05-01

## 2024-04-30 RX ORDER — SODIUM CHLORIDE 9 MG/ML
INJECTION, SOLUTION INTRAVENOUS PRN
Status: DISCONTINUED | OUTPATIENT
Start: 2024-04-30 | End: 2024-05-01 | Stop reason: HOSPADM

## 2024-04-30 RX ORDER — ACETAMINOPHEN 325 MG/1
650 TABLET ORAL EVERY 6 HOURS PRN
Status: DISCONTINUED | OUTPATIENT
Start: 2024-04-30 | End: 2024-05-01 | Stop reason: HOSPADM

## 2024-04-30 RX ORDER — INSULIN GLARGINE 100 [IU]/ML
40 INJECTION, SOLUTION SUBCUTANEOUS NIGHTLY
Status: DISCONTINUED | OUTPATIENT
Start: 2024-04-30 | End: 2024-05-01 | Stop reason: HOSPADM

## 2024-04-30 RX ORDER — PANTOPRAZOLE SODIUM 40 MG/1
40 TABLET, DELAYED RELEASE ORAL
Status: DISCONTINUED | OUTPATIENT
Start: 2024-04-30 | End: 2024-05-01 | Stop reason: HOSPADM

## 2024-04-30 RX ORDER — POLYETHYLENE GLYCOL 3350 17 G/17G
17 POWDER, FOR SOLUTION ORAL DAILY PRN
Status: DISCONTINUED | OUTPATIENT
Start: 2024-04-30 | End: 2024-05-01 | Stop reason: HOSPADM

## 2024-04-30 RX ORDER — GABAPENTIN 300 MG/1
300 CAPSULE ORAL 3 TIMES DAILY
Status: DISCONTINUED | OUTPATIENT
Start: 2024-04-30 | End: 2024-05-01 | Stop reason: HOSPADM

## 2024-04-30 RX ORDER — AMMONIUM LACTATE 12 G/100G
LOTION TOPICAL PRN
Status: DISCONTINUED | OUTPATIENT
Start: 2024-04-30 | End: 2024-05-01 | Stop reason: HOSPADM

## 2024-04-30 RX ORDER — ACETAMINOPHEN 650 MG/1
650 SUPPOSITORY RECTAL EVERY 6 HOURS PRN
Status: DISCONTINUED | OUTPATIENT
Start: 2024-04-30 | End: 2024-05-01 | Stop reason: HOSPADM

## 2024-04-30 RX ORDER — GLUCAGON 1 MG/ML
1 KIT INJECTION PRN
Status: DISCONTINUED | OUTPATIENT
Start: 2024-04-30 | End: 2024-05-01 | Stop reason: HOSPADM

## 2024-04-30 RX ORDER — DEXTROSE MONOHYDRATE 100 MG/ML
INJECTION, SOLUTION INTRAVENOUS CONTINUOUS PRN
Status: DISCONTINUED | OUTPATIENT
Start: 2024-04-30 | End: 2024-05-01 | Stop reason: HOSPADM

## 2024-04-30 RX ADMIN — WATER 40 MG: 1 INJECTION INTRAMUSCULAR; INTRAVENOUS; SUBCUTANEOUS at 08:34

## 2024-04-30 RX ADMIN — DICYCLOMINE HYDROCHLORIDE 20 MG: 10 CAPSULE ORAL at 20:05

## 2024-04-30 RX ADMIN — SODIUM CHLORIDE, PRESERVATIVE FREE 10 ML: 5 INJECTION INTRAVENOUS at 08:43

## 2024-04-30 RX ADMIN — GABAPENTIN 300 MG: 300 CAPSULE ORAL at 20:05

## 2024-04-30 RX ADMIN — PANTOPRAZOLE SODIUM 40 MG: 40 TABLET, DELAYED RELEASE ORAL at 08:34

## 2024-04-30 RX ADMIN — WATER 40 MG: 1 INJECTION INTRAMUSCULAR; INTRAVENOUS; SUBCUTANEOUS at 02:07

## 2024-04-30 RX ADMIN — ATORVASTATIN CALCIUM 40 MG: 40 TABLET, FILM COATED ORAL at 20:05

## 2024-04-30 RX ADMIN — PIPERACILLIN AND TAZOBACTAM 3375 MG: 3; .375 INJECTION, POWDER, LYOPHILIZED, FOR SOLUTION INTRAVENOUS at 02:55

## 2024-04-30 RX ADMIN — INSULIN LISPRO 2 UNITS: 100 INJECTION, SOLUTION INTRAVENOUS; SUBCUTANEOUS at 02:53

## 2024-04-30 RX ADMIN — ACETAMINOPHEN 650 MG: 325 TABLET ORAL at 16:45

## 2024-04-30 RX ADMIN — INSULIN GLARGINE 40 UNITS: 100 INJECTION, SOLUTION SUBCUTANEOUS at 20:04

## 2024-04-30 RX ADMIN — PIPERACILLIN AND TAZOBACTAM 3375 MG: 3; .375 INJECTION, POWDER, LYOPHILIZED, FOR SOLUTION INTRAVENOUS at 12:06

## 2024-04-30 RX ADMIN — INSULIN LISPRO 12 UNITS: 100 INJECTION, SOLUTION INTRAVENOUS; SUBCUTANEOUS at 17:42

## 2024-04-30 RX ADMIN — INSULIN LISPRO 16 UNITS: 100 INJECTION, SOLUTION INTRAVENOUS; SUBCUTANEOUS at 20:04

## 2024-04-30 RX ADMIN — WATER 40 MG: 1 INJECTION INTRAMUSCULAR; INTRAVENOUS; SUBCUTANEOUS at 06:43

## 2024-04-30 RX ADMIN — VANCOMYCIN HYDROCHLORIDE 1500 MG: 10 INJECTION, POWDER, LYOPHILIZED, FOR SOLUTION INTRAVENOUS at 08:35

## 2024-04-30 RX ADMIN — INSULIN LISPRO 3 UNITS: 100 INJECTION, SOLUTION INTRAVENOUS; SUBCUTANEOUS at 08:43

## 2024-04-30 ASSESSMENT — ENCOUNTER SYMPTOMS
BACK PAIN: 0
COUGH: 1
VOMITING: 0
RHINORRHEA: 0
ABDOMINAL PAIN: 0
CONSTIPATION: 0
COLOR CHANGE: 0
SHORTNESS OF BREATH: 0
DIARRHEA: 0

## 2024-04-30 ASSESSMENT — PAIN - FUNCTIONAL ASSESSMENT: PAIN_FUNCTIONAL_ASSESSMENT: ACTIVITIES ARE NOT PREVENTED

## 2024-04-30 ASSESSMENT — PAIN DESCRIPTION - FREQUENCY: FREQUENCY: CONTINUOUS

## 2024-04-30 ASSESSMENT — PAIN DESCRIPTION - ONSET: ONSET: ON-GOING

## 2024-04-30 ASSESSMENT — PAIN SCALES - GENERAL
PAINLEVEL_OUTOF10: 3
PAINLEVEL_OUTOF10: 3
PAINLEVEL_OUTOF10: 0

## 2024-04-30 ASSESSMENT — PAIN DESCRIPTION - PAIN TYPE: TYPE: CHRONIC PAIN

## 2024-04-30 ASSESSMENT — PAIN DESCRIPTION - DESCRIPTORS: DESCRIPTORS: DISCOMFORT;DULL;SORE

## 2024-04-30 ASSESSMENT — PAIN DESCRIPTION - LOCATION: LOCATION: GENERALIZED

## 2024-04-30 NOTE — ACP (ADVANCE CARE PLANNING)
Advance Care Planning   Healthcare Decision Maker:    Primary Decision Maker: Shyam Em - Aunt/Uncle - [phone]    Click here to complete Healthcare Decision Makers including selection of the Healthcare Decision Maker Relationship (ie \"Primary\").

## 2024-04-30 NOTE — H&P
some words  Extinction and Inattention (11): (!) Profound jasper-inattention or extinction to more than one modality  Total: 16  Comparable to ED documented NIHSS  Labs and Imaging Studies   Basic Labs  No results for input(s): \"NA\", \"K\", \"CL\", \"CO2\", \"BUN\", \"CREATININE\", \"GLUCOSE\", \"CALCIUM\" in the last 72 hours.    Recent Labs     04/29/24 2145   WBC 7.2   RBC 5.04   HGB 14.5   HCT 42.3   MCV 83.9   MCH 28.8   MCHC 34.3   RDW 13.8      MPV 9.4       CBC:   Lab Results   Component Value Date/Time    WBC 6.4 04/30/2024 04:59 AM    RBC 4.66 04/30/2024 04:59 AM    RBC 5.07 08/23/2022 04:03 AM    HGB 13.3 04/30/2024 04:59 AM    HCT 38.6 04/30/2024 04:59 AM    MCV 82.8 04/30/2024 04:59 AM    RDW 13.6 04/30/2024 04:59 AM     04/30/2024 04:59 AM     CMP:  Lab Results   Component Value Date/Time     04/30/2024 04:59 AM    K 4.5 04/30/2024 04:59 AM    K 4.3 03/22/2023 07:55 AM     04/30/2024 04:59 AM    CO2 19 04/30/2024 04:59 AM    BUN 12 04/30/2024 04:59 AM    PROT 6.8 04/30/2024 04:59 AM       Imaging Studies:     CT BRAIN PERFUSION    Result Date: 4/29/2024  EXAMINATION: CTA OF THE HEAD WITH CONTRAST WITH PERFUSION 4/29/2024 10:46 pm: TECHNIQUE: CTA of the head/brain was performed with the administration of intravenous contrast. Multiplanar reformatted images are provided for review.  MIP images are provided for review. Automated exposure control, iterative reconstruction, and/or weight based adjustment of the mA/kV was utilized to reduce the radiation dose to as low as reasonably achievable. COMPARISON: None. HISTORY: ORDERING SYSTEM PROVIDED HISTORY: c/f stroke TECHNOLOGIST PROVIDED HISTORY: Reason for exam:->c/f stroke Has a \"code stroke\" or \"stroke alert\" been called?->Yes Decision Support Exception - unselect if not a suspected or confirmed emergency medical condition->Emergency Medical Condition (MA) What reading provider will be dictating this exam?->CRC FINDINGS: CT PERFUSION: The

## 2024-04-30 NOTE — PROCEDURES
Patient admitted to NSICU with the following belongings:  Wallet, Cell Phone, Cell Phone , Pants, Shirt, Socks, and Shoes. The following belongings admitted with the patient, None, were sent home with the patient's family.     4 Eyes Skin Assessment     NAME:  Pepe Em  YOB: 1975  MEDICAL RECORD NUMBER:  [id number]    The patient is being assessed for  Admission    I agree that at least one RN has performed a thorough Head to Toe Skin Assessment on the patient. ALL assessment sites listed below have been assessed.      Areas assessed by both nurses:    Head, Face, Ears, Shoulders, Back, Chest, Arms, Elbows, Hands, Sacrum. Buttock, Coccyx, Ischium, Legs. Feet and Heels, and Under Medical Devices         Does the Patient have a Wound? No noted wound(s)       Cal Prevention initiated by RN: Yes  Wound Care Orders initiated by RN: No    Pressure Injury (Stage 3,4, Unstageable, DTI, NWPT, and Complex wounds) if present, place Wound referral order by RN under : No    New Ostomies, if present place, Ostomy referral order under : No     Nurse 1 eSignature: Electronically signed by Matthew Pretty RN on 4/30/24 at 5:56 PM EDT    **SHARE this note so that the co-signing nurse can place an eSignature**    Nurse 2 eSignature: Electronically signed by MERRILL NOLAN RN on 4/30/24 at 5:57 PM EDT

## 2024-04-30 NOTE — ED NOTES
Stroke/ Syl Alert Time:  2140    Time Neurologist called:  :2140    Time CT Notified:     2140  BRAIN alert time: (If applicable)
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
## 2024-04-30 NOTE — ED PROVIDER NOTES
(has no administration in time range)   ondansetron (ZOFRAN-ODT) disintegrating tablet 4 mg (has no administration in time range)     Or   ondansetron (ZOFRAN) injection 4 mg (has no administration in time range)   polyethylene glycol (GLYCOLAX) packet 17 g (has no administration in time range)   acetaminophen (TYLENOL) tablet 650 mg (has no administration in time range)     Or   acetaminophen (TYLENOL) suppository 650 mg (has no administration in time range)   glucose chewable tablet 16 g (has no administration in time range)   dextrose bolus 10% 125 mL (has no administration in time range)     Or   dextrose bolus 10% 250 mL (has no administration in time range)   glucagon injection 1 mg (has no administration in time range)   dextrose 10 % infusion (has no administration in time range)   insulin lispro (HUMALOG) injection vial 0-4 Units (has no administration in time range)   piperacillin-tazobactam (ZOSYN) 3,375 mg in sodium chloride 0.9 % 50 mL IVPB ([id number]) (has no administration in time range)   methylPREDNISolone sodium succ (SOLU-MEDROL) 40 mg in sterile water 1 mL injection (40 mg IntraVENous Given 4/29/24 2150)   diphenhydrAMINE (BENADRYL) injection 50 mg (50 mg IntraVENous Given 4/29/24 2151)   iopamidol (ISOVUE-370) 76 % injection 60 mL (40 mLs IntraVENous Given 4/29/24 2245)       Based upon patient's stroke like symptoms a stroke neurology consult is indicated.   Consult to Neurology completed. [facility]      Acute CVA Core Measures:   Last Known to be Well (Stroke Diagnosis)  Date Last Known Well: 04/29/24  Time Last Known Well: 2055  NIH Stroke Scale Total: Total: 18  TNK eligibility: was recommeded by [facility] Neurologist and under the order of the ED physician  IV TNK was Administered-Total dose IV TNK of 22 mg  There is indication for TPA.    Medical Decision Making:    Patient is a 49-year-old male presenting with strokelike symptoms.  At the time initial

## 2024-04-30 NOTE — PLAN OF CARE
Pt requesting to speak with IM team about pain management.   Sleeping on entering room, no distress. Did not wake to name, woke to gentle touch of LUE.     Pt states he is in 10/10 pain in his back, refuses to take tylenol, baclofen, or lidocaine patch w/o a \"one time dose of hydromorphone\". Previous gabapentin TID resumed +lidocaine patch with reassessment if further invention is needed.     Pt upset about his elevated BG due to the steroids and upset he is on a carb restricted diet. Reviewed insulin regimen, has been adjusted by unit.     Electronically signed by Oneida Charles MD on 4/30/2024 at 6:42 PM

## 2024-04-30 NOTE — ED NOTES
This RN assessed pt and notified swelling at IV site to right forearm. Pt was receiving vancomycin in IV. IV infiltrated. Pt reports no pain to site. This RN notified the clinical pharmacist regarding issue. Cold compress placed over site. Report given to oncoming RN and notified of issue. Will continue to closely monitor pt.

## 2024-04-30 NOTE — CONSULTS
I was contacted by the emergency department @ *** on *** to review/discuss about this ***stroke/***[facility] alert case.  The last known well time was reported as ***.   I was informed of the relevant medical history and the presenting complaints and I myself did pertinent medical chart review.  I have personally reviewed the neuroimaging wherein Glownet software was utilized to assist with triage.  No face-to-face interaction with the patient was done.    Personal review of neuroimaging shows ***    Assessment/Plan:    ***    ***No further vascular and/or endovascular workup is needed at this time.  Please consult general neurology service for further management of this patient.  Please call me with any additional questions/concerns.    Total time spent reviewing the pertinent clinical presentation, review of neuroimaging, time spent in medical decision making and discussion of case with the physicians involved in the acute care was *** mins.      Luciano Brooks M.D.  Vascular Neurology & Neuroendovascular Surgery

## 2024-04-30 NOTE — ED NOTES
MRI called this RN regarding documentation regarding spinal cord stimulator. This RN addressed pt regarding make and model of stimulator. Pt states that he had the stimulator placed at [facility] in 2001 and had it removed at same hospital in 2022. Will notify MRI.

## 2024-04-30 NOTE — PLAN OF CARE
Dicussed case with Dr. SAMINA Aleman. Concern for decompensation given severity of presentation. Discussed these concerns with Dr. Laguna, who agrees to admission to ICU for observation.     Discussed change in admission plans with ED, who will change the admission order.     Patient to be admitted to ICU.     Electronically signed by Shell Guzman DO on 4/30/2024 at 2:46 AM
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
## 2024-05-01 ENCOUNTER — APPOINTMENT (OUTPATIENT)
Dept: CT IMAGING | Age: 49
DRG: 756 | End: 2024-05-01
Payer: MEDICAID

## 2024-05-01 VITALS
HEART RATE: 73 BPM | WEIGHT: 197.75 LBS | OXYGEN SATURATION: 97 % | BODY MASS INDEX: 29.29 KG/M2 | RESPIRATION RATE: 20 BRPM | DIASTOLIC BLOOD PRESSURE: 84 MMHG | SYSTOLIC BLOOD PRESSURE: 126 MMHG | HEIGHT: 69 IN | TEMPERATURE: 97.4 F

## 2024-05-01 LAB
GLUCOSE BLD-MCNC: 106 MG/DL (ref 74–99)
GLUCOSE BLD-MCNC: 161 MG/DL (ref 74–99)
GLUCOSE BLD-MCNC: 161 MG/DL (ref 74–99)

## 2024-05-01 PROCEDURE — 97162 PT EVAL MOD COMPLEX 30 MIN: CPT

## 2024-05-01 PROCEDURE — 97530 THERAPEUTIC ACTIVITIES: CPT

## 2024-05-01 PROCEDURE — 73700 CT LOWER EXTREMITY W/O DYE: CPT

## 2024-05-01 PROCEDURE — 99232 SBSQ HOSP IP/OBS MODERATE 35: CPT | Performed by: CLINICAL NURSE SPECIALIST

## 2024-05-01 PROCEDURE — 97166 OT EVAL MOD COMPLEX 45 MIN: CPT

## 2024-05-01 PROCEDURE — 82962 GLUCOSE BLOOD TEST: CPT

## 2024-05-01 RX ORDER — HYDRALAZINE HYDROCHLORIDE 20 MG/ML
10 INJECTION INTRAMUSCULAR; INTRAVENOUS EVERY 6 HOURS PRN
Status: CANCELLED | OUTPATIENT
Start: 2024-05-01

## 2024-05-01 RX ORDER — INSULIN LISPRO 100 [IU]/ML
0-4 INJECTION, SOLUTION INTRAVENOUS; SUBCUTANEOUS NIGHTLY
Status: DISCONTINUED | OUTPATIENT
Start: 2024-05-01 | End: 2024-05-01 | Stop reason: HOSPADM

## 2024-05-01 RX ORDER — INSULIN LISPRO 100 [IU]/ML
0-4 INJECTION, SOLUTION INTRAVENOUS; SUBCUTANEOUS
Status: DISCONTINUED | OUTPATIENT
Start: 2024-05-01 | End: 2024-05-01 | Stop reason: HOSPADM

## 2024-05-01 ASSESSMENT — PAIN SCALES - GENERAL
PAINLEVEL_OUTOF10: 0
PAINLEVEL_OUTOF10: 0
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
## 2024-05-01 NOTE — CONSULTS
Department of Podiatry   Consult Note        Reason for Consult:  Left Foot Wound    CHIEF COMPLAINT:  Left toe pain    HISTORY OF PRESENT ILLNESS:    Mr. Em is a 49 y.o. male with significant past medical history of DVT,COPD,asthma,factor V Leiden. Podiatry consulted for evaluation of left foot wound. Patient states that he has had a wound on his left foot for about a week. He says the he has done nothing for treatment given that he has never had any foot problems before. He would like to have his left foot evaluated/treated today. Patient denies any N/V/D/F/C/SOB/CP and has no other pedal complaints at this time.     Past Medical History:        Diagnosis Date    Asthma     COPD (chronic obstructive pulmonary disease) (HCC)     Deep vein thrombosis (DVT) (HCC)     ETOH abuse     Factor V Leiden (HCC)     Protein S deficiency (HCC)     Pulmonary emboli (HCC)        Past Surgical History:        Procedure Laterality Date    APPENDECTOMY      IR MECHANICAL ART THROMBECTOMY INTRACRANIAL  5/11/2022    IR MECHANICAL ART THROMBECTOMY INTRACRANIAL 5/11/2022 Heber Daniel MD [facility] SPECIAL PROCEDURES    SKIN BIOPSY Left 3/21/2023    LEFT POSTERIOR THIGH ABSCESS INCISION AND DRAINAGE performed by Stanford Hidalgo MD at [facility] OR    VASCULAR SURGERY      july 2021    XR MIDLINE EQUAL OR GREATER THAN 5 YEARS  8/10/2013    XR MIDLINE EQUAL OR GREATER THAN 5 YEARS    XR MIDLINE EQUAL OR GREATER THAN 5 YEARS  6/23/2013    XR MIDLINE EQUAL OR GREATER THAN 5 YEARS       Medications Prior to Admission:    Medications Prior to Admission: divalproex (DEPAKOTE) 250 MG DR tablet, Take 1 tablet by mouth in the morning and at bedtime  thiamine 100 MG tablet, Take 1 tablet by mouth daily  Multiple Vitamin (MULTIVITAMIN) TABS tablet, Take 1 tablet by mouth daily    Allergies:  Apixaban, Bupranolol, Carrot, Citalopram, Dabigatran, Dalteparin, Daucus carota, Enoxaparin, Fentanyl, Fluphenazine, Fluphenazine hcl, Fondaparinux, Gramineae

## 2024-05-01 NOTE — DISCHARGE SUMMARY
[facility]  Discharge Summary    PCP: No primary care provider on file.    Admit Date:4/29/2024  Discharge Date: 5/1/2024    Admission Diagnosis:   Left sided weakness and facial droop 2/2 ?CVA vs conversion disorder; hx of multiple episodes of stroke-like left sided paralysis, relapsing-remitting in nature with multiple rounds of TNK in the last 2 years (6 total) s/p TPA this admission  Osteomyelitis of the left great toe  Hx of HIV infection on abacavir-lamivudine and emtricitabine-tenofovir in the past  Hx COPD   Hx DVT and PE on warfarin   Hx of factor V Leiden mutation  Hx of Protein S deficiency    Discharge Diagnosis:  Left sided weakness and facial droop 2/2 ?CVA vs conversion disorder; hx of multiple episodes of stroke-like left sided paralysis, relapsing-remitting in nature with multiple rounds of TNK in the last 2 years (6 total) s/p TPA this admission  Osteomyelitis of the left great toe  Hx of HIV infection on abacavir-lamivudine and emtricitabine-tenofovir in the past  Hx COPD   Hx DVT and PE on warfarin   Hx of factor V Leiden mutation  Hx of Protein S deficiency    Hospital Course:   Pepe Em is a 49 y.o. male with past medical history of previous episodes of left-sided paresis s/p TNK, Hx of conversion disorder, Hx of diabetes, Hx of COPD, Hx of DVT/PE, Hx of factor V Leiden mutation, Hx of protein S deficiency, alcohol abuse, polysubstance abuse (previous UDS + cocaine, benzo, barbiturate, Ecstasy).  He initially presented to the emergency department with concerns of stroke.  He had left-sided paralysis and facial droop that started suddenly.  This is happening before.  He has received TNK at least 5 times in the last 2 years.  He was previously being treated for osteomyelitis at [facility] until yesterday morning when he decided to leave AGAINST MEDICAL ADVICE due to the fact that he did not want his toe being cut off.  He returned home to wear

## 2024-05-01 NOTE — PLAN OF CARE
Problem: Discharge Planning  Goal: Discharge to home or other facility with appropriate resources  4/30/2024 2256 by Kiley Vang RN  Outcome: Progressing  4/30/2024 2256 by Kiley Vang RN  Outcome: Progressing  Flowsheets (Taken 4/30/2024 2256)  Discharge to home or other facility with appropriate resources:   Identify barriers to discharge with patient and caregiver   Identify discharge learning needs (meds, wound care, etc)     Problem: Skin/Tissue Integrity  Goal: Absence of new skin breakdown  Description: 1.  Monitor for areas of redness and/or skin breakdown  2.  Assess vascular access sites hourly  3.  Every 4-6 hours minimum:  Change oxygen saturation probe site  4.  Every 4-6 hours:  If on nasal continuous positive airway pressure, respiratory therapy assess nares and determine need for appliance change or resting period.  4/30/2024 2256 by Kiley Vang RN  Outcome: Progressing  4/30/2024 2256 by Kiley Vang RN  Outcome: Progressing     Problem: Safety - Adult  Goal: Free from fall injury  4/30/2024 2256 by Kiley aVng RN  Outcome: Progressing  4/30/2024 2256 by Kiley Vang RN  Outcome: Progressing  Flowsheets (Taken 4/30/2024 2256)  Free From Fall Injury:   Instruct family/caregiver on patient safety   Based on caregiver fall risk screen, instruct family/caregiver to ask for assistance with transferring infant if caregiver noted to have fall risk factors     Problem: Pain  Goal: Verbalizes/displays adequate comfort level or baseline comfort level  4/30/2024 2256 by Kiley Vang RN  Outcome: Progressing  4/30/2024 2256 by Kiley Vang RN  Outcome: Progressing  Flowsheets (Taken 4/30/2024 2256)  Verbalizes/displays adequate comfort level or baseline comfort level:   Assess pain using appropriate pain scale   Encourage patient to monitor pain and request assistance   Implement non-pharmacological measures as appropriate and evaluate response

## 2024-05-01 NOTE — PLAN OF CARE
Problem: Discharge Planning  Goal: Discharge to home or other facility with appropriate resources  5/1/2024 1014 by Matthew Pretty RN  Outcome: Progressing  4/30/2024 2256 by Kiley Vang RN  Outcome: Progressing  4/30/2024 2256 by Kiley Vang RN  Outcome: Progressing  Flowsheets (Taken 4/30/2024 2256)  Discharge to home or other facility with appropriate resources:   Identify barriers to discharge with patient and caregiver   Identify discharge learning needs (meds, wound care, etc)     Problem: Skin/Tissue Integrity  Goal: Absence of new skin breakdown  Description: 1.  Monitor for areas of redness and/or skin breakdown  2.  Assess vascular access sites hourly  3.  Every 4-6 hours minimum:  Change oxygen saturation probe site  4.  Every 4-6 hours:  If on nasal continuous positive airway pressure, respiratory therapy assess nares and determine need for appliance change or resting period.  5/1/2024 1014 by Matthew Pretty RN  Outcome: Progressing  4/30/2024 2256 by Kiley Vang RN  Outcome: Progressing  4/30/2024 2256 by Kiley Vang RN  Outcome: Progressing     Problem: Safety - Adult  Goal: Free from fall injury  5/1/2024 1014 by Matthew Pretty RN  Outcome: Progressing  4/30/2024 2256 by Kiley Vang RN  Outcome: Progressing  4/30/2024 2256 by Kiley Vang RN  Outcome: Progressing  Flowsheets (Taken 4/30/2024 2256)  Free From Fall Injury:   Instruct family/caregiver on patient safety   Based on caregiver fall risk screen, instruct family/caregiver to ask for assistance with transferring infant if caregiver noted to have fall risk factors     Problem: Pain  Goal: Verbalizes/displays adequate comfort level or baseline comfort level  5/1/2024 1014 by Matthew Pertty RN  Outcome: Progressing  4/30/2024 2256 by Kiley Vang RN  Outcome: Progressing  4/30/2024 2256 by Kiley Vang RN  Outcome: Progressing  Flowsheets (Taken 4/30/2024 2256)  Verbalizes/displays

## 2024-05-02 LAB
EKG ATRIAL RATE: 93 BPM
EKG P AXIS: 57 DEGREES
EKG P-R INTERVAL: 178 MS
EKG Q-T INTERVAL: 362 MS
EKG QRS DURATION: 80 MS
EKG QTC CALCULATION (BAZETT): 450 MS
EKG R AXIS: 3 DEGREES
EKG T AXIS: 30 DEGREES
EKG VENTRICULAR RATE: 93 BPM

## 2024-05-02 PROCEDURE — 93010 ELECTROCARDIOGRAM REPORT: CPT | Performed by: INTERNAL MEDICINE

## 2024-05-05 LAB
MICROORGANISM SPEC CULT: NORMAL
MICROORGANISM SPEC CULT: NORMAL
SERVICE CMNT-IMP: NORMAL
SERVICE CMNT-IMP: NORMAL
SPECIMEN DESCRIPTION: NORMAL
SPECIMEN DESCRIPTION: NORMAL

## 2024-05-16 ENCOUNTER — APPOINTMENT (OUTPATIENT)
Dept: RADIOLOGY | Facility: HOSPITAL | Age: 49
End: 2024-05-16
Payer: MEDICAID

## 2024-05-16 ENCOUNTER — HOSPITAL ENCOUNTER (INPATIENT)
Facility: HOSPITAL | Age: 49
LOS: 4 days | Discharge: NURSING, REHAB OR LONG-TERM CARE | End: 2024-05-25
Attending: EMERGENCY MEDICINE | Admitting: INTERNAL MEDICINE
Payer: MEDICAID

## 2024-05-16 ENCOUNTER — APPOINTMENT (OUTPATIENT)
Dept: CARDIOLOGY | Facility: HOSPITAL | Age: 49
End: 2024-05-16
Payer: MEDICAID

## 2024-05-16 DIAGNOSIS — R55 SYNCOPE AND COLLAPSE: Primary | ICD-10-CM

## 2024-05-16 LAB
ABO GROUP (TYPE) IN BLOOD: NORMAL
ALBUMIN SERPL BCP-MCNC: 3.7 G/DL (ref 3.4–5)
ALP SERPL-CCNC: 77 U/L (ref 33–120)
ALT SERPL W P-5'-P-CCNC: 16 U/L (ref 10–52)
ANION GAP SERPL CALC-SCNC: 12 MMOL/L (ref 10–20)
ANTIBODY SCREEN: NORMAL
AST SERPL W P-5'-P-CCNC: 14 U/L (ref 9–39)
BASOPHILS # BLD AUTO: 0.02 X10*3/UL (ref 0–0.1)
BASOPHILS NFR BLD AUTO: 0.3 %
BILIRUB SERPL-MCNC: 0.4 MG/DL (ref 0–1.2)
BUN SERPL-MCNC: 17 MG/DL (ref 6–23)
CALCIUM SERPL-MCNC: 8.7 MG/DL (ref 8.6–10.3)
CARDIAC TROPONIN I PNL SERPL HS: <3 NG/L (ref 0–20)
CHLORIDE SERPL-SCNC: 108 MMOL/L (ref 98–107)
CO2 SERPL-SCNC: 24 MMOL/L (ref 21–32)
CREAT SERPL-MCNC: 0.81 MG/DL (ref 0.5–1.3)
EGFRCR SERPLBLD CKD-EPI 2021: >90 ML/MIN/1.73M*2
EOSINOPHIL # BLD AUTO: 0.16 X10*3/UL (ref 0–0.7)
EOSINOPHIL NFR BLD AUTO: 2.5 %
ERYTHROCYTE [DISTWIDTH] IN BLOOD BY AUTOMATED COUNT: 14.4 % (ref 11.5–14.5)
ETHANOL SERPL-MCNC: <10 MG/DL
GLUCOSE SERPL-MCNC: 210 MG/DL (ref 74–99)
HCT VFR BLD AUTO: 35.1 % (ref 41–52)
HGB BLD-MCNC: 12.1 G/DL (ref 13.5–17.5)
IMM GRANULOCYTES # BLD AUTO: 0.02 X10*3/UL (ref 0–0.7)
IMM GRANULOCYTES NFR BLD AUTO: 0.3 % (ref 0–0.9)
INR PPP: 1.1 (ref 0.9–1.1)
LACTATE SERPL-SCNC: 2 MMOL/L (ref 0.4–2)
LYMPHOCYTES # BLD AUTO: 1.26 X10*3/UL (ref 1.2–4.8)
LYMPHOCYTES NFR BLD AUTO: 19.7 %
MCH RBC QN AUTO: 28.5 PG (ref 26–34)
MCHC RBC AUTO-ENTMCNC: 34.5 G/DL (ref 32–36)
MCV RBC AUTO: 83 FL (ref 80–100)
MONOCYTES # BLD AUTO: 0.44 X10*3/UL (ref 0.1–1)
MONOCYTES NFR BLD AUTO: 6.9 %
NEUTROPHILS # BLD AUTO: 4.49 X10*3/UL (ref 1.2–7.7)
NEUTROPHILS NFR BLD AUTO: 70.3 %
NRBC BLD-RTO: 0 /100 WBCS (ref 0–0)
PLATELET # BLD AUTO: 211 X10*3/UL (ref 150–450)
POTASSIUM SERPL-SCNC: 4 MMOL/L (ref 3.5–5.3)
PROT SERPL-MCNC: 6.2 G/DL (ref 6.4–8.2)
PROTHROMBIN TIME: 12.4 SECONDS (ref 9.8–12.8)
RBC # BLD AUTO: 4.24 X10*6/UL (ref 4.5–5.9)
RH FACTOR (ANTIGEN D): NORMAL
SODIUM SERPL-SCNC: 140 MMOL/L (ref 136–145)
WBC # BLD AUTO: 6.4 X10*3/UL (ref 4.4–11.3)

## 2024-05-16 PROCEDURE — 99291 CRITICAL CARE FIRST HOUR: CPT | Performed by: EMERGENCY MEDICINE

## 2024-05-16 PROCEDURE — 71250 CT THORAX DX C-: CPT

## 2024-05-16 PROCEDURE — 36415 COLL VENOUS BLD VENIPUNCTURE: CPT | Performed by: EMERGENCY MEDICINE

## 2024-05-16 PROCEDURE — 93005 ELECTROCARDIOGRAM TRACING: CPT

## 2024-05-16 PROCEDURE — 85610 PROTHROMBIN TIME: CPT | Performed by: EMERGENCY MEDICINE

## 2024-05-16 PROCEDURE — 83605 ASSAY OF LACTIC ACID: CPT | Performed by: EMERGENCY MEDICINE

## 2024-05-16 PROCEDURE — 74176 CT ABD & PELVIS W/O CONTRAST: CPT | Mod: FOREIGN READ | Performed by: RADIOLOGY

## 2024-05-16 PROCEDURE — 85025 COMPLETE CBC W/AUTO DIFF WBC: CPT | Performed by: EMERGENCY MEDICINE

## 2024-05-16 PROCEDURE — 99285 EMERGENCY DEPT VISIT HI MDM: CPT | Mod: 25

## 2024-05-16 PROCEDURE — 71045 X-RAY EXAM CHEST 1 VIEW: CPT | Mod: FOREIGN READ | Performed by: RADIOLOGY

## 2024-05-16 PROCEDURE — 84484 ASSAY OF TROPONIN QUANT: CPT | Performed by: EMERGENCY MEDICINE

## 2024-05-16 PROCEDURE — 2500000004 HC RX 250 GENERAL PHARMACY W/ HCPCS (ALT 636 FOR OP/ED): Performed by: EMERGENCY MEDICINE

## 2024-05-16 PROCEDURE — 70450 CT HEAD/BRAIN W/O DYE: CPT

## 2024-05-16 PROCEDURE — 72125 CT NECK SPINE W/O DYE: CPT | Performed by: STUDENT IN AN ORGANIZED HEALTH CARE EDUCATION/TRAINING PROGRAM

## 2024-05-16 PROCEDURE — 84075 ASSAY ALKALINE PHOSPHATASE: CPT | Performed by: EMERGENCY MEDICINE

## 2024-05-16 PROCEDURE — 96374 THER/PROPH/DIAG INJ IV PUSH: CPT

## 2024-05-16 PROCEDURE — 71250 CT THORAX DX C-: CPT | Mod: FOREIGN READ | Performed by: RADIOLOGY

## 2024-05-16 PROCEDURE — 71045 X-RAY EXAM CHEST 1 VIEW: CPT

## 2024-05-16 PROCEDURE — 72125 CT NECK SPINE W/O DYE: CPT

## 2024-05-16 PROCEDURE — 86901 BLOOD TYPING SEROLOGIC RH(D): CPT | Performed by: EMERGENCY MEDICINE

## 2024-05-16 PROCEDURE — 82077 ASSAY SPEC XCP UR&BREATH IA: CPT | Performed by: EMERGENCY MEDICINE

## 2024-05-16 PROCEDURE — 70450 CT HEAD/BRAIN W/O DYE: CPT | Performed by: STUDENT IN AN ORGANIZED HEALTH CARE EDUCATION/TRAINING PROGRAM

## 2024-05-16 RX ORDER — MORPHINE SULFATE 4 MG/ML
4 INJECTION INTRAVENOUS ONCE
Status: COMPLETED | OUTPATIENT
Start: 2024-05-16 | End: 2024-05-16

## 2024-05-16 RX ADMIN — MORPHINE SULFATE 4 MG: 4 INJECTION INTRAVENOUS at 23:38

## 2024-05-16 ASSESSMENT — LIFESTYLE VARIABLES
EVER FELT BAD OR GUILTY ABOUT YOUR DRINKING: NO
TOTAL SCORE: 0
HAVE PEOPLE ANNOYED YOU BY CRITICIZING YOUR DRINKING: NO
HAVE YOU EVER FELT YOU SHOULD CUT DOWN ON YOUR DRINKING: NO
EVER HAD A DRINK FIRST THING IN THE MORNING TO STEADY YOUR NERVES TO GET RID OF A HANGOVER: NO

## 2024-05-16 ASSESSMENT — PAIN SCALES - GENERAL
PAINLEVEL_OUTOF10: 8
PAINLEVEL_OUTOF10: 6
PAINLEVEL_OUTOF10: 10 - WORST POSSIBLE PAIN

## 2024-05-16 ASSESSMENT — PAIN - FUNCTIONAL ASSESSMENT
PAIN_FUNCTIONAL_ASSESSMENT: 0-10

## 2024-05-16 ASSESSMENT — PAIN DESCRIPTION - PAIN TYPE: TYPE: ACUTE PAIN

## 2024-05-16 ASSESSMENT — PAIN DESCRIPTION - LOCATION: LOCATION: HEAD

## 2024-05-16 ASSESSMENT — COLUMBIA-SUICIDE SEVERITY RATING SCALE - C-SSRS
1. IN THE PAST MONTH, HAVE YOU WISHED YOU WERE DEAD OR WISHED YOU COULD GO TO SLEEP AND NOT WAKE UP?: NO
2. HAVE YOU ACTUALLY HAD ANY THOUGHTS OF KILLING YOURSELF?: NO
6. HAVE YOU EVER DONE ANYTHING, STARTED TO DO ANYTHING, OR PREPARED TO DO ANYTHING TO END YOUR LIFE?: NO

## 2024-05-17 PROBLEM — F31.5 BIPOLAR DISORDER, CURRENT EPISODE DEPRESSED, SEVERE, WITH PSYCHOTIC FEATURES (MULTI): Status: ACTIVE | Noted: 2024-05-17

## 2024-05-17 PROBLEM — F31.12 BIPOLAR I DISORDER, MOST RECENT EPISODE (OR CURRENT) MANIC, MODERATE (MULTI): Status: ACTIVE | Noted: 2021-05-26

## 2024-05-17 PROBLEM — R55 SYNCOPE AND COLLAPSE: Status: ACTIVE | Noted: 2024-05-17

## 2024-05-17 PROBLEM — J44.1 ACUTE EXACERBATION OF CHRONIC OBSTRUCTIVE PULMONARY DISEASE (MULTI): Status: ACTIVE | Noted: 2020-11-14

## 2024-05-17 PROBLEM — F10.939: Status: ACTIVE | Noted: 2023-01-02

## 2024-05-17 PROBLEM — M10.272 ACUTE DRUG-INDUCED GOUT INVOLVING TOE OF LEFT FOOT: Status: ACTIVE | Noted: 2024-04-25

## 2024-05-17 LAB
GLUCOSE BLD MANUAL STRIP-MCNC: 138 MG/DL (ref 74–99)
GLUCOSE BLD MANUAL STRIP-MCNC: 220 MG/DL (ref 74–99)
GLUCOSE BLD MANUAL STRIP-MCNC: 264 MG/DL (ref 74–99)
GLUCOSE BLD MANUAL STRIP-MCNC: 274 MG/DL (ref 74–99)

## 2024-05-17 PROCEDURE — G0378 HOSPITAL OBSERVATION PER HR: HCPCS

## 2024-05-17 PROCEDURE — 2500000004 HC RX 250 GENERAL PHARMACY W/ HCPCS (ALT 636 FOR OP/ED): Performed by: INTERNAL MEDICINE

## 2024-05-17 PROCEDURE — 2500000006 HC RX 250 W HCPCS SELF ADMINISTERED DRUGS (ALT 637 FOR ALL PAYERS): Performed by: INTERNAL MEDICINE

## 2024-05-17 PROCEDURE — 96376 TX/PRO/DX INJ SAME DRUG ADON: CPT

## 2024-05-17 PROCEDURE — 2500000004 HC RX 250 GENERAL PHARMACY W/ HCPCS (ALT 636 FOR OP/ED): Performed by: EMERGENCY MEDICINE

## 2024-05-17 PROCEDURE — 2500000001 HC RX 250 WO HCPCS SELF ADMINISTERED DRUGS (ALT 637 FOR MEDICARE OP): Performed by: EMERGENCY MEDICINE

## 2024-05-17 PROCEDURE — 2500000002 HC RX 250 W HCPCS SELF ADMINISTERED DRUGS (ALT 637 FOR MEDICARE OP, ALT 636 FOR OP/ED): Performed by: INTERNAL MEDICINE

## 2024-05-17 PROCEDURE — 99223 1ST HOSP IP/OBS HIGH 75: CPT | Performed by: INTERNAL MEDICINE

## 2024-05-17 PROCEDURE — 96375 TX/PRO/DX INJ NEW DRUG ADDON: CPT

## 2024-05-17 PROCEDURE — 99233 SBSQ HOSP IP/OBS HIGH 50: CPT | Performed by: INTERNAL MEDICINE

## 2024-05-17 PROCEDURE — 82947 ASSAY GLUCOSE BLOOD QUANT: CPT

## 2024-05-17 RX ORDER — POLYETHYLENE GLYCOL 3350 17 G/17G
17 POWDER, FOR SOLUTION ORAL DAILY PRN
Status: DISCONTINUED | OUTPATIENT
Start: 2024-05-17 | End: 2024-05-25 | Stop reason: HOSPADM

## 2024-05-17 RX ORDER — NAPROXEN SODIUM 220 MG/1
81 TABLET, FILM COATED ORAL
COMMUNITY
Start: 2024-02-13

## 2024-05-17 RX ORDER — ACETAMINOPHEN 325 MG/1
650 TABLET ORAL EVERY 4 HOURS PRN
Status: DISCONTINUED | OUTPATIENT
Start: 2024-05-17 | End: 2024-05-25 | Stop reason: HOSPADM

## 2024-05-17 RX ORDER — DEXTROSE 50 % IN WATER (D50W) INTRAVENOUS SYRINGE
25
Status: DISCONTINUED | OUTPATIENT
Start: 2024-05-17 | End: 2024-05-25 | Stop reason: HOSPADM

## 2024-05-17 RX ORDER — WARFARIN SODIUM 5 MG/1
5 TABLET ORAL DAILY
Status: COMPLETED | OUTPATIENT
Start: 2024-05-17 | End: 2024-05-19

## 2024-05-17 RX ORDER — ACETAMINOPHEN 650 MG/1
650 SUPPOSITORY RECTAL EVERY 4 HOURS PRN
Status: DISCONTINUED | OUTPATIENT
Start: 2024-05-17 | End: 2024-05-25 | Stop reason: HOSPADM

## 2024-05-17 RX ORDER — LEVETIRACETAM 10 MG/ML
1000 INJECTION INTRAVASCULAR EVERY 12 HOURS
Status: DISCONTINUED | OUTPATIENT
Start: 2024-05-17 | End: 2024-05-18

## 2024-05-17 RX ORDER — CLOPIDOGREL BISULFATE 75 MG/1
75 TABLET ORAL
COMMUNITY
Start: 2023-05-01

## 2024-05-17 RX ORDER — WARFARIN SODIUM 5 MG/1
5 TABLET ORAL
COMMUNITY

## 2024-05-17 RX ORDER — ONDANSETRON 4 MG/1
4 TABLET, FILM COATED ORAL EVERY 8 HOURS PRN
Status: DISCONTINUED | OUTPATIENT
Start: 2024-05-17 | End: 2024-05-25 | Stop reason: HOSPADM

## 2024-05-17 RX ORDER — MORPHINE SULFATE 4 MG/ML
4 INJECTION INTRAVENOUS ONCE
Status: COMPLETED | OUTPATIENT
Start: 2024-05-17 | End: 2024-05-17

## 2024-05-17 RX ORDER — GABAPENTIN 300 MG/1
300 CAPSULE ORAL EVERY 8 HOURS SCHEDULED
Status: DISCONTINUED | OUTPATIENT
Start: 2024-05-17 | End: 2024-05-25 | Stop reason: HOSPADM

## 2024-05-17 RX ORDER — ONDANSETRON HYDROCHLORIDE 2 MG/ML
4 INJECTION, SOLUTION INTRAVENOUS EVERY 8 HOURS PRN
Status: DISCONTINUED | OUTPATIENT
Start: 2024-05-17 | End: 2024-05-25 | Stop reason: HOSPADM

## 2024-05-17 RX ORDER — INSULIN LISPRO 100 [IU]/ML
0-5 INJECTION, SOLUTION INTRAVENOUS; SUBCUTANEOUS
Status: DISCONTINUED | OUTPATIENT
Start: 2024-05-17 | End: 2024-05-25 | Stop reason: HOSPADM

## 2024-05-17 RX ORDER — ACETAMINOPHEN 325 MG/1
650 TABLET ORAL ONCE
Status: COMPLETED | OUTPATIENT
Start: 2024-05-17 | End: 2024-05-17

## 2024-05-17 RX ORDER — ACETAMINOPHEN 160 MG/5ML
650 SUSPENSION ORAL EVERY 4 HOURS PRN
Status: DISCONTINUED | OUTPATIENT
Start: 2024-05-17 | End: 2024-05-25 | Stop reason: HOSPADM

## 2024-05-17 RX ORDER — MORPHINE SULFATE 4 MG/ML
4 INJECTION INTRAVENOUS ONCE
Status: DISCONTINUED | OUTPATIENT
Start: 2024-05-17 | End: 2024-05-22

## 2024-05-17 RX ORDER — ACETAMINOPHEN 325 MG/1
975 TABLET ORAL ONCE
Status: DISCONTINUED | OUTPATIENT
Start: 2024-05-17 | End: 2024-05-25 | Stop reason: HOSPADM

## 2024-05-17 RX ADMIN — LEVETIRACETAM 1000 MG: 10 INJECTION INTRAVENOUS at 19:06

## 2024-05-17 RX ADMIN — WARFARIN SODIUM 5 MG: 5 TABLET ORAL at 18:05

## 2024-05-17 RX ADMIN — ACETAMINOPHEN 650 MG: 325 TABLET ORAL at 04:43

## 2024-05-17 RX ADMIN — MORPHINE SULFATE 4 MG: 4 INJECTION INTRAVENOUS at 01:42

## 2024-05-17 RX ADMIN — INSULIN LISPRO 3 UNITS: 100 INJECTION, SOLUTION INTRAVENOUS; SUBCUTANEOUS at 18:21

## 2024-05-17 RX ADMIN — GABAPENTIN 300 MG: 300 CAPSULE ORAL at 04:43

## 2024-05-17 RX ADMIN — MORPHINE SULFATE 4 MG: 4 INJECTION INTRAVENOUS at 15:08

## 2024-05-17 RX ADMIN — LEVETIRACETAM 1000 MG: 10 INJECTION INTRAVENOUS at 08:21

## 2024-05-17 RX ADMIN — INSULIN LISPRO 2 UNITS: 100 INJECTION, SOLUTION INTRAVENOUS; SUBCUTANEOUS at 08:53

## 2024-05-17 RX ADMIN — GABAPENTIN 300 MG: 300 CAPSULE ORAL at 15:08

## 2024-05-17 RX ADMIN — GABAPENTIN 300 MG: 300 CAPSULE ORAL at 21:41

## 2024-05-17 SDOH — SOCIAL STABILITY: SOCIAL INSECURITY: WERE YOU ABLE TO COMPLETE ALL THE BEHAVIORAL HEALTH SCREENINGS?: YES

## 2024-05-17 SDOH — SOCIAL STABILITY: SOCIAL INSECURITY: HAVE YOU HAD THOUGHTS OF HARMING ANYONE ELSE?: NO

## 2024-05-17 ASSESSMENT — COGNITIVE AND FUNCTIONAL STATUS - GENERAL
WALKING IN HOSPITAL ROOM: TOTAL
CLIMB 3 TO 5 STEPS WITH RAILING: TOTAL
STANDING UP FROM CHAIR USING ARMS: A LITTLE
TOILETING: A LITTLE
DAILY ACTIVITIY SCORE: 17
CLIMB 3 TO 5 STEPS WITH RAILING: TOTAL
MOVING FROM LYING ON BACK TO SITTING ON SIDE OF FLAT BED WITH BEDRAILS: A LITTLE
MOVING TO AND FROM BED TO CHAIR: A LITTLE
STANDING UP FROM CHAIR USING ARMS: A LITTLE
TOILETING: A LITTLE
PERSONAL GROOMING: A LITTLE
EATING MEALS: A LITTLE
DRESSING REGULAR LOWER BODY CLOTHING: A LITTLE
WALKING IN HOSPITAL ROOM: TOTAL
HELP NEEDED FOR BATHING: A LITTLE
HELP NEEDED FOR BATHING: A LITTLE
MOBILITY SCORE: 14
EATING MEALS: A LITTLE
DAILY ACTIVITIY SCORE: 18
MOBILITY SCORE: 14
DRESSING REGULAR UPPER BODY CLOTHING: A LITTLE
DRESSING REGULAR UPPER BODY CLOTHING: A LITTLE
PATIENT BASELINE BEDBOUND: NO
MOVING TO AND FROM BED TO CHAIR: A LITTLE
MOVING FROM LYING ON BACK TO SITTING ON SIDE OF FLAT BED WITH BEDRAILS: A LITTLE
TURNING FROM BACK TO SIDE WHILE IN FLAT BAD: A LITTLE
DRESSING REGULAR LOWER BODY CLOTHING: A LOT
TURNING FROM BACK TO SIDE WHILE IN FLAT BAD: A LITTLE
PERSONAL GROOMING: A LITTLE

## 2024-05-17 ASSESSMENT — ACTIVITIES OF DAILY LIVING (ADL)
LACK_OF_TRANSPORTATION: NO
FEEDING YOURSELF: NEEDS ASSISTANCE
TOILETING: NEEDS ASSISTANCE
TOILETING: NEEDS ASSISTANCE
BATHING: NEEDS ASSISTANCE
GROOMING: NEEDS ASSISTANCE
LACK_OF_TRANSPORTATION: NO
JUDGMENT_ADEQUATE_SAFELY_COMPLETE_DAILY_ACTIVITIES: YES
PATIENT'S MEMORY ADEQUATE TO SAFELY COMPLETE DAILY ACTIVITIES?: YES
DRESSING YOURSELF: NEEDS ASSISTANCE
GROOMING: NEEDS ASSISTANCE
FEEDING YOURSELF: NEEDS ASSISTANCE
DRESSING YOURSELF: NEEDS ASSISTANCE
PATIENT'S MEMORY ADEQUATE TO SAFELY COMPLETE DAILY ACTIVITIES?: YES
ASSISTIVE_DEVICE: WHEELCHAIR
ADEQUATE_TO_COMPLETE_ADL: YES
BATHING: NEEDS ASSISTANCE
HEARING - RIGHT EAR: FUNCTIONAL
HEARING - LEFT EAR: FUNCTIONAL
ADEQUATE_TO_COMPLETE_ADL: YES
HEARING - RIGHT EAR: FUNCTIONAL
WALKS IN HOME: DEPENDENT
ASSISTIVE_DEVICE: WHEELCHAIR
HEARING - LEFT EAR: FUNCTIONAL
JUDGMENT_ADEQUATE_SAFELY_COMPLETE_DAILY_ACTIVITIES: YES
WALKS IN HOME: DEPENDENT

## 2024-05-17 ASSESSMENT — PAIN - FUNCTIONAL ASSESSMENT
PAIN_FUNCTIONAL_ASSESSMENT: 0-10

## 2024-05-17 ASSESSMENT — ENCOUNTER SYMPTOMS
ARTHRALGIAS: 1
NECK PAIN: 0
VOMITING: 0
NAUSEA: 0
ABDOMINAL PAIN: 0
DIFFICULTY URINATING: 0
PALPITATIONS: 1
LIGHT-HEADEDNESS: 0
CONSTIPATION: 1
DIZZINESS: 0
CHEST TIGHTNESS: 0
SHORTNESS OF BREATH: 0
FREQUENCY: 0
COUGH: 0
HEADACHES: 1
NECK STIFFNESS: 0
APPETITE CHANGE: 0
DIARRHEA: 0
FATIGUE: 0
MYALGIAS: 1
BLOOD IN STOOL: 0
FEVER: 0

## 2024-05-17 ASSESSMENT — LIFESTYLE VARIABLES
PRESCIPTION_ABUSE_PAST_12_MONTHS: NO
HOW OFTEN DO YOU HAVE A DRINK CONTAINING ALCOHOL: NEVER
HOW MANY STANDARD DRINKS CONTAINING ALCOHOL DO YOU HAVE ON A TYPICAL DAY: PATIENT DOES NOT DRINK
SUBSTANCE_ABUSE_PAST_12_MONTHS: NO
AUDIT-C TOTAL SCORE: 0
SKIP TO QUESTIONS 9-10: 1
HOW OFTEN DO YOU HAVE 6 OR MORE DRINKS ON ONE OCCASION: NEVER
AUDIT-C TOTAL SCORE: 0

## 2024-05-17 ASSESSMENT — PAIN SCALES - GENERAL
PAINLEVEL_OUTOF10: 8
PAINLEVEL_OUTOF10: 8
PAINLEVEL_OUTOF10: 7
PAINLEVEL_OUTOF10: 10 - WORST POSSIBLE PAIN
PAINLEVEL_OUTOF10: 9
PAINLEVEL_OUTOF10: 10 - WORST POSSIBLE PAIN
PAINLEVEL_OUTOF10: 8
PAINLEVEL_OUTOF10: 7

## 2024-05-17 ASSESSMENT — PAIN DESCRIPTION - LOCATION
LOCATION: HIP
LOCATION: GENERALIZED

## 2024-05-17 ASSESSMENT — PATIENT HEALTH QUESTIONNAIRE - PHQ9
2. FEELING DOWN, DEPRESSED OR HOPELESS: NOT AT ALL
1. LITTLE INTEREST OR PLEASURE IN DOING THINGS: NOT AT ALL
SUM OF ALL RESPONSES TO PHQ9 QUESTIONS 1 & 2: 0

## 2024-05-17 ASSESSMENT — PAIN DESCRIPTION - PAIN TYPE: TYPE: ACUTE PAIN

## 2024-05-17 NOTE — H&P
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 obtained followed by sagittal and coronal reconstructed images.  All CT scans at this facility use dose modulation, iterative reconstruction, and/or weight based dosing when appropriate to reduce radiation dose to as low as reasonably achievable.   Steroid prep was given.  Patient received IV contrast.  No adverse reaction is reported.  Patient received 125 mL of Omnipaque 350 FINDINGS: Soft tissue thickening and increased attenuation along the great toe distally is noted.  Presumably this represents an infectious process.  There appears to be air associated with the nail bed.  Infectious process or fungal process of the nail bed is not excluded.  There is no bony involvement.  No evidence of osteomyelitis on the basis of CT. Numerous bone infarcts are appreciated in the visualized tibia with are also present in the calcaneus.  There is extreme loss and skeletal muscle throughout the foot.  Insertion of the Achilles is intact. IMPRESSION: Soft tissue infection involving the great toe is confirmed.  No definite osseous involvement.  Additional chronic findings are detailed above. Workstation:[id number] Finalized by Juliana Gamino MD on 5/2/2024 8:25 AM    CT femur left w IV contrast    Result Date: 5/2/2024  CT left femur without contrast. INDICATION: Abscesses. COMPARISON: CT, 6/7/2022. TECHNIQUE: CT of the  left femur with contrast.  All CT scans at this facility use dose modulation, iterative reconstruction, and/or weight based dosing when appropriate to reduce radiation dose to as low as reasonably achievable FINDINGS: Multiple metallic BBs within the posterior compartment left thigh , similar to prior.  Myofascial fibrosis and subcutaneous tethering of the hamstring musculature towards the overlying skin, likely chronic/post infectious/inflammatory. No focal subcutaneous or intramuscular fluid collection identified, confounded by streak artifact however. Mild scanning thickening of the medial anterior left thigh (91/200).  Small circular foreign body within the left inguinal soft tissues are unchanged. Avascular necrosis of the left femoral head .  Intramedullary infarcts of the distal femur, proximal tibia.  Chronic appearing deformity with osseous excrescences and dystrophic calcification of the femoral diaphysis.  No definitive acute fracture or dislocation.  No significant knee effusion. Few presumably reactive left inguinal lymph nodes, slightly enlarged. Normal enhancement of the left external iliac artery.  Poorly opacified left proximal-mid superficial femoral artery with collaterals throughout the anterior compartment .  Left popliteal artery appears patent.. IMPRESSION: 1.  No convincing soft tissue fluid collection/abscess in the left thigh, likely limitations related to extensive streak artifact probable myofascial/subcutaneous fibrosis. 2.  Mild skin thickening of the proximal left thigh medially; correlate with any cellulitis. 3.  Presumed reactive left inguinal lymph nodes. 4.  Left femoral head AVN.  Intramedullary infarcts of the distal femur, proximal tibia. 5.  Poorly opacified of the proximal-mid SFA (likely chronic).  Distal SFA/popliteal artery appear normally opacified. Workstation:[id number] Finalized by Navi Bueno MD on 5/2/2024 1:13 AM    CT chest wo IV contrast    Result Date:  5/2/2024  STUDY: CT chest without contrast CLINICAL HISTORY: Chest pain pain in chest after getting IJ line COMPARISON: 3/16/2023 TECHNIQUE: CT chest was performed utilizing 5 mm axial reconstructions without contrast. Coronal and sagittal reformatted images were obtained and reviewed. Automated exposure control was utilized.  Computer aided detection for pulmonary nodules was performed utilizing [employer] software. FINDINGS: There are small bilateral pleural effusions.  There is no enlarged axillary lymph nodes. There is a hematoma in the right lower neck and paratracheal mediastinal measuring 4.3 x 4.2 cm with mass effect upon the trachea.  Hematoma extends into the middle mediastinum to the level of the sheela measuring approximately 4.3 x 4.2 cm.  Assessment for active bleeding cannot be performed by the  absence of contrast. There is dependent change and atelectasis in both lungs. No pneumothorax. Central airways patent. No significant coronary artery calcifications.   IMPRESSION: 1. Moderate to large hematoma predominantly centered within the mediastinum with some lower right neck hematoma and blood products noted as well.  There is mild mass effect upon the trachea. 2.  Assessment for active bleeding cannot be performed as a IV contrast was administered. THIS REPORT CONTAINS A SIGNIFICANT RESULT AND/OR RECOMMENDATION, WHICH REQUIRES THE ATTENTION OF THE LICENSED CAREGIVER RESPONSIBLE FOR THIS PATIENT. THEREFORE, I SPECIFICALLY DESIGNATED THIS REPORT TO BE TELEPHONED BY THE RADIOLOGY DEPARTMENT. FINDINGS WERE INSTRUCTED TO BE CALLED TO THE CLINICAL SERVICE ON 5/2/2024 AT 0107 hours. All CT scans at this facility use dose modulation, iterative reconstruction, and/or weight based dosing when appropriate to reduce radiation dose to as low as reasonably achievable. Workstation:[id number] Finalized by Navi Natarajan MD on 5/2/2024 1:07 AM    XR chest 1 view    Result Date: 5/1/2024  Single view chest History: Mild  placement Comparison: 2023 Findings: Right IJ central venous catheter, tip at the distal SVC.  Stable cardiomediastinal silhouette.  Low lung volumes with bronchovascular crowding, atelectasis.  Perhaps suspect small right pleural effusion.  No measurable pneumothorax. Impression: 1.  Right IJ catheter with its tip at the distal SVC.  No post procedural pneumothorax. 2.  Pulmonary edema, probable small right effusion. Workstation:[id number] Finalized by Navi Bueno MD on 2024 9:59 PM    CT head wo IV contrast    Result Date: 2024  Patient Name: ARGELIA JIANG : 1975 MRN: [id number] Acct#: [id number] Accession: [id number] Exam Date/Time: 2024 04:55 Procedure: CT HEAD WO IV CONTRAST Ordering Provider: GARCIA JONATHAN Reason For Exam: ams EXAMINATION: CT of the head without intravenous contrast. INDICATION: ams COMPARISON: 2023 TECHNIQUE: Thin axial imaging of the head was performed without intravenous contrast. Images were reformatted in coronal and sagittal projections using the raw CT data and were interpreted in conjunction with the axial images to render the findings listed below. Dose reduction was employed with automated exposure control. FINDINGS: Limitations: None Ventricles: Normal in size and morphology for the patient's age.   No extracerebral collection with mass effect. Brain: No mass or acute hemorrhage. No evidence of acute infarct.     Brainstem: Normal Paranasal sinuses: Clear. Mastoids: Clear. Skull and orbits: The skull and orbits are within normal limits.    No acute intracranial abnormality. Report Dictated on Workstation: [id number]  Electronically Signed By: Gerardo Shaikh MD  Electronically Signed Date/Time: 2024 5:10 AM EDT     XR chest 1 view    Result Date: 2024  Patient Name: ARGELIA JIANG : 1975 MRN: [id number] Acct#: [id number] Accession: [id number] Exam Date/Time: 2024 04:41 Procedure: XR CHEST 1 VIEW Ordering Provider:  GARCIA JONATHAN Reason For Exam: ams CHEST X-RAY CLINICAL INDICATION:  ams TECHNIQUE: AP COMPARISON: 2023 FINDINGS: Quality: Exam quality: EKG leads obscure small portions of the chest. Lines: None Cardiomediastinal Silhouette: The cardiac and mediastinal silhouettes are normal. Lungs: The lungs are clear.. No evidence of pleural effusion or pneumothorax. Soft tissue: The soft tissue structures appear unremarkable. Bones: No acute osseous process identified.      No acute cardiopulmonary process identified. Report Dictated on Workstation: [id number]  Electronically Signed By: Gerardo Shaikh MD  Electronically Signed Date/Time: 2024 4:52 AM EDT     XR foot left 3+ views    Result Date: 2024  Patient Name: ARGELIA LEVINE : 1975 [id number] [id number] Accession: [id number] Exam Date/Time: 2024 03:52 Procedure: XR FOOT 3+ VIEWS LEFT Ordering Provider: GARCIA JONATHAN Reason For Exam: left big toe wound LEFT FOOT CLINICAL INDICATION: Left greater toe wound AP, lateral, and oblique plain film views of the left foot were obtained. COMPARISON: None. FINDINGS: There is no evidence for fracture or dislocation. No bone lesion is identified. Soft tissue swelling is noted about the first digit, without underlying cortical erosion identified.    Left great toe soft tissue swelling without underlying cortical erosion. Report Dictated on Workstation: [id number]  Electronically Signed By: Gerardo Shaikh MD  Electronically Signed Date/Time: 2024 4:11 AM EDT        Assessment/Plan   Principal Problem:    Syncope and collapse    Argelia Levine is a 49 y.o. 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    Seizure versus syncope  -TIA appears less likely  -His neighbor took at least 5 minutes to wake him up  -Patient is supposed to be on Keppra.  He has not taking it for at least 3 or 4 days  -We will load him with Keppra  -Consult neurology  -Seizure precautions  -Fall precautions  -Will monitor    Protein as deficiency/DVT/PE  -INR subtherapeutic  -Patient ran out of Coumadin  -Will restart Coumadin  -Patient is allergic to enoxaparin, accident, dabigatran, apixaban  -We will try to get hematology's input on how to bridge him.      Recent CVA status post TNK  -Patient has left-sided hemiparesis/hemiplegia  -He is unable to take care of himself  -Will consult   -Patient will likely need placement  -It appears that he signed out AGAINST MEDICAL ADVICE when he was here after the recent CVA after which she received TNK.  -Will get PT/OT eval    Headache, shoulder pain, hip pain  -CT scan did not show any acute findings  -will continue supportive care  -Avoid narcotic if possible    COPD  -Appears stable    Diabetes mellitus type 2  -Last HbA1c was 6.8  -Give sliding scale insulin  -Will monitor blood sugar    Will resume home meds for comorbidities.  Please note that medication reconciliation has not been completed yet.           Suzanne Toney MD

## 2024-05-17 NOTE — PROGRESS NOTES
Social work consult placed for discharge planning. SW reviewed pt's chart and communicated with TCC. Per chart review, pt being transferred to [facility]. No SW needs foreseen at this time. SW signing off; available upon request.    DENIA Fitzgerald ([id number])   Care Transitions

## 2024-05-17 NOTE — ED PROVIDER NOTES
HPI   Chief Complaint   Patient presents with    Fall     Hit head. + LOC. On thinners       Patient presents emergency department with syncopal event out of a chair at home.  Patient reports to be on warfarin and Plavix.  Patient has a history of stroke with left-sided paralysis.  Patient was unconscious for a few minutes.  Patient does not remember the incident.  States this was witnessed by family members.  Patient is endorsing right-sided headache, neck pain and pain going across to his chest as well as right hip pain                          [place] Coma Scale Score: 15         NIH Stroke Scale: 3          Patient History   No past medical history on file.  No past surgical history on file.  No family history on file.  Social History     Tobacco Use    Smoking status: Not on file    Smokeless tobacco: Not on file   Substance Use Topics    Alcohol use: Not on file    Drug use: Not on file       Physical Exam   ED Triage Vitals   Temperature Heart Rate Respirations BP   05/16/24 2115 05/16/24 2115 05/16/24 2115 05/16/24 2115   36.5 °C (97.7 °F) 96 20 118/76      Pulse Ox Temp Source Heart Rate Source Patient Position   05/16/24 2115 05/16/24 2115 05/16/24 2300 --   98 % Skin Monitor       BP Location FiO2 (%)     -- --             PRIMARY SURVEY  Airway: Patent  Breathing: Breath sounds equal  Circulation: 2+ radial, 2+ femoral, 2+ dp/tp  Disability:     Eye: 4     Verbal: 5     Motor: 6    SECONDARY SURVEY  Neurological: Paralysis of the left side upper and lower extremities, oriented x3  HEENT: Atraumatic, no occlusions. PERRLA, no step offs.  Neck: No midline cervical tenderness, trachea midline, atraumatic  Chest: Atraumatic, tender  Cardiovascular: Regular rate and rhythm  Abdomen: Atraumatic, tender  Pelvic/Perineal: Atraumatic, pelvis stable, no blood present  Back/Spine: Atraumatic, nontender, no step offs  Extremities: Atraumatic, right hip tender, left arm contracted    Physical Exam    ED Course & MDM    Diagnoses as of 05/17/24 0342   Syncope and collapse       Medical Decision Making  Patient presents as a limited trauma after fall on Plavix and Coumadin. Available chart reviewed. On initial assessment the patient was found non-toxic, no acute distress, vitals hemodynamically stable and afebrile. Initial concern for fracture, subluxation, internal bleeding. CT of the chest abdomen pelvis shows no definitive acute pathology, scarring of the lateral segment of the right middle lobe, diverticulosis and chronic changes.  CT of the head shows no fracture.  CT of the C-spine shows no fracture or malalignment with congenital fusion.  Chest x-ray is unremarkable.  No expression of patient's pain.  Patient given opioids here initially.  Alcohol level 2.  Lactic acid is upper normal at 2.  CBC shows no leukocytosis, anemia hemoglobin 12.  INR is 1.1.  Troponins undetected.  Metabolic panel shows hyperglycemia of 210, hyperchloremia 108, normal kidney and liver function.  Pain returned and was given gabapentin and Tylenol.  Given patient's syncope I feel he would benefit from admission.  Patient also is considering placement as well as he feels like he has failed being home without much help having his left-sided deficits.  Patient is a  excepted here at this hospital.  The patient will be transferred to [facility].  Patient was accepted for transfer.  Patient care has been discussed with Dr. Jules        Procedure  Critical Care    Performed by: Reggie Holman MD  Authorized by: Reggie Holman MD    Critical care provider statement:     Critical care time (minutes):  30    Critical care time was exclusive of:  Separately billable procedures and treating other patients and teaching time    Critical care was necessary to treat or prevent imminent or life-threatening deterioration of the following conditions:  Trauma    Critical care was time spent personally by me on the following activities:  Discussions with  consultants, obtaining history from patient or surrogate, re-evaluation of patient's condition, ordering and review of radiographic studies and examination of patient       Reggie Holman MD  05/17/24 1343

## 2024-05-17 NOTE — PROGRESS NOTES
.  I assumed care of the patient at change of shift.  We were able to speak to his insurance company and got approval for him to stay here at  and for this visit to be covered.  Patient was admitted to [facility] in stable condition with plan for nursing home rehabilitation placement

## 2024-05-17 NOTE — CARE PLAN
The patient's goals for the shift include      Problem: Skin  Goal: Decreased wound size/increased tissue granulation at next dressing change  Outcome: Progressing  Flowsheets (Taken 5/17/2024 1640)  Decreased wound size/increased tissue granulation at next dressing change: Promote sleep for wound healing  Goal: Participates in plan/prevention/treatment measures  Outcome: Progressing  Flowsheets (Taken 5/17/2024 1640)  Participates in plan/prevention/treatment measures:   Discuss with provider PT/OT consult   Elevate heels  Goal: Prevent/manage excess moisture  Outcome: Progressing  Flowsheets (Taken 5/17/2024 1640)  Prevent/manage excess moisture:   Monitor for/manage infection if present   Cleanse incontinence/protect with barrier cream  Goal: Prevent/minimize sheer/friction injuries  Outcome: Progressing  Flowsheets (Taken 5/17/2024 1640)  Prevent/minimize sheer/friction injuries:   Turn/reposition every 2 hours/use positioning/transfer devices   Complete micro-shifts as needed if patient unable. Adjust patient position to relieve pressure points, not a full turn  Goal: Promote/optimize nutrition  Outcome: Progressing  Flowsheets (Taken 5/17/2024 1640)  Promote/optimize nutrition: Monitor/record intake including meals  Goal: Promote skin healing  Outcome: Progressing  Flowsheets (Taken 5/17/2024 1640)  Promote skin healing:   Protective dressings over bony prominences   Turn/reposition every 2 hours/use positioning/transfer devices   Assess skin/pad under line(s)/device(s)     Problem: Pain  Goal: My pain/discomfort is manageable  Outcome: Progressing     Problem: Safety  Goal: Patient will be injury free during hospitalization  Outcome: Progressing  Goal: I will remain free of falls  Outcome: Progressing     Problem: Daily Care  Goal: Daily care needs are met  Outcome: Progressing     Problem: Psychosocial Needs  Goal: Demonstrates ability to cope with hospitalization/illness  Outcome: Progressing  Goal:  Collaborate with me, my family, and caregiver to identify my specific goals  Outcome: Progressing     Problem: Discharge Barriers  Goal: My discharge needs are met  Outcome: Progressing     Problem: Fall/Injury  Goal: Not fall by end of shift  Outcome: Progressing  Goal: Be free from injury by end of the shift  Outcome: Progressing

## 2024-05-17 NOTE — ED NOTES
Pt arrives to ED via squad from home    Code Status:  No Order    HPI         Patient presents emergency department with syncopal event out of a chair at home.  Patient reports to be on warfarin and Plavix.  Patient has a history of stroke with left-sided paralysis.  Patient was unconscious for a few minutes.  Patient does not remember the incident.  States this was witnessed by family members.  Patient is endorsing right-sided headache, neck pain and pain going across to his chest as well as right hip pain      [place] Coma Scale Score: 15         NIH Stroke Scale: 3    Chief Complaint   Patient presents with    Fall     Hit head. + LOC. On thinners       /79   Pulse 81   Temp 36.5 °C (97.7 °F) (Skin)   Resp 19   Wt 87.1 kg (192 lb)   SpO2 97%     No Coma Scale Score: 15      LDA:   Peripheral IV 05/16/24 18 G Right Forearm (Active)   Placement Date/Time: 05/16/24 2230   Size (Gauge): 18 G  Orientation: Right  Location: Forearm  Site Prep: Chlorhexidine    Number of days: 0        BACKGROUND  No past medical history on file.  No past surgical history on file.  No current facility-administered medications on file prior to encounter.     Current Outpatient Medications on File Prior to Encounter   Medication Sig Dispense Refill    aspirin 81 mg chewable tablet Chew 1 tablet (81 mg) once daily.      clopidogrel (Plavix) 75 mg tablet Take 1 tablet (75 mg) by mouth once daily.      warfarin (Coumadin) 5 mg tablet Take 1 tablet (5 mg) by mouth once daily.          ASSESSMENT  Diagnoses as of 05/17/24 0611   Syncope and collapse       Medications Currently Running:        Medications Given:  ED Medication Administration from 05/16/2024 2058 to 05/17/2024 0611         Date/Time Order Dose Route Action Action by     05/16/2024 2338 EDT morphine injection 4 mg 4 mg intravenous Given Miryam, A     05/17/2024 0142 EDT morphine injection 4 mg 4 mg intravenous Given Rexford, A     05/17/2024 0443 EDT acetaminophen  (Tylenol) tablet 650 mg 650 mg oral Given Bah, A     05/17/2024 0443 EDT gabapentin (Neurontin) capsule 300 mg 300 mg oral Given Bah, A                 RESULTS    Imaging:  CT chest abdomen pelvis wo IV contrast   Final Result   1.  No definite acute thoracic, abdominal, or pelvic pathology   identified.   2.  Small foci of scarring and volume loss in the lateral segment of   the right middle lobe, less likely low-grade infectious or   inflammatory process.   3.  Minimal sigmoid diverticulosis.   4.  Additional chronic changes as described.   Signed by Judson Jamil      CT head W O contrast trauma protocol   Final Result   CT HEAD:   1. No acute intracranial abnormality or calvarial fracture.                  CT CERVICAL SPINE:   1. No acute fracture or traumatic malalignment of the cervical spine.   2. 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        MACRO:   None.        Signed by: Yunior Chambers 5/16/2024 10:21 PM   Dictation workstation:   [id number]      CT cervical spine wo IV contrast   Final Result   CT HEAD:   1. No acute intracranial abnormality or calvarial fracture.                  CT CERVICAL SPINE:   1. No acute fracture or traumatic malalignment of the cervical spine.   2. 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        MACRO:   None.        Signed by: Yunior Chambers 5/16/2024 10:21 PM   Dictation workstation:   [id number]      XR chest 1 view   Final Result   No acute pulmonary abnormality.   Signed by David Peoples MD         }  Labs ::99  Abnormal Labs Reviewed   CBC WITH AUTO DIFFERENTIAL - Abnormal; Notable for the following components:       Result Value    RBC 4.24 (*)     Hemoglobin 12.1 (*)     Hematocrit 35.1 (*)     All other components within normal limits    COMPREHENSIVE METABOLIC PANEL - Abnormal; Notable for the following components:    Glucose 210 (*)     Chloride 108 (*)     Total Protein 6.2 (*)     All other components within normal limits                Yunior Bah RN  05/17/24 5180

## 2024-05-17 NOTE — PROGRESS NOTES
Ab Levine is a 49 y.o. male on day 0 of admission presenting with Syncope and collapse.      Subjective   Ab Levine is a 49 y.o. 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  Patient states that he lives alone on the second floor of an apartment.  He has cameras in his apartment.  His neighbors watch over him through the cameras.  They also usually help transfer him in and out of his wheelchair as well as bathing him.  They saw that he fell off his wheelchair yesterday.  He was not moving and he did not get up.  One of the neighbors came down and states that it took about 5 minutes to get him up.  Patient does not remember the episode.  Patient is incontinent of urine at baseline.  Since the fall, he complains of headache, right shoulder pain and right hip pain.  He has no sensation on the left side of his body.  He also has decreased vision in the left eye which resolved from a CVA he had last month status post TNK.              Objective     Last Recorded Vitals  /73   Pulse 73   Temp 36.5 °C (97.7 °F) (Skin)   Resp 16   Wt 87.1 kg (192 lb)   SpO2 98%   Intake/Output last 3 Shifts:  No intake or output data in the 24 hours ending 05/17/24 1525    Admission Weight  Weight: 87.1 kg (192 lb) (05/16/24 2115)    Daily Weight  05/16/24 : 87.1 kg (192 lb)    Image Results  Electrocardiogram, 12-lead  Sinus rhythm  Inferior infarct, old  Baseline wander in lead(s) II,III,aVF  CT chest abdomen pelvis wo IV contrast  Narrative: STUDY:  CT Chest, Abdomen, and Pelvis without IV Contrast; 05/16/2024 11:25 PM  INDICATION:  Chest  pain from right side of chest to the left upper quadrant.  Fall  with right hip pain.  COMPARISON:  Pelvis XR 06/02/2022.  ACCESSION NUMBER(S):  [id number]  ORDERING CLINICIAN:  ROSA GIORDANO  TECHNIQUE:  CT of the chest, abdomen, and pelvis was performed.  Contiguous axial  images were obtained at 3 mm slice thickness through the chest,  abdomen, and pelvis.  Coronal and sagittal reconstructions at 3 mm  slice thickness were performed.  No intravenous contrast was  administered.    FINDINGS:  Please note that the evaluation of vessels, lymph nodes and organs is  limited without intravenous contrast.   CHEST:  MEDIASTINUM:  Common origin of the right brachiocephalic artery and left common  carotid artery is seen from the aortic arch, a normal variant.  There  is a trace pericardial effusion.  The heart is normal in size.   Coronary artery calcifications are not identified.  LUNGS/PLEURA:  There is no pleural effusion, pleural thickening, or pneumothorax.    Minimal dependent atelectasis is seen in the lower lobes bilaterally.   Small groundglass foci of airspace disease and mild pleural  parenchymal scarring is seen in the lateral segment of the right  middle lobe along the right minor fissure.  Minimal scarring is seen  in the posterior basilar segment of the right lower lobe.  Respiratory  motion slightly limits pulmonary evaluation.    LYMPH NODES:  Thoracic lymph nodes are not enlarged.  ABDOMEN:     LIVER:  No hepatomegaly.  Smooth surface contour.  Normal attenuation.     BILE DUCTS:  No intrahepatic or extrahepatic biliary ductal dilatation.     GALLBLADDER:  Gallbladder is contracted but otherwise unremarkable.    STOMACH:  No abnormalities identified.     PANCREAS:  No masses or ductal dilatation.     SPLEEN:  No splenomegaly or focal splenic lesion.     ADRENAL GLANDS:  No thickening or nodules.     KIDNEYS AND URETERS:  Kidneys are normal in size and location.  No renal or ureteral  calculi.     PELVIS:      BLADDER:  No abnormalities identified.     REPRODUCTIVE ORGANS:  No abnormalities identified.     BOWEL:  Minimal diverticulosis is present in the sigmoid colon.  Appendix  appears normal.  Terminal ileum is unremarkable.       VESSELS:  Inferior vena caval filter is in place.  No abnormalities identified.   Abdominal aorta is normal in caliber.      PERITONEUM/RETROPERITONEUM/LYMPH NODES:  No free fluid.  No pneumoperitoneum.  No lymphadenopathy.     ABDOMINAL WALL:  No abnormalities identified.  SOFT TISSUES:   No abnormalities identified.     BONES:  No acute fracture or aggressive osseous lesion.  Moderate disc space  narrowing is seen at L5-S1 with endplate sclerosis.  Sclerotic changes  in the femoral heads suggesting sequela of AVN.  Impression: 1.  No definite acute thoracic, abdominal, or pelvic pathology  identified.  2.  Small foci of scarring and volume loss in the lateral segment of  the right middle lobe, less likely low-grade infectious or  inflammatory process.  3.  Minimal sigmoid diverticulosis.  4.  Additional chronic changes as described.  Signed by Judson Jmail      Physical Exam  Patient is awake and orient, not in apparent distress  Eyes: PERRLA, no conjunctival congestion  Chest: Bilateral Air entry, no crackles or wheezing  Heart: s1S2 regular, no murmur  Abdomen: Soft, non tender, BS present  Ext: Left-sided hemiplegia  CNS: Slurring of speech, left-sided hemiplegia.  Relevant Results               Assessment/Plan        Seizure versus syncope  -TIA appears less likely  -His neighbor took at least 5 minutes to wake him up  -Patient is supposed to be on Keppra.  He has not taking it for  4 days  -Restarted on IV Keppra 1 g twice daily   -Neurology consulted    protein S deficiency/DVT/PE  -INR subtherapeutic secondary to noncompliance  -Patient ran out of Coumadin  -Will restart Coumadin, monitor INR daily  -Patient is allergic to enoxaparin, dabigatran, apixaban  Consult to hematology  consulted hematology        Recent CVA status post TNK  -Patient has left-sided hemiparesis/hemiplegia  -He is unable to take care of himself  -Will consult   -Patient will likely need placement  -It appears that he signed out AGAINST MEDICAL ADVICE when he was here after the recent CVA after which she received TNK.  -Will get PT/OT eval     Headache, shoulder pain, hip pain  -CT scan did not show any acute findings  -will continue supportive care  -Avoid narcotic if possible     COPD  -Appears stable     Diabetes mellitus type 2  -Last HbA1c was 6.8  -Give sliding scale insulin  -Will monitor blood sugar           Tim Spicer MD

## 2024-05-17 NOTE — ED TRIAGE NOTES
Pt presents via private vehicle for a fall on thinners. States that he did hit his head. + LOC. Residual left sided weakness from a stroke previously along with speech. Now complaining of CP. Fall occurred about [id number] today. Pt takes plavix and coumadin. Alert and oriented x's 4.

## 2024-05-17 NOTE — CONSULTS
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 degree of stenosis was utilized for stenosis measurements. All CT scans at this facility use dose modulation, iterative reconstruction, and/or weight based dosing when appropriate to reduce radiation dose to as low as reasonably achievable. COMPARISON: No relevant prior studies available. FINDINGS: The visualized portions of the aortic arch are widely patent.  The origins of the great vessels are widely patent. The common, internal, and external carotid arteries widely patent bilaterally.  There is a densely calcified plaque in the left carotid artery bifurcation however no hemodynamically significant stenoses are identified.  The visualized portions of the vertebral arteries are patent bilaterally.  The proximal right vertebral artery is obscured by dense contrast in adjacent venous system. The presence of collateral vessels in the right aspect of the neck suggests a right subclavian vein stenosis. There is a right-sided paratracheal mass that appears separate from the thyroid gland.  CT thorax is recommended for further evaluation. IMPRESSION: *  Normal CT angiogram of the carotid arteries.  No evidence of carotid artery dissection is seen. *  Partial demonstration of a right-sided paratracheal mass.  CT thorax is recommended for further evaluation. Workstation:[id number] Finalized by Russ Garcia MD on 5/2/2024 1:12  PM    CT foot left with contrast    Result Date: 5/2/2024  CT left foot History of discoloration and swelling of great toe.  Concern for osteomyelitis.  Wound.  Draining wound.  Diabetes TECHNIQUE: Axial images through the foot were obtained followed by sagittal and coronal reconstructed images.  All CT scans at this facility use dose modulation, iterative reconstruction, and/or weight based dosing when appropriate to reduce radiation dose to as low as reasonably achievable.   Steroid prep was given.  Patient received IV contrast.  No adverse reaction is reported.  Patient received 125 mL of Omnipaque 350 FINDINGS: Soft tissue thickening and increased attenuation along the great toe distally is noted.  Presumably this represents an infectious process.  There appears to be air associated with the nail bed.  Infectious process or fungal process of the nail bed is not excluded.  There is no bony involvement.  No evidence of osteomyelitis on the basis of CT. Numerous bone infarcts are appreciated in the visualized tibia with are also present in the calcaneus.  There is extreme loss and skeletal muscle throughout the foot.  Insertion of the Achilles is intact. IMPRESSION: Soft tissue infection involving the great toe is confirmed.  No definite osseous involvement.  Additional chronic findings are detailed above. Workstation:[id number] Finalized by Juliana Gamino MD on 5/2/2024 8:25 AM    CT femur left w IV contrast    Result Date: 5/2/2024  CT left femur without contrast. INDICATION: Abscesses. COMPARISON: CT, 6/7/2022. TECHNIQUE: CT of the left femur with contrast.  All CT scans at this facility use dose modulation, iterative reconstruction, and/or weight based dosing when appropriate to reduce radiation dose to as low as reasonably achievable FINDINGS: Multiple metallic BBs within the posterior compartment left thigh , similar to prior.  Myofascial fibrosis and subcutaneous tethering of the hamstring musculature towards the  overlying skin, likely chronic/post infectious/inflammatory. No focal subcutaneous or intramuscular fluid collection identified, confounded by streak artifact however. Mild scanning thickening of the medial anterior left thigh (91/200).  Small circular foreign body within the left inguinal soft tissues are unchanged. Avascular necrosis of the left femoral head .  Intramedullary infarcts of the distal femur, proximal tibia.  Chronic appearing deformity with osseous excrescences and dystrophic calcification of the femoral diaphysis.  No definitive acute fracture or dislocation.  No significant knee effusion. Few presumably reactive left inguinal lymph nodes, slightly enlarged. Normal enhancement of the left external iliac artery.  Poorly opacified left proximal-mid superficial femoral artery with collaterals throughout the anterior compartment .  Left popliteal artery appears patent.. IMPRESSION: 1.  No convincing soft tissue fluid collection/abscess in the left thigh, likely limitations related to extensive streak artifact probable myofascial/subcutaneous fibrosis. 2.  Mild skin thickening of the proximal left thigh medially; correlate with any cellulitis. 3.  Presumed reactive left inguinal lymph nodes. 4.  Left femoral head AVN.  Intramedullary infarcts of the distal femur, proximal tibia. 5.  Poorly opacified of the proximal-mid SFA (likely chronic).  Distal SFA/popliteal artery appear normally opacified. Workstation:[id number] Finalized by Navi Bueno MD on 5/2/2024 1:13 AM    CT chest wo IV contrast    Result Date: 5/2/2024  STUDY: CT chest without contrast CLINICAL HISTORY: Chest pain pain in chest after getting IJ line COMPARISON: 3/16/2023 TECHNIQUE: CT chest was performed utilizing 5 mm axial reconstructions without contrast. Coronal and sagittal reformatted images were obtained and reviewed. Automated exposure control was utilized.  Computer aided detection for pulmonary nodules was performed utilizing  [employer] software. FINDINGS: There are small bilateral pleural effusions.  There is no enlarged axillary lymph nodes. There is a hematoma in the right lower neck and paratracheal mediastinal measuring 4.3 x 4.2 cm with mass effect upon the trachea.  Hematoma extends into the middle mediastinum to the level of the sheela measuring approximately 4.3 x 4.2 cm.  Assessment for active bleeding cannot be performed by the  absence of contrast. There is dependent change and atelectasis in both lungs. No pneumothorax. Central airways patent. No significant coronary artery calcifications.   IMPRESSION: 1. Moderate to large hematoma predominantly centered within the mediastinum with some lower right neck hematoma and blood products noted as well.  There is mild mass effect upon the trachea. 2.  Assessment for active bleeding cannot be performed as a IV contrast was administered. THIS REPORT CONTAINS A SIGNIFICANT RESULT AND/OR RECOMMENDATION, WHICH REQUIRES THE ATTENTION OF THE LICENSED CAREGIVER RESPONSIBLE FOR THIS PATIENT. THEREFORE, I SPECIFICALLY DESIGNATED THIS REPORT TO BE TELEPHONED BY THE RADIOLOGY DEPARTMENT. FINDINGS WERE INSTRUCTED TO BE CALLED TO THE CLINICAL SERVICE ON 5/2/2024 AT 0107 hours. All CT scans at this facility use dose modulation, iterative reconstruction, and/or weight based dosing when appropriate to reduce radiation dose to as low as reasonably achievable. Workstation:[id number] Finalized by Navi Natarajan MD on 5/2/2024 1:07 AM    XR chest 1 view    Result Date: 5/1/2024  Single view chest History: Mild placement Comparison: 12/19/2023 Findings: Right IJ central venous catheter, tip at the distal SVC.  Stable cardiomediastinal silhouette.  Low lung volumes with bronchovascular crowding, atelectasis.  Perhaps suspect small right pleural effusion.  No measurable pneumothorax. Impression: 1.  Right IJ catheter with its tip at the distal SVC.  No post procedural pneumothorax. 2.  Pulmonary edema,  probable small right effusion. Workstation:[id number] Finalized by Navi Bueno MD on 2024 9:59 PM    CT head wo IV contrast    Result Date: 2024  Patient Name: ARGELIA JIANG : 1975 MRN: [id number] Acct#: [id number] Accession: [id number] Exam Date/Time: 2024 04:55 Procedure: CT HEAD WO IV CONTRAST Ordering Provider: GARCIA JONATHAN Reason For Exam: ams EXAMINATION: CT of the head without intravenous contrast. INDICATION: ams COMPARISON: 2023 TECHNIQUE: Thin axial imaging of the head was performed without intravenous contrast. Images were reformatted in coronal and sagittal projections using the raw CT data and were interpreted in conjunction with the axial images to render the findings listed below. Dose reduction was employed with automated exposure control. FINDINGS: Limitations: None Ventricles: Normal in size and morphology for the patient's age.   No extracerebral collection with mass effect. Brain: No mass or acute hemorrhage. No evidence of acute infarct.     Brainstem: Normal Paranasal sinuses: Clear. Mastoids: Clear. Skull and orbits: The skull and orbits are within normal limits.    No acute intracranial abnormality. Report Dictated on Workstation: [id number]  Electronically Signed By: Gerardo Shaikh MD  Electronically Signed Date/Time: 2024 5:10 AM EDT     XR chest 1 view    Result Date: 2024  Patient Name: ARGELIA JIANG : 1975 MRN: [id number] Acct#: [id number] Accession: [id number] Exam Date/Time: 2024 04:41 Procedure: XR CHEST 1 VIEW Ordering Provider: GARCIA JONATHAN Reason For Exam: ams CHEST X-RAY CLINICAL INDICATION:  ams TECHNIQUE: AP COMPARISON: 2023 FINDINGS: Quality: Exam quality: EKG leads obscure small portions of the chest. Lines: None Cardiomediastinal Silhouette: The cardiac and mediastinal silhouettes are normal. Lungs: The lungs are clear.. No evidence of pleural effusion or pneumothorax. Soft tissue: The soft tissue  structures appear unremarkable. Bones: No acute osseous process identified.      No acute cardiopulmonary process identified. Report Dictated on Workstation: [id number]  Electronically Signed By: Gerardo Shaikh MD  Electronically Signed Date/Time: 2024 4:52 AM EDT     XR foot left 3+ views    Result Date: 2024  Patient Name: ARGELIA JIANG : 1975 [id number] Accession: [id number] Exam Date/Time: 2024 03:52 Procedure: XR FOOT 3+ VIEWS LEFT Ordering Provider: GARCIA JONATHAN Reason For Exam: left big toe wound LEFT FOOT CLINICAL INDICATION: Left greater toe wound AP, lateral, and oblique plain film views of the left foot were obtained. COMPARISON: None. FINDINGS: There is no evidence for fracture or dislocation. No bone lesion is identified. Soft tissue swelling is noted about the first digit, without underlying cortical erosion identified.    Left great toe soft tissue swelling without underlying cortical erosion. Report Dictated on Workstation: [id number]  Electronically Signed By: Gerardo Shaikh MD  Electronically Signed Date/Time: 2024 4:11 AM EDT       Assessment and Plan  Principal Problem:    Syncope and collapse    49 y.o. male with fall on thinners  - work up for syncope vs seizure activity  - Neurology following  - PT/OT  - Medical management per primary team  - No acute injuries on tertiary survey  - No acute signs of bleeding  No traumatic needs at this time, Trauma will sign off      Wdw Dr Dhara Pineda, PGY2  General Surgery       Pt seen at 11 am in ED agree with above. No new traumatic issues, please re-consult if any arise.

## 2024-05-18 LAB
ANION GAP SERPL CALC-SCNC: 10 MMOL/L (ref 10–20)
BUN SERPL-MCNC: 11 MG/DL (ref 6–23)
CALCIUM SERPL-MCNC: 8.6 MG/DL (ref 8.6–10.3)
CHLORIDE SERPL-SCNC: 107 MMOL/L (ref 98–107)
CO2 SERPL-SCNC: 24 MMOL/L (ref 21–32)
CREAT SERPL-MCNC: 0.66 MG/DL (ref 0.5–1.3)
EGFRCR SERPLBLD CKD-EPI 2021: >90 ML/MIN/1.73M*2
ERYTHROCYTE [DISTWIDTH] IN BLOOD BY AUTOMATED COUNT: 14.4 % (ref 11.5–14.5)
GLUCOSE BLD MANUAL STRIP-MCNC: 135 MG/DL (ref 74–99)
GLUCOSE BLD MANUAL STRIP-MCNC: 148 MG/DL (ref 74–99)
GLUCOSE BLD MANUAL STRIP-MCNC: 179 MG/DL (ref 74–99)
GLUCOSE BLD MANUAL STRIP-MCNC: 272 MG/DL (ref 74–99)
GLUCOSE SERPL-MCNC: 123 MG/DL (ref 74–99)
HCT VFR BLD AUTO: 37.2 % (ref 41–52)
HGB BLD-MCNC: 12.9 G/DL (ref 13.5–17.5)
INR PPP: 1.1 (ref 0.9–1.1)
MAGNESIUM SERPL-MCNC: 1.28 MG/DL (ref 1.6–2.4)
MCH RBC QN AUTO: 28.5 PG (ref 26–34)
MCHC RBC AUTO-ENTMCNC: 34.7 G/DL (ref 32–36)
MCV RBC AUTO: 82 FL (ref 80–100)
NRBC BLD-RTO: 0 /100 WBCS (ref 0–0)
PLATELET # BLD AUTO: 204 X10*3/UL (ref 150–450)
POTASSIUM SERPL-SCNC: 3.9 MMOL/L (ref 3.5–5.3)
PROTHROMBIN TIME: 12.5 SECONDS (ref 9.8–12.8)
RBC # BLD AUTO: 4.52 X10*6/UL (ref 4.5–5.9)
SODIUM SERPL-SCNC: 137 MMOL/L (ref 136–145)
WBC # BLD AUTO: 5.1 X10*3/UL (ref 4.4–11.3)

## 2024-05-18 PROCEDURE — 82947 ASSAY GLUCOSE BLOOD QUANT: CPT

## 2024-05-18 PROCEDURE — 2500000004 HC RX 250 GENERAL PHARMACY W/ HCPCS (ALT 636 FOR OP/ED): Performed by: INTERNAL MEDICINE

## 2024-05-18 PROCEDURE — 2500000001 HC RX 250 WO HCPCS SELF ADMINISTERED DRUGS (ALT 637 FOR MEDICARE OP): Performed by: INTERNAL MEDICINE

## 2024-05-18 PROCEDURE — 97112 NEUROMUSCULAR REEDUCATION: CPT | Mod: GP

## 2024-05-18 PROCEDURE — 36415 COLL VENOUS BLD VENIPUNCTURE: CPT | Performed by: INTERNAL MEDICINE

## 2024-05-18 PROCEDURE — 2500000006 HC RX 250 W HCPCS SELF ADMINISTERED DRUGS (ALT 637 FOR ALL PAYERS): Performed by: INTERNAL MEDICINE

## 2024-05-18 PROCEDURE — 85610 PROTHROMBIN TIME: CPT | Performed by: INTERNAL MEDICINE

## 2024-05-18 PROCEDURE — G0378 HOSPITAL OBSERVATION PER HR: HCPCS

## 2024-05-18 PROCEDURE — 97166 OT EVAL MOD COMPLEX 45 MIN: CPT | Mod: GO | Performed by: OCCUPATIONAL THERAPIST

## 2024-05-18 PROCEDURE — 99233 SBSQ HOSP IP/OBS HIGH 50: CPT | Performed by: INTERNAL MEDICINE

## 2024-05-18 PROCEDURE — 85027 COMPLETE CBC AUTOMATED: CPT | Performed by: INTERNAL MEDICINE

## 2024-05-18 PROCEDURE — 97161 PT EVAL LOW COMPLEX 20 MIN: CPT | Mod: GP

## 2024-05-18 PROCEDURE — 2500000001 HC RX 250 WO HCPCS SELF ADMINISTERED DRUGS (ALT 637 FOR MEDICARE OP): Performed by: EMERGENCY MEDICINE

## 2024-05-18 PROCEDURE — 80048 BASIC METABOLIC PNL TOTAL CA: CPT | Performed by: INTERNAL MEDICINE

## 2024-05-18 PROCEDURE — 97535 SELF CARE MNGMENT TRAINING: CPT | Mod: GO | Performed by: OCCUPATIONAL THERAPIST

## 2024-05-18 PROCEDURE — 83735 ASSAY OF MAGNESIUM: CPT | Performed by: INTERNAL MEDICINE

## 2024-05-18 RX ORDER — MORPHINE SULFATE 2 MG/ML
2 INJECTION, SOLUTION INTRAMUSCULAR; INTRAVENOUS EVERY 4 HOURS PRN
Status: DISCONTINUED | OUTPATIENT
Start: 2024-05-18 | End: 2024-05-18

## 2024-05-18 RX ORDER — MORPHINE SULFATE 4 MG/ML
4 INJECTION INTRAVENOUS EVERY 4 HOURS PRN
Status: DISCONTINUED | OUTPATIENT
Start: 2024-05-18 | End: 2024-05-22

## 2024-05-18 RX ORDER — MAGNESIUM SULFATE HEPTAHYDRATE 40 MG/ML
4 INJECTION, SOLUTION INTRAVENOUS ONCE
Status: COMPLETED | OUTPATIENT
Start: 2024-05-18 | End: 2024-05-18

## 2024-05-18 RX ADMIN — INSULIN LISPRO 1 UNITS: 100 INJECTION, SOLUTION INTRAVENOUS; SUBCUTANEOUS at 16:29

## 2024-05-18 RX ADMIN — GABAPENTIN 300 MG: 300 CAPSULE ORAL at 13:40

## 2024-05-18 RX ADMIN — MORPHINE SULFATE 4 MG: 4 INJECTION, SOLUTION INTRAMUSCULAR; INTRAVENOUS at 20:38

## 2024-05-18 RX ADMIN — GABAPENTIN 300 MG: 300 CAPSULE ORAL at 22:00

## 2024-05-18 RX ADMIN — MAGNESIUM SULFATE HEPTAHYDRATE 4 G: 40 INJECTION, SOLUTION INTRAVENOUS at 09:20

## 2024-05-18 RX ADMIN — ACETAMINOPHEN 650 MG: 325 TABLET ORAL at 09:23

## 2024-05-18 RX ADMIN — MORPHINE SULFATE 4 MG: 4 INJECTION, SOLUTION INTRAMUSCULAR; INTRAVENOUS at 16:01

## 2024-05-18 RX ADMIN — LEVETIRACETAM 1000 MG: 10 INJECTION INTRAVENOUS at 07:00

## 2024-05-18 RX ADMIN — LEVETIRACETAM 750 MG: 250 TABLET, FILM COATED ORAL at 20:38

## 2024-05-18 RX ADMIN — GABAPENTIN 300 MG: 300 CAPSULE ORAL at 05:08

## 2024-05-18 RX ADMIN — MORPHINE SULFATE 2 MG: 2 INJECTION, SOLUTION INTRAMUSCULAR; INTRAVENOUS at 11:29

## 2024-05-18 RX ADMIN — WARFARIN SODIUM 5 MG: 5 TABLET ORAL at 17:08

## 2024-05-18 RX ADMIN — INSULIN LISPRO 3 UNITS: 100 INJECTION, SOLUTION INTRAVENOUS; SUBCUTANEOUS at 11:24

## 2024-05-18 ASSESSMENT — PAIN - FUNCTIONAL ASSESSMENT
PAIN_FUNCTIONAL_ASSESSMENT: 0-10

## 2024-05-18 ASSESSMENT — PAIN DESCRIPTION - DESCRIPTORS
DESCRIPTORS: ACHING;DISCOMFORT;SORE
DESCRIPTORS: ACHING;DISCOMFORT
DESCRIPTORS: ACHING;DISCOMFORT;SORE

## 2024-05-18 ASSESSMENT — COGNITIVE AND FUNCTIONAL STATUS - GENERAL
STANDING UP FROM CHAIR USING ARMS: A LOT
DRESSING REGULAR UPPER BODY CLOTHING: A LOT
EATING MEALS: A LITTLE
MOBILITY SCORE: 12
MOVING FROM LYING ON BACK TO SITTING ON SIDE OF FLAT BED WITH BEDRAILS: A LITTLE
TURNING FROM BACK TO SIDE WHILE IN FLAT BAD: A LITTLE
MOVING FROM LYING ON BACK TO SITTING ON SIDE OF FLAT BED WITH BEDRAILS: A LITTLE
CLIMB 3 TO 5 STEPS WITH RAILING: TOTAL
DRESSING REGULAR LOWER BODY CLOTHING: A LOT
STANDING UP FROM CHAIR USING ARMS: A LOT
DRESSING REGULAR LOWER BODY CLOTHING: A LOT
DAILY ACTIVITIY SCORE: 13
MOBILITY SCORE: 10
CLIMB 3 TO 5 STEPS WITH RAILING: TOTAL
PERSONAL GROOMING: A LITTLE
PERSONAL GROOMING: A LITTLE
HELP NEEDED FOR BATHING: A LOT
EATING MEALS: A LITTLE
STANDING UP FROM CHAIR USING ARMS: A LOT
TURNING FROM BACK TO SIDE WHILE IN FLAT BAD: A LITTLE
MOVING TO AND FROM BED TO CHAIR: A LOT
DRESSING REGULAR UPPER BODY CLOTHING: A LOT
EATING MEALS: A LITTLE
CLIMB 3 TO 5 STEPS WITH RAILING: TOTAL
TOILETING: A LOT
DRESSING REGULAR LOWER BODY CLOTHING: TOTAL
WALKING IN HOSPITAL ROOM: TOTAL
DRESSING REGULAR UPPER BODY CLOTHING: A LOT
DAILY ACTIVITIY SCORE: 13
PERSONAL GROOMING: A LITTLE
TOILETING: TOTAL
DAILY ACTIVITIY SCORE: 14
MOBILITY SCORE: 12
MOVING TO AND FROM BED TO CHAIR: A LOT
TURNING FROM BACK TO SIDE WHILE IN FLAT BAD: A LOT
WALKING IN HOSPITAL ROOM: TOTAL
MOVING FROM LYING ON BACK TO SITTING ON SIDE OF FLAT BED WITH BEDRAILS: A LOT
HELP NEEDED FOR BATHING: A LOT
HELP NEEDED FOR BATHING: A LOT
WALKING IN HOSPITAL ROOM: TOTAL
MOVING TO AND FROM BED TO CHAIR: A LOT
TOILETING: A LOT

## 2024-05-18 ASSESSMENT — ACTIVITIES OF DAILY LIVING (ADL)
ADL_ASSISTANCE: NEEDS ASSISTANCE
ADL_ASSISTANCE: NEEDS ASSISTANCE
BATHING_ASSISTANCE: MAXIMAL
HOME_MANAGEMENT_TIME_ENTRY: 14

## 2024-05-18 ASSESSMENT — PAIN SCALES - GENERAL
PAINLEVEL_OUTOF10: 9
PAINLEVEL_OUTOF10: 6
PAINLEVEL_OUTOF10: 8
PAINLEVEL_OUTOF10: 9
PAINLEVEL_OUTOF10: 8
PAINLEVEL_OUTOF10: 9
PAINLEVEL_OUTOF10: 8

## 2024-05-18 NOTE — PROGRESS NOTES
Ab Levine is a 49 y.o. male on day 0 of admission presenting with Syncope and collapse.      Subjective   Ab Levine is a 49 y.o. 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  Patient states that he lives alone on the second floor of an apartment.  He has cameras in his apartment.  His neighbors watch over him through the cameras.  They also usually help transfer him in and out of his wheelchair as well as bathing him.  They saw that he fell off his wheelchair yesterday.  He was not moving and he did not get up.  One of the neighbors came down and states that it took about 5 minutes to get him up.  Patient does not remember the episode.  Patient is incontinent of urine at baseline.  Since the fall, he complains of headache, right shoulder pain and right hip pain.  He has no sensation on the left side of his body.  He also has decreased vision in the left eye which resolved from a CVA he had last month status post TNK.    05/18: Patient was evaluated this a.m., awake alert and oriented.  Complains of pain all over otherwise no active complaint.  Restarted on Keppra on admission-no seizure activity since then.              Objective     Last Recorded Vitals  /69 (BP Location: Right arm, Patient Position: Lying)   Pulse 81   Temp 36.6 °C (97.9 °F) (Temporal)   Resp 15   Wt 87.1 kg (192 lb)   SpO2 96%   Intake/Output last 3 Shifts:    Intake/Output Summary (Last 24 hours) at 5/18/2024 1234  Last data filed at 5/18/2024 0913  Gross per 24 hour   Intake 1216 ml   Output 650 ml   Net 566 ml       Admission Weight  Weight: 87.1  kg (192 lb) (05/16/24 2115)    Daily Weight  05/16/24 : 87.1 kg (192 lb)    Image Results  Electrocardiogram, 12-lead  Sinus rhythm  Inferior infarct, old  Baseline wander in lead(s) II,III,aVF  CT chest abdomen pelvis wo IV contrast  Narrative: STUDY:  CT Chest, Abdomen, and Pelvis without IV Contrast; 05/16/2024 11:25 PM  INDICATION:  Chest pain from right side of chest to the left upper quadrant.  Fall  with right hip pain.  COMPARISON:  Pelvis XR 06/02/2022.  ACCESSION NUMBER(S):  [id number]  ORDERING CLINICIAN:  ROSA GIORDANO  TECHNIQUE:  CT of the chest, abdomen, and pelvis was performed.  Contiguous axial  images were obtained at 3 mm slice thickness through the chest,  abdomen, and pelvis.  Coronal and sagittal reconstructions at 3 mm  slice thickness were performed.  No intravenous contrast was  administered.    FINDINGS:  Please note that the evaluation of vessels, lymph nodes and organs is  limited without intravenous contrast.   CHEST:  MEDIASTINUM:  Common origin of the right brachiocephalic artery and left common  carotid artery is seen from the aortic arch, a normal variant.  There  is a trace pericardial effusion.  The heart is normal in size.   Coronary artery calcifications are not identified.  LUNGS/PLEURA:  There is no pleural effusion, pleural thickening, or pneumothorax.    Minimal dependent atelectasis is seen in the lower lobes bilaterally.   Small groundglass foci of airspace disease and mild pleural  parenchymal scarring is seen in the lateral segment of the right  middle lobe along the right minor fissure.  Minimal scarring is seen  in the posterior basilar segment of the right lower lobe.  Respiratory  motion slightly limits pulmonary evaluation.    LYMPH NODES:  Thoracic lymph nodes are not enlarged.  ABDOMEN:     LIVER:  No hepatomegaly.  Smooth surface contour.  Normal attenuation.     BILE DUCTS:  No intrahepatic or extrahepatic biliary ductal dilatation.     GALLBLADDER:  Gallbladder  is contracted but otherwise unremarkable.    STOMACH:  No abnormalities identified.     PANCREAS:  No masses or ductal dilatation.     SPLEEN:  No splenomegaly or focal splenic lesion.     ADRENAL GLANDS:  No thickening or nodules.     KIDNEYS AND URETERS:  Kidneys are normal in size and location.  No renal or ureteral  calculi.     PELVIS:     BLADDER:  No abnormalities identified.     REPRODUCTIVE ORGANS:  No abnormalities identified.     BOWEL:  Minimal diverticulosis is present in the sigmoid colon.  Appendix  appears normal.  Terminal ileum is unremarkable.       VESSELS:  Inferior vena caval filter is in place.  No abnormalities identified.   Abdominal aorta is normal in caliber.      PERITONEUM/RETROPERITONEUM/LYMPH NODES:  No free fluid.  No pneumoperitoneum.  No lymphadenopathy.     ABDOMINAL WALL:  No abnormalities identified.  SOFT TISSUES:   No abnormalities identified.     BONES:  No acute fracture or aggressive osseous lesion.  Moderate disc space  narrowing is seen at L5-S1 with endplate sclerosis.  Sclerotic changes  in the femoral heads suggesting sequela of AVN.  Impression: 1.  No definite acute thoracic, abdominal, or pelvic pathology  identified.  2.  Small foci of scarring and volume loss in the lateral segment of  the right middle lobe, less likely low-grade infectious or  inflammatory process.  3.  Minimal sigmoid diverticulosis.  4.  Additional chronic changes as described.  Signed by Judson Jamil      Physical Exam  Patient is awake and orient, not in apparent distress  Eyes: PERRLA, no conjunctival congestion  Chest: Bilateral Air entry, no crackles or wheezing  Heart: s1S2 regular, no murmur  Abdomen: Soft, non tender, BS present  Ext: Left-sided hemiplegia  CNS: Slurring of speech, left-sided hemiplegia.  Relevant Results               Assessment/Plan        Seizure versus syncope  -TIA appears less likely  -His neighbor took at least 5 minutes to wake him up  -Patient is supposed to be  on Keppra.  He has not taking it for  4 days  -Restarted on IV Keppra 1 g twice daily-switch to oral on 05/18  -PT/OT on board  -Discharge planning to [facility] for rehabilitation    protein S deficiency/DVT/PE  -INR subtherapeutic secondary to noncompliance  -Patient ran out of Coumadin  -Will restart Coumadin, monitor INR daily  -Patient is allergic to enoxaparin, dabigatran, apixaban  Consult to hematology     Recent CVA status post TNK  -Patient has left-sided hemiparesis/hemiplegia  -He is unable to take care of himself  -Will consult   -Patient will likely need placement  -It appears that he signed out AGAINST MEDICAL ADVICE when he was here after the recent CVA after which she received TNK.  -Will get PT/OT eval     Headache, shoulder pain, hip pain  -CT scan did not show any acute findings  -will continue supportive care  -Avoid narcotic if possible     COPD  -Appears stable     Diabetes mellitus type 2  -Last HbA1c was 6.8  -Give sliding scale insulin  -Will monitor blood sugar    Physical debility/deconditioning  Secondary to recent stroke  PT/OT on board           Tim Spicer MD

## 2024-05-18 NOTE — PROGRESS NOTES
"Occupational Therapy    Evaluation    Patient Name: Ab Levine  MRN: [id number]  Today's Date: 5/18/2024  Time Calculation  Start Time: 0816  Stop Time: 0850  Time Calculation (min): 34 min    Assessment  IP OT Assessment  OT Assessment: pt. high fall risk, assist of 2 for safe trfrs., unable to care for self at home  Prognosis: Good  Barriers to Discharge: Decreased caregiver support  Medical Staff Made Aware: Yes  End of Session Communication: Bedside nurse, PCT/NA/CTA  End of Session Patient Position: Bed, 3 rail up, Alarm on       05/18/24 0816   Inpatient Plan   Equipment Recommended upon Discharge Wheelchair  (current wheelchair \"broken\")   Treatment Interventions ADL retraining;Functional transfer training;UE strengthening/ROM   OT Frequency 4 times per week   OT - OK to Discharge Yes  ((when medically approp.))   OT Discharge Recommendations High intensity level of continued care   OT Recommended Transfer Status Assist of 2     Subjective     Current Problem:  1. Syncope and collapse            General:  General  Reason for Referral: syncope and collapse  Referred By: Feng  Past Medical History Relevant to Rehab: Hx. of CVA with TNK with L hao., PE, bipolar, COPD, compartment syndrome  Co-Treatment: PT  Co-Treatment Reason: to maximize safe moibility  Prior to Session Communication: Bedside nurse, PCT/NA/CTA  Patient Position Received: Bed, 3 rail up, Alarm on  General Comment: Pt. states that it has been very difficult to function at home, iswilling to put forth max. effort for rehab. to improve mobility    Precautions:  Medical Precautions: Fall precautions    Vital Signs:see nurses notes        Pain:  Pain Assessment  Pain Assessment: 0-10  Pain Score: 9  Pain Location:  (head, R shoulder and R hip)    Objective     Cognition:  Overall Cognitive Status: Within Functional Limits (dysarthric speech, but mostly understandable)  Orientation Level: Oriented X4  Problem Solving: Exceptions to " "WFL  Safety/Judgement: Exceptions to WFL             Home Living:  Type of Home: Apartment  Lives With: Alone  Home Adaptive Equipment: Wheelchair-manual (wheelchair \"broken\", hospital bed, BSC)  Home Layout: One level  Home Access: Stairs to enter with rails (on 7th floor and elevator not working)  Home Living Comments: states has help from neighbors 4 days/week, on other days stays in bed mostly, states that he crawls if needed to bathroom , reportedly has cameras in apt. so neighbors can check on him     Prior Function:  Level of [place]: Needs assistance with ADLs, Needs assistance with homemaking, Needs assistance with functional transfers  Receives Help From: Neighbor  ADL Assistance: Needs assistance  Bath: Maximal  Toileting:  (needs assist)  Homemaking Assistance: Needs assistance  Meal Prep:  (needs assist)  Driving/Transportation: Public transportation  Ambulatory Assistance:  (non-amb. since CVA)    IADL History:  Homemaking Responsibilities: No    ADL:  LE Dressing Assistance: Maximal  Toileting Assistance with Device:  (assist of 2 for safe trfr. to[place] [facility]    Activity Tolerance:  Endurance: Endurance does not limit participation in activity    Bed Mobility/Transfers:   Bed Mobility  Bed Mobility: Yes (Mod.A to sit EOB, Max.A with instruction to supine)  Transfers  Transfer: Yes (Max.A X 2 to BSC, Mod.A X 2 to bed with gait belt and instruction)    Ambulation/Gait Training:non-amb. Since CVA        Sitting Balance:  Dynamic Sitting Balance  Dynamic Sitting-Balance Support:  (CGA reaching with cuing to keep R foot on floor)    Standing Balance:  Static Standing Balance  Static Standing-Balance Support:  (Max.A with gait belt and instruction by P.T.)    Vision: Vision - Basic Assessment  Patient Visual Report:  (states blurry on L)      Sensation:  Sensation Comment: no sensation on L side of body    Strength:  Strength Comments: R arm fair shoulder 2/2 injury, L UE flaccid all " planes    Perception:  Inattention/Neglect: Appears intact    Coordination:  Movements are Fluid and Coordinated: No  Trunk Coordination: impaired during sitting and standing     Hand Function: R WNL, L non-functional          Outcome Measures: [facility] Daily Activity  Putting on and taking off regular lower body clothing: A lot  Bathing (including washing, rinsing, drying): A lot  Putting on and taking off regular upper body clothing: A lot  Toileting, which includes using toilet, bedpan or urinal: Total  Taking care of personal grooming such as brushing teeth: A little  Eating Meals: A little  Daily Activity - Total Score: 13                    EDUCATION:     Education Documentation  ADL Training, taught by Amanda Elias OT at 5/18/2024  9:20 AM.  Learner: Patient  Readiness: Acceptance  Method: Demonstration  Response: Needs Reinforcement  Comment: trfr. training    Education Comments  No comments found.        Goals:   Encounter Problems       Encounter Problems (Active)       ADLs       Demo. UB bathing, dressing, brushing teeth while seated EOB with set-up and SBA  (Progressing)       Start:  05/18/24    Expected End:  06/01/24               BALANCE       Demo. static stand with Mod.A and device for 1 min.  (Progressing)       Start:  05/18/24    Expected End:  06/01/24               TRANSFERS       Demo. Min./A func. trfrs. to/from BSC or chair.with device PRN   (Progressing)       Start:  05/18/24    Expected End:  06/01/24

## 2024-05-18 NOTE — CARE PLAN
Problem: Skin  Goal: Decreased wound size/increased tissue granulation at next dressing change  Flowsheets (Taken 5/18/2024 1049)  Decreased wound size/increased tissue granulation at next dressing change: Promote sleep for wound healing  Goal: Participates in plan/prevention/treatment measures  Flowsheets (Taken 5/18/2024 1049)  Participates in plan/prevention/treatment measures:   Elevate heels   Discuss with provider PT/OT consult  Goal: Prevent/manage excess moisture  Flowsheets (Taken 5/18/2024 1049)  Prevent/manage excess moisture:   Monitor for/manage infection if present   Moisturize dry skin   Cleanse incontinence/protect with barrier cream  Goal: Prevent/minimize sheer/friction injuries  Flowsheets (Taken 5/18/2024 1049)  Prevent/minimize sheer/friction injuries:   Turn/reposition every 2 hours/use positioning/transfer devices   HOB 30 degrees or less   Use pull sheet  Goal: Promote/optimize nutrition  Flowsheets (Taken 5/18/2024 1049)  Promote/optimize nutrition:   Monitor/record intake including meals   Consume > 50% meals/supplements   Assist with feeding  Goal: Promote skin healing  Flowsheets (Taken 5/18/2024 1049)  Promote skin healing:   Protective dressings over bony prominences   Turn/reposition every 2 hours/use positioning/transfer devices

## 2024-05-18 NOTE — NURSING NOTE
"At around 9 pm patient put his call light on to report a need to have bowel movement. Since patient is wheelchair bound at baseline and PT/OT haven't worked with him yet, he was offered a bedpan. Patient refused using the bedpan , stating \"this is uncomfortable, I need to sit on the toilet\". Patient was educated about fall precautions and the need to call and wait for  and accept help to prevent injury. To all the explanation, patient responded saying \"Then I will just wait a while\".   Then the nurse went to get his scheduled medication, and upon entering the room the patient was sitting on the toilet. When asked how he got there, patient said \"I just crawled here, I do that at home all the time\" and requested privacy while in bathroom. Patient was asked to pull call light when he is ready to transfer to bed. While nurse was out of room to get ice water per patient's request, the patient moved himself back into bed without calling and waiting for help.   Patient was given an explanation that he should follow the safety instructions while in the hospital and call for help.   "

## 2024-05-18 NOTE — PROGRESS NOTES
Physical Therapy    Physical Therapy Evaluation    Patient Name: Ab Levine  MRN: [id number]  Today's Date: 5/18/2024   Time Calculation  Start Time: 0817  Stop Time: 0848  Time Calculation (min): 31 min    Assessment/Plan   PT Assessment  PT Assessment Results: Decreased strength, Decreased range of motion, Decreased endurance, Impaired balance, Decreased mobility, Decreased coordination, Impaired vision, Pain  Rehab Prognosis: Good  Barriers to Discharge: 7 flights of stairs with unavailable elevator to access apartment. Caregiver only 4 days a week. Lives alone.  Evaluation/Treatment Tolerance: Patient tolerated treatment well  Medical Staff Made Aware: Yes  End of Session Communication: PCT/NA/CTA, Care Coordinator  Assessment Comment: Patient presents with recent CVA with dense L hemiplegia, recent fall uncertain of syncope or seizure. He has deficits in strength, R hip pain, balance, ROM, and endurance and would benefit from PT intervention to improve understanding of compensatory strategies to manage his deficits (and improve impairments). The patient did not have rehab post CVA and would benefit from high intensity rehab to assist him in achievement of his highest level of function.  End of Session Patient Position: Bed, 3 rail up, Alarm on  IP OR SWING BED PT PLAN  Inpatient or Swing Bed: Inpatient      05/18/24 0817   PT Plan   Treatment/Interventions Bed mobility;Transfer training;Neuromuscular re-education;Strengthening;Endurance training;Range of motion;Therapeutic exercise;Therapeutic activity;Home exercise program;Postural re-education   PT Plan Skilled PT   PT Frequency Daily   PT Discharge Recommendations High intensity level of continued care   Equipment Recommended upon Discharge Wheelchair   PT Recommended Transfer Status Assist x2   PT - OK to Discharge Yes  (To next level of care when medically cleared.)       Subjective     Current Problem:  Patient Active Problem List   Diagnosis    Acute  "drug-induced gout involving toe of left foot    Alcohol withdrawal with complication with inpatient treatment (Multi)    Acute exacerbation of chronic obstructive pulmonary disease (Multi)    Bipolar disorder, current episode depressed, severe, with psychotic features (Multi)    Bipolar I disorder, most recent episode (or current) manic, moderate (Multi)    Syncope and collapse       General Visit Information:  General  Reason for Referral: Impaired mobility. Fall with R sided and chest pain.  Referred By: Suzanne Toney MD  Past Medical History Relevant to Rehab: 50 y/o male admitted with syncope, having fallen to the floor with R hip and chest pain. CT of head (-) for acute intracranial abnormalities or fx; C-spine (-) for fracture. Congenital fusion of C2 - C3, C5-C6. Ground glass opacities R upper lung. Uncertain of atelectasis, inflammation, or infectious process developing. Patient expresses significant strength and mobility decline over the past month when suffered CVA with L hemiplegia including decreased vision L eye \"blurry\". (PMHx: PE and DVT, infectious hepatitis, COPD, diabetic foot pain per chart, polysubstance abuse, COPD, bipolar disorder, seizure disorder, PVD. CVA 4/2024 with L hemiplegia including decreased vision L eye \"blurry\".)  Family/Caregiver Present: No  Co-Treatment: OT  Co-Treatment Reason: To optimize safe functional mobility with consideration of current and past medical diagnoses.  Prior to Session Communication: Bedside nurse  Patient Position Received: Bed, 3 rail up, Alarm on  General Comment: Patient alert, agreeable to participate in PT intervention. Presents with dysarthria.    Home Living:  Home Living  Type of Home: Apartment  Lives With: Alone  Home Adaptive Equipment: Wheelchair-manual (Reports w/c is approx 5 years old and brakes no longer work.)  Home Layout: One level  Home Access: Stairs to enter with rails, Elevator (Patient reports elevator has been broken since he " moved in May 2023.)  Entrance Stairs-Number of Steps: 7 flights  Bathroom Shower/Tub: Tub/shower unit  Home Living Comments: Sleeps in a hospital bed.    Prior Level of Function:  Prior Function Per Pt/Caregiver Report  Level of [place]: Needs assistance with ADLs, Needs assistance with homemaking, Needs assistance with functional transfers  Receives Help From: Personal care attendant (At least 4 days a week he has assistance. Patient reports staying in bed and transferring self to [facility] as needed.)  ADL Assistance: Needs assistance  Homemaking Assistance: Needs assistance  Ambulatory Assistance:  (Non ambulatory; uses w/c as primary mode of transportation. Patient states that it is difficult to propel his w/c with R side.)  Hand Dominance: Right  Prior Function Comments: Gets into tub with caregiver lifting or sponge bathes.    Precautions:  Precautions  Precautions Comment: Fall precautions. Dense L hemiplegia.    Vital Signs:     Objective     Pain:  Pain Assessment  Pain Assessment: 0-10  Pain Score: 8  Pain Type: Acute pain  Pain Location: Chest  Pain Orientation: Mid  Multiple Pain Sites: Two  Pain 2  Pain Score 2: 8  Pain Type 2: Acute pain  Pain Location 2: Hip  Pain Orientation 2: Right    Cognition:  Cognition  Overall Cognitive Status: Within Functional Limits  Orientation Level: Oriented X4  Attention: Within Functional Limits  Memory: Within Funtional Limits  Safety/Judgement: Exceptions to WFL (Reports crawling to get to the bathroom or negotiating steps to get to his apartment if needed.)  Processing Speed: Within funtional limits    General Assessments:  General Observation  General Observation: Patient provided with VC and visual cues for hand placement as well as compensatory strategy (LE crossing for sit > supine) for bed mobility. Cues and facilitation to keep hips on side of the bed , R sidelying. Educated and provided tactile facilitation to keep B feet in contact with floor with reaching  out of LIBRADO anterior and laterally with MIN A. Instructed pt to reach for far BSC arm rest for pivot and discussed potential equipment for ease of transfers. Static stand with L knee block in retropulsion to R with tightness in R ankle, MOD A of 2 with VC for weight shifting L & R without L knee buckle. With VC for R heel elevation for pivot, B knees unable to remain extended.   Activity Tolerance  Endurance: Decreased tolerance for upright activites  Sensation  Light Touch: Severe deficits in the LLE, Severe deficits in the LUE  Sensation Comment: Absent to light and pressure touch L side  Strength  Strength Comments: L LE 0/5. Possible slight muscle activation hip adduction and quads with bilateral LE simultaneous movement. R LE hip flexion 3-/5 due to discomfort from fall. R hip ab/adduction 4/5. R knee and ankle 5/5.  Perception  Inattention/Neglect: Appears intact  Coordination  Movements are Fluid and Coordinated: No  Trunk Coordination: Good posture; does not use R LE weight bearing to assist with balance until prompted.  Postural Control  Postural Control: Within Functional Limits  Static Sitting Balance  Static Sitting-Balance Support: No upper extremity supported  Static Sitting-Level of Assistance: Distant supervision  Dynamic Sitting Balance  Dynamic Sitting-Balance Support: No upper extremity supported  Dynamic Sitting-Balance: Lateral lean, Forward lean, Reaching for objects (SBA)  Static Standing Balance  Static Standing-Balance Support: Right upper extremity supported  Static Standing-Level of Assistance: Maximum assistance  Static Standing-Comment/Number of Minutes: R posterior weight shift. (45 seconds)    Functional Assessments:     Bed Mobility  Bed Mobility:  (Supine > sit MOD A. Sit > supine MAX A.)  Transfers  Transfer:  (Pivot R bed > BSC MAX A of 2 with movement of R hand from armrest closest to pt to farther arm rest. Sit > stand from BSC and pivot to L MOD A of 2.)              Extremity/Trunk Assessments:        RLE   RLE : Within Functional Limits  LLE   LLE : Exceptions to WFL (Ankle df grossly 10 degrees from neutral.)    Outcome Measures:  [facility] Basic Mobility  Turning from your back to your side while in a flat bed without using bedrails: A lot  Moving from lying on your back to sitting on the side of a flat bed without using bedrails: A lot  Moving to and from bed to chair (including a wheelchair): A lot  Standing up from a chair using your arms (e.g. wheelchair or bedside chair): A lot  To walk in hospital room: Total  Climbing 3-5 steps with railing: Total  Basic Mobility - Total Score: 10                            Goals:  Encounter Problems       Encounter Problems (Active)       Pain - Adult          To improve strength, balance, endurance and efficiency of mobility:       Patient will participate in dynamic sit balance activities out of LIBRADO with understanding of and implementation of weight bearing through B LE with SBA in order to improve supine > sit to CGA.       Start:  05/18/24    Expected End:  06/01/24            Patient will stand with weight shifting in 4 planes and MOD A of 1 for 2 minutes with increased L ankle dorsiflexion to 0 degrees to improve future transfer ability.       Start:  05/18/24    Expected End:  06/01/24            Patient will pivot bed <> chair with MOD A of 1 with safe and efficient technique with VC <25% time.       Start:  05/18/24    Expected End:  06/01/24                 Education Documentation  Mobility Training, taught by Melody Schuster PT at 5/18/2024  9:30 AM.  Learner: Patient  Readiness: Eager  Method: Explanation  Response: Verbalizes Understanding, Needs Reinforcement    Education Comments  No comments found.

## 2024-05-18 NOTE — CARE PLAN
Problem: Pain  Goal: My pain/discomfort is manageable  Outcome: Progressing     Problem: Safety  Goal: Patient will be injury free during hospitalization  Outcome: Progressing     Problem: Daily Care  Goal: Daily care needs are met  Outcome: Progressing     Problem: Psychosocial Needs  Goal: Demonstrates ability to cope with hospitalization/illness  Outcome: Progressing   The patient's goals for the shift include

## 2024-05-19 LAB
APTT PPP: 21 SECONDS (ref 27–38)
ERYTHROCYTE [DISTWIDTH] IN BLOOD BY AUTOMATED COUNT: 14.4 % (ref 11.5–14.5)
GLUCOSE BLD MANUAL STRIP-MCNC: 194 MG/DL (ref 74–99)
GLUCOSE BLD MANUAL STRIP-MCNC: 196 MG/DL (ref 74–99)
GLUCOSE BLD MANUAL STRIP-MCNC: 219 MG/DL (ref 74–99)
GLUCOSE BLD MANUAL STRIP-MCNC: 245 MG/DL (ref 74–99)
HCT VFR BLD AUTO: 36.5 % (ref 41–52)
HGB BLD-MCNC: 12.8 G/DL (ref 13.5–17.5)
INR PPP: 1.2 (ref 0.9–1.1)
MCH RBC QN AUTO: 29.2 PG (ref 26–34)
MCHC RBC AUTO-ENTMCNC: 35.1 G/DL (ref 32–36)
MCV RBC AUTO: 83 FL (ref 80–100)
NRBC BLD-RTO: 0 /100 WBCS (ref 0–0)
PLATELET # BLD AUTO: 186 X10*3/UL (ref 150–450)
PROTHROMBIN TIME: 13.6 SECONDS (ref 9.8–12.8)
RBC # BLD AUTO: 4.38 X10*6/UL (ref 4.5–5.9)
UFH PPP CHRO-ACNC: 0.5 IU/ML
UFH PPP CHRO-ACNC: 1.7 IU/ML
WBC # BLD AUTO: 5 X10*3/UL (ref 4.4–11.3)

## 2024-05-19 PROCEDURE — G0378 HOSPITAL OBSERVATION PER HR: HCPCS

## 2024-05-19 PROCEDURE — 82947 ASSAY GLUCOSE BLOOD QUANT: CPT

## 2024-05-19 PROCEDURE — 76937 US GUIDE VASCULAR ACCESS: CPT

## 2024-05-19 PROCEDURE — 36415 COLL VENOUS BLD VENIPUNCTURE: CPT | Performed by: INTERNAL MEDICINE

## 2024-05-19 PROCEDURE — 2500000006 HC RX 250 W HCPCS SELF ADMINISTERED DRUGS (ALT 637 FOR ALL PAYERS): Performed by: INTERNAL MEDICINE

## 2024-05-19 PROCEDURE — 85730 THROMBOPLASTIN TIME PARTIAL: CPT | Performed by: INTERNAL MEDICINE

## 2024-05-19 PROCEDURE — 97530 THERAPEUTIC ACTIVITIES: CPT | Mod: CQ,GP

## 2024-05-19 PROCEDURE — 85610 PROTHROMBIN TIME: CPT | Performed by: INTERNAL MEDICINE

## 2024-05-19 PROCEDURE — 85027 COMPLETE CBC AUTOMATED: CPT | Performed by: INTERNAL MEDICINE

## 2024-05-19 PROCEDURE — 2500000004 HC RX 250 GENERAL PHARMACY W/ HCPCS (ALT 636 FOR OP/ED): Performed by: INTERNAL MEDICINE

## 2024-05-19 PROCEDURE — 85520 HEPARIN ASSAY: CPT | Performed by: INTERNAL MEDICINE

## 2024-05-19 PROCEDURE — 2500000001 HC RX 250 WO HCPCS SELF ADMINISTERED DRUGS (ALT 637 FOR MEDICARE OP): Performed by: INTERNAL MEDICINE

## 2024-05-19 PROCEDURE — 99233 SBSQ HOSP IP/OBS HIGH 50: CPT | Performed by: INTERNAL MEDICINE

## 2024-05-19 PROCEDURE — 97110 THERAPEUTIC EXERCISES: CPT | Mod: CQ,GP

## 2024-05-19 PROCEDURE — 2500000001 HC RX 250 WO HCPCS SELF ADMINISTERED DRUGS (ALT 637 FOR MEDICARE OP): Performed by: EMERGENCY MEDICINE

## 2024-05-19 RX ORDER — WARFARIN SODIUM 5 MG/1
5 TABLET ORAL DAILY
Status: DISCONTINUED | OUTPATIENT
Start: 2024-05-19 | End: 2024-05-19

## 2024-05-19 RX ORDER — NAPROXEN SODIUM 220 MG/1
81 TABLET, FILM COATED ORAL
Status: DISCONTINUED | OUTPATIENT
Start: 2024-05-19 | End: 2024-05-25 | Stop reason: HOSPADM

## 2024-05-19 RX ORDER — HEPARIN SODIUM 10000 [USP'U]/100ML
0-4500 INJECTION, SOLUTION INTRAVENOUS CONTINUOUS
Status: DISCONTINUED | OUTPATIENT
Start: 2024-05-19 | End: 2024-05-24

## 2024-05-19 RX ORDER — CLOPIDOGREL BISULFATE 75 MG/1
75 TABLET ORAL DAILY
Status: DISCONTINUED | OUTPATIENT
Start: 2024-05-19 | End: 2024-05-25 | Stop reason: HOSPADM

## 2024-05-19 RX ADMIN — INSULIN LISPRO 2 UNITS: 100 INJECTION, SOLUTION INTRAVENOUS; SUBCUTANEOUS at 16:49

## 2024-05-19 RX ADMIN — LEVETIRACETAM 750 MG: 250 TABLET, FILM COATED ORAL at 20:40

## 2024-05-19 RX ADMIN — GABAPENTIN 300 MG: 300 CAPSULE ORAL at 13:52

## 2024-05-19 RX ADMIN — ASPIRIN 81 MG CHEWABLE TABLET 81 MG: 81 TABLET CHEWABLE at 09:12

## 2024-05-19 RX ADMIN — GABAPENTIN 300 MG: 300 CAPSULE ORAL at 05:50

## 2024-05-19 RX ADMIN — LEVETIRACETAM 750 MG: 250 TABLET, FILM COATED ORAL at 09:12

## 2024-05-19 RX ADMIN — MORPHINE SULFATE 4 MG: 4 INJECTION, SOLUTION INTRAMUSCULAR; INTRAVENOUS at 00:17

## 2024-05-19 RX ADMIN — CLOPIDOGREL 75 MG: 75 TABLET ORAL at 09:12

## 2024-05-19 RX ADMIN — HEPARIN SODIUM 1600 UNITS/HR: 10000 INJECTION, SOLUTION INTRAVENOUS at 14:02

## 2024-05-19 RX ADMIN — MORPHINE SULFATE 4 MG: 4 INJECTION, SOLUTION INTRAMUSCULAR; INTRAVENOUS at 13:38

## 2024-05-19 RX ADMIN — MORPHINE SULFATE 4 MG: 4 INJECTION, SOLUTION INTRAMUSCULAR; INTRAVENOUS at 22:22

## 2024-05-19 RX ADMIN — MORPHINE SULFATE 4 MG: 4 INJECTION, SOLUTION INTRAMUSCULAR; INTRAVENOUS at 09:12

## 2024-05-19 RX ADMIN — WARFARIN SODIUM 5 MG: 5 TABLET ORAL at 17:43

## 2024-05-19 RX ADMIN — INSULIN LISPRO 2 UNITS: 100 INJECTION, SOLUTION INTRAVENOUS; SUBCUTANEOUS at 12:32

## 2024-05-19 RX ADMIN — INSULIN LISPRO 1 UNITS: 100 INJECTION, SOLUTION INTRAVENOUS; SUBCUTANEOUS at 09:12

## 2024-05-19 RX ADMIN — MORPHINE SULFATE 4 MG: 4 INJECTION, SOLUTION INTRAMUSCULAR; INTRAVENOUS at 04:04

## 2024-05-19 RX ADMIN — GABAPENTIN 300 MG: 300 CAPSULE ORAL at 20:41

## 2024-05-19 RX ADMIN — MORPHINE SULFATE 4 MG: 4 INJECTION, SOLUTION INTRAMUSCULAR; INTRAVENOUS at 18:39

## 2024-05-19 ASSESSMENT — COGNITIVE AND FUNCTIONAL STATUS - GENERAL
DRESSING REGULAR UPPER BODY CLOTHING: A LOT
MOVING TO AND FROM BED TO CHAIR: A LOT
TOILETING: A LOT
STANDING UP FROM CHAIR USING ARMS: A LOT
MOVING TO AND FROM BED TO CHAIR: A LOT
MOVING TO AND FROM BED TO CHAIR: A LOT
MOVING FROM LYING ON BACK TO SITTING ON SIDE OF FLAT BED WITH BEDRAILS: A LOT
DRESSING REGULAR LOWER BODY CLOTHING: TOTAL
DRESSING REGULAR LOWER BODY CLOTHING: A LOT
MOBILITY SCORE: 10
TURNING FROM BACK TO SIDE WHILE IN FLAT BAD: A LITTLE
DRESSING REGULAR UPPER BODY CLOTHING: A LOT
CLIMB 3 TO 5 STEPS WITH RAILING: TOTAL
EATING MEALS: A LITTLE
WALKING IN HOSPITAL ROOM: TOTAL
TURNING FROM BACK TO SIDE WHILE IN FLAT BAD: A LOT
CLIMB 3 TO 5 STEPS WITH RAILING: TOTAL
MOVING FROM LYING ON BACK TO SITTING ON SIDE OF FLAT BED WITH BEDRAILS: A LITTLE
DAILY ACTIVITIY SCORE: 14
TOILETING: A LOT
STANDING UP FROM CHAIR USING ARMS: A LOT
STANDING UP FROM CHAIR USING ARMS: A LOT
TURNING FROM BACK TO SIDE WHILE IN FLAT BAD: A LOT
DAILY ACTIVITIY SCORE: 13
MOBILITY SCORE: 12
EATING MEALS: A LITTLE
WALKING IN HOSPITAL ROOM: TOTAL
MOVING FROM LYING ON BACK TO SITTING ON SIDE OF FLAT BED WITH BEDRAILS: A LOT
CLIMB 3 TO 5 STEPS WITH RAILING: TOTAL
PERSONAL GROOMING: A LITTLE
WALKING IN HOSPITAL ROOM: TOTAL
HELP NEEDED FOR BATHING: A LOT
HELP NEEDED FOR BATHING: A LOT
MOBILITY SCORE: 10
PERSONAL GROOMING: A LITTLE

## 2024-05-19 ASSESSMENT — PAIN SCALES - GENERAL
PAINLEVEL_OUTOF10: 0 - NO PAIN
PAINLEVEL_OUTOF10: 9
PAINLEVEL_OUTOF10: 10 - WORST POSSIBLE PAIN
PAINLEVEL_OUTOF10: 5 - MODERATE PAIN
PAINLEVEL_OUTOF10: 0 - NO PAIN
PAINLEVEL_OUTOF10: 9

## 2024-05-19 ASSESSMENT — PAIN - FUNCTIONAL ASSESSMENT
PAIN_FUNCTIONAL_ASSESSMENT: 0-10

## 2024-05-19 ASSESSMENT — PAIN DESCRIPTION - LOCATION
LOCATION: GENERALIZED
LOCATION: GENERALIZED

## 2024-05-19 ASSESSMENT — PAIN DESCRIPTION - DESCRIPTORS
DESCRIPTORS: ACHING;DISCOMFORT;SORE
DESCRIPTORS: ACHING;DISCOMFORT
DESCRIPTORS: ACHING;DISCOMFORT

## 2024-05-19 NOTE — PROGRESS NOTES
Ab Levine is a 49 y.o. male on day 0 of admission presenting with Syncope and collapse.      Subjective   Ab Levine is a 49 y.o. 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  Patient states that he lives alone on the second floor of an apartment.  He has cameras in his apartment.  His neighbors watch over him through the cameras.  They also usually help transfer him in and out of his wheelchair as well as bathing him.  They saw that he fell off his wheelchair yesterday.  He was not moving and he did not get up.  One of the neighbors came down and states that it took about 5 minutes to get him up.  Patient does not remember the episode.  Patient is incontinent of urine at baseline.  Since the fall, he complains of headache, right shoulder pain and right hip pain.  He has no sensation on the left side of his body.  He also has decreased vision in the left eye which resolved from a CVA he had last month status post TNK.    05/18: Patient was evaluated this a.m., awake alert and oriented.  Complains of pain all over otherwise no active complaint.  Restarted on Keppra on admission-no seizure activity since then.  05/19: Patient is feeling better, generalized pain is improving, no fever or chills.  No acute apparent bleeding.              Objective     Last Recorded Vitals  /69 (BP Location: Left arm, Patient Position: Lying)   Pulse 80   Temp 36.6 °C (97.8 °F) (Temporal)   Resp 18   Wt 87.1 kg (192 lb)   SpO2 96%   Intake/Output last 3 Shifts:    Intake/Output Summary (Last 24 hours) at 5/19/2024 1049  Last data filed at  5/19/2024 0844  Gross per 24 hour   Intake 706.67 ml   Output 800 ml   Net -93.33 ml       Admission Weight  Weight: 87.1 kg (192 lb) (05/16/24 2115)    Daily Weight  05/16/24 : 87.1 kg (192 lb)    Image Results  Electrocardiogram, 12-lead  Sinus rhythm  Inferior infarct, old  Baseline wander in lead(s) II,III,aVF  CT chest abdomen pelvis wo IV contrast  Narrative: STUDY:  CT Chest, Abdomen, and Pelvis without IV Contrast; 05/16/2024 11:25 PM  INDICATION:  Chest pain from right side of chest to the left upper quadrant.  Fall  with right hip pain.  COMPARISON:  Pelvis XR 06/02/2022.  ACCESSION NUMBER(S):  [id number]  ORDERING CLINICIAN:  ROSA GIORDANO  TECHNIQUE:  CT of the chest, abdomen, and pelvis was performed.  Contiguous axial  images were obtained at 3 mm slice thickness through the chest,  abdomen, and pelvis.  Coronal and sagittal reconstructions at 3 mm  slice thickness were performed.  No intravenous contrast was  administered.    FINDINGS:  Please note that the evaluation of vessels, lymph nodes and organs is  limited without intravenous contrast.   CHEST:  MEDIASTINUM:  Common origin of the right brachiocephalic artery and left common  carotid artery is seen from the aortic arch, a normal variant.  There  is a trace pericardial effusion.  The heart is normal in size.   Coronary artery calcifications are not identified.  LUNGS/PLEURA:  There is no pleural effusion, pleural thickening, or pneumothorax.    Minimal dependent atelectasis is seen in the lower lobes bilaterally.   Small groundglass foci of airspace disease and mild pleural  parenchymal scarring is seen in the lateral segment of the right  middle lobe along the right minor fissure.  Minimal scarring is seen  in the posterior basilar segment of the right lower lobe.  Respiratory  motion slightly limits pulmonary evaluation.    LYMPH NODES:  Thoracic lymph nodes are not enlarged.  ABDOMEN:     LIVER:  No hepatomegaly.  Smooth surface contour.   Normal attenuation.     BILE DUCTS:  No intrahepatic or extrahepatic biliary ductal dilatation.     GALLBLADDER:  Gallbladder is contracted but otherwise unremarkable.    STOMACH:  No abnormalities identified.     PANCREAS:  No masses or ductal dilatation.     SPLEEN:  No splenomegaly or focal splenic lesion.     ADRENAL GLANDS:  No thickening or nodules.     KIDNEYS AND URETERS:  Kidneys are normal in size and location.  No renal or ureteral  calculi.     PELVIS:     BLADDER:  No abnormalities identified.     REPRODUCTIVE ORGANS:  No abnormalities identified.     BOWEL:  Minimal diverticulosis is present in the sigmoid colon.  Appendix  appears normal.  Terminal ileum is unremarkable.       VESSELS:  Inferior vena caval filter is in place.  No abnormalities identified.   Abdominal aorta is normal in caliber.      PERITONEUM/RETROPERITONEUM/LYMPH NODES:  No free fluid.  No pneumoperitoneum.  No lymphadenopathy.     ABDOMINAL WALL:  No abnormalities identified.  SOFT TISSUES:   No abnormalities identified.     BONES:  No acute fracture or aggressive osseous lesion.  Moderate disc space  narrowing is seen at L5-S1 with endplate sclerosis.  Sclerotic changes  in the femoral heads suggesting sequela of AVN.  Impression: 1.  No definite acute thoracic, abdominal, or pelvic pathology  identified.  2.  Small foci of scarring and volume loss in the lateral segment of  the right middle lobe, less likely low-grade infectious or  inflammatory process.  3.  Minimal sigmoid diverticulosis.  4.  Additional chronic changes as described.  Signed by Judson Jamil      Physical Exam  Patient is awake and orient, not in apparent distress  Eyes: PERRLA, no conjunctival congestion  Chest: Bilateral Air entry, no crackles or wheezing  Heart: s1S2 regular, no murmur  Abdomen: Soft, non tender, BS present  Ext: Left-sided hemiplegia  CNS: Slurring of speech, left-sided hemiplegia.  Relevant Results               Assessment/Plan         Seizure versus syncope  -TIA appears less likely  -His neighbor took at least 5 minutes to wake him up  -Patient is supposed to be on Keppra.  He has not taking it for  4 days  -Restarted on IV Keppra 1 g twice daily-switch to oral on 05/18  -PT/OT on board  -Discharge planning to ECF for rehabilitation    protein S deficiency/DVT/PE  -INR subtherapeutic secondary to noncompliance  -Patient ran out of Coumadin  -Will restart Coumadin with heparin bridging, monitor INR daily.  Discontinue heparin once INR is therapeutic  -Patient is allergic to enoxaparin, dabigatran, apixaban and Xarelto       Recent CVA status post TNK  -Patient has left-sided hemiparesis/hemiplegia  -He is unable to take care of himself  -Will consult   -Patient will likely need placement  -It appears that he signed out AGAINST MEDICAL ADVICE when he was here after the recent CVA after which she received TNK.  -Will get PT/OT eval     Headache, shoulder pain, hip pain  -CT scan did not show any acute findings  -will continue supportive care  -Avoid narcotic if possible     COPD  -Appears stable     Diabetes mellitus type 2  -Last HbA1c was 6.8  -Give sliding scale insulin  -Will monitor blood sugar    Physical debility/deconditioning  Secondary to recent stroke  PT/OT on board  Discharge planning to acute versus subacute rehab           Tim Spicer MD

## 2024-05-19 NOTE — PROGRESS NOTES
5/19/24 1151   05/19/24 1150   Discharge Planning   Living Arrangements Alone   Support Systems Friends/neighbors   Assistance Needed total   Type of Residence Private residence   Do you have animals or pets at home? No   Home or Post Acute Services Post acute facilities (Rehab/SNF/etc);In home services   Type of Post Acute Facility Services Rehab;Skilled nursing   Type of Home Care Services Home health aide   Patient expects to be discharged to: TBD   Does the patient need discharge transport arranged? Yes   RoundTrip coordination needed? Yes   Patient Choice   Provider Choice list and CMS website (https://medicare.gov/care-compare#search) for post-acute Quality and Resource Measure Data were provided and reviewed with: Patient   Patient / Family choosing to utilize agency / facility established prior to hospitalization No     Spoke with pt at bedside along with TRAE Thomas. Pt from home and lives in an apartment alone on the 7th floor. Pt has a wheelchair that has been used since 4wks ago after stroke and previous admission at [facility]. Pt has been getting increasingly weaker since previous admission and has been relying on neighbors and friends for care at home and stated that these neighbors carry pt up the stairs when they are available. Pt states paying neighbors for the assistance. Pt states will do anything needed in order to get stronger. Pt wanting to go to an AR and requested this CT Supervisor to send mass referrals and see what they say before picking a FOC. Requested [facility] to send mass AR referrals. Discussed the possibility that AR may not be an option if insurance does not approve or if the facilities do not feel pt is appropriate. Pt also agreeable to [facility] liaison coming to assess and speak with pt if they would like to. If AR is not an option, pt is agreeable to SNF. Provided skilled nursing list to pt from [facility] directory that includes facilities that are within  Post -  Acute Quality Network, as well as meeting patient’s medical needs, and are in-network for patient’s insurance; while also in discharge geographic area patient prefers, and identifies each facilities CMS star rating. Pt will review. Pt states PCP is Bev Rodarte with [facility] but has not seen as of yet. Pt states to reach to use Jen Foreman, pt , as EC. Pt denies any living parents or having children. Pt states only having an Uncle, Kole Levine. Pt states a  from [place] stated that they need a script and may be able to provide pt with a HHA. Will notify MD of this. Pt also agreeable to [facility] referral. Martha LARKIN, provided resources. Will await AR responses.   Allison Leach RN, BSN, [place]/ [place] CT Supervisor     5/19/24 2902  Updated pt on facility responses. Pt leaning towards [facility] as FOC once speaking with them tomorrow. If they, can no longer accept the FOC would be [facility].   Allison Leach RN, BSN, [place] [place] CT Supervisor

## 2024-05-19 NOTE — CARE PLAN
Problem: Skin  Goal: Decreased wound size/increased tissue granulation at next dressing change  Outcome: Progressing  Flowsheets (Taken 5/19/2024 1522)  Decreased wound size/increased tissue granulation at next dressing change:   Promote sleep for wound healing   Protective dressings over bony prominences  Goal: Participates in plan/prevention/treatment measures  Outcome: Progressing  Flowsheets (Taken 5/19/2024 1522)  Participates in plan/prevention/treatment measures:   Discuss with provider PT/OT consult   Elevate heels   Increase activity/out of bed for meals  Goal: Prevent/manage excess moisture  Outcome: Progressing  Flowsheets (Taken 5/19/2024 1522)  Prevent/manage excess moisture:   Cleanse incontinence/protect with barrier cream   Follow provider orders for dressing changes   Moisturize dry skin   Monitor for/manage infection if present  Goal: Prevent/minimize sheer/friction injuries  Outcome: Progressing  Flowsheets (Taken 5/19/2024 1522)  Prevent/minimize sheer/friction injuries:   Increase activity/out of bed for meals   Turn/reposition every 2 hours/use positioning/transfer devices   Use pull sheet  Goal: Promote/optimize nutrition  Outcome: Progressing  Flowsheets (Taken 5/19/2024 1522)  Promote/optimize nutrition:   Consume > 50% meals/supplements   Monitor/record intake including meals   Offer water/supplements/favorite foods  Goal: Promote skin healing  Outcome: Progressing  Flowsheets (Taken 5/19/2024 1522)  Promote skin healing:   Assess skin/pad under line(s)/device(s)   Protective dressings over bony prominences   Turn/reposition every 2 hours/use positioning/transfer devices     Problem: Pain  Goal: My pain/discomfort is manageable  Outcome: Progressing     Problem: Safety  Goal: Patient will be injury free during hospitalization  Outcome: Progressing  Goal: I will remain free of falls  Outcome: Progressing     Problem: Daily Care  Goal: Daily care needs are met  Outcome: Progressing      Problem: Psychosocial Needs  Goal: Demonstrates ability to cope with hospitalization/illness  Outcome: Progressing  Goal: Collaborate with me, my family, and caregiver to identify my specific goals  Outcome: Progressing     Problem: Discharge Barriers  Goal: My discharge needs are met  Outcome: Progressing     Problem: Fall/Injury  Goal: Not fall by end of shift  Outcome: Progressing  Goal: Be free from injury by end of the shift  Outcome: Progressing  Goal: Verbalize understanding of personal risk factors for fall in the hospital  Outcome: Progressing  Goal: Verbalize understanding of risk factor reduction measures to prevent injury from fall in the home  Outcome: Progressing  Goal: Use assistive devices by end of the shift  Outcome: Progressing  Goal: Pace activities to prevent fatigue by end of the shift  Outcome: Progressing     Problem: Pain - Adult  Goal: Verbalizes/displays adequate comfort level or baseline comfort level  Outcome: Progressing     Problem: Safety - Adult  Goal: Free from fall injury  Outcome: Progressing     Problem: Discharge Planning  Goal: Discharge to home or other facility with appropriate resources  Outcome: Progressing     Problem: Chronic Conditions and Co-morbidities  Goal: Patient's chronic conditions and co-morbidity symptoms are monitored and maintained or improved  Outcome: Progressing     Problem: Pain  Goal: Takes deep breaths with improved pain control throughout the shift  Outcome: Progressing  Goal: Turns in bed with improved pain control throughout the shift  Outcome: Progressing  Goal: Walks with improved pain control throughout the shift  Outcome: Progressing  Goal: Performs ADL's with improved pain control throughout shift  Outcome: Progressing  Goal: Participates in PT with improved pain control throughout the shift  Outcome: Progressing  Goal: Free from opioid side effects throughout the shift  Outcome: Progressing  Goal: Free from acute confusion related to pain  meds throughout the shift  Outcome: Progressing     Problem: Nutrition  Goal: Less than 5 days NPO/clear liquids  Outcome: Progressing  Goal: Oral intake greater than 50%  Outcome: Progressing  Goal: Oral intake greater 75%  Outcome: Progressing  Goal: Consume prescribed supplement  Outcome: Progressing  Goal: Adequate PO fluid intake  Outcome: Progressing  Goal: Nutrition support goals are met within 48 hrs  Outcome: Progressing  Goal: Nutrition support is meeting 75% of nutrient needs  Outcome: Progressing  Goal: Tube feed tolerance  Outcome: Progressing  Goal: BG  mg/dL  Outcome: Progressing  Goal: Lab values WNL  Outcome: Progressing  Goal: Electrolytes WNL  Outcome: Progressing  Goal: Promote healing  Outcome: Progressing  Goal: Maintain stable weight  Outcome: Progressing  Goal: Reduce weight from edema/fluid  Outcome: Progressing  Goal: Gradual weight gain  Outcome: Progressing  Goal: Improve ostomy output  Outcome: Progressing   The patient's goals for the shift include      The clinical goals for the shift include pt free from falls this shift

## 2024-05-19 NOTE — PROGRESS NOTES
Social work consult placed for [facility] referral by YESENIA Leach CT Supervisor, met with pt to assess needs and provide support, introduced self and my role as  with care transition team, accompanied by YESENIA Leach RN, CT Supervisor. Pt stated that he does not have any assistance in the home at this time and relies on his neighbors. Pt stated that all his family has passed, but did mention an uncle living in [place] whom he does not keep in close contact. Pt expressed interest in [facility] resources/referral, SW provided information on agency. Pt was very appreciative of SW support. SW to place referral upon pt discharge. No other SW needs at this time, SW signing off, available upon request.    DENIA Fitzgerald ([id number])   Care Transitions

## 2024-05-19 NOTE — PROGRESS NOTES
Physical Therapy  Physical Therapy Treatment    Patient Name: Ab Levine  MRN: [id number]  Today's Date: 5/19/2024  Time Calculation  Start Time: 1220  Stop Time: 1303  Time Calculation (min): 43 min    Assessment/Plan   PT Plan  Treatment/Interventions: Bed mobility, Transfer training, Neuromuscular re-education, Strengthening, Endurance training, Range of motion, Therapeutic exercise, Therapeutic activity, Home exercise program, Postural re-education  PT Plan: Skilled PT  PT Frequency: Daily  PT Discharge Recommendations: High intensity level of continued care  Equipment Recommended upon Discharge: Wheelchair  PT Recommended Transfer Status: Assist x2  PT - OK to Discharge: Yes (To next level of care when medically cleared.)    General Visit Information:   PT  Visit  PT Received On: 05/19/24  Response to Previous Treatment: Patient with no complaints from previous session.    Reason for Referral: Impaired mobility. Fall with R sided and chest pain.  Room: [facility]    Subjective   Precautions:  Fall precautions  Recent CVA with dense L hemiplegia     Objective   Pain:  Pain Assessment  Pain Score: 9  Pain Type: all over  Nursing aware    Cognition:  Oriented X4    Activity Tolerance:  Activity Tolerance  Endurance: Tolerates 30 min exercise with multiple rests    Treatments:  Therapeutic Exercise  10 minute unsupported sit EOB, SBA  Various reaching and self righting tasks while sitting EOB, SBA    Bed Mobility  Supine to sitting: Moderate assistance  Sitting to supine: Moderate assistance  Scooting: Moderate assistance  Rolling: Minimum assistance    Transfers  Sit to stand: Maximum assistance  Stand to sit: Maximum assistance  Stand pivot (bed<>bedside commode): Maximum assistance    Pt pleasant, cooperative, and motivated. Pt completed various seated balance activities from EOB. Pt then completed multiple reps sit<>stand. Pt abl;e to SPT bed<>bedside commode for BM. Pt completed hygiene with Aundrea. Pt RTB following  tx. All needs in reach.     Outcome Measures:  [facility] Basic Mobility  Turning from your back to your side while in a flat bed without using bedrails: A lot  Moving from lying on your back to sitting on the side of a flat bed without using bedrails: A lot  Moving to and from bed to chair (including a wheelchair): A lot  Standing up from a chair using your arms (e.g. wheelchair or bedside chair): A lot  To walk in hospital room: Total  Climbing 3-5 steps with railing: Total  Basic Mobility - Total Score: 10    Education Documentation  Mobility Training, taught by Jeremy Reyez PTA at 5/19/2024  2:20 PM.  Learner: Patient  Readiness: Acceptance  Method: Explanation, Demonstration  Response: Needs Reinforcement    Education Comments  No comments found.        OP EDUCATION:       Encounter Problems       Encounter Problems (Active)       Pain - Adult          To improve strength, balance, endurance and efficiency of mobility:       Patient will participate in dynamic sit balance activities out of LIBRADO with understanding of and implementation of weight bearing through B LE with SBA in order to improve supine > sit to CGA. (Progressing)       Start:  05/18/24    Expected End:  06/01/24            Patient will stand with weight shifting in 4 planes and MOD A of 1 for 2 minutes with increased L ankle dorsiflexion to 0 degrees to improve future transfer ability. (Progressing)       Start:  05/18/24    Expected End:  06/01/24            Patient will pivot bed <> chair with MOD A of 1 with safe and efficient technique with VC <25% time. (Progressing)       Start:  05/18/24    Expected End:  06/01/24

## 2024-05-20 LAB
ANION GAP SERPL CALC-SCNC: 13 MMOL/L (ref 10–20)
BUN SERPL-MCNC: 18 MG/DL (ref 6–23)
CALCIUM SERPL-MCNC: 8.9 MG/DL (ref 8.6–10.3)
CHLORIDE SERPL-SCNC: 105 MMOL/L (ref 98–107)
CO2 SERPL-SCNC: 22 MMOL/L (ref 21–32)
CREAT SERPL-MCNC: 0.68 MG/DL (ref 0.5–1.3)
EGFRCR SERPLBLD CKD-EPI 2021: >90 ML/MIN/1.73M*2
ERYTHROCYTE [DISTWIDTH] IN BLOOD BY AUTOMATED COUNT: 14.4 % (ref 11.5–14.5)
GLUCOSE BLD MANUAL STRIP-MCNC: 186 MG/DL (ref 74–99)
GLUCOSE BLD MANUAL STRIP-MCNC: 194 MG/DL (ref 74–99)
GLUCOSE BLD MANUAL STRIP-MCNC: 238 MG/DL (ref 74–99)
GLUCOSE BLD MANUAL STRIP-MCNC: 258 MG/DL (ref 74–99)
GLUCOSE SERPL-MCNC: 138 MG/DL (ref 74–99)
HCT VFR BLD AUTO: 39.7 % (ref 41–52)
HGB BLD-MCNC: 13.4 G/DL (ref 13.5–17.5)
INR PPP: 1.3 (ref 0.9–1.1)
MAGNESIUM SERPL-MCNC: 1.54 MG/DL (ref 1.6–2.4)
MCH RBC QN AUTO: 28.6 PG (ref 26–34)
MCHC RBC AUTO-ENTMCNC: 33.8 G/DL (ref 32–36)
MCV RBC AUTO: 85 FL (ref 80–100)
NRBC BLD-RTO: 0 /100 WBCS (ref 0–0)
PLATELET # BLD AUTO: 165 X10*3/UL (ref 150–450)
POTASSIUM SERPL-SCNC: 4.6 MMOL/L (ref 3.5–5.3)
PROTHROMBIN TIME: 14.6 SECONDS (ref 9.8–12.8)
RBC # BLD AUTO: 4.68 X10*6/UL (ref 4.5–5.9)
SODIUM SERPL-SCNC: 135 MMOL/L (ref 136–145)
UFH PPP CHRO-ACNC: 0.4 IU/ML
UFH PPP CHRO-ACNC: 0.5 IU/ML
WBC # BLD AUTO: 4.1 X10*3/UL (ref 4.4–11.3)

## 2024-05-20 PROCEDURE — 2500000001 HC RX 250 WO HCPCS SELF ADMINISTERED DRUGS (ALT 637 FOR MEDICARE OP): Performed by: EMERGENCY MEDICINE

## 2024-05-20 PROCEDURE — 85027 COMPLETE CBC AUTOMATED: CPT | Performed by: INTERNAL MEDICINE

## 2024-05-20 PROCEDURE — 85610 PROTHROMBIN TIME: CPT | Performed by: INTERNAL MEDICINE

## 2024-05-20 PROCEDURE — 97110 THERAPEUTIC EXERCISES: CPT | Mod: GP,CQ

## 2024-05-20 PROCEDURE — 97530 THERAPEUTIC ACTIVITIES: CPT | Mod: GO,CO

## 2024-05-20 PROCEDURE — 99233 SBSQ HOSP IP/OBS HIGH 50: CPT | Performed by: INTERNAL MEDICINE

## 2024-05-20 PROCEDURE — 85520 HEPARIN ASSAY: CPT | Performed by: INTERNAL MEDICINE

## 2024-05-20 PROCEDURE — 36415 COLL VENOUS BLD VENIPUNCTURE: CPT | Performed by: INTERNAL MEDICINE

## 2024-05-20 PROCEDURE — G0378 HOSPITAL OBSERVATION PER HR: HCPCS

## 2024-05-20 PROCEDURE — 2500000004 HC RX 250 GENERAL PHARMACY W/ HCPCS (ALT 636 FOR OP/ED): Performed by: INTERNAL MEDICINE

## 2024-05-20 PROCEDURE — 2500000001 HC RX 250 WO HCPCS SELF ADMINISTERED DRUGS (ALT 637 FOR MEDICARE OP): Performed by: INTERNAL MEDICINE

## 2024-05-20 PROCEDURE — 2500000006 HC RX 250 W HCPCS SELF ADMINISTERED DRUGS (ALT 637 FOR ALL PAYERS): Performed by: INTERNAL MEDICINE

## 2024-05-20 PROCEDURE — 83735 ASSAY OF MAGNESIUM: CPT | Performed by: INTERNAL MEDICINE

## 2024-05-20 PROCEDURE — 97112 NEUROMUSCULAR REEDUCATION: CPT | Mod: GO,CO

## 2024-05-20 PROCEDURE — 97530 THERAPEUTIC ACTIVITIES: CPT | Mod: GP,CQ

## 2024-05-20 PROCEDURE — 82435 ASSAY OF BLOOD CHLORIDE: CPT | Performed by: INTERNAL MEDICINE

## 2024-05-20 PROCEDURE — 82947 ASSAY GLUCOSE BLOOD QUANT: CPT

## 2024-05-20 RX ORDER — WARFARIN SODIUM 5 MG/1
5 TABLET ORAL DAILY
Status: DISCONTINUED | OUTPATIENT
Start: 2024-05-20 | End: 2024-05-21

## 2024-05-20 RX ADMIN — HEPARIN SODIUM 1300 UNITS/HR: 10000 INJECTION, SOLUTION INTRAVENOUS at 05:28

## 2024-05-20 RX ADMIN — CLOPIDOGREL 75 MG: 75 TABLET ORAL at 08:43

## 2024-05-20 RX ADMIN — LEVETIRACETAM 750 MG: 250 TABLET, FILM COATED ORAL at 22:10

## 2024-05-20 RX ADMIN — LEVETIRACETAM 750 MG: 250 TABLET, FILM COATED ORAL at 08:43

## 2024-05-20 RX ADMIN — INSULIN LISPRO 1 UNITS: 100 INJECTION, SOLUTION INTRAVENOUS; SUBCUTANEOUS at 18:07

## 2024-05-20 RX ADMIN — ASPIRIN 81 MG CHEWABLE TABLET 81 MG: 81 TABLET CHEWABLE at 07:05

## 2024-05-20 RX ADMIN — MORPHINE SULFATE 4 MG: 4 INJECTION, SOLUTION INTRAMUSCULAR; INTRAVENOUS at 02:32

## 2024-05-20 RX ADMIN — WARFARIN SODIUM 5 MG: 5 TABLET ORAL at 18:07

## 2024-05-20 RX ADMIN — MORPHINE SULFATE 4 MG: 4 INJECTION, SOLUTION INTRAMUSCULAR; INTRAVENOUS at 22:10

## 2024-05-20 RX ADMIN — MORPHINE SULFATE 4 MG: 4 INJECTION, SOLUTION INTRAMUSCULAR; INTRAVENOUS at 13:40

## 2024-05-20 RX ADMIN — GABAPENTIN 300 MG: 300 CAPSULE ORAL at 05:27

## 2024-05-20 RX ADMIN — GABAPENTIN 300 MG: 300 CAPSULE ORAL at 22:10

## 2024-05-20 RX ADMIN — MORPHINE SULFATE 4 MG: 4 INJECTION, SOLUTION INTRAMUSCULAR; INTRAVENOUS at 07:05

## 2024-05-20 RX ADMIN — INSULIN LISPRO 3 UNITS: 100 INJECTION, SOLUTION INTRAVENOUS; SUBCUTANEOUS at 12:59

## 2024-05-20 RX ADMIN — INSULIN LISPRO 1 UNITS: 100 INJECTION, SOLUTION INTRAVENOUS; SUBCUTANEOUS at 08:44

## 2024-05-20 ASSESSMENT — COGNITIVE AND FUNCTIONAL STATUS - GENERAL
DAILY ACTIVITIY SCORE: 13
MOVING TO AND FROM BED TO CHAIR: A LOT
DRESSING REGULAR LOWER BODY CLOTHING: TOTAL
CLIMB 3 TO 5 STEPS WITH RAILING: TOTAL
TOILETING: A LOT
STANDING UP FROM CHAIR USING ARMS: A LOT
PERSONAL GROOMING: A LITTLE
CLIMB 3 TO 5 STEPS WITH RAILING: TOTAL
WALKING IN HOSPITAL ROOM: TOTAL
MOVING TO AND FROM BED TO CHAIR: A LOT
TURNING FROM BACK TO SIDE WHILE IN FLAT BAD: A LITTLE
EATING MEALS: A LITTLE
HELP NEEDED FOR BATHING: A LOT
EATING MEALS: A LITTLE
DRESSING REGULAR UPPER BODY CLOTHING: A LOT
TOILETING: A LOT
DRESSING REGULAR UPPER BODY CLOTHING: A LOT
PERSONAL GROOMING: A LOT
MOVING FROM LYING ON BACK TO SITTING ON SIDE OF FLAT BED WITH BEDRAILS: A LITTLE
MOVING FROM LYING ON BACK TO SITTING ON SIDE OF FLAT BED WITH BEDRAILS: A LITTLE
TURNING FROM BACK TO SIDE WHILE IN FLAT BAD: A LITTLE
STANDING UP FROM CHAIR USING ARMS: A LOT
DRESSING REGULAR LOWER BODY CLOTHING: A LOT
MOBILITY SCORE: 12
MOBILITY SCORE: 12
HELP NEEDED FOR BATHING: A LOT
DAILY ACTIVITIY SCORE: 13
WALKING IN HOSPITAL ROOM: TOTAL

## 2024-05-20 ASSESSMENT — PAIN - FUNCTIONAL ASSESSMENT
PAIN_FUNCTIONAL_ASSESSMENT: 0-10

## 2024-05-20 ASSESSMENT — PAIN SCALES - GENERAL
PAINLEVEL_OUTOF10: 3
PAINLEVEL_OUTOF10: 7
PAINLEVEL_OUTOF10: 8
PAINLEVEL_OUTOF10: 9
PAINLEVEL_OUTOF10: 10 - WORST POSSIBLE PAIN

## 2024-05-20 ASSESSMENT — PAIN DESCRIPTION - DESCRIPTORS
DESCRIPTORS: ACHING;DISCOMFORT

## 2024-05-20 NOTE — PROGRESS NOTES
"Occupational Therapy    OT Treatment    Patient Name: Ab Levine  MRN: [id number]  Today's Date: 5/20/2024  Time Calculation  Start Time: 0951  Stop Time: 1030  Time Calculation (min): 39 min         Assessment:  OT Assessment: Pt progressed to participating in functional transfers to chair with mod A x2. Pt continues to require mod A x2 to stand once upright progresses to min A x2 to maintain stand. Pt motivated to progress, pt would benefit from intensive OT services.     Plan:  Treatment Interventions: ADL retraining, Functional transfer training, UE strengthening/ROM  OT Frequency: 4 times per week  OT Discharge Recommendations: High intensity level of continued care  Equipment Recommended upon Discharge: Wheelchair (current wheelchair \"broken\")  OT Recommended Transfer Status: Assist of 2  OT - OK to Discharge: Yes ((when medically approp.))  Treatment Interventions: ADL retraining, Functional transfer training, UE strengthening/ROM    Subjective   Previous Visit Info:  OT Last Visit  OT Received On: 05/20/24  General:  General  Prior to Session Communication: Bedside nurse  Patient Position Received: Bed, 3 rail up, Alarm on  General Comment: Pt agreeable and motivated for therapy.  Precautions:  Medical Precautions: Fall precautions       Pain:  Pain Assessment  Pain Location: Generalized (did not rate)    Objective    Cognition:  Cognition  Orientation Level: Oriented X4            Functional Standing Tolerance:  Activity: Pt completed x3 stands tolerating for 2 minutes with min A x2 to maintain and use of hao walker for support/stability. Pt participated in intervals of weigh with min A x2.  Bed Mobility/Transfers: Bed Mobility 1  Bed Mobility 1: Supine to sitting  Level of Assistance 1: Moderate assistance, +2    Transfer 1  Transfer From 1: Bed to  Transfer to 1: Chair with arms  Technique 1: Stand pivot  Transfer Device 1:  (arm in arm)  Transfer Level of Assistance 1: Moderate assistance, +2, Moderate " verbal cues  Transfers 2  Technique 2: Sit to stand, Stand to sit  Transfer Device 2: Jaison-walker  Transfer Level of Assistance 2: Moderate assistance, +2, Moderate verbal cues                        Functional Mobility:     Sitting Balance:     Standing Balance:     Modalities:     IADL's:   Communication:       Outcome Measures:[facility] Daily Activity  Putting on and taking off regular lower body clothing: A lot  Bathing (including washing, rinsing, drying): A lot  Putting on and taking off regular upper body clothing: A lot  Toileting, which includes using toilet, bedpan or urinal: A lot  Taking care of personal grooming such as brushing teeth: A lot  Eating Meals: A little  Daily Activity - Total Score: 13        Education Documentation  Body Mechanics, taught by LELE Casillas at 5/20/2024  1:19 PM.  Learner: Patient  Readiness: Acceptance  Method: Explanation  Response: Needs Reinforcement    Education Comments  No comments found.            Goals:  Encounter Problems       Encounter Problems (Active)       ADLs       Demo. UB bathing, dressing, brushing teeth while seated EOB with set-up and SBA  (Progressing)       Start:  05/18/24    Expected End:  06/01/24               BALANCE       Demo. static stand with Mod.A and device for 1 min.  (Progressing)       Start:  05/18/24    Expected End:  06/01/24               TRANSFERS       Demo. Min./A func. trfrs. to/from BSC or chair.with device PRN   (Progressing)       Start:  05/18/24    Expected End:  06/01/24

## 2024-05-20 NOTE — PROGRESS NOTES
Ab Levine is a 49 y.o. male on day 0 of admission presenting with Syncope and collapse.      Subjective   Ab Levine is a 49 y.o. 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  Patient states that he lives alone on the second floor of an apartment.  He has cameras in his apartment.  His neighbors watch over him through the cameras.  They also usually help transfer him in and out of his wheelchair as well as bathing him.  They saw that he fell off his wheelchair yesterday.  He was not moving and he did not get up.  One of the neighbors came down and states that it took about 5 minutes to get him up.  Patient does not remember the episode.  Patient is incontinent of urine at baseline.  Since the fall, he complains of headache, right shoulder pain and right hip pain.  He has no sensation on the left side of his body.  He also has decreased vision in the left eye which resolved from a CVA he had last month status post TNK.    05/18: Patient was evaluated this a.m., awake alert and oriented.  Complains of pain all over otherwise no active complaint.  Restarted on Keppra on admission-no seizure activity since then.  05/19: Patient is feeling better, generalized pain is improving, no fever or chills.  No acute apparent bleeding.  05/20: Patient was evaluated this morning, denies active bleeding.  No seizure activity since admission.  PT/OT on board and plan for acute rehab              Objective     Last Recorded Vitals  /65 (BP Location: Right arm, Patient Position: Lying)   Pulse 81   Temp 36.6 °C (97.8 °F) (Temporal)   Resp 18    Wt 87.1 kg (192 lb)   SpO2 96%   Intake/Output last 3 Shifts:    Intake/Output Summary (Last 24 hours) at 5/20/2024 1519  Last data filed at 5/20/2024 0843  Gross per 24 hour   Intake 166.67 ml   Output 1000 ml   Net -833.33 ml       Admission Weight  Weight: 87.1 kg (192 lb) (05/16/24 2115)    Daily Weight  05/16/24 : 87.1 kg (192 lb)    Image Results  Electrocardiogram, 12-lead  Sinus rhythm  Inferior infarct, old  Baseline wander in lead(s) II,III,aVF  CT chest abdomen pelvis wo IV contrast  Narrative: STUDY:  CT Chest, Abdomen, and Pelvis without IV Contrast; 05/16/2024 11:25 PM  INDICATION:  Chest pain from right side of chest to the left upper quadrant.  Fall  with right hip pain.  COMPARISON:  Pelvis XR 06/02/2022.  ACCESSION NUMBER(S):  [id number]  ORDERING CLINICIAN:  ROSA GIORDANO  TECHNIQUE:  CT of the chest, abdomen, and pelvis was performed.  Contiguous axial  images were obtained at 3 mm slice thickness through the chest,  abdomen, and pelvis.  Coronal and sagittal reconstructions at 3 mm  slice thickness were performed.  No intravenous contrast was  administered.    FINDINGS:  Please note that the evaluation of vessels, lymph nodes and organs is  limited without intravenous contrast.   CHEST:  MEDIASTINUM:  Common origin of the right brachiocephalic artery and left common  carotid artery is seen from the aortic arch, a normal variant.  There  is a trace pericardial effusion.  The heart is normal in size.   Coronary artery calcifications are not identified.  LUNGS/PLEURA:  There is no pleural effusion, pleural thickening, or pneumothorax.    Minimal dependent atelectasis is seen in the lower lobes bilaterally.   Small groundglass foci of airspace disease and mild pleural  parenchymal scarring is seen in the lateral segment of the right  middle lobe along the right minor fissure.  Minimal scarring is seen  in the posterior basilar segment of the right lower lobe.  Respiratory  motion slightly limits  pulmonary evaluation.    LYMPH NODES:  Thoracic lymph nodes are not enlarged.  ABDOMEN:     LIVER:  No hepatomegaly.  Smooth surface contour.  Normal attenuation.     BILE DUCTS:  No intrahepatic or extrahepatic biliary ductal dilatation.     GALLBLADDER:  Gallbladder is contracted but otherwise unremarkable.    STOMACH:  No abnormalities identified.     PANCREAS:  No masses or ductal dilatation.     SPLEEN:  No splenomegaly or focal splenic lesion.     ADRENAL GLANDS:  No thickening or nodules.     KIDNEYS AND URETERS:  Kidneys are normal in size and location.  No renal or ureteral  calculi.     PELVIS:     BLADDER:  No abnormalities identified.     REPRODUCTIVE ORGANS:  No abnormalities identified.     BOWEL:  Minimal diverticulosis is present in the sigmoid colon.  Appendix  appears normal.  Terminal ileum is unremarkable.       VESSELS:  Inferior vena caval filter is in place.  No abnormalities identified.   Abdominal aorta is normal in caliber.      PERITONEUM/RETROPERITONEUM/LYMPH NODES:  No free fluid.  No pneumoperitoneum.  No lymphadenopathy.     ABDOMINAL WALL:  No abnormalities identified.  SOFT TISSUES:   No abnormalities identified.     BONES:  No acute fracture or aggressive osseous lesion.  Moderate disc space  narrowing is seen at L5-S1 with endplate sclerosis.  Sclerotic changes  in the femoral heads suggesting sequela of AVN.  Impression: 1.  No definite acute thoracic, abdominal, or pelvic pathology  identified.  2.  Small foci of scarring and volume loss in the lateral segment of  the right middle lobe, less likely low-grade infectious or  inflammatory process.  3.  Minimal sigmoid diverticulosis.  4.  Additional chronic changes as described.  Signed by Judson Jamil      Physical Exam  Patient is awake and orient, not in apparent distress  Eyes: PERRLA, no conjunctival congestion  Chest: Bilateral Air entry, no crackles or wheezing  Heart: s1S2 regular, no murmur  Abdomen: Soft, non tender, BS  present  Ext: Left-sided hemiplegia  CNS: Slurring of speech, left-sided hemiplegia.  Relevant Results               Assessment/Plan        Seizure versus syncope  -TIA appears less likely  -His neighbor took at least 5 minutes to wake him up  -Patient is supposed to be on Keppra.  He has not taking it for  4 days  -Restarted on IV Keppra 1 g twice daily-switch to oral on 05/18  -PT/OT on board  -Discharge planning to acute rehab once INR is more than 2.    protein S deficiency/DVT/PE  -INR subtherapeutic secondary to noncompliance  -Patient ran out of Coumadin  -Will restart Coumadin with heparin bridging, monitor INR daily.  Discontinue heparin once INR is therapeutic  -Patient is allergic to enoxaparin, dabigatran, apixaban and Xarelto       Recent CVA status post TNK  -Patient has left-sided hemiparesis/hemiplegia  -He is unable to take care of himself  -Will consult   -Patient will likely need placement  -It appears that he signed out AGAINST MEDICAL ADVICE when he was here after the recent CVA after which she received TNK.  -Will get PT/OT eval     Headache, shoulder pain, hip pain  -CT scan did not show any acute findings  -will continue supportive care  -Avoid narcotic if possible     COPD  -Appears stable     Diabetes mellitus type 2  -Last HbA1c was 6.8  -Give sliding scale insulin  -Will monitor blood sugar    Physical debility/deconditioning  Secondary to recent stroke  PT/OT on board  Discharge planning to acute versus subacute rehab           Tim Spicer MD

## 2024-05-20 NOTE — PROGRESS NOTES
13:45Met with patient at bedside to discuss discharge planning.  Introduced self and role.  Inquired which facility is [facility]OC.  Pt is unsure and is agreeable to whichever facility will accept him.  He prefers to discharge to a facility with a private room.  Per patient, still awaiting onsite personnel from [facility].      Discussed patients discharge plan during rounds.  Pt will remain here until INR is 2.0 or higher.  Pt will be discharged to an Acute Rehab when medically stable.        1510 Spoke to onsite personnel from [facility].  Awaiting acceptance to [facility], and they will start auth once pt is accepted.

## 2024-05-20 NOTE — CONSULTS
Nutrition Initial Assessment:   Nutrition Assessment    Reason for Assessment: Admission nursing screening    Medical history per chart:   CVA status post TNK last month (left-sided hemiparesis/hemiplegia) protein S deficiency with thrombophilia complicated by pulmonary embolism and DVT (ran out of Coumadin 3 to 4 days ago), infectious hepatitis, COPD, diabetic foot pain per chart (patient denies diabetes), COPD, bipolar disorder, seizure disorder, and peripheral vascular disease, substance abuse     HPI:  Patient presents with syncope     5/20:  Patient awake, alert at time of visit.  Patient endorses significant weight loss d/t recent medical situation, was hospitalized s/p stroke about 1 month ago.  Meal intake appears adequate since admission, patient is requesting oral supplement at was consuming Boost PTA, will provide Glucerna with meals.  Provided patient with menu, encouraged adequate meal + supplement intake, will monitor.        Current Diet: Adult diet Regular  Supplement(s): No  Average meal Intake during admission: %       Nutrition Related Findings:   Oral Symptoms:  Teeth: Other (Comment) (grinds teeth)   GI symptoms: anorexia.   BM:    Wound Type: none (nursing/wound notes provide further details)    Food allergies: NKFA. is allergic to apixaban, citalopram, cyclobenzaprine, dabigatran etexilate, dalteparin (porcine), enoxaparin, fentanyl, fluphenazine, fondaparinux, haloperidol, hydroxyzine, ibuprofen, iodides, iodinated contrast media, ipratropium, ketorolac, lanolin, metaxalone, methocarbamol, peanut oil, rivaroxaban, sulfa (sulfonamide antibiotics), tiotropium, tositumomab iodine-131, tramadol, alfentanil, hydrocodone-acetaminophen, phenytoin, and heparin.  Meds/Labs reviewed.  acetaminophen, 975 mg, oral, Once  aspirin, 81 mg, oral, Daily  clopidogrel, 75 mg, oral, Daily  gabapentin, 300 mg, oral, q8h NATE  insulin lispro, 0-5 Units, subcutaneous, TID  levETIRAcetam, 750 mg, oral,  "BID  morphine, 4 mg, intravenous, Once       heparin, 0-4,500 Units/hr, Last Rate: 1,300 Units/hr (05/20/24 0843)         Nutrition Significant Labs:    Results from last 7 days   Lab Units 05/20/24  0215 05/18/24  0539 05/16/24  2213   GLUCOSE mg/dL 138* 123* 210*   SODIUM mmol/L 135* 137 140   POTASSIUM mmol/L 4.6 3.9 4.0   CHLORIDE mmol/L 105 107 108*   CO2 mmol/L 22 24 24   BUN mg/dL 18 11 17   CREATININE mg/dL 0.68 0.66 0.81   EGFR mL/min/1.73m*2 >90 >90 >90   CALCIUM mg/dL 8.9 8.6 8.7   MAGNESIUM mg/dL 1.54* 1.28*  --      Lab Results   Component Value Date    HGBA1C 6.8 (H) 03/25/2024    HGBA1C 7.4 (H) 05/19/2023     Results from last 7 days   Lab Units 05/20/24  1150 05/20/24  0641 05/19/24  2039 05/19/24  1615 05/19/24  1111 05/19/24  0655 05/18/24  2040 05/18/24  1603   POCT GLUCOSE mg/dL 258* 186* 196* 219* 245* 194* 135* 179*       Anthropometrics:  Height: 175.3 cm (5' 9\")   Weight: 87.1 kg (192 lb)   BMI (Calculated): 28.34  IBW/kg (Dietitian Calculated): 72.7 kg            Weight History:   Wt Readings from Last 10 Encounters:   05/16/24 87.1 kg (192 lb)        Weight Change %:  Weight History / % Weight Change: Patient endorses a 26 lb recent weight loss, reportes UBW is 246 lbs.  Limited weight records available to assess, weight history taken from outside records (5/23/23 202 lb, 11/5/23 207 lb, 4/23/24 200 lb, 5/16/24 192 lb)  Significant Weight Loss: No (4% x 1 month/7% x 6 months)          Nutrition Focused Physical Exam Findings:  defer: Patient on phone, physician then present    Physical Findings:  Skin: Negative    Estimated Needs:   Total Energy Estimated Needs (kCal):  (4066-5948)  Method for Estimating Needs: 30, IBW  Total Protein Estimated Needs (g):  (70-85)  Method for Estimating Needs: 1-1.2, IBW     Method for Estimating Needs: 1 mL, kcal or per physician        Nutrition Diagnosis   Nutrition Diagnosis:  Malnutrition Diagnosis  Patient has Malnutrition Diagnosis: No    Nutrition " Diagnosis  Patient has Nutrition Diagnosis: Yes  Diagnosis Status (1): New  Nutrition Diagnosis 1: Unintended weight loss  Related to (1): predicted suboptimal oral intake secondary to illness  As Evidenced by (1): 4% weight loss x 1 month, reported decreased oral intake       Nutrition Interventions/Recommendations   Nutrition Interventions and Recommendations:        Nutrition Prescription:  Individualized Nutrition Prescription Provided for : Regular diet.  Glucerna TID per patient request (strawberry)        Nutrition Interventions:   Food and/or Nutrient Delivery Interventions  Interventions: Meals and snacks, Medical food supplement  Meals and Snacks: General healthful diet  Goal: >75% intake of meals  Medical Food Supplement: Commercial beverage  Goal: >75% intake of Glucerna TID         Nutrition Education:   Education Documentation  No documentation found.      Nutrition Counseling  Counseling Theoretical Approach: Other (Comment)  Goal: Reviewed supplement options, provided menu to assist with ordering meals       Nutrition Monitoring and Evaluation   Monitoring/Evaluation:   Food/Nutrient Related History Monitoring  Monitoring and Evaluation Plan: Energy intake  Energy Intake: Estimated energy intake  Criteria: meet >75% of estimated needs from diet and ONS    Body Composition/Growth/Weight History  Monitoring and Evaluation Plan: Weight  Weight: Weight change  Criteria: Maintain stable weight    Biochemical Data, Medical Tests and Procedures  Monitoring and Evaluation Plan: Glucose/endocrine profile, Electrolyte/renal panel  Electrolyte and Renal Panel: Sodium, Magnesium, Potassium, Phosphorus  Criteria: WNL  Glucose/Endocrine Profile: Glucose, casual  Criteria: WNL    Nutrition Focused Physical Findings  Monitoring and Evaluation Plan: Skin  Criteria: maintain skin integrity            Time Spent/Follow-up Reminder:   Follow Up  Time Spent (min): 45 minutes  Last Date of Nutrition Visit:  05/20/24  Nutrition Follow-Up Needed?: Dietitian to reassess per policy  Follow up Comment: 5/24

## 2024-05-20 NOTE — PROGRESS NOTES
Physical Therapy    Physical Therapy Treatment    Patient Name: Ab Levine  MRN: [id number]  Today's Date: 5/20/2024  Time Calculation  Start Time: 0952  Stop Time: 1030  Time Calculation (min): 38 min    Assessment/Plan   PT Assessment  End of Session Communication: Bedside nurse  End of Session Patient Position: Up in chair, Alarm on     PT Plan  Treatment/Interventions: Bed mobility, Transfer training, Neuromuscular re-education, Strengthening, Endurance training, Range of motion, Therapeutic exercise, Therapeutic activity, Home exercise program, Postural re-education  PT Plan: Skilled PT  PT Frequency: Daily  PT Discharge Recommendations: High intensity level of continued care  Equipment Recommended upon Discharge: Wheelchair  PT Recommended Transfer Status: Assist x2  PT - OK to Discharge: Yes (To next level of care when medically cleared.)    General Visit Information:   PT  Visit  PT Received On: 05/20/24  General  Prior to Session Communication: Bedside nurse  Patient Position Received: Bed, 3 rail up, Alarm on    Subjective   Precautions:  Precautions  Medical Precautions: Fall precautions  Precautions Comment: Fall precautions. Dense L hemiplegia.    Objective   Pain:  Pain Assessment  Pain Assessment: 0-10  Pain Score:  (no rating)  Pain Location: Generalized  Cognition:  Cognition  Orientation Level: Oriented X4    Treatments:  Therapeutic Exercise  Therapeutic Exercise Performed: Yes  Therapeutic Exercise Activity 1: pt completed seated LE exercises (PROM on LLE): AP x 10 reps, LAQ x 10 reps, hip flexion x 10 reps, hip abduction x 10 reps    Balance/Neuromuscular Re-Education  Balance/Neuromuscular Re-Education Activity Performed: Yes  Balance/Neuromuscular Re-Education Activity 1: pt completed 2 stands with Min Ax2 to maintain for 2 min each. pt completed weight shifting R<->L with assist for proper placement of Left foot and maintaining weight bearing on LLE    Bed Mobility  Bed Mobility: Yes  Bed  Mobility 1  Bed Mobility 1: Supine to sitting  Level of Assistance 1: Moderate assistance, Minimal verbal cues     Transfers  Transfer: Yes  Transfer 1  Technique 1: Sit to stand, Stand to sit  Transfer Device 1:  (arm in arm for 1st stand and Jaison walker for 2nd/3rd)  Transfer Level of Assistance 1: Moderate assistance, +2, Moderate verbal cues  Trials/Comments 1: cues for proper hand placement  Transfers 2  Transfer From 2: Bed to  Transfer to 2: Chair with arms  Technique 2: Stand pivot  Transfer Device 2:  (arm in arm)  Transfer Level of Assistance 2: Moderate assistance, +2, Moderate verbal cues, Moderate tactile cues  Trials/Comments 2: pt pivots/shuffles on right foot to assist in transfer to chair    Outcome Measures:  [facility] Basic Mobility  Turning from your back to your side while in a flat bed without using bedrails: A little  Moving from lying on your back to sitting on the side of a flat bed without using bedrails: A little  Moving to and from bed to chair (including a wheelchair): A lot  Standing up from a chair using your arms (e.g. wheelchair or bedside chair): A lot  To walk in hospital room: Total  Climbing 3-5 steps with railing: Total  Basic Mobility - Total Score: 12    Education Documentation  Mobility Training, taught by Corinne Marquez PTA at 5/20/2024  1:11 PM.  Learner: Patient  Readiness: Acceptance  Method: Explanation  Response: Needs Reinforcement    Education Comments  No comments found.        OP EDUCATION:       Encounter Problems       Encounter Problems (Active)       Pain - Adult          To improve strength, balance, endurance and efficiency of mobility:       Patient will participate in dynamic sit balance activities out of LIBRADO with understanding of and implementation of weight bearing through B LE with SBA in order to improve supine > sit to CGA. (Progressing)       Start:  05/18/24    Expected End:  06/01/24            Patient will stand with weight shifting in 4 planes and MOD  A of 1 for 2 minutes with increased L ankle dorsiflexion to 0 degrees to improve future transfer ability. (Progressing)       Start:  05/18/24    Expected End:  06/01/24            Patient will pivot bed <> chair with MOD A of 1 with safe and efficient technique with VC <25% time. (Progressing)       Start:  05/18/24    Expected End:  06/01/24

## 2024-05-21 LAB
ANION GAP SERPL CALC-SCNC: 9 MMOL/L (ref 10–20)
BUN SERPL-MCNC: 14 MG/DL (ref 6–23)
CALCIUM SERPL-MCNC: 8.8 MG/DL (ref 8.6–10.3)
CHLORIDE SERPL-SCNC: 105 MMOL/L (ref 98–107)
CO2 SERPL-SCNC: 26 MMOL/L (ref 21–32)
CREAT SERPL-MCNC: 0.67 MG/DL (ref 0.5–1.3)
EGFRCR SERPLBLD CKD-EPI 2021: >90 ML/MIN/1.73M*2
ERYTHROCYTE [DISTWIDTH] IN BLOOD BY AUTOMATED COUNT: 14.5 % (ref 11.5–14.5)
GLUCOSE BLD MANUAL STRIP-MCNC: 133 MG/DL (ref 74–99)
GLUCOSE BLD MANUAL STRIP-MCNC: 134 MG/DL (ref 74–99)
GLUCOSE BLD MANUAL STRIP-MCNC: 144 MG/DL (ref 74–99)
GLUCOSE BLD MANUAL STRIP-MCNC: 191 MG/DL (ref 74–99)
GLUCOSE SERPL-MCNC: 131 MG/DL (ref 74–99)
HCT VFR BLD AUTO: 38.1 % (ref 41–52)
HGB BLD-MCNC: 12.9 G/DL (ref 13.5–17.5)
INR PPP: 1.3 (ref 0.9–1.1)
MCH RBC QN AUTO: 28 PG (ref 26–34)
MCHC RBC AUTO-ENTMCNC: 33.9 G/DL (ref 32–36)
MCV RBC AUTO: 83 FL (ref 80–100)
NRBC BLD-RTO: 0 /100 WBCS (ref 0–0)
PLATELET # BLD AUTO: 194 X10*3/UL (ref 150–450)
POTASSIUM SERPL-SCNC: 4.1 MMOL/L (ref 3.5–5.3)
PROTHROMBIN TIME: 15.2 SECONDS (ref 9.8–12.8)
RBC # BLD AUTO: 4.6 X10*6/UL (ref 4.5–5.9)
SODIUM SERPL-SCNC: 136 MMOL/L (ref 136–145)
UFH PPP CHRO-ACNC: 0.4 IU/ML
WBC # BLD AUTO: 3.9 X10*3/UL (ref 4.4–11.3)

## 2024-05-21 PROCEDURE — 82947 ASSAY GLUCOSE BLOOD QUANT: CPT

## 2024-05-21 PROCEDURE — 2500000001 HC RX 250 WO HCPCS SELF ADMINISTERED DRUGS (ALT 637 FOR MEDICARE OP): Performed by: INTERNAL MEDICINE

## 2024-05-21 PROCEDURE — 97530 THERAPEUTIC ACTIVITIES: CPT | Mod: CQ,GP

## 2024-05-21 PROCEDURE — 36415 COLL VENOUS BLD VENIPUNCTURE: CPT | Performed by: INTERNAL MEDICINE

## 2024-05-21 PROCEDURE — 2500000006 HC RX 250 W HCPCS SELF ADMINISTERED DRUGS (ALT 637 FOR ALL PAYERS): Performed by: INTERNAL MEDICINE

## 2024-05-21 PROCEDURE — 2500000004 HC RX 250 GENERAL PHARMACY W/ HCPCS (ALT 636 FOR OP/ED): Performed by: INTERNAL MEDICINE

## 2024-05-21 PROCEDURE — 85520 HEPARIN ASSAY: CPT | Performed by: INTERNAL MEDICINE

## 2024-05-21 PROCEDURE — 2500000001 HC RX 250 WO HCPCS SELF ADMINISTERED DRUGS (ALT 637 FOR MEDICARE OP): Performed by: EMERGENCY MEDICINE

## 2024-05-21 PROCEDURE — 85027 COMPLETE CBC AUTOMATED: CPT | Performed by: INTERNAL MEDICINE

## 2024-05-21 PROCEDURE — 97535 SELF CARE MNGMENT TRAINING: CPT | Mod: GO,CO

## 2024-05-21 PROCEDURE — 80048 BASIC METABOLIC PNL TOTAL CA: CPT | Performed by: INTERNAL MEDICINE

## 2024-05-21 PROCEDURE — 1200000002 HC GENERAL ROOM WITH TELEMETRY DAILY

## 2024-05-21 PROCEDURE — 97112 NEUROMUSCULAR REEDUCATION: CPT | Mod: GO,CO

## 2024-05-21 PROCEDURE — 99233 SBSQ HOSP IP/OBS HIGH 50: CPT | Performed by: INTERNAL MEDICINE

## 2024-05-21 PROCEDURE — 97530 THERAPEUTIC ACTIVITIES: CPT | Mod: GO,CO

## 2024-05-21 PROCEDURE — 85610 PROTHROMBIN TIME: CPT | Performed by: INTERNAL MEDICINE

## 2024-05-21 PROCEDURE — 99221 1ST HOSP IP/OBS SF/LOW 40: CPT | Performed by: INTERNAL MEDICINE

## 2024-05-21 RX ORDER — CLOTRIMAZOLE 1 %
CREAM (GRAM) TOPICAL 2 TIMES DAILY
COMMUNITY

## 2024-05-21 RX ORDER — ALBUTEROL SULFATE 90 UG/1
1 AEROSOL, METERED RESPIRATORY (INHALATION) EVERY 4 HOURS PRN
COMMUNITY

## 2024-05-21 RX ORDER — WARFARIN 7.5 MG/1
7.5 TABLET ORAL
COMMUNITY

## 2024-05-21 RX ADMIN — MORPHINE SULFATE 4 MG: 4 INJECTION, SOLUTION INTRAMUSCULAR; INTRAVENOUS at 05:54

## 2024-05-21 RX ADMIN — CLOPIDOGREL 75 MG: 75 TABLET ORAL at 10:21

## 2024-05-21 RX ADMIN — WARFARIN SODIUM 7.5 MG: 2.5 TABLET ORAL at 19:36

## 2024-05-21 RX ADMIN — MORPHINE SULFATE 4 MG: 4 INJECTION, SOLUTION INTRAMUSCULAR; INTRAVENOUS at 19:37

## 2024-05-21 RX ADMIN — HEPARIN SODIUM 1300 UNITS/HR: 10000 INJECTION, SOLUTION INTRAVENOUS at 01:49

## 2024-05-21 RX ADMIN — LEVETIRACETAM 750 MG: 250 TABLET, FILM COATED ORAL at 10:21

## 2024-05-21 RX ADMIN — MORPHINE SULFATE 4 MG: 4 INJECTION, SOLUTION INTRAMUSCULAR; INTRAVENOUS at 23:21

## 2024-05-21 RX ADMIN — GABAPENTIN 300 MG: 300 CAPSULE ORAL at 05:59

## 2024-05-21 RX ADMIN — MORPHINE SULFATE 4 MG: 4 INJECTION, SOLUTION INTRAMUSCULAR; INTRAVENOUS at 15:29

## 2024-05-21 RX ADMIN — ASPIRIN 81 MG CHEWABLE TABLET 81 MG: 81 TABLET CHEWABLE at 05:55

## 2024-05-21 RX ADMIN — GABAPENTIN 300 MG: 300 CAPSULE ORAL at 22:24

## 2024-05-21 RX ADMIN — LEVETIRACETAM 750 MG: 250 TABLET, FILM COATED ORAL at 22:22

## 2024-05-21 RX ADMIN — MORPHINE SULFATE 4 MG: 4 INJECTION, SOLUTION INTRAMUSCULAR; INTRAVENOUS at 11:31

## 2024-05-21 RX ADMIN — ACETAMINOPHEN 650 MG: 325 TABLET ORAL at 22:23

## 2024-05-21 RX ADMIN — HEPARIN SODIUM 1300 UNITS/HR: 10000 INJECTION, SOLUTION INTRAVENOUS at 22:29

## 2024-05-21 RX ADMIN — MORPHINE SULFATE 4 MG: 4 INJECTION, SOLUTION INTRAMUSCULAR; INTRAVENOUS at 01:53

## 2024-05-21 ASSESSMENT — COGNITIVE AND FUNCTIONAL STATUS - GENERAL
EATING MEALS: A LITTLE
STANDING UP FROM CHAIR USING ARMS: A LOT
DRESSING REGULAR UPPER BODY CLOTHING: A LOT
PERSONAL GROOMING: A LOT
MOVING FROM LYING ON BACK TO SITTING ON SIDE OF FLAT BED WITH BEDRAILS: A LITTLE
TURNING FROM BACK TO SIDE WHILE IN FLAT BAD: A LITTLE
EATING MEALS: A LITTLE
DRESSING REGULAR UPPER BODY CLOTHING: A LOT
CLIMB 3 TO 5 STEPS WITH RAILING: TOTAL
WALKING IN HOSPITAL ROOM: TOTAL
CLIMB 3 TO 5 STEPS WITH RAILING: TOTAL
MOBILITY SCORE: 12
TURNING FROM BACK TO SIDE WHILE IN FLAT BAD: A LITTLE
HELP NEEDED FOR BATHING: A LOT
WALKING IN HOSPITAL ROOM: TOTAL
PERSONAL GROOMING: A LOT
TOILETING: A LOT
MOVING FROM LYING ON BACK TO SITTING ON SIDE OF FLAT BED WITH BEDRAILS: A LITTLE
MOBILITY SCORE: 12
DRESSING REGULAR UPPER BODY CLOTHING: A LOT
HELP NEEDED FOR BATHING: A LOT
TURNING FROM BACK TO SIDE WHILE IN FLAT BAD: A LITTLE
MOBILITY SCORE: 12
STANDING UP FROM CHAIR USING ARMS: A LOT
HELP NEEDED FOR BATHING: A LOT
DAILY ACTIVITIY SCORE: 13
DRESSING REGULAR LOWER BODY CLOTHING: A LOT
WALKING IN HOSPITAL ROOM: TOTAL
MOVING FROM LYING ON BACK TO SITTING ON SIDE OF FLAT BED WITH BEDRAILS: A LITTLE
DRESSING REGULAR LOWER BODY CLOTHING: A LOT
STANDING UP FROM CHAIR USING ARMS: A LOT
DAILY ACTIVITIY SCORE: 13
TOILETING: A LOT
PERSONAL GROOMING: A LOT
TOILETING: A LOT
CLIMB 3 TO 5 STEPS WITH RAILING: TOTAL
DRESSING REGULAR LOWER BODY CLOTHING: A LOT
MOVING FROM LYING ON BACK TO SITTING ON SIDE OF FLAT BED WITH BEDRAILS: A LITTLE
CLIMB 3 TO 5 STEPS WITH RAILING: TOTAL
MOVING TO AND FROM BED TO CHAIR: A LOT
WALKING IN HOSPITAL ROOM: TOTAL
DAILY ACTIVITIY SCORE: 13
EATING MEALS: A LITTLE
MOBILITY SCORE: 12
HELP NEEDED FOR BATHING: A LOT
DAILY ACTIVITIY SCORE: 13
EATING MEALS: A LITTLE
TOILETING: A LOT
MOVING TO AND FROM BED TO CHAIR: A LOT
STANDING UP FROM CHAIR USING ARMS: A LOT
DRESSING REGULAR LOWER BODY CLOTHING: A LOT
MOVING TO AND FROM BED TO CHAIR: A LOT
MOVING TO AND FROM BED TO CHAIR: A LOT
PERSONAL GROOMING: A LOT
DRESSING REGULAR UPPER BODY CLOTHING: A LOT
TURNING FROM BACK TO SIDE WHILE IN FLAT BAD: A LITTLE

## 2024-05-21 ASSESSMENT — PAIN SCALES - GENERAL
PAINLEVEL_OUTOF10: 10 - WORST POSSIBLE PAIN
PAINLEVEL_OUTOF10: 0 - NO PAIN
PAINLEVEL_OUTOF10: 9
PAINLEVEL_OUTOF10: 0 - NO PAIN
PAINLEVEL_OUTOF10: 10 - WORST POSSIBLE PAIN
PAINLEVEL_OUTOF10: 2
PAINLEVEL_OUTOF10: 10 - WORST POSSIBLE PAIN
PAINLEVEL_OUTOF10: 9
PAINLEVEL_OUTOF10: 10 - WORST POSSIBLE PAIN
PAINLEVEL_OUTOF10: 0 - NO PAIN
PAINLEVEL_OUTOF10: 10 - WORST POSSIBLE PAIN
PAINLEVEL_OUTOF10: 10 - WORST POSSIBLE PAIN
PAINLEVEL_OUTOF10: 3
PAINLEVEL_OUTOF10: 9

## 2024-05-21 ASSESSMENT — PAIN DESCRIPTION - DESCRIPTORS: DESCRIPTORS: ACHING;DISCOMFORT

## 2024-05-21 ASSESSMENT — PAIN - FUNCTIONAL ASSESSMENT
PAIN_FUNCTIONAL_ASSESSMENT: 0-10

## 2024-05-21 ASSESSMENT — PAIN DESCRIPTION - LOCATION: LOCATION: BACK

## 2024-05-21 ASSESSMENT — ACTIVITIES OF DAILY LIVING (ADL): HOME_MANAGEMENT_TIME_ENTRY: 15

## 2024-05-21 NOTE — PROGRESS NOTES
"Pharmacy Consult for Warfarin (Coumadin) Management - Initial Consult Note     Ab Levine is a 49 y.o. male admitted for Syncope and collapse. Pharmacy was consulted for warfarin dosing and monitoring.       [unfilled]    Labs  INR   Date Value Ref Range Status   05/21/2024 1.3 (H) 0.9 - 1.1 Final   05/20/2024 1.3 (H) 0.9 - 1.1 Final   05/19/2024 1.2 (H) 0.9 - 1.1 Final     Protime   Date Value Ref Range Status   05/21/2024 15.2 (H) 9.8 - 12.8 seconds Final   05/20/2024 14.6 (H) 9.8 - 12.8 seconds Final   05/19/2024 13.6 (H) 9.8 - 12.8 seconds Final     Hemoglobin   Date Value Ref Range Status   05/21/2024 12.9 (L) 13.5 - 17.5 g/dL Final     Hematocrit   Date Value Ref Range Status   05/21/2024 38.1 (L) 41.0 - 52.0 % Final     Platelets   Date Value Ref Range Status   05/21/2024 194 150 - 450 x10*3/uL Final     No results found for: \"PTT\"  [unfilled]  Warfarin Indication: Deep vein thrombosis  Target INR: 2 - 3  [unfilled]  [unfilled]  Assessment   Pt. Takes 5mg on [place]/Mon/Tues/Fri/Sat and 7.5mg on Wed/Thur  Will dose with 7.5mg x1 today; pt ran out of warfarin prior to admit. So may have missed a number of days    Plan     [unfilled]  Orders placed per pharmacy consult. Pharmacy will continue to monitor and adjust therapy as needed.     Trae Limon, PharmD  " Sacral decubitus ulcer

## 2024-05-21 NOTE — PROGRESS NOTES
Ab Levine is a 49 y.o. male admitted for Syncope and collapse. Pharmacy reviewed the patient's [id number] medications and allergies for accuracy.    The list below reflects the PTA list prior to pharmacy medication history. A summary a changes to the PTA medication list has been listed below. Please review each medication in order reconciliation for additional clarification and justification.    Source of information:  PATIENT    Medications added:  WARFARIN 7.5 MG  WEDNESDAYS, THURSDAYS  ALBUTEROL 90 1 PUFF Q4 PRN  VITAMIN B12 1 every day  VITAMIN E 1 every day  LOTRIMIN 1% CREAM  APPLY TOPICALLY BID TO FEET    Medications modified:  WARFARIN 5MG 1 every day----->SUNDAYS, MONDAYS,TUESDAYS, FRIDAYS AND SATURDAYS    Medications to be removed:    Medications of concern:      Prior to Admission Medications   Prescriptions Last Dose Informant Patient Reported? Taking?   aspirin 81 mg chewable tablet   Yes Yes   Sig: Chew 1 tablet (81 mg) once daily.   clopidogrel (Plavix) 75 mg tablet   Yes Yes   Sig: Take 1 tablet (75 mg) by mouth once daily.   warfarin (Coumadin) 5 mg tablet   Yes No   Sig: Take 1 tablet (5 mg) by mouth once daily.      Facility-Administered Medications: None       Claudette River

## 2024-05-21 NOTE — CONSULTS
Reason For Consult  Protein S deficiency, pulmonary embolism, DVT    History Of Present Illness  Ab Levine is a 49 y.o. 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 Patient states that he lives alone on the second floor of an apartment. He has cameras in his apartment. His neighbors watch over him through the cameras. They also usually help transfer him in and out of his wheelchair as well as bathing him. They saw that he fell off his wheelchair yesterday. He was not moving and he did not get up. One of the neighbors came down and states that it took about 5 minutes to get him up. Patient does not remember the episode. Patient is incontinent of urine at baseline. Since the fall, he complains of headache, right shoulder pain and right hip pain. He has no sensation on the left side of his body. He also has decreased vision in the left eye which resolved from a CVA he had last month status post TNK.    Patient was not taking Coumadin patient was started on heparin drip and Coumadin.  INR 1.3, hematology consultation requested.    Patient is allergic to Eliquis Lovenox Xarelto     Past Medical History  He has no past medical history on file.    Surgical History  He has no past surgical history on file.     Social History  He reports that he has never smoked. He has never been exposed to tobacco smoke. He has never used smokeless tobacco. He reports that he does not drink alcohol and does not use drugs.    Family History  No family history on file.     Allergies  Apixaban, Citalopram, Cyclobenzaprine, Dabigatran etexilate, Dalteparin  "(porcine), Enoxaparin, Fentanyl, Fluphenazine, Fondaparinux, Haloperidol, Hydroxyzine, Ibuprofen, Iodides, Iodinated contrast media, Ipratropium, Ketorolac, Lanolin, Metaxalone, Methocarbamol, Peanut oil, Rivaroxaban, Sulfa (sulfonamide antibiotics), Tiotropium, Tositumomab iodine-131, Tramadol, Alfentanil, Hydrocodone-acetaminophen, Phenytoin, and Heparin    Review of Systems  Awake some problem with his speech no chest pain, no headache and dizziness no recurrent seizure disorder       Physical Exam  Vitals are stable    Weakness of right upper extremity right lower extremities    Neck supple    Lung examination did show fair air movement    Heart with normal regular rhythm    Abdomen is soft and nontender no mass    Extremity bilateral trace edema     Last Recorded Vitals  Blood pressure 137/79, pulse 81, temperature 36.8 °C (98.2 °F), resp. rate 18, height 1.753 m (5' 9\"), weight 87.1 kg (192 lb), SpO2 96%.    Relevant Results   Latest Reference Range & Units 05/21/24 06:04   GLUCOSE 74 - 99 mg/dL 131 (H)   SODIUM 136 - 145 mmol/L 136   POTASSIUM 3.5 - 5.3 mmol/L 4.1   CHLORIDE 98 - 107 mmol/L 105   Bicarbonate 21 - 32 mmol/L 26   Anion Gap 10 - 20 mmol/L 9 (L)   Blood Urea Nitrogen 6 - 23 mg/dL 14   Creatinine 0.50 - 1.30 mg/dL 0.67   EGFR >60 mL/min/1.73m*2 >90   Calcium 8.6 - 10.3 mg/dL 8.8   INR 0.9 - 1.1  1.3 (H)   Heparin Unfractionated See Comment Below for Therapeutic Ranges IU/mL 0.4   Protime 9.8 - 12.8 seconds 15.2 (H)   LEUKOCYTES (10*3/UL) IN BLOOD BY AUTOMATED COUNT, [place] 4.4 - 11.3 x10*3/uL 3.9 (L)   nRBC 0.0 - 0.0 /100 WBCs 0.0   ERYTHROCYTES (10*6/UL) IN BLOOD BY AUTOMATED COUNT, [place] 4.50 - 5.90 x10*6/uL 4.60   HEMOGLOBIN 13.5 - 17.5 g/dL 12.9 (L)   HEMATOCRIT 41.0 - 52.0 % 38.1 (L)   MCV 80 - 100 fL 83   MCH 26.0 - 34.0 pg 28.0   MCHC 32.0 - 36.0 g/dL 33.9   RED CELL DISTRIBUTION WIDTH 11.5 - 14.5 % 14.5   PLATELETS (10*3/UL) IN BLOOD AUTOMATED COUNT, [place] 150 - 450 x10*3/uL 194 "   (H): Data is abnormally high  (L): Data is abnormally low     Assessment/Plan     #1 primary thrombophilia due to protein S deficiency, history of pulmonary embolism, DVT    2.  History of CVA, questionable seizure disorder    3.  Patient is allergic to Lovenox, Eliquis, Xarelto    Medical record reviewed and will continue heparin until INR 2.0 continue Coumadin and agree with current plan.  Discussed with patient  I spent 40 minutes in the professional and overall care of this patient.      Jo Pandey MD

## 2024-05-21 NOTE — PROGRESS NOTES
[facility] has accepted. Patient agreeable. Facility is to start auth. Will await authorization.

## 2024-05-21 NOTE — PROGRESS NOTES
Ab Levine is a 49 y.o. male on day 0 of admission presenting with Syncope and collapse.      Subjective   Ab Levine is a 49 y.o. 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  Patient states that he lives alone on the second floor of an apartment.  He has cameras in his apartment.  His neighbors watch over him through the cameras.  They also usually help transfer him in and out of his wheelchair as well as bathing him.  They saw that he fell off his wheelchair yesterday.  He was not moving and he did not get up.  One of the neighbors came down and states that it took about 5 minutes to get him up.  Patient does not remember the episode.  Patient is incontinent of urine at baseline.  Since the fall, he complains of headache, right shoulder pain and right hip pain.  He has no sensation on the left side of his body.  He also has decreased vision in the left eye which resolved from a CVA he had last month status post TNK.    05/18: Patient was evaluated this a.m., awake alert and oriented.  Complains of pain all over otherwise no active complaint.  Restarted on Keppra on admission-no seizure activity since then.  05/19: Patient is feeling better, generalized pain is improving, no fever or chills.  No acute apparent bleeding.  05/20: Patient was evaluated this morning, denies active bleeding.  No seizure activity since admission.  PT/OT on board and plan for acute rehab  05/21: Rapid response was called this morning as patient was found on the floor.  Patient states he was sliding from the bed to go to the bathroom but he did not fall.   He usually does this at home to get into the restroom.  INR is 1.3, on heparin drip              Objective     Last Recorded Vitals  /68 (BP Location: Right arm, Patient Position: Lying)   Pulse 67   Temp 36.6 °C (97.8 °F) (Temporal)   Resp 20   Wt 87.1 kg (192 lb)   SpO2 97%   Intake/Output last 3 Shifts:    Intake/Output Summary (Last 24 hours) at 5/21/2024 1004  Last data filed at 5/21/2024 0117  Gross per 24 hour   Intake 240 ml   Output 825 ml   Net -585 ml       Admission Weight  Weight: 87.1 kg (192 lb) (05/16/24 2115)    Daily Weight  05/16/24 : 87.1 kg (192 lb)    Image Results  Electrocardiogram, 12-lead  Sinus rhythm  Inferior infarct, old  Baseline wander in lead(s) II,III,aVF  CT chest abdomen pelvis wo IV contrast  Narrative: STUDY:  CT Chest, Abdomen, and Pelvis without IV Contrast; 05/16/2024 11:25 PM  INDICATION:  Chest pain from right side of chest to the left upper quadrant.  Fall  with right hip pain.  COMPARISON:  Pelvis XR 06/02/2022.  ACCESSION NUMBER(S):  [id number]  ORDERING CLINICIAN:  ROSA GIORDANO  TECHNIQUE:  CT of the chest, abdomen, and pelvis was performed.  Contiguous axial  images were obtained at 3 mm slice thickness through the chest,  abdomen, and pelvis.  Coronal and sagittal reconstructions at 3 mm  slice thickness were performed.  No intravenous contrast was  administered.    FINDINGS:  Please note that the evaluation of vessels, lymph nodes and organs is  limited without intravenous contrast.   CHEST:  MEDIASTINUM:  Common origin of the right brachiocephalic artery and left common  carotid artery is seen from the aortic arch, a normal variant.  There  is a trace pericardial effusion.  The heart is normal in size.   Coronary artery calcifications are not identified.  LUNGS/PLEURA:  There is no pleural effusion, pleural thickening, or pneumothorax.    Minimal dependent atelectasis is seen in the lower lobes bilaterally.   Small groundglass foci of airspace disease  and mild pleural  parenchymal scarring is seen in the lateral segment of the right  middle lobe along the right minor fissure.  Minimal scarring is seen  in the posterior basilar segment of the right lower lobe.  Respiratory  motion slightly limits pulmonary evaluation.    LYMPH NODES:  Thoracic lymph nodes are not enlarged.  ABDOMEN:     LIVER:  No hepatomegaly.  Smooth surface contour.  Normal attenuation.     BILE DUCTS:  No intrahepatic or extrahepatic biliary ductal dilatation.     GALLBLADDER:  Gallbladder is contracted but otherwise unremarkable.    STOMACH:  No abnormalities identified.     PANCREAS:  No masses or ductal dilatation.     SPLEEN:  No splenomegaly or focal splenic lesion.     ADRENAL GLANDS:  No thickening or nodules.     KIDNEYS AND URETERS:  Kidneys are normal in size and location.  No renal or ureteral  calculi.     PELVIS:     BLADDER:  No abnormalities identified.     REPRODUCTIVE ORGANS:  No abnormalities identified.     BOWEL:  Minimal diverticulosis is present in the sigmoid colon.  Appendix  appears normal.  Terminal ileum is unremarkable.       VESSELS:  Inferior vena caval filter is in place.  No abnormalities identified.   Abdominal aorta is normal in caliber.      PERITONEUM/RETROPERITONEUM/LYMPH NODES:  No free fluid.  No pneumoperitoneum.  No lymphadenopathy.     ABDOMINAL WALL:  No abnormalities identified.  SOFT TISSUES:   No abnormalities identified.     BONES:  No acute fracture or aggressive osseous lesion.  Moderate disc space  narrowing is seen at L5-S1 with endplate sclerosis.  Sclerotic changes  in the femoral heads suggesting sequela of AVN.  Impression: 1.  No definite acute thoracic, abdominal, or pelvic pathology  identified.  2.  Small foci of scarring and volume loss in the lateral segment of  the right middle lobe, less likely low-grade infectious or  inflammatory process.  3.  Minimal sigmoid diverticulosis.  4.  Additional chronic changes as described.  Signed  by Judson Jamil      Physical Exam  Patient is awake and orient, not in apparent distress  Eyes: PERRLA, no conjunctival congestion  Chest: Bilateral Air entry, no crackles or wheezing  Heart: s1S2 regular, no murmur  Abdomen: Soft, non tender, BS present  Ext: Left-sided hemiplegia  CNS: Slurring of speech, left-sided hemiplegia.  Relevant Results               Assessment/Plan        Seizure versus syncope  -TIA appears less likely  -His neighbor took at least 5 minutes to wake him up  -Patient is supposed to be on Keppra.  He has not taking it for  4 days  -Restarted on IV Keppra 1 g twice daily-switch to oral on 05/18  -PT/OT on board  -Discharge planning to acute rehab once INR is more than 2.    protein S deficiency/DVT/PE  -INR subtherapeutic secondary to noncompliance  -Patient ran out of Coumadin  -Will restart Coumadin with heparin bridging, monitor INR daily.  Discontinue heparin once INR is therapeutic  -Patient is allergic to enoxaparin, dabigatran, apixaban and Xarelto       Recent CVA status post TNK  -Patient has left-sided hemiparesis/hemiplegia  -He is unable to take care of himself  -Will consult   -Patient will likely need placement  -It appears that he signed out AGAINST MEDICAL ADVICE when he was here after the recent CVA after which she received TNK.  -Will get PT/OT eval     Headache, shoulder pain, hip pain  -CT scan did not show any acute findings  -will continue supportive care     COPD  -Appears stable     Diabetes mellitus type 2  -Last HbA1c was 6.8  -Give sliding scale insulin  -Will monitor blood sugar    Physical debility/deconditioning  Secondary to recent stroke  PT/OT on board  Discharge planning to acute versus subacute rehab           Tim Spicer MD

## 2024-05-21 NOTE — PROGRESS NOTES
Physical Therapy    Physical Therapy   Patient Name: Ab Levine  MRN: [id number]  Today's Date: 5/21/2024        Assessment/Plan       IP OR SWING BED PT PLAN  Inpatient or Swing Bed: Inpatient    PT Frequency: 5 times per week (Spoke with PTA treating patient. Patient more appropriate for 5x/week at this time. Frequency decreased to 5x/week, continue with goals and recommendations.)  PT Discharge Recommendations: High intensity level of continued care  Equipment Recommended upon Discharge: Wheelchair  PT Recommended Transfer Status: Assist x2  PT - OK to Discharge: Yes (To next level of care when medically cleared.)        Encounter Problems       Encounter Problems (Active)       Pain - Adult          To improve strength, balance, endurance and efficiency of mobility:       Patient will participate in dynamic sit balance activities out of LIBRADO with understanding of and implementation of weight bearing through B LE with SBA in order to improve supine > sit to CGA. (Progressing)       Start:  05/18/24    Expected End:  06/01/24            Patient will stand with weight shifting in 4 planes and MOD A of 1 for 2 minutes with increased L ankle dorsiflexion to 0 degrees to improve future transfer ability. (Progressing)       Start:  05/18/24    Expected End:  06/01/24            Patient will pivot bed <> chair with MOD A of 1 with safe and efficient technique with VC <25% time. (Progressing)       Start:  05/18/24    Expected End:  06/01/24                   Education Documentation  No documentation found.  Education Comments  No comments found.

## 2024-05-21 NOTE — PROGRESS NOTES
"Occupational Therapy    OT Treatment    Patient Name: Ab Levine  MRN: [id number]  Today's Date: 5/21/2024  Time Calculation  Start Time: 1354  Stop Time: 1434  Time Calculation (min): 40 min         Assessment:  OT Assessment: Pt progressed activity tolerance this session. Pt progressed to min A to mod A x2 to maintain static standing, continues to require modA x2 for intervals of dyn standing activiy. Pt motivated to progress and demo's good rehab potential. Would benefit from intensive OT services.  End of Session Communication: Bedside nurse  End of Session Patient Position: Up in chair, Alarm on     Plan:  Treatment Interventions: ADL retraining, Functional transfer training, UE strengthening/ROM  OT Frequency: 4 times per week  OT Discharge Recommendations: High intensity level of continued care  Equipment Recommended upon Discharge: Wheelchair (current wheelchair \"broken\")  OT Recommended Transfer Status: Assist of 2  OT - OK to Discharge: Yes ((when medically approp.))  Treatment Interventions: ADL retraining, Functional transfer training, UE strengthening/ROM    Subjective   Previous Visit Info:  OT Last Visit  OT Received On: 05/21/24  General:  General  Prior to Session Communication: Bedside nurse  Patient Position Received: Bed, 3 rail up, Alarm on  General Comment: Pt agreeable and motivated for therapy.      Pain:  Pain Assessment  Pain Score: 0 - No pain    Objective    Cognition:  Cognition  Overall Cognitive Status: Within Functional Limits  Orientation Level: Oriented X4       Activities of Daily Living:                LE Dressing  LE Dressing: Yes  Adult Briefs Level of Assistance: Maximum assistance  LE Dressing Where Assessed: Bedsied commode    Toileting  Toileting Level of Assistance: Moderate assistance  Where Assessed: Bedside commode  Functional Standing Tolerance:  Activity: Pt tolerated x5 stands participating in intervals of minimally dyn activity tolerating for 20 seconds to 1.5 minutes " with min/mod A x2 to maintain balance with use of jaison walker.  Bed Mobility/Transfers: Bed Mobility 1  Bed Mobility 1: Supine to sitting  Level of Assistance 1: Minimum assistance    Transfer 1  Transfer From 1: Bed to  Transfer to 1: Chair with arms  Transfer Device 1: Jaison-walker  Transfer Level of Assistance 1: Moderate assistance, +2, Moderate verbal cues  Transfers 2  Transfer From 2: Chair with arms to  Transfer to 2: Bed  Technique 2: Stand pivot  Transfer Device 2: Jaison-walker  Transfer Level of Assistance 2: Moderate assistance, +2, Moderate verbal cues, Moderate tactile cues  Transfers 3  Technique 3: Sit to stand, Stand to sit  Transfer Device 3: Jaison-walker  Transfer Level of Assistance 3: Moderate assistance, +2, Moderate verbal cues                  Therapy/Activity:      Therapeutic Activity  Therapeutic Activity Performed: Yes  Therapeutic Activity 1: Pt tolerating sitting EOB for 8 minutes with CGA.      Outcome Measures:[facility] Daily Activity  Putting on and taking off regular lower body clothing: A lot  Bathing (including washing, rinsing, drying): A lot  Putting on and taking off regular upper body clothing: A lot  Toileting, which includes using toilet, bedpan or urinal: A lot  Taking care of personal grooming such as brushing teeth: A lot  Eating Meals: A little  Daily Activity - Total Score: 13        Education Documentation  Body Mechanics, taught by LELE Casillas at 5/21/2024  3:06 PM.  Learner: Patient  Readiness: Acceptance  Method: Explanation  Response: Needs Reinforcement    Education Comments  No comments found.             Goals:  Encounter Problems       Encounter Problems (Active)       ADLs       Demo. UB bathing, dressing, brushing teeth while seated EOB with set-up and SBA  (Progressing)       Start:  05/18/24    Expected End:  06/01/24               BALANCE       Demo. static stand with Mod.A and device for 1 min.  (Progressing)       Start:  05/18/24    Expected End:   06/01/24               TRANSFERS       Demo. Min./A func. trfrs. to/from BSC or chair.with device PRN   (Progressing)       Start:  05/18/24    Expected End:  06/01/24

## 2024-05-21 NOTE — PROGRESS NOTES
Physical Therapy  Physical Therapy Treatment    Patient Name: Ab Levine  MRN: [id number]  Today's Date: 5/21/2024  Time Calculation  Start Time: 1355  Stop Time: 1434  Time Calculation (min): 39 min    Assessment/Plan   PT Plan  Treatment/Interventions: Bed mobility, Transfer training, Neuromuscular re-education, Strengthening, Endurance training, Range of motion, Therapeutic exercise, Therapeutic activity, Home exercise program, Postural re-education  PT Plan: Skilled PT  PT Frequency: Daily  PT Discharge Recommendations: High intensity level of continued care  Equipment Recommended upon Discharge: Wheelchair  PT Recommended Transfer Status: Assist x2  PT - OK to Discharge: Yes (To next level of care when medically cleared.)    General Visit Information:   PT  Visit  PT Received On: 05/21/24  Response to Previous Treatment: Patient with no complaints from previous session.    Reason for Referral: Impaired mobility. Fall with R sided and chest pain.  Room: [facility]    Subjective   Precautions:  Fall precautions  Recent CVA with dense L hemiplegia     Objective   Pain:  Pain Score: 0 - No pain    Cognition:  Oriented X4     Activity Tolerance:  Activity Tolerance  Endurance: Tolerates 30 min exercise with multiple rests    Treatments:  Bed Mobility  Supine to sitting: Minimum assistance  Scooting: Minimum assistance    Transfers  Sit to stand: Moderate assistance+2  Stand to sit: Moderate assistance+2  Stand pivot: Moderate assistance+2  Transfer Device: Jaison-walker  X3 reps sit<>stand with standing lateral weight shifts  X3 reps Stand pivot bed>chair>bedside commode>chair    Pt remained sitting in bedside chair following tx. Alarm on and call light in reach.     Outcome Measures:  [facility] Basic Mobility  Turning from your back to your side while in a flat bed without using bedrails: A little  Moving from lying on your back to sitting on the side of a flat bed without using bedrails: A little  Moving to and from bed to  chair (including a wheelchair): A lot  Standing up from a chair using your arms (e.g. wheelchair or bedside chair): A lot  To walk in hospital room: Total  Climbing 3-5 steps with railing: Total  Basic Mobility - Total Score: 12    Education Documentation  Mobility Training, taught by Jeremy Reyez PTA at 5/21/2024  2:37 PM.  Learner: Patient  Readiness: Acceptance  Method: Explanation, Demonstration  Response: Needs Reinforcement    Education Comments  No comments found.        OP EDUCATION:       Encounter Problems       Encounter Problems (Active)       Pain - Adult          To improve strength, balance, endurance and efficiency of mobility:       Patient will participate in dynamic sit balance activities out of LIBRADO with understanding of and implementation of weight bearing through B LE with SBA in order to improve supine > sit to CGA. (Progressing)       Start:  05/18/24    Expected End:  06/01/24            Patient will stand with weight shifting in 4 planes and MOD A of 1 for 2 minutes with increased L ankle dorsiflexion to 0 degrees to improve future transfer ability. (Progressing)       Start:  05/18/24    Expected End:  06/01/24            Patient will pivot bed <> chair with MOD A of 1 with safe and efficient technique with VC <25% time. (Progressing)       Start:  05/18/24    Expected End:  06/01/24

## 2024-05-22 LAB
ATRIAL RATE: 82 BPM
GLUCOSE BLD MANUAL STRIP-MCNC: 113 MG/DL (ref 74–99)
GLUCOSE BLD MANUAL STRIP-MCNC: 133 MG/DL (ref 74–99)
GLUCOSE BLD MANUAL STRIP-MCNC: 191 MG/DL (ref 74–99)
GLUCOSE BLD MANUAL STRIP-MCNC: 195 MG/DL (ref 74–99)
INR PPP: 1.4 (ref 0.9–1.1)
P AXIS: 32 DEGREES
PR INTERVAL: 179 MS
PROTHROMBIN TIME: 15.6 SECONDS (ref 9.8–12.8)
Q ONSET: 249 MS
QRS COUNT: 13 BEATS
QRS DURATION: 85 MS
QT INTERVAL: 360 MS
QTC CALCULATION(BAZETT): 421 MS
QTC FREDERICIA: 399 MS
R AXIS: -8 DEGREES
T AXIS: 20 DEGREES
T OFFSET: 429 MS
UFH PPP CHRO-ACNC: 0.5 IU/ML
VENTRICULAR RATE: 82 BPM

## 2024-05-22 PROCEDURE — 36415 COLL VENOUS BLD VENIPUNCTURE: CPT | Performed by: INTERNAL MEDICINE

## 2024-05-22 PROCEDURE — 2500000001 HC RX 250 WO HCPCS SELF ADMINISTERED DRUGS (ALT 637 FOR MEDICARE OP): Performed by: EMERGENCY MEDICINE

## 2024-05-22 PROCEDURE — 85520 HEPARIN ASSAY: CPT | Performed by: INTERNAL MEDICINE

## 2024-05-22 PROCEDURE — 99231 SBSQ HOSP IP/OBS SF/LOW 25: CPT | Performed by: INTERNAL MEDICINE

## 2024-05-22 PROCEDURE — 99233 SBSQ HOSP IP/OBS HIGH 50: CPT | Performed by: INTERNAL MEDICINE

## 2024-05-22 PROCEDURE — 1200000002 HC GENERAL ROOM WITH TELEMETRY DAILY

## 2024-05-22 PROCEDURE — 97530 THERAPEUTIC ACTIVITIES: CPT | Mod: CQ,GP

## 2024-05-22 PROCEDURE — 2500000001 HC RX 250 WO HCPCS SELF ADMINISTERED DRUGS (ALT 637 FOR MEDICARE OP): Performed by: INTERNAL MEDICINE

## 2024-05-22 PROCEDURE — 2500000006 HC RX 250 W HCPCS SELF ADMINISTERED DRUGS (ALT 637 FOR ALL PAYERS)

## 2024-05-22 PROCEDURE — 85610 PROTHROMBIN TIME: CPT | Performed by: INTERNAL MEDICINE

## 2024-05-22 PROCEDURE — 2500000004 HC RX 250 GENERAL PHARMACY W/ HCPCS (ALT 636 FOR OP/ED): Performed by: INTERNAL MEDICINE

## 2024-05-22 PROCEDURE — 82947 ASSAY GLUCOSE BLOOD QUANT: CPT

## 2024-05-22 RX ORDER — MORPHINE SULFATE 4 MG/ML
4 INJECTION INTRAVENOUS ONCE
Status: COMPLETED | OUTPATIENT
Start: 2024-05-22 | End: 2024-05-22

## 2024-05-22 RX ORDER — OXYCODONE HYDROCHLORIDE 5 MG/1
5 TABLET ORAL EVERY 6 HOURS PRN
Status: DISCONTINUED | OUTPATIENT
Start: 2024-05-22 | End: 2024-05-23

## 2024-05-22 RX ORDER — WARFARIN SODIUM 5 MG/1
10 TABLET ORAL ONCE
Status: COMPLETED | OUTPATIENT
Start: 2024-05-22 | End: 2024-05-22

## 2024-05-22 RX ADMIN — WARFARIN SODIUM 10 MG: 5 TABLET ORAL at 18:09

## 2024-05-22 RX ADMIN — MORPHINE SULFATE 4 MG: 4 INJECTION, SOLUTION INTRAMUSCULAR; INTRAVENOUS at 11:51

## 2024-05-22 RX ADMIN — MORPHINE SULFATE 4 MG: 4 INJECTION, SOLUTION INTRAMUSCULAR; INTRAVENOUS at 03:39

## 2024-05-22 RX ADMIN — HEPARIN SODIUM 1300 UNITS/HR: 10000 INJECTION, SOLUTION INTRAVENOUS at 18:09

## 2024-05-22 RX ADMIN — LEVETIRACETAM 750 MG: 250 TABLET, FILM COATED ORAL at 21:07

## 2024-05-22 RX ADMIN — ASPIRIN 81 MG CHEWABLE TABLET 81 MG: 81 TABLET CHEWABLE at 07:53

## 2024-05-22 RX ADMIN — LEVETIRACETAM 750 MG: 250 TABLET, FILM COATED ORAL at 07:53

## 2024-05-22 RX ADMIN — CLOPIDOGREL 75 MG: 75 TABLET ORAL at 07:53

## 2024-05-22 RX ADMIN — MORPHINE SULFATE 4 MG: 4 INJECTION, SOLUTION INTRAMUSCULAR; INTRAVENOUS at 07:49

## 2024-05-22 RX ADMIN — MORPHINE SULFATE 4 MG: 4 INJECTION INTRAVENOUS at 16:29

## 2024-05-22 ASSESSMENT — COGNITIVE AND FUNCTIONAL STATUS - GENERAL
EATING MEALS: A LITTLE
CLIMB 3 TO 5 STEPS WITH RAILING: TOTAL
EATING MEALS: A LITTLE
MOVING FROM LYING ON BACK TO SITTING ON SIDE OF FLAT BED WITH BEDRAILS: A LITTLE
DRESSING REGULAR UPPER BODY CLOTHING: A LOT
MOVING TO AND FROM BED TO CHAIR: A LOT
PERSONAL GROOMING: A LOT
WALKING IN HOSPITAL ROOM: TOTAL
MOBILITY SCORE: 12
TOILETING: A LOT
TOILETING: A LOT
EATING MEALS: A LITTLE
DRESSING REGULAR LOWER BODY CLOTHING: A LOT
DAILY ACTIVITIY SCORE: 13
HELP NEEDED FOR BATHING: A LOT
STANDING UP FROM CHAIR USING ARMS: A LOT
MOBILITY SCORE: 12
HELP NEEDED FOR BATHING: A LOT
PERSONAL GROOMING: A LOT
TURNING FROM BACK TO SIDE WHILE IN FLAT BAD: A LITTLE
DRESSING REGULAR LOWER BODY CLOTHING: A LOT
DRESSING REGULAR LOWER BODY CLOTHING: A LOT
TOILETING: A LOT
CLIMB 3 TO 5 STEPS WITH RAILING: TOTAL
DAILY ACTIVITIY SCORE: 13
WALKING IN HOSPITAL ROOM: TOTAL
DRESSING REGULAR LOWER BODY CLOTHING: A LOT
TURNING FROM BACK TO SIDE WHILE IN FLAT BAD: A LITTLE
HELP NEEDED FOR BATHING: A LOT
STANDING UP FROM CHAIR USING ARMS: A LOT
WALKING IN HOSPITAL ROOM: TOTAL
MOVING TO AND FROM BED TO CHAIR: A LOT
TURNING FROM BACK TO SIDE WHILE IN FLAT BAD: A LITTLE
HELP NEEDED FOR BATHING: A LOT
PERSONAL GROOMING: A LOT
STANDING UP FROM CHAIR USING ARMS: A LOT
WALKING IN HOSPITAL ROOM: TOTAL
TURNING FROM BACK TO SIDE WHILE IN FLAT BAD: A LITTLE
MOVING TO AND FROM BED TO CHAIR: A LOT
MOVING FROM LYING ON BACK TO SITTING ON SIDE OF FLAT BED WITH BEDRAILS: A LITTLE
CLIMB 3 TO 5 STEPS WITH RAILING: TOTAL
WALKING IN HOSPITAL ROOM: TOTAL
MOVING TO AND FROM BED TO CHAIR: A LOT
PERSONAL GROOMING: A LOT
EATING MEALS: A LITTLE
MOVING FROM LYING ON BACK TO SITTING ON SIDE OF FLAT BED WITH BEDRAILS: A LITTLE
STANDING UP FROM CHAIR USING ARMS: A LOT
CLIMB 3 TO 5 STEPS WITH RAILING: TOTAL
STANDING UP FROM CHAIR USING ARMS: A LOT
DRESSING REGULAR UPPER BODY CLOTHING: A LOT
MOVING TO AND FROM BED TO CHAIR: A LOT
TOILETING: A LOT
DRESSING REGULAR UPPER BODY CLOTHING: A LOT
DRESSING REGULAR UPPER BODY CLOTHING: A LOT
MOVING FROM LYING ON BACK TO SITTING ON SIDE OF FLAT BED WITH BEDRAILS: A LITTLE
TURNING FROM BACK TO SIDE WHILE IN FLAT BAD: A LITTLE
MOBILITY SCORE: 12
MOVING FROM LYING ON BACK TO SITTING ON SIDE OF FLAT BED WITH BEDRAILS: A LITTLE
CLIMB 3 TO 5 STEPS WITH RAILING: TOTAL

## 2024-05-22 ASSESSMENT — PAIN DESCRIPTION - LOCATION
LOCATION: GENERALIZED
LOCATION: OTHER (COMMENT)

## 2024-05-22 ASSESSMENT — PAIN - FUNCTIONAL ASSESSMENT
PAIN_FUNCTIONAL_ASSESSMENT: 0-10

## 2024-05-22 ASSESSMENT — PAIN SCALES - GENERAL
PAINLEVEL_OUTOF10: 2
PAINLEVEL_OUTOF10: 10 - WORST POSSIBLE PAIN
PAINLEVEL_OUTOF10: 0 - NO PAIN
PAINLEVEL_OUTOF10: 4
PAINLEVEL_OUTOF10: 4
PAINLEVEL_OUTOF10: 10 - WORST POSSIBLE PAIN

## 2024-05-22 ASSESSMENT — PAIN DESCRIPTION - DESCRIPTORS: DESCRIPTORS: DISCOMFORT

## 2024-05-22 NOTE — PROGRESS NOTES
Pharmacy Consult for Warfarin (Coumadin) Management - Daily Progress Note     Ab Levine is a 49 y.o. male admitted for Syncope and collapse. Pharmacy was consulted for warfarin dosing and monitoring.       Labs  INR   Date Value Ref Range Status   05/22/2024 1.4 (H) 0.9 - 1.1 Final   05/21/2024 1.3 (H) 0.9 - 1.1 Final   05/20/2024 1.3 (H) 0.9 - 1.1 Final     Review  Warfarin Indication: Deep vein thrombosis  Target INR: 2 - 3     Home regimen: 7.5mg Wed/Thurs and 5mg all other days    Pt was boosted yesterday with 7.5mg. Upon admission stated he ran out of warfarin for a few days. Given he has been on his home dose of warfarin for 5 days and his INR is still only 1.4, we will boost today with 10mg. Bridging with heparin. Recheck INR tomorrow.    Orders placed per pharmacy consult. Pharmacy will continue to monitor and adjust therapy as needed.   Bev Burdick, PharmD

## 2024-05-22 NOTE — PROGRESS NOTES
Physical Therapy                 Therapy Communication Note    Patient Name: Ab Levine  MRN: [id number]  Today's Date: 5/22/2024     Discipline: Physical Therapy    Missed Visit Reason:  PT attempted to see to see pt today. Pt unavailable. PT will attempt to see pt at a later time/date.    Missed Time: Attempt

## 2024-05-22 NOTE — PROGRESS NOTES
Auth is approved for [facility], his ICR level remains subtherapeutic. Hopeful for DC tomorrow to Rehab. TCC to continue to follow. Patient is aware.

## 2024-05-22 NOTE — PROGRESS NOTES
Occupational Therapy                 Therapy Communication Note    Patient Name: Ab Levine  MRN: [id number]  Today's Date: 5/22/2024     Discipline: Occupational Therapy    Missed Visit Reason: Missed Visit Reason:  (Pt unavailable at this this time, will attempt later if time permits.)    Missed Time: Attempt    Comment:

## 2024-05-22 NOTE — PROGRESS NOTES
"Ab Levine is a 49 y.o. male on day 1 of admission presenting with Syncope and collapse.  History of pulmonary embolism, DVT, protein S deficiency  Subjective   Patient is awake no any acute discomfort, no any complaint       Objective     Physical Exam  Vitals are stable     Weakness of right upper extremity right lower extremities     Neck supple     Lung examination did show fair air movement     Heart with normal regular rhythm     Abdomen is soft and nontender no mass     Extremity bilateral trace edema  Last Recorded Vitals  Blood pressure 114/72, pulse 69, temperature 36.4 °C (97.6 °F), temperature source Temporal, resp. rate 17, height 1.753 m (5' 9\"), weight 87.1 kg (192 lb), SpO2 97%.  Intake/Output last 3 Shifts:  I/O last 3 completed shifts:  In: - (0 mL/kg)   Out: 1025 (11.8 mL/kg) [Urine:1025 (0.3 mL/kg/hr)]  Weight: 87.1 kg     Relevant Results    Latest Reference Range & Units 05/22/24 05:43   INR 0.9 - 1.1  1.4 (H)   Heparin Unfractionated See Comment Below for Therapeutic Ranges IU/mL 0.5   Protime 9.8 - 12.8 seconds 15.6 (H)   (H): Data is abnormally high   Latest Reference Range & Units 05/21/24 06:04   LEUKOCYTES (10*3/UL) IN BLOOD BY AUTOMATED COUNT, [place] 4.4 - 11.3 x10*3/uL 3.9 (L)   nRBC 0.0 - 0.0 /100 WBCs 0.0   ERYTHROCYTES (10*6/UL) IN BLOOD BY AUTOMATED COUNT, [place] 4.50 - 5.90 x10*6/uL 4.60   HEMOGLOBIN 13.5 - 17.5 g/dL 12.9 (L)   HEMATOCRIT 41.0 - 52.0 % 38.1 (L)   MCV 80 - 100 fL 83   MCH 26.0 - 34.0 pg 28.0   MCHC 32.0 - 36.0 g/dL 33.9   RED CELL DISTRIBUTION WIDTH 11.5 - 14.5 % 14.5   PLATELETS (10*3/UL) IN BLOOD AUTOMATED COUNT, [place] 150 - 450 x10*3/uL 194   (L): Data is abnormally low         Assessment/Plan   #1 primary thrombophilia due to protein S deficiency, history of pulmonary embolism, DVT     2.  History of CVA, questionable seizure disorder     3.  Patient is allergic to Lovenox, Eliquis, Xarelto    Today's INR 1.4, DC heparin drip when INR is 2.0 patient can " be discharged with Coumadin.  I spent 20 minutes in the professional and overall care of this patient.      Jo Pandey MD

## 2024-05-22 NOTE — CARE PLAN
Problem: Skin  Goal: Decreased wound size/increased tissue granulation at next dressing change  Outcome: Progressing  Flowsheets (Taken 5/22/2024 0732)  Decreased wound size/increased tissue granulation at next dressing change:   Promote sleep for wound healing   Utilize specialty bed per algorithm   Protective dressings over bony prominences  Goal: Participates in plan/prevention/treatment measures  Outcome: Progressing  Flowsheets (Taken 5/22/2024 0732)  Participates in plan/prevention/treatment measures:   Discuss with provider PT/OT consult   Elevate heels   Increase activity/out of bed for meals  Goal: Prevent/manage excess moisture  Outcome: Progressing  Flowsheets (Taken 5/22/2024 0732)  Prevent/manage excess moisture:   Moisturize dry skin   Cleanse incontinence/protect with barrier cream   Monitor for/manage infection if present  Goal: Prevent/minimize sheer/friction injuries  Outcome: Progressing  Flowsheets (Taken 5/22/2024 0732)  Prevent/minimize sheer/friction injuries:   Complete micro-shifts as needed if patient unable. Adjust patient position to relieve pressure points, not a full turn   HOB 30 degrees or less   Increase activity/out of bed for meals  Goal: Promote/optimize nutrition  Outcome: Progressing  Flowsheets (Taken 5/22/2024 0732)  Promote/optimize nutrition:   Offer water/supplements/favorite foods   Discuss with provider if NPO > 2 days   Assist with feeding  Goal: Promote skin healing  Outcome: Progressing  Flowsheets (Taken 5/22/2024 0732)  Promote skin healing:   Ensure correct size (line/device) and apply per  instructions   Assess skin/pad under line(s)/device(s)   Protective dressings over bony prominences     Problem: Pain  Goal: My pain/discomfort is manageable  Outcome: Progressing     Problem: Safety  Goal: Patient will be injury free during hospitalization  Outcome: Progressing  Goal: I will remain free of falls  Outcome: Progressing     Problem: Daily Care  Goal:  Daily care needs are met  Outcome: Progressing     Problem: Psychosocial Needs  Goal: Demonstrates ability to cope with hospitalization/illness  Outcome: Progressing  Goal: Collaborate with me, my family, and caregiver to identify my specific goals  Outcome: Progressing     Problem: Discharge Barriers  Goal: My discharge needs are met  Outcome: Progressing     Problem: Fall/Injury  Goal: Not fall by end of shift  Outcome: Progressing  Goal: Be free from injury by end of the shift  Outcome: Progressing  Goal: Verbalize understanding of personal risk factors for fall in the hospital  Outcome: Progressing  Goal: Verbalize understanding of risk factor reduction measures to prevent injury from fall in the home  Outcome: Progressing  Goal: Use assistive devices by end of the shift  Outcome: Progressing  Goal: Pace activities to prevent fatigue by end of the shift  Outcome: Progressing     Problem: Pain - Adult  Goal: Verbalizes/displays adequate comfort level or baseline comfort level  Outcome: Progressing     Problem: Safety - Adult  Goal: Free from fall injury  Outcome: Progressing     Problem: Discharge Planning  Goal: Discharge to home or other facility with appropriate resources  Outcome: Progressing     Problem: Chronic Conditions and Co-morbidities  Goal: Patient's chronic conditions and co-morbidity symptoms are monitored and maintained or improved  Outcome: Progressing     Problem: Pain  Goal: Takes deep breaths with improved pain control throughout the shift  Outcome: Progressing  Goal: Turns in bed with improved pain control throughout the shift  Outcome: Progressing  Goal: Walks with improved pain control throughout the shift  Outcome: Progressing  Goal: Performs ADL's with improved pain control throughout shift  Outcome: Progressing  Goal: Participates in PT with improved pain control throughout the shift  Outcome: Progressing  Goal: Free from opioid side effects throughout the shift  Outcome:  Progressing  Goal: Free from acute confusion related to pain meds throughout the shift  Outcome: Progressing     Problem: Nutrition  Goal: Less than 5 days NPO/clear liquids  Outcome: Progressing  Goal: Oral intake greater than 50%  Outcome: Progressing  Goal: Oral intake greater 75%  Outcome: Progressing  Goal: Consume prescribed supplement  Outcome: Progressing  Goal: Adequate PO fluid intake  Outcome: Progressing  Goal: Nutrition support goals are met within 48 hrs  Outcome: Progressing  Goal: Nutrition support is meeting 75% of nutrient needs  Outcome: Progressing  Goal: Tube feed tolerance  Outcome: Progressing  Goal: BG  mg/dL  Outcome: Progressing  Goal: Lab values WNL  Outcome: Progressing  Goal: Electrolytes WNL  Outcome: Progressing  Goal: Promote healing  Outcome: Progressing  Goal: Maintain stable weight  Outcome: Progressing  Goal: Reduce weight from edema/fluid  Outcome: Progressing  Goal: Gradual weight gain  Outcome: Progressing  Goal: Improve ostomy output  Outcome: Progressing   The patient's goals for the shift include      The clinical goals for the shift include pt will verbalize a pain of a 4 or less this shift.

## 2024-05-22 NOTE — PROGRESS NOTES
Physical Therapy  Physical Therapy Treatment    Patient Name: Ab Levine  MRN: [id number]  Today's Date: 5/22/2024  Time Calculation  Start Time: 1404  Stop Time: 1428  Time Calculation (min): 24 min    Assessment/Plan   PT Plan  Treatment/Interventions: Bed mobility, Transfer training, Neuromuscular re-education, Strengthening, Endurance training, Range of motion, Therapeutic exercise, Therapeutic activity, Home exercise program, Postural re-education  PT Plan: Skilled PT  PT Frequency: 5 times per week (Spoke with PTA treating patient. Patient more appropriate for 5x/week at this time. Frequency decreased to 5x/week, continue with goals and recommendations.)  PT Discharge Recommendations: High intensity level of continued care  Equipment Recommended upon Discharge: Wheelchair  PT Recommended Transfer Status: Assist x2  PT - OK to Discharge: Yes (To next level of care when medically cleared.)    General Visit Information:   PT  Visit  PT Received On: 05/22/24  Response to Previous Treatment: Patient with no complaints from previous session.    Reason for Referral: Impaired mobility. Fall with R sided and chest pain.  Room: [facility]    Subjective   Precautions:  Fall precautions  Recent CVA with dense L hemiplegia    Objective   Pain:  4/10 abdominal cramps    Cognition:  Oriented X4    Activity Tolerance:  Activity Tolerance  Endurance: Tolerates 30 min exercise with multiple rests    Treatments:  Bed Mobility  Supine to sitting: Minimum assistance  Sitting to supine: Minimum assistance  Scooting: Minimum assistance    Transfers  Sit to stand: Minimum assistance  Stand to sit: Moderate assistance  Stand pivot (bed<>bedside commode): Moderate assistance    Pt RTB following tx. Alarm on and call light in reach.     Outcome Measures:  [facility] Basic Mobility  Turning from your back to your side while in a flat bed without using bedrails: A little  Moving from lying on your back to sitting on the side of a flat bed without  using bedrails: A little  Moving to and from bed to chair (including a wheelchair): A lot  Standing up from a chair using your arms (e.g. wheelchair or bedside chair): A lot  To walk in hospital room: Total  Climbing 3-5 steps with railing: Total  Basic Mobility - Total Score: 12    Education Documentation  Mobility Training, taught by Jeremy Reyez PTA at 5/22/2024  2:33 PM.  Learner: Patient  Readiness: Acceptance  Method: Explanation, Demonstration  Response: Needs Reinforcement    Education Comments  No comments found.        OP EDUCATION:       Encounter Problems       Encounter Problems (Active)       Pain - Adult          To improve strength, balance, endurance and efficiency of mobility:       Patient will participate in dynamic sit balance activities out of LIBRADO with understanding of and implementation of weight bearing through B LE with SBA in order to improve supine > sit to CGA. (Progressing)       Start:  05/18/24    Expected End:  06/01/24            Patient will stand with weight shifting in 4 planes and MOD A of 1 for 2 minutes with increased L ankle dorsiflexion to 0 degrees to improve future transfer ability. (Progressing)       Start:  05/18/24    Expected End:  06/01/24            Patient will pivot bed <> chair with MOD A of 1 with safe and efficient technique with VC <25% time. (Progressing)       Start:  05/18/24    Expected End:  06/01/24

## 2024-05-22 NOTE — PROGRESS NOTES
Ab Levine is a 49 y.o. male on day 1 of admission presenting with Syncope and collapse.      Subjective   Ab Levine is a 49 y.o. 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  Patient states that he lives alone on the second floor of an apartment.  He has cameras in his apartment.  His neighbors watch over him through the cameras.  They also usually help transfer him in and out of his wheelchair as well as bathing him.  They saw that he fell off his wheelchair yesterday.  He was not moving and he did not get up.  One of the neighbors came down and states that it took about 5 minutes to get him up.  Patient does not remember the episode.  Patient is incontinent of urine at baseline.  Since the fall, he complains of headache, right shoulder pain and right hip pain.  He has no sensation on the left side of his body.  He also has decreased vision in the left eye which resolved from a CVA he had last month status post TNK.    05/18: Patient was evaluated this a.m., awake alert and oriented.  Complains of pain all over otherwise no active complaint.  Restarted on Keppra on admission-no seizure activity since then.  05/19: Patient is feeling better, generalized pain is improving, no fever or chills.  No acute apparent bleeding.  05/20: Patient was evaluated this morning, denies active INR is 1.4 today no seizure activity since admission.  PT/OT on board and plan for acute rehab  05/21: Rapid response was called this morning as patient was found on the floor.  Patient states he was sliding from the bed to go to the bathroom but he did not  fall.  He usually does this at home to get into the restroom.  INR is 1.3, on heparin drip  05/22: Patient was evaluated this morning, complains of generalized aches and pains otherwise no active complaint.  No fever or chills, no apparent bleeding.              Objective     Last Recorded Vitals  /74 (BP Location: Right arm, Patient Position: Lying)   Pulse 76   Temp 36.2 °C (97.2 °F)   Resp 16   Wt 87.1 kg (192 lb)   SpO2 92%   Intake/Output last 3 Shifts:    Intake/Output Summary (Last 24 hours) at 5/22/2024 1451  Last data filed at 5/22/2024 0902  Gross per 24 hour   Intake 450 ml   Output 850 ml   Net -400 ml       Admission Weight  Weight: 87.1 kg (192 lb) (05/16/24 2115)    Daily Weight  05/16/24 : 87.1 kg (192 lb)    Image Results  Electrocardiogram, 12-lead  Sinus rhythm  Inferior infarct, old  Baseline wander in lead(s) II,III,aVF  CT chest abdomen pelvis wo IV contrast  Narrative: STUDY:  CT Chest, Abdomen, and Pelvis without IV Contrast; 05/16/2024 11:25 PM  INDICATION:  Chest pain from right side of chest to the left upper quadrant.  Fall  with right hip pain.  COMPARISON:  Pelvis XR 06/02/2022.  ACCESSION NUMBER(S):  [id number]  ORDERING CLINICIAN:  ROSA GIORDANO  TECHNIQUE:  CT of the chest, abdomen, and pelvis was performed.  Contiguous axial  images were obtained at 3 mm slice thickness through the chest,  abdomen, and pelvis.  Coronal and sagittal reconstructions at 3 mm  slice thickness were performed.  No intravenous contrast was  administered.    FINDINGS:  Please note that the evaluation of vessels, lymph nodes and organs is  limited without intravenous contrast.   CHEST:  MEDIASTINUM:  Common origin of the right brachiocephalic artery and left common  carotid artery is seen from the aortic arch, a normal variant.  There  is a trace pericardial effusion.  The heart is normal in size.   Coronary artery calcifications are not identified.  LUNGS/PLEURA:  There is no pleural effusion,  pleural thickening, or pneumothorax.    Minimal dependent atelectasis is seen in the lower lobes bilaterally.   Small groundglass foci of airspace disease and mild pleural  parenchymal scarring is seen in the lateral segment of the right  middle lobe along the right minor fissure.  Minimal scarring is seen  in the posterior basilar segment of the right lower lobe.  Respiratory  motion slightly limits pulmonary evaluation.    LYMPH NODES:  Thoracic lymph nodes are not enlarged.  ABDOMEN:     LIVER:  No hepatomegaly.  Smooth surface contour.  Normal attenuation.     BILE DUCTS:  No intrahepatic or extrahepatic biliary ductal dilatation.     GALLBLADDER:  Gallbladder is contracted but otherwise unremarkable.    STOMACH:  No abnormalities identified.     PANCREAS:  No masses or ductal dilatation.     SPLEEN:  No splenomegaly or focal splenic lesion.     ADRENAL GLANDS:  No thickening or nodules.     KIDNEYS AND URETERS:  Kidneys are normal in size and location.  No renal or ureteral  calculi.     PELVIS:     BLADDER:  No abnormalities identified.     REPRODUCTIVE ORGANS:  No abnormalities identified.     BOWEL:  Minimal diverticulosis is present in the sigmoid colon.  Appendix  appears normal.  Terminal ileum is unremarkable.       VESSELS:  Inferior vena caval filter is in place.  No abnormalities identified.   Abdominal aorta is normal in caliber.      PERITONEUM/RETROPERITONEUM/LYMPH NODES:  No free fluid.  No pneumoperitoneum.  No lymphadenopathy.     ABDOMINAL WALL:  No abnormalities identified.  SOFT TISSUES:   No abnormalities identified.     BONES:  No acute fracture or aggressive osseous lesion.  Moderate disc space  narrowing is seen at L5-S1 with endplate sclerosis.  Sclerotic changes  in the femoral heads suggesting sequela of AVN.  Impression: 1.  No definite acute thoracic, abdominal, or pelvic pathology  identified.  2.  Small foci of scarring and volume loss in the lateral segment of  the right middle  lobe, less likely low-grade infectious or  inflammatory process.  3.  Minimal sigmoid diverticulosis.  4.  Additional chronic changes as described.  Signed by Judson Jamil      Physical Exam  Patient is awake and orient, not in apparent distress  Eyes: PERRLA, no conjunctival congestion  Chest: Bilateral Air entry, no crackles or wheezing  Heart: s1S2 regular, no murmur  Abdomen: Soft, non tender, BS present  Ext: Left-sided hemiplegia  CNS: Slurring of speech, left-sided hemiplegia.  Relevant Results               Assessment/Plan        Seizure versus syncope  -TIA appears less likely  -His neighbor took at least 5 minutes to wake him up  -Patient is supposed to be on Keppra.  He has not taking it for  4 days  -Restarted on IV Keppra 1 g twice daily-switch to oral on 05/18  -PT/OT on board  -Discharge planning to acute rehab once INR is more than 2.    protein S deficiency/DVT/PE  -INR subtherapeutic secondary to noncompliance  -Patient ran out of Coumadin  -Will restart Coumadin with heparin bridging, monitor INR daily.  Discontinue heparin once INR is therapeutic  -Patient is allergic to enoxaparin, dabigatran, apixaban and Xarelto       Recent CVA status post TNK  -Patient has left-sided hemiparesis/hemiplegia  -He is unable to take care of himself  -Will consult   -Patient will likely need placement  -It appears that he signed out AGAINST MEDICAL ADVICE when he was here after the recent CVA after which she received TNK.  -Will get PT/OT eval     Headache, shoulder pain, hip pain  -CT scan did not show any acute findings  -will continue supportive care     COPD  -Appears stable     Diabetes mellitus type 2  -Last HbA1c was 6.8  -Give sliding scale insulin  -Will monitor blood sugar    Physical debility/deconditioning  Secondary to recent stroke  PT/OT on board  Discharge planning to acute versus subacute rehab           Tim Spicer MD

## 2024-05-23 LAB
ANION GAP SERPL CALC-SCNC: 11 MMOL/L (ref 10–20)
BUN SERPL-MCNC: 13 MG/DL (ref 6–23)
CALCIUM SERPL-MCNC: 9.3 MG/DL (ref 8.6–10.3)
CHLORIDE SERPL-SCNC: 104 MMOL/L (ref 98–107)
CO2 SERPL-SCNC: 24 MMOL/L (ref 21–32)
CREAT SERPL-MCNC: 0.65 MG/DL (ref 0.5–1.3)
EGFRCR SERPLBLD CKD-EPI 2021: >90 ML/MIN/1.73M*2
ERYTHROCYTE [DISTWIDTH] IN BLOOD BY AUTOMATED COUNT: 14.3 % (ref 11.5–14.5)
GLUCOSE BLD MANUAL STRIP-MCNC: 129 MG/DL (ref 74–99)
GLUCOSE BLD MANUAL STRIP-MCNC: 133 MG/DL (ref 74–99)
GLUCOSE BLD MANUAL STRIP-MCNC: 145 MG/DL (ref 74–99)
GLUCOSE BLD MANUAL STRIP-MCNC: 200 MG/DL (ref 74–99)
GLUCOSE SERPL-MCNC: 122 MG/DL (ref 74–99)
HCT VFR BLD AUTO: 38.9 % (ref 41–52)
HGB BLD-MCNC: 13.5 G/DL (ref 13.5–17.5)
INR PPP: 1.7 (ref 0.9–1.1)
MAGNESIUM SERPL-MCNC: 1.54 MG/DL (ref 1.6–2.4)
MCH RBC QN AUTO: 29 PG (ref 26–34)
MCHC RBC AUTO-ENTMCNC: 34.7 G/DL (ref 32–36)
MCV RBC AUTO: 84 FL (ref 80–100)
NRBC BLD-RTO: 0 /100 WBCS (ref 0–0)
PLATELET # BLD AUTO: 181 X10*3/UL (ref 150–450)
POTASSIUM SERPL-SCNC: 4.3 MMOL/L (ref 3.5–5.3)
PROTHROMBIN TIME: 19.6 SECONDS (ref 9.8–12.8)
RBC # BLD AUTO: 4.66 X10*6/UL (ref 4.5–5.9)
SODIUM SERPL-SCNC: 135 MMOL/L (ref 136–145)
UFH PPP CHRO-ACNC: 0.3 IU/ML
WBC # BLD AUTO: 4 X10*3/UL (ref 4.4–11.3)

## 2024-05-23 PROCEDURE — 2500000004 HC RX 250 GENERAL PHARMACY W/ HCPCS (ALT 636 FOR OP/ED): Performed by: INTERNAL MEDICINE

## 2024-05-23 PROCEDURE — 1200000002 HC GENERAL ROOM WITH TELEMETRY DAILY

## 2024-05-23 PROCEDURE — 97535 SELF CARE MNGMENT TRAINING: CPT | Mod: GO,CO

## 2024-05-23 PROCEDURE — 85027 COMPLETE CBC AUTOMATED: CPT | Performed by: INTERNAL MEDICINE

## 2024-05-23 PROCEDURE — 85610 PROTHROMBIN TIME: CPT | Performed by: INTERNAL MEDICINE

## 2024-05-23 PROCEDURE — 2500000002 HC RX 250 W HCPCS SELF ADMINISTERED DRUGS (ALT 637 FOR MEDICARE OP, ALT 636 FOR OP/ED): Performed by: INTERNAL MEDICINE

## 2024-05-23 PROCEDURE — 99233 SBSQ HOSP IP/OBS HIGH 50: CPT | Performed by: INTERNAL MEDICINE

## 2024-05-23 PROCEDURE — 2500000001 HC RX 250 WO HCPCS SELF ADMINISTERED DRUGS (ALT 637 FOR MEDICARE OP): Performed by: INTERNAL MEDICINE

## 2024-05-23 PROCEDURE — 83735 ASSAY OF MAGNESIUM: CPT | Performed by: INTERNAL MEDICINE

## 2024-05-23 PROCEDURE — 36415 COLL VENOUS BLD VENIPUNCTURE: CPT | Performed by: INTERNAL MEDICINE

## 2024-05-23 PROCEDURE — 82947 ASSAY GLUCOSE BLOOD QUANT: CPT

## 2024-05-23 PROCEDURE — 97530 THERAPEUTIC ACTIVITIES: CPT | Mod: CQ,GP

## 2024-05-23 PROCEDURE — 80048 BASIC METABOLIC PNL TOTAL CA: CPT | Performed by: INTERNAL MEDICINE

## 2024-05-23 PROCEDURE — 85520 HEPARIN ASSAY: CPT | Performed by: INTERNAL MEDICINE

## 2024-05-23 RX ORDER — MAGNESIUM SULFATE HEPTAHYDRATE 40 MG/ML
2 INJECTION, SOLUTION INTRAVENOUS ONCE
Status: DISCONTINUED | OUTPATIENT
Start: 2024-05-23 | End: 2024-05-23

## 2024-05-23 RX ORDER — OXYCODONE HYDROCHLORIDE 5 MG/1
5 TABLET ORAL EVERY 6 HOURS PRN
Status: DISCONTINUED | OUTPATIENT
Start: 2024-05-23 | End: 2024-05-25 | Stop reason: HOSPADM

## 2024-05-23 RX ORDER — LANOLIN ALCOHOL/MO/W.PET/CERES
800 CREAM (GRAM) TOPICAL DAILY
Status: DISCONTINUED | OUTPATIENT
Start: 2024-05-23 | End: 2024-05-25 | Stop reason: HOSPADM

## 2024-05-23 RX ADMIN — LEVETIRACETAM 750 MG: 250 TABLET, FILM COATED ORAL at 08:13

## 2024-05-23 RX ADMIN — HYDROMORPHONE HYDROCHLORIDE 0.4 MG: 0.5 INJECTION, SOLUTION INTRAMUSCULAR; INTRAVENOUS; SUBCUTANEOUS at 13:22

## 2024-05-23 RX ADMIN — Medication 800 MG: at 17:41

## 2024-05-23 RX ADMIN — HEPARIN SODIUM 1300 UNITS/HR: 10000 INJECTION, SOLUTION INTRAVENOUS at 13:38

## 2024-05-23 RX ADMIN — INSULIN LISPRO 1 UNITS: 100 INJECTION, SOLUTION INTRAVENOUS; SUBCUTANEOUS at 13:24

## 2024-05-23 RX ADMIN — HYDROMORPHONE HYDROCHLORIDE 0.4 MG: 1 INJECTION, SOLUTION INTRAMUSCULAR; INTRAVENOUS; SUBCUTANEOUS at 18:13

## 2024-05-23 RX ADMIN — LEVETIRACETAM 750 MG: 250 TABLET, FILM COATED ORAL at 20:38

## 2024-05-23 RX ADMIN — WARFARIN SODIUM 7.5 MG: 5 TABLET ORAL at 17:41

## 2024-05-23 RX ADMIN — ASPIRIN 81 MG CHEWABLE TABLET 81 MG: 81 TABLET CHEWABLE at 08:13

## 2024-05-23 RX ADMIN — CLOPIDOGREL 75 MG: 75 TABLET ORAL at 08:13

## 2024-05-23 ASSESSMENT — COGNITIVE AND FUNCTIONAL STATUS - GENERAL
MOVING TO AND FROM BED TO CHAIR: A LOT
MOBILITY SCORE: 12
PERSONAL GROOMING: A LOT
MOVING FROM LYING ON BACK TO SITTING ON SIDE OF FLAT BED WITH BEDRAILS: A LITTLE
STANDING UP FROM CHAIR USING ARMS: A LOT
WALKING IN HOSPITAL ROOM: TOTAL
MOVING FROM LYING ON BACK TO SITTING ON SIDE OF FLAT BED WITH BEDRAILS: A LITTLE
MOVING FROM LYING ON BACK TO SITTING ON SIDE OF FLAT BED WITH BEDRAILS: A LITTLE
MOVING TO AND FROM BED TO CHAIR: A LOT
DRESSING REGULAR UPPER BODY CLOTHING: A LOT
HELP NEEDED FOR BATHING: A LOT
DRESSING REGULAR LOWER BODY CLOTHING: A LOT
STANDING UP FROM CHAIR USING ARMS: A LOT
MOBILITY SCORE: 12
CLIMB 3 TO 5 STEPS WITH RAILING: TOTAL
TURNING FROM BACK TO SIDE WHILE IN FLAT BAD: A LITTLE
DAILY ACTIVITIY SCORE: 13
STANDING UP FROM CHAIR USING ARMS: A LOT
DRESSING REGULAR LOWER BODY CLOTHING: A LOT
HELP NEEDED FOR BATHING: A LOT
DAILY ACTIVITIY SCORE: 13
DRESSING REGULAR LOWER BODY CLOTHING: A LOT
EATING MEALS: A LITTLE
CLIMB 3 TO 5 STEPS WITH RAILING: TOTAL
DAILY ACTIVITIY SCORE: 13
TURNING FROM BACK TO SIDE WHILE IN FLAT BAD: A LITTLE
MOBILITY SCORE: 12
EATING MEALS: A LITTLE
EATING MEALS: A LITTLE
PERSONAL GROOMING: A LOT
PERSONAL GROOMING: A LOT
CLIMB 3 TO 5 STEPS WITH RAILING: TOTAL
TOILETING: A LOT
TOILETING: A LOT
DRESSING REGULAR UPPER BODY CLOTHING: A LOT
WALKING IN HOSPITAL ROOM: TOTAL
DRESSING REGULAR UPPER BODY CLOTHING: A LOT
TURNING FROM BACK TO SIDE WHILE IN FLAT BAD: A LITTLE
WALKING IN HOSPITAL ROOM: TOTAL
MOVING TO AND FROM BED TO CHAIR: A LOT
TOILETING: A LOT
HELP NEEDED FOR BATHING: A LOT

## 2024-05-23 ASSESSMENT — ACTIVITIES OF DAILY LIVING (ADL): HOME_MANAGEMENT_TIME_ENTRY: 24

## 2024-05-23 ASSESSMENT — PAIN SCALES - GENERAL
PAINLEVEL_OUTOF10: 0 - NO PAIN
PAINLEVEL_OUTOF10: 10 - WORST POSSIBLE PAIN
PAINLEVEL_OUTOF10: 5 - MODERATE PAIN
PAINLEVEL_OUTOF10: 0 - NO PAIN
PAINLEVEL_OUTOF10: 0 - NO PAIN
PAINLEVEL_OUTOF10: 10 - WORST POSSIBLE PAIN
PAINLEVEL_OUTOF10: 0 - NO PAIN

## 2024-05-23 ASSESSMENT — PAIN - FUNCTIONAL ASSESSMENT
PAIN_FUNCTIONAL_ASSESSMENT: 0-10

## 2024-05-23 ASSESSMENT — PAIN DESCRIPTION - LOCATION: LOCATION: GENERALIZED

## 2024-05-23 NOTE — PROGRESS NOTES
"Ab Levine is a 49 y.o. male on day 2 of admission presenting with Syncope and collapse.  History of pulmonary embolism, DVT, protein S deficiency  Subjective   Patient is awake no any acute discomfort, no any complaint       Objective     Physical Exam  Vitals are stable     Weakness of right upper extremity right lower extremities     Neck supple     Lung examination did show fair air movement     Heart with normal regular rhythm     Abdomen is soft and nontender no mass     Extremity bilateral trace edema  Last Recorded Vitals  Blood pressure 120/73, pulse 76, temperature 36.4 °C (97.6 °F), temperature source Temporal, resp. rate 16, height 1.753 m (5' 9\"), weight 87.1 kg (192 lb), SpO2 97%.  Intake/Output last 3 Shifts:  I/O last 3 completed shifts:  In: 925 (10.6 mL/kg) [P.O.:925]  Out: 1250 (14.4 mL/kg) [Urine:1250 (0.4 mL/kg/hr)]  Weight: 87.1 kg     Relevant Results      Today INR 1.7      Assessment/Plan   #1 primary thrombophilia due to protein S deficiency, history of pulmonary embolism, DVT     2.  History of CVA, questionable seizure disorder     3.  Patient is allergic to Lovenox, Eliquis, Xarelto    Today's INR 1.4, DC heparin drip when INR is 2.0 patient can be discharged with Coumadin.  I spent 20 minutes in the professional and overall care of this patient.      Jo Pandey MD      "

## 2024-05-23 NOTE — PROGRESS NOTES
"Occupational Therapy    OT Treatment    Patient Name: Ab Levine  MRN: [id number]  Today's Date: 5/23/2024  Time Calculation  Start Time: 1209  Stop Time: 1233  Time Calculation (min): 24 min         Assessment:  Medical Staff Made Aware: Yes  End of Session Communication: Bedside nurse  End of Session Patient Position: Up in chair, Alarm on  Medical Staff Made Aware: Yes  Plan:  Treatment Interventions: ADL retraining, Functional transfer training, UE strengthening/ROM, Endurance training, Neuromuscular reeducation  OT Frequency: 4 times per week  OT Discharge Recommendations: High intensity level of continued care  Equipment Recommended upon Discharge: Wheelchair  OT Recommended Transfer Status: Assist of 2  OT - OK to Discharge: Yes ((when medically approp.))  Treatment Interventions: ADL retraining, Functional transfer training, UE strengthening/ROM, Endurance training, Neuromuscular reeducation    Subjective   Previous Visit Info:  OT Last Visit  OT Received On: 05/23/24  General:  General  Reason for Referral: Impaired mobility. Fall with R sided and chest pain.  Referred By: Suzanne Toney MD  Past Medical History Relevant to Rehab: 50 y/o male admitted with syncope, having fallen to the floor with R hip and chest pain. CT of head (-) for acute intracranial abnormalities or fx; C-spine (-) for fracture. Congenital fusion of C2 - C3, C5-C6. Ground glass opacities R upper lung. Uncertain of atelectasis, inflammation, or infectious process developing. Patient expresses significant strength and mobility decline over the past month when suffered CVA with L hemiplegia including decreased vision L eye \"blurry\".  Co-Treatment: PT  Co-Treatment Reason: To optimize safe functional mobility with consideration of current and past medical diagnoses.  Prior to Session Communication: Bedside nurse  Patient Position Received:  (pt on BSC upon arrival)  General Comment: Room:[facility]  Precautions:  Medical Precautions: Fall " precautions  Precautions Comment: Fall precautions. Dense L hemiplegia.  Vital Signs:     Pain:  Pain Assessment  Pain Assessment: 0-10  Pain Score: 0 - No pain    Objective    Cognition:  Cognition  Orientation Level: Oriented X4  Coordination:     Activities of Daily Living: Feeding  Feeding Level of Assistance: Minimum assistance  Feeding Where Assessed: Chair  Feeding Comments: required assist with cutting up food to increase ease with self-feeding task         Toileting  Toileting Level of Assistance: Close supervision  Where Assessed: Bedside commode  Functional Standing Tolerance:  Time: <1 min standing bouts  Activity: pt tolerated standing fairly with cues for proper technique and body mechanics with use of jaison-walker and minAx2 to maintain balance  Functional Standing Tolerance Comments: no LOB noted  Bed Mobility/Transfers: Bed Mobility  Bed Mobility: Yes  Bed Mobility 1  Bed Mobility 1: Supine to sitting, Sitting to supine  Level of Assistance 1: Minimum assistance  Bed Mobility Comments 1: good technique with hooking LLE with RLE to initiate advancement of BLE to EOB  Bed Mobility 3  Bed Mobility 3: Scooting  Level of Assistance 3: Minimum assistance    Transfers  Transfer: Yes  Transfer 1  Transfer From 1: Commode-standard to  Transfer to 1: Bed  Technique 1: Stand pivot  Transfer Device 1: Jaison-walker  Transfer Level of Assistance 1: Minimum assistance, +2, Minimal tactile cues  Transfers 2  Transfer From 2: Bed to  Transfer to 2: Stand  Technique 2: Sit to stand, Stand to sit  Transfer Device 2: Jaison-walker  Transfer Level of Assistance 2: Minimum assistance, +2, Minimal verbal cues  Trials/Comments 2: STS x5  Transfers 3  Technique 3: Stand pivot  Transfer Device 3: Jaison-walker  Transfer Level of Assistance 3: Minimum assistance, +2, Minimal verbal cues  Trials/Comments 3: stand pivot transfer from EOB to recliner with minAx2 and use of jaison-walker. Cues for proper technique and hand placement to  increase safety awareness and reduce fallrisk    Sitting Balance:  Static Sitting Balance  Static Sitting-Balance Support: Feet supported, Bilateral upper extremity supported  Static Sitting-Level of Assistance: Contact guard, Close supervision  Standing Balance:  Static Standing Balance  Static Standing-Balance Support: Bilateral upper extremity supported  Static Standing-Level of Assistance: Minimum assistance (x2)    Therapy/Activity:      Balance/Neuromuscular Re-Education  Balance/Neuromuscular Re-Education Activity Performed: Yes  Balance/Neuromuscular Re-Education Activity 1: pt achieved <1 min standing bouts x5 with minAx2 and hemiwalker, no LOB noted. Pt tolerated static standing bouts fairly. Achieved standing bouts to promote weightbearing and strengthening required to improve functional transfers      Outcome Measures:[facility] Daily Activity  Putting on and taking off regular lower body clothing: A lot  Bathing (including washing, rinsing, drying): A lot  Putting on and taking off regular upper body clothing: A lot  Toileting, which includes using toilet, bedpan or urinal: A lot  Taking care of personal grooming such as brushing teeth: A lot  Eating Meals: A little  Daily Activity - Total Score: 13        Education Documentation  Body Mechanics, taught by LELE White at 5/23/2024 12:55 PM.  Learner: Patient  Readiness: Acceptance  Method: Explanation  Response: Verbalizes Understanding    ADL Training, taught by LELE White at 5/23/2024 12:55 PM.  Learner: Patient  Readiness: Acceptance  Method: Explanation  Response: Verbalizes Understanding    Education Comments  No comments found.        OP EDUCATION:       Goals:  Encounter Problems       Encounter Problems (Active)       ADLs       Demo. UB bathing, dressing, brushing teeth while seated EOB with set-up and SBA  (Progressing)       Start:  05/18/24    Expected End:  06/01/24               BALANCE       Demo. static stand with Mod.A and  device for 1 min.  (Progressing)       Start:  05/18/24    Expected End:  06/01/24               TRANSFERS       Demo. Min./A func. trfrs. to/from BSC or chair.with device PRN   (Progressing)       Start:  05/18/24    Expected End:  06/01/24

## 2024-05-23 NOTE — PROGRESS NOTES
Physical Therapy  Physical Therapy Treatment    Patient Name: Ab Levine  MRN: [id number]  Today's Date: 5/23/2024  Time Calculation  Start Time: 1208  Stop Time: 1233  Time Calculation (min): 25 min    Assessment/Plan   PT Plan  Treatment/Interventions: Bed mobility, Transfer training, Neuromuscular re-education, Strengthening, Endurance training, Range of motion, Therapeutic exercise, Therapeutic activity, Home exercise program, Postural re-education  PT Plan: Skilled PT  PT Frequency: 5 times per week (Spoke with PTA treating patient. Patient more appropriate for 5x/week at this time. Frequency decreased to 5x/week, continue with goals and recommendations.)  PT Discharge Recommendations: High intensity level of continued care  Equipment Recommended upon Discharge: Wheelchair  PT Recommended Transfer Status: Assist x2  PT - OK to Discharge: Yes (To next level of care when medically cleared.)    General Visit Information:   PT  Visit  PT Received On: 05/23/24  Response to Previous Treatment: Patient with no complaints from previous session.    Reason for Referral: Impaired mobility. Fall with R sided and chest pain.  General Comment: Room: 3332    Subjective   Precautions:  Fall precautions  Recent CVA with dense L hemiplegia.    Objective   Pain:  Pain Assessment  Pain Score: 5 - Moderate pain  Pain Type:  (pt reports LBP as well as RLE pain)    Cognition:  Oriented X4    Activity Tolerance:  Activity Tolerance  Endurance: Tolerates 30 min exercise with multiple rests    Treatments:  Bed Mobility  Supine to sitting: Minimum assistance+2  Sitting to supine: Minimum assistance+2  Scooting: Minimum assistance+2       Transfers  Sit to stand: Minimum assistance+2  Stand to sit: Minimum assistance+2  Stand pivot: Minimum assistance+2  Transfer Device: Jaison-walker      Pt remained sitting in bedside chair following tx. Alarm on and call light in reach.     Outcome Measures:  [facility] Basic Mobility  Turning from your back  to your side while in a flat bed without using bedrails: A little  Moving from lying on your back to sitting on the side of a flat bed without using bedrails: A little  Moving to and from bed to chair (including a wheelchair): A lot  Standing up from a chair using your arms (e.g. wheelchair or bedside chair): A lot  To walk in hospital room: Total  Climbing 3-5 steps with railing: Total  Basic Mobility - Total Score: 12    Education Documentation  Mobility Training, taught by Jeremy Reyez PTA at 5/23/2024 12:49 PM.  Learner: Patient  Readiness: Acceptance  Method: Explanation, Demonstration  Response: Needs Reinforcement    Education Comments  No comments found.        OP EDUCATION:       Encounter Problems       Encounter Problems (Active)       Pain - Adult          To improve strength, balance, endurance and efficiency of mobility:       Patient will participate in dynamic sit balance activities out of LIBRADO with understanding of and implementation of weight bearing through B LE with SBA in order to improve supine > sit to CGA. (Progressing)       Start:  05/18/24    Expected End:  06/01/24            Patient will stand with weight shifting in 4 planes and MOD A of 1 for 2 minutes with increased L ankle dorsiflexion to 0 degrees to improve future transfer ability. (Progressing)       Start:  05/18/24    Expected End:  06/01/24            Patient will pivot bed <> chair with MOD A of 1 with safe and efficient technique with VC <25% time. (Progressing)       Start:  05/18/24    Expected End:  06/01/24

## 2024-05-23 NOTE — PROGRESS NOTES
Spiritual Care Visit    Clinical Encounter Type  Visited With: Patient  Routine Visit: Introduction    Worship Encounters  Worship Needs: Prayer

## 2024-05-23 NOTE — PROGRESS NOTES
Ab Levine is a 49 y.o. male on day 2 of admission presenting with Syncope and collapse.      Subjective   Ab Levine is a 49 y.o. 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  Patient states that he lives alone on the second floor of an apartment.  He has cameras in his apartment.  His neighbors watch over him through the cameras.  They also usually help transfer him in and out of his wheelchair as well as bathing him.  They saw that he fell off his wheelchair yesterday.  He was not moving and he did not get up.  One of the neighbors came down and states that it took about 5 minutes to get him up.  Patient does not remember the episode.  Patient is incontinent of urine at baseline.  Since the fall, he complains of headache, right shoulder pain and right hip pain.  He has no sensation on the left side of his body.  He also has decreased vision in the left eye which resolved from a CVA he had last month status post TNK.    05/18: Patient was evaluated this a.m., awake alert and oriented.  Complains of pain all over otherwise no active complaint.  Restarted on Keppra on admission-no seizure activity since then.  05/19: Patient is feeling better, generalized pain is improving, no fever or chills.  No acute apparent bleeding.  05/20: Patient was evaluated this morning, denies active INR is 1.4 today no seizure activity since admission.  PT/OT on board and plan for acute rehab  05/21: Rapid response was called this morning as patient was found on the floor.  Patient states he was sliding from the bed to go to the bathroom but he did not  fall.  He usually does this at home to get into the restroom.  INR is 1.3, on heparin drip  05/22: Patient was evaluated this morning, complains of generalized aches and pains otherwise no active complaint.  No fever or chills, no apparent bleeding.    5/23: He has no acute complaints               Objective     Last Recorded Vitals  /73 (BP Location: Right arm, Patient Position: Lying)   Pulse 76   Temp 36.4 °C (97.6 °F) (Temporal)   Resp 16   Wt 87.1 kg (192 lb)   SpO2 97%   Intake/Output last 3 Shifts:    Intake/Output Summary (Last 24 hours) at 5/23/2024 1359  Last data filed at 5/23/2024 1302  Gross per 24 hour   Intake 1075 ml   Output 400 ml   Net 675 ml       Admission Weight  Weight: 87.1 kg (192 lb) (05/16/24 2115)    Daily Weight  05/16/24 : 87.1 kg (192 lb)    Image Results  Electrocardiogram, 12-lead  Sinus rhythm  Inferior infarct, old  Baseline wander in lead(s) II,III,aVF    See ED provider note for full interpretation and clinical correlation  Confirmed by Yin Dial ([id number]) on 5/22/2024 9:51:03 PM      Physical Exam  Patient is awake and orient, not in apparent distress  Eyes: PERRLA, no conjunctival congestion  Chest: Bilateral Air entry, no crackles or wheezing  Heart: s1S2 regular, no murmur  Abdomen: Soft, non tender, BS present  Ext: Left-sided hemiplegia  CNS: Slurring of speech, left-sided hemiplegia.  Relevant Results               Assessment/Plan        Seizure versus syncope  -TIA appears less likely  -His neighbor took at least 5 minutes to wake him up  -Patient is supposed to be on Keppra.  He has not taking it for  4 days  -Restarted on IV Keppra 1 g twice daily-switch to oral on 05/18  -PT/OT on board  -Discharge planning to acute rehab once INR is more than 2.    protein S deficiency/DVT/PE  -INR subtherapeutic secondary to noncompliance  -Patient ran out of Coumadin  -Will restart Coumadin with heparin bridging, monitor INR daily.  Discontinue heparin once INR is  therapeutic  -Patient is allergic to enoxaparin, dabigatran, apixaban and Xarelto     Recent CVA status post TNK  -Patient has left-sided hemiparesis/hemiplegia  -He is unable to take care of himself     Headache, shoulder pain, hip pain  -CT scan did not show any acute findings  -will continue supportive care     COPD  -Appears stable     Diabetes mellitus type 2  -Last HbA1c was 6.8  -Give sliding scale insulin  -Will monitor blood sugar    Physical debility/deconditioning  Secondary to recent stroke  PT/OT on board  Discharge planning to acute versus subacute rehab           Russ Guzman MD PhD

## 2024-05-23 NOTE — PROGRESS NOTES
Pharmacy Consult for Warfarin (Coumadin) Management - Daily Consult Note     Ab Levine is a 49 y.o. male admitted for Syncope and collapse. Pharmacy was consulted for warfarin dosing and monitoring.         Labs  INR   Date Value Ref Range Status   05/23/2024 1.7 (H) 0.9 - 1.1 Final   05/22/2024 1.4 (H) 0.9 - 1.1 Final   05/21/2024 1.3 (H) 0.9 - 1.1 Final     Protime   Date Value Ref Range Status   05/23/2024 19.6 (H) 9.8 - 12.8 seconds Final   05/22/2024 15.6 (H) 9.8 - 12.8 seconds Final   05/21/2024 15.2 (H) 9.8 - 12.8 seconds Final     Hemoglobin   Date Value Ref Range Status   05/23/2024 13.5 13.5 - 17.5 g/dL Final     Hematocrit   Date Value Ref Range Status   05/23/2024 38.9 (L) 41.0 - 52.0 % Final     Platelets   Date Value Ref Range Status   05/23/2024 181 150 - 450 x10*3/uL Final       Warfarin Indication: Deep vein thrombosis  Target INR: 2 - 3    Home regimen: 7.5mg Wed/Thurs and 5mg all other days     Mr. Levine was boosted with 7.5 mg on 5/21 and 10 mg on 5/22 (total 5 mg boost). His INR increased from 1.4 yesterday to 1.7 today. We would expect to continue to see his INR increase from the previous boosts over next couple of days. Will continue with his normal dose of 7.5 mg today. Bridging with heparin. Recheck INR tomorrow.     Orders placed per pharmacy consult. Pharmacy will continue to monitor and adjust therapy as needed.     Hayley Sandhu, PharmD

## 2024-05-23 NOTE — CARE PLAN
The patient's goals for the shift include      The clinical goals for the shift include Patient will remain free of falls and injury throughout shift      Problem: Skin  Goal: Decreased wound size/increased tissue granulation at next dressing change  Outcome: Progressing  Goal: Participates in plan/prevention/treatment measures  Outcome: Progressing  Goal: Prevent/manage excess moisture  Outcome: Progressing  Goal: Prevent/minimize sheer/friction injuries  Outcome: Progressing  Flowsheets (Taken 5/23/2024 0805)  Prevent/minimize sheer/friction injuries: Turn/reposition every 2 hours/use positioning/transfer devices  Goal: Promote/optimize nutrition  Outcome: Progressing  Goal: Promote skin healing  Outcome: Progressing  Flowsheets (Taken 5/23/2024 0805)  Promote skin healing: Turn/reposition every 2 hours/use positioning/transfer devices     Problem: Pain  Goal: My pain/discomfort is manageable  Outcome: Progressing     Problem: Safety  Goal: Patient will be injury free during hospitalization  Outcome: Progressing  Goal: I will remain free of falls  Outcome: Progressing     Problem: Daily Care  Goal: Daily care needs are met  Outcome: Progressing     Problem: Psychosocial Needs  Goal: Demonstrates ability to cope with hospitalization/illness  Outcome: Progressing  Goal: Collaborate with me, my family, and caregiver to identify my specific goals  Outcome: Progressing     Problem: Discharge Barriers  Goal: My discharge needs are met  Outcome: Progressing     Problem: Fall/Injury  Goal: Not fall by end of shift  Outcome: Progressing  Goal: Be free from injury by end of the shift  Outcome: Progressing  Goal: Verbalize understanding of personal risk factors for fall in the hospital  Outcome: Progressing  Goal: Verbalize understanding of risk factor reduction measures to prevent injury from fall in the home  Outcome: Progressing  Goal: Use assistive devices by end of the shift  Outcome: Progressing  Goal: Pace activities  to prevent fatigue by end of the shift  Outcome: Progressing     Problem: Pain - Adult  Goal: Verbalizes/displays adequate comfort level or baseline comfort level  Outcome: Progressing     Problem: Safety - Adult  Goal: Free from fall injury  Outcome: Progressing     Problem: Discharge Planning  Goal: Discharge to home or other facility with appropriate resources  Outcome: Progressing     Problem: Chronic Conditions and Co-morbidities  Goal: Patient's chronic conditions and co-morbidity symptoms are monitored and maintained or improved  Outcome: Progressing     Problem: Pain  Goal: Takes deep breaths with improved pain control throughout the shift  Outcome: Progressing  Goal: Turns in bed with improved pain control throughout the shift  Outcome: Progressing  Goal: Walks with improved pain control throughout the shift  Outcome: Progressing  Goal: Performs ADL's with improved pain control throughout shift  Outcome: Progressing  Goal: Participates in PT with improved pain control throughout the shift  Outcome: Progressing  Goal: Free from opioid side effects throughout the shift  Outcome: Progressing  Goal: Free from acute confusion related to pain meds throughout the shift  Outcome: Progressing     Problem: Nutrition  Goal: Less than 5 days NPO/clear liquids  Outcome: Progressing  Goal: Oral intake greater than 50%  Outcome: Progressing  Goal: Oral intake greater 75%  Outcome: Progressing  Goal: Consume prescribed supplement  Outcome: Progressing  Goal: Adequate PO fluid intake  Outcome: Progressing  Goal: Nutrition support goals are met within 48 hrs  Outcome: Progressing  Goal: Nutrition support is meeting 75% of nutrient needs  Outcome: Progressing  Goal: Tube feed tolerance  Outcome: Progressing  Goal: BG  mg/dL  Outcome: Progressing  Goal: Lab values WNL  Outcome: Progressing  Goal: Electrolytes WNL  Outcome: Progressing  Goal: Promote healing  Outcome: Progressing  Goal: Maintain stable weight  Outcome:  Progressing  Goal: Reduce weight from edema/fluid  Outcome: Progressing  Goal: Gradual weight gain  Outcome: Progressing  Goal: Improve ostomy output  Outcome: Progressing

## 2024-05-24 PROBLEM — R55 SYNCOPE AND COLLAPSE: Status: RESOLVED | Noted: 2024-05-17 | Resolved: 2024-05-24

## 2024-05-24 LAB
GLUCOSE BLD MANUAL STRIP-MCNC: 151 MG/DL (ref 74–99)
GLUCOSE BLD MANUAL STRIP-MCNC: 170 MG/DL (ref 74–99)
GLUCOSE BLD MANUAL STRIP-MCNC: 238 MG/DL (ref 74–99)
INR PPP: 2.1 (ref 0.9–1.1)
PROTHROMBIN TIME: 23.9 SECONDS (ref 9.8–12.8)
UFH PPP CHRO-ACNC: 0.4 IU/ML

## 2024-05-24 PROCEDURE — 2500000002 HC RX 250 W HCPCS SELF ADMINISTERED DRUGS (ALT 637 FOR MEDICARE OP, ALT 636 FOR OP/ED): Performed by: PHARMACIST

## 2024-05-24 PROCEDURE — 99239 HOSP IP/OBS DSCHRG MGMT >30: CPT | Performed by: INTERNAL MEDICINE

## 2024-05-24 PROCEDURE — 82947 ASSAY GLUCOSE BLOOD QUANT: CPT

## 2024-05-24 PROCEDURE — 85520 HEPARIN ASSAY: CPT | Performed by: INTERNAL MEDICINE

## 2024-05-24 PROCEDURE — 2500000004 HC RX 250 GENERAL PHARMACY W/ HCPCS (ALT 636 FOR OP/ED): Performed by: INTERNAL MEDICINE

## 2024-05-24 PROCEDURE — 1210000001 HC SEMI-PRIVATE ROOM DAILY

## 2024-05-24 PROCEDURE — 85610 PROTHROMBIN TIME: CPT | Performed by: INTERNAL MEDICINE

## 2024-05-24 PROCEDURE — 36415 COLL VENOUS BLD VENIPUNCTURE: CPT | Performed by: INTERNAL MEDICINE

## 2024-05-24 PROCEDURE — 2500000001 HC RX 250 WO HCPCS SELF ADMINISTERED DRUGS (ALT 637 FOR MEDICARE OP): Performed by: INTERNAL MEDICINE

## 2024-05-24 PROCEDURE — 99231 SBSQ HOSP IP/OBS SF/LOW 25: CPT | Performed by: INTERNAL MEDICINE

## 2024-05-24 RX ORDER — LOPERAMIDE HYDROCHLORIDE 2 MG/1
2 CAPSULE ORAL 4 TIMES DAILY PRN
Status: DISCONTINUED | OUTPATIENT
Start: 2024-05-24 | End: 2024-05-25 | Stop reason: HOSPADM

## 2024-05-24 RX ORDER — WARFARIN SODIUM 5 MG/1
5 TABLET ORAL ONCE
Status: COMPLETED | OUTPATIENT
Start: 2024-05-24 | End: 2024-05-24

## 2024-05-24 RX ADMIN — HYDROMORPHONE HYDROCHLORIDE 0.4 MG: 1 INJECTION, SOLUTION INTRAMUSCULAR; INTRAVENOUS; SUBCUTANEOUS at 00:13

## 2024-05-24 RX ADMIN — LEVETIRACETAM 750 MG: 250 TABLET, FILM COATED ORAL at 20:31

## 2024-05-24 RX ADMIN — HYDROMORPHONE HYDROCHLORIDE 0.4 MG: 1 INJECTION, SOLUTION INTRAMUSCULAR; INTRAVENOUS; SUBCUTANEOUS at 20:31

## 2024-05-24 RX ADMIN — ASPIRIN 81 MG CHEWABLE TABLET 81 MG: 81 TABLET CHEWABLE at 08:36

## 2024-05-24 RX ADMIN — LOPERAMIDE HYDROCHLORIDE 2 MG: 2 CAPSULE ORAL at 10:57

## 2024-05-24 RX ADMIN — INSULIN LISPRO 2 UNITS: 100 INJECTION, SOLUTION INTRAVENOUS; SUBCUTANEOUS at 17:15

## 2024-05-24 RX ADMIN — CLOPIDOGREL 75 MG: 75 TABLET ORAL at 08:36

## 2024-05-24 RX ADMIN — LEVETIRACETAM 750 MG: 250 TABLET, FILM COATED ORAL at 08:36

## 2024-05-24 RX ADMIN — HYDROMORPHONE HYDROCHLORIDE 0.4 MG: 1 INJECTION, SOLUTION INTRAMUSCULAR; INTRAVENOUS; SUBCUTANEOUS at 08:36

## 2024-05-24 RX ADMIN — Medication 800 MG: at 08:36

## 2024-05-24 RX ADMIN — WARFARIN SODIUM 5 MG: 5 TABLET ORAL at 20:31

## 2024-05-24 RX ADMIN — INSULIN LISPRO 1 UNITS: 100 INJECTION, SOLUTION INTRAVENOUS; SUBCUTANEOUS at 13:06

## 2024-05-24 RX ADMIN — HYDROMORPHONE HYDROCHLORIDE 0.4 MG: 1 INJECTION, SOLUTION INTRAMUSCULAR; INTRAVENOUS; SUBCUTANEOUS at 14:39

## 2024-05-24 ASSESSMENT — PAIN - FUNCTIONAL ASSESSMENT
PAIN_FUNCTIONAL_ASSESSMENT: 0-10

## 2024-05-24 ASSESSMENT — PAIN SCALES - GENERAL
PAINLEVEL_OUTOF10: 0 - NO PAIN
PAINLEVEL_OUTOF10: 0 - NO PAIN
PAINLEVEL_OUTOF10: 10 - WORST POSSIBLE PAIN
PAINLEVEL_OUTOF10: 9
PAINLEVEL_OUTOF10: 9
PAINLEVEL_OUTOF10: 10 - WORST POSSIBLE PAIN
PAINLEVEL_OUTOF10: 2

## 2024-05-24 ASSESSMENT — COGNITIVE AND FUNCTIONAL STATUS - GENERAL
MOBILITY SCORE: 12
MOVING TO AND FROM BED TO CHAIR: A LOT
TOILETING: A LOT
STANDING UP FROM CHAIR USING ARMS: A LOT
DRESSING REGULAR UPPER BODY CLOTHING: A LOT
TURNING FROM BACK TO SIDE WHILE IN FLAT BAD: A LITTLE
DAILY ACTIVITIY SCORE: 13
MOVING FROM LYING ON BACK TO SITTING ON SIDE OF FLAT BED WITH BEDRAILS: A LITTLE
DRESSING REGULAR LOWER BODY CLOTHING: A LOT
CLIMB 3 TO 5 STEPS WITH RAILING: TOTAL
EATING MEALS: A LITTLE
HELP NEEDED FOR BATHING: A LOT
WALKING IN HOSPITAL ROOM: TOTAL
PERSONAL GROOMING: A LOT

## 2024-05-24 ASSESSMENT — PAIN DESCRIPTION - LOCATION
LOCATION: GENERALIZED
LOCATION: GENERALIZED

## 2024-05-24 NOTE — PROGRESS NOTES
Patient's INR is 2.1, he is able to discharge to [facility] today. [facility] to send orders and arrange transport. [facility] is requesting 2PM or later transport. 4:30 PM given. Bedside RN given report number.

## 2024-05-24 NOTE — CARE PLAN
The patient's goals for the shift include      The clinical goals for the shift include Patient will remain free from falls and injury throughout shift      Problem: Skin  Goal: Decreased wound size/increased tissue granulation at next dressing change  Outcome: Progressing  Goal: Participates in plan/prevention/treatment measures  Outcome: Progressing  Goal: Prevent/manage excess moisture  Outcome: Progressing  Goal: Prevent/minimize sheer/friction injuries  Outcome: Progressing  Flowsheets (Taken 5/24/2024 0735)  Prevent/minimize sheer/friction injuries: Turn/reposition every 2 hours/use positioning/transfer devices  Goal: Promote/optimize nutrition  Outcome: Progressing  Goal: Promote skin healing  Outcome: Progressing  Flowsheets (Taken 5/24/2024 0735)  Promote skin healing: Turn/reposition every 2 hours/use positioning/transfer devices     Problem: Pain  Goal: My pain/discomfort is manageable  Outcome: Progressing     Problem: Safety  Goal: Patient will be injury free during hospitalization  Outcome: Progressing  Goal: I will remain free of falls  Outcome: Progressing     Problem: Daily Care  Goal: Daily care needs are met  Outcome: Progressing     Problem: Psychosocial Needs  Goal: Demonstrates ability to cope with hospitalization/illness  Outcome: Progressing  Goal: Collaborate with me, my family, and caregiver to identify my specific goals  Outcome: Progressing     Problem: Discharge Barriers  Goal: My discharge needs are met  Outcome: Progressing     Problem: Fall/Injury  Goal: Not fall by end of shift  Outcome: Progressing  Goal: Be free from injury by end of the shift  Outcome: Progressing  Goal: Verbalize understanding of personal risk factors for fall in the hospital  Outcome: Progressing  Goal: Verbalize understanding of risk factor reduction measures to prevent injury from fall in the home  Outcome: Progressing  Goal: Use assistive devices by end of the shift  Outcome: Progressing  Goal: Pace  activities to prevent fatigue by end of the shift  Outcome: Progressing     Problem: Pain - Adult  Goal: Verbalizes/displays adequate comfort level or baseline comfort level  Outcome: Progressing     Problem: Safety - Adult  Goal: Free from fall injury  Outcome: Progressing     Problem: Discharge Planning  Goal: Discharge to home or other facility with appropriate resources  Outcome: Progressing     Problem: Chronic Conditions and Co-morbidities  Goal: Patient's chronic conditions and co-morbidity symptoms are monitored and maintained or improved  Outcome: Progressing     Problem: Pain  Goal: Takes deep breaths with improved pain control throughout the shift  Outcome: Progressing  Goal: Turns in bed with improved pain control throughout the shift  Outcome: Progressing  Goal: Walks with improved pain control throughout the shift  Outcome: Progressing  Goal: Performs ADL's with improved pain control throughout shift  Outcome: Progressing  Goal: Participates in PT with improved pain control throughout the shift  Outcome: Progressing  Goal: Free from opioid side effects throughout the shift  Outcome: Progressing  Goal: Free from acute confusion related to pain meds throughout the shift  Outcome: Progressing     Problem: Nutrition  Goal: Less than 5 days NPO/clear liquids  Outcome: Progressing  Goal: Oral intake greater than 50%  Outcome: Progressing  Goal: Oral intake greater 75%  Outcome: Progressing  Goal: Consume prescribed supplement  Outcome: Progressing  Goal: Adequate PO fluid intake  Outcome: Progressing  Goal: Nutrition support goals are met within 48 hrs  Outcome: Progressing  Goal: Nutrition support is meeting 75% of nutrient needs  Outcome: Progressing  Goal: Tube feed tolerance  Outcome: Progressing  Goal: BG  mg/dL  Outcome: Progressing  Goal: Lab values WNL  Outcome: Progressing  Goal: Electrolytes WNL  Outcome: Progressing  Goal: Promote healing  Outcome: Progressing  Goal: Maintain stable  weight  Outcome: Progressing  Goal: Reduce weight from edema/fluid  Outcome: Progressing  Goal: Gradual weight gain  Outcome: Progressing  Goal: Improve ostomy output  Outcome: Progressing

## 2024-05-24 NOTE — PROGRESS NOTES
"Ab Levine is a 49 y.o. male on day 3 of admission presenting with Syncope and collapse.  History of pulmonary embolism, DVT, protein S deficiency  Subjective   Patient is awake no any acute discomfort, no any complaint       Objective     Physical Exam  Vitals are stable     Weakness of right upper extremity right lower extremities     Neck supple     Lung examination did show fair air movement     Heart with normal regular rhythm     Abdomen is soft and nontender no mass     Extremity bilateral trace edema  Last Recorded Vitals  Blood pressure 114/79, pulse 76, temperature 36.2 °C (97.2 °F), temperature source Temporal, resp. rate 18, height 1.753 m (5' 9\"), weight 87.1 kg (192 lb), SpO2 99%.  Intake/Output last 3 Shifts:  I/O last 3 completed shifts:  In: 840 (9.6 mL/kg) [P.O.:840]  Out: 400 (4.6 mL/kg) [Urine:400 (0.1 mL/kg/hr)]  Weight: 87.1 kg     Relevant Results      Today INR 2.1      Assessment/Plan   #1 primary thrombophilia due to protein S deficiency, history of pulmonary embolism, DVT     2.  History of CVA, questionable seizure disorder     3.  Patient is allergic to Lovenox, Eliquis, Xarelto    Today's INR 2.1, we can discharge patient on Coumadin      Jo Pandey MD      "

## 2024-05-24 NOTE — DISCHARGE SUMMARY
DISCHARGE SUMMARY     Discharge Diagnosis  Syncope and collapse    This discharge took greater than 35 minutes.    Test Results Pending At Discharge  Pending Labs       No current pending labs.            Hospital Course   Ab Levine is a 49 y.o. 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  Patient states that he lives alone on the second floor of an apartment.  He has cameras in his apartment.  His neighbors watch over him through the cameras.  They also usually help transfer him in and out of his wheelchair as well as bathing him.  They saw that he fell off his wheelchair yesterday.  He was not moving and he did not get up.  One of the neighbors came down and states that it took about 5 minutes to get him up.  Patient does not remember the episode.  Patient is incontinent of urine at baseline.  Since the fall, he complains of headache, right shoulder pain and right hip pain.  He has no sensation on the left side of his body.  He also has decreased vision in the left eye which resolved from a CVA he had last month status post TNK.     05/18: Patient was evaluated this a.m., awake alert and oriented.  Complains of pain all over otherwise no active complaint.  Restarted on Keppra on admission-no seizure activity since then.  05/19: Patient is feeling better, generalized pain is improving, no fever or chills.  No acute apparent bleeding.  05/20: Patient was evaluated this morning, denies active INR is 1.4 today no seizure activity since admission.  PT/OT on board and plan for acute rehab  05/21: Rapid response was called this morning  as patient was found on the floor.  Patient states he was sliding from the bed to go to the bathroom but he did not fall.  He usually does this at home to get into the restroom.  INR is 1.3, on heparin drip  05/22: Patient was evaluated this morning, complains of generalized aches and pains otherwise no active complaint.  No fever or chills, no apparent bleeding.     5/23: He has no acute complaints     Seizure versus syncope  -TIA appears less likely  -His neighbor took at least 5 minutes to wake him up  -Patient is supposed to be on Keppra.  He has not taking it for  4 days  -Restarted on IV Keppra 1 g twice daily-switch to oral on 05/18  -PT/OT on board, rec acute rehab     protein S deficiency/DVT/PE  -INR was subtherapeutic secondary to noncompliance, now therapeutic  -Patient is allergic to enoxaparin, dabigatran, apixaban and Xarelto     Recent CVA status post TNK  -Patient has left-sided hemiparesis/hemiplegia  -He is unable to take care of himself     Headache, shoulder pain, hip pain  -CT scan did not show any acute findings     COPD  -Appears stable     Diabetes mellitus type 2  -Last HbA1c was 6.8     Physical debility/deconditioning  Secondary to recent stroke  PT/OT on board  Discharge planning to acute rehab    Pertinent Physical Exam At Time of Discharge  Patient is awake and orient, not in apparent distress  Eyes: PERRLA, no conjunctival congestion  Chest: Bilateral Air entry, no crackles or wheezing  Heart: s1S2 regular, no murmur  Abdomen: Soft, non tender, BS present  Ext: Left-sided hemiplegia  CNS: Slurring of speech, left-sided hemiplegia.    Home Medications     Medication List      CONTINUE taking these medications     albuterol 90 mcg/actuation inhaler   aspirin 81 mg chewable tablet   clopidogrel 75 mg tablet; Commonly known as: Plavix   clotrimazole 1 % cream; Commonly known as: Lotrimin   UNABLE TO FIND   UNABLE TO FIND   * warfarin 5 mg tablet; Commonly known as: Coumadin   * warfarin  7.5 mg tablet; Commonly known as: Coumadin  * This list has 2 medication(s) that are the same as other medications   prescribed for you. Read the directions carefully, and ask your doctor or   other care provider to review them with you.       Outpatient Follow-Up  No follow-ups on file.     Russ Guzman MD PhD  5/24/2024  10:15 AM

## 2024-05-25 ENCOUNTER — DOCUMENTATION (OUTPATIENT)
Dept: INPATIENT UNIT | Facility: HOSPITAL | Age: 49
End: 2024-05-25
Payer: MEDICAID

## 2024-05-25 VITALS
HEIGHT: 69 IN | HEART RATE: 68 BPM | TEMPERATURE: 98.2 F | BODY MASS INDEX: 28.44 KG/M2 | OXYGEN SATURATION: 97 % | DIASTOLIC BLOOD PRESSURE: 77 MMHG | WEIGHT: 192 LBS | RESPIRATION RATE: 18 BRPM | SYSTOLIC BLOOD PRESSURE: 112 MMHG

## 2024-05-25 LAB — GLUCOSE BLD MANUAL STRIP-MCNC: 125 MG/DL (ref 74–99)

## 2024-05-25 PROCEDURE — 82947 ASSAY GLUCOSE BLOOD QUANT: CPT

## 2024-05-25 PROCEDURE — 2500000004 HC RX 250 GENERAL PHARMACY W/ HCPCS (ALT 636 FOR OP/ED): Performed by: INTERNAL MEDICINE

## 2024-05-25 RX ADMIN — HYDROMORPHONE HYDROCHLORIDE 0.4 MG: 1 INJECTION, SOLUTION INTRAMUSCULAR; INTRAVENOUS; SUBCUTANEOUS at 08:03

## 2024-05-25 RX ADMIN — HYDROMORPHONE HYDROCHLORIDE 0.4 MG: 1 INJECTION, SOLUTION INTRAMUSCULAR; INTRAVENOUS; SUBCUTANEOUS at 02:27

## 2024-05-25 ASSESSMENT — COGNITIVE AND FUNCTIONAL STATUS - GENERAL
PERSONAL GROOMING: A LITTLE
DRESSING REGULAR LOWER BODY CLOTHING: A LOT
HELP NEEDED FOR BATHING: A LOT
EATING MEALS: A LITTLE
DAILY ACTIVITIY SCORE: 15
TURNING FROM BACK TO SIDE WHILE IN FLAT BAD: A LITTLE
MOVING FROM LYING ON BACK TO SITTING ON SIDE OF FLAT BED WITH BEDRAILS: A LITTLE
CLIMB 3 TO 5 STEPS WITH RAILING: TOTAL
MOVING TO AND FROM BED TO CHAIR: A LOT
MOBILITY SCORE: 12
TOILETING: A LOT
DRESSING REGULAR UPPER BODY CLOTHING: A LITTLE
STANDING UP FROM CHAIR USING ARMS: A LOT
WALKING IN HOSPITAL ROOM: TOTAL

## 2024-05-25 ASSESSMENT — PAIN SCALES - GENERAL
PAINLEVEL_OUTOF10: 0 - NO PAIN
PAINLEVEL_OUTOF10: 10 - WORST POSSIBLE PAIN
PAINLEVEL_OUTOF10: 9

## 2024-05-25 ASSESSMENT — PAIN - FUNCTIONAL ASSESSMENT: PAIN_FUNCTIONAL_ASSESSMENT: 0-10

## 2024-05-25 NOTE — NURSING NOTE
Patient discharged to [facility]. Report called to receiving facility yesterday per Nena RIVAS notes. Discharge paperwork sent with physicians ambulance services and patient.

## 2024-05-25 NOTE — NURSING NOTE
[facility] Santi Diaz nursing supervisor called regarding inability to accept patient now that it is after 2200. I called and re-scheduled pick-up with [facility] for 0700. Nursing supervisor made aware of update. Primary RN aware. I called Santi Diaz nursing supervisor back to make her aware of updated time.

## 2024-05-25 NOTE — CARE PLAN
The patient's goals for the shift include      The clinical goals for the shift include Patient will remain free from falls and injury throughout shift      Problem: Skin  Goal: Decreased wound size/increased tissue granulation at next dressing change  Outcome: Progressing  Goal: Participates in plan/prevention/treatment measures  Outcome: Progressing  Goal: Prevent/manage excess moisture  Outcome: Progressing  Goal: Prevent/minimize sheer/friction injuries  Outcome: Progressing  Goal: Promote/optimize nutrition  Outcome: Progressing  Goal: Promote skin healing  Outcome: Progressing     Problem: Pain  Goal: My pain/discomfort is manageable  Outcome: Progressing     Problem: Safety  Goal: Patient will be injury free during hospitalization  Outcome: Progressing  Goal: I will remain free of falls  Outcome: Progressing     Problem: Fall/Injury  Goal: Not fall by end of shift  Outcome: Progressing  Goal: Be free from injury by end of the shift  Outcome: Progressing  Goal: Verbalize understanding of personal risk factors for fall in the hospital  Outcome: Progressing  Goal: Verbalize understanding of risk factor reduction measures to prevent injury from fall in the home  Outcome: Progressing  Goal: Use assistive devices by end of the shift  Outcome: Progressing  Goal: Pace activities to prevent fatigue by end of the shift  Outcome: Progressing     Problem: Pain  Goal: Takes deep breaths with improved pain control throughout the shift  Outcome: Progressing  Goal: Turns in bed with improved pain control throughout the shift  Outcome: Progressing  Goal: Walks with improved pain control throughout the shift  Outcome: Progressing  Goal: Performs ADL's with improved pain control throughout shift  Outcome: Progressing  Goal: Participates in PT with improved pain control throughout the shift  Outcome: Progressing  Goal: Free from opioid side effects throughout the shift  Outcome: Progressing  Goal: Free from acute confusion  related to pain meds throughout the shift  Outcome: Progressing

## 2024-07-06 ENCOUNTER — APPOINTMENT (OUTPATIENT)
Dept: CT IMAGING | Age: 49
DRG: 045 | End: 2024-07-06
Payer: MEDICAID

## 2024-07-06 ENCOUNTER — HOSPITAL ENCOUNTER (INPATIENT)
Age: 49
LOS: 1 days | Discharge: LEFT BEFORE DISCHARGE | DRG: 045 | End: 2024-07-07
Attending: EMERGENCY MEDICINE | Admitting: INTERNAL MEDICINE
Payer: MEDICAID

## 2024-07-06 DIAGNOSIS — R29.90 STROKE-LIKE SYMPTOMS: Primary | ICD-10-CM

## 2024-07-06 DIAGNOSIS — I63.511 CEREBROVASCULAR ACCIDENT (CVA) DUE TO OCCLUSION OF RIGHT MIDDLE CEREBRAL ARTERY (HCC): ICD-10-CM

## 2024-07-06 DIAGNOSIS — J18.9 PNEUMONIA OF RIGHT MIDDLE LOBE DUE TO INFECTIOUS ORGANISM: ICD-10-CM

## 2024-07-06 LAB
ABSOLUTE BANDS: NORMAL K/UL
ABSOLUTE PLASMA CELLS: NORMAL K/UL
ALBUMIN SERPL-MCNC: 3.7 G/DL (ref 3.5–5.2)
ALP SERPL-CCNC: 73 U/L (ref 40–129)
ALT SERPL-CCNC: 17 U/L (ref 0–40)
ANION GAP SERPL CALCULATED.3IONS-SCNC: 10 MMOL/L (ref 7–16)
APAP SERPL-MCNC: <5 UG/ML (ref 10–30)
AST SERPL-CCNC: 14 U/L (ref 0–39)
ATYPICAL LYMPHOCYTE ABSOLUTE COUNT: NORMAL K/UL
ATYPICAL LYMPHOCYTES: NORMAL %
BANDS: NORMAL %
BASOPHILS # BLD: 0.01 K/UL (ref 0–0.2)
BASOPHILS # BLD: 0.04 K/UL (ref 0–0.2)
BASOPHILS # BLD: NORMAL K/UL
BASOPHILS NFR BLD: 0 % (ref 0–2)
BASOPHILS NFR BLD: 1 % (ref 0–2)
BASOPHILS NFR BLD: NORMAL %
BILIRUB SERPL-MCNC: 0.4 MG/DL (ref 0–1.2)
BLASTS ABSOLUTE COUNT: NORMAL K/UL
BLASTS: NORMAL %
BUN SERPL-MCNC: 8 MG/DL (ref 6–20)
CALCIUM SERPL-MCNC: 8.1 MG/DL (ref 8.6–10.2)
CHLORIDE SERPL-SCNC: 105 MMOL/L (ref 98–107)
CO2 SERPL-SCNC: 22 MMOL/L (ref 22–29)
CREAT SERPL-MCNC: 0.8 MG/DL (ref 0.7–1.2)
EOSINOPHIL # BLD: 0.02 K/UL (ref 0.05–0.5)
EOSINOPHIL # BLD: 0.07 K/UL (ref 0.05–0.5)
EOSINOPHIL # BLD: NORMAL K/UL
EOSINOPHILS RELATIVE PERCENT: 0 % (ref 0–6)
EOSINOPHILS RELATIVE PERCENT: 2 % (ref 0–6)
EOSINOPHILS RELATIVE PERCENT: NORMAL %
ERYTHROCYTE [DISTWIDTH] IN BLOOD BY AUTOMATED COUNT: 13.3 % (ref 11.5–15)
ERYTHROCYTE [DISTWIDTH] IN BLOOD BY AUTOMATED COUNT: 13.5 % (ref 11.5–15)
ERYTHROCYTE [DISTWIDTH] IN BLOOD BY AUTOMATED COUNT: NORMAL %
ETHANOLAMINE SERPL-MCNC: <10 MG/DL (ref 0–0.08)
GFR, ESTIMATED: >90 ML/MIN/1.73M2
GLUCOSE SERPL-MCNC: 94 MG/DL (ref 74–99)
HCT VFR BLD AUTO: 43.9 % (ref 37–54)
HCT VFR BLD AUTO: 44.7 % (ref 37–54)
HCT VFR BLD AUTO: NORMAL %
HGB BLD-MCNC: 15.2 G/DL (ref 12.5–16.5)
HGB BLD-MCNC: 15.5 G/DL (ref 12.5–16.5)
HGB BLD-MCNC: NORMAL G/DL
IMM GRANULOCYTES # BLD AUTO: 0.22 K/UL (ref 0–0.58)
IMM GRANULOCYTES # BLD AUTO: <0.03 K/UL (ref 0–0.58)
IMM GRANULOCYTES # BLD AUTO: NORMAL K/UL
IMM GRANULOCYTES NFR BLD: 0 % (ref 0–5)
IMM GRANULOCYTES NFR BLD: 3 % (ref 0–5)
IMM GRANULOCYTES NFR BLD: NORMAL %
INR PPP: 1.1
LYMPHOCYTES NFR BLD: 0.75 K/UL (ref 1.5–4)
LYMPHOCYTES NFR BLD: 1.14 K/UL (ref 1.5–4)
LYMPHOCYTES NFR BLD: NORMAL K/UL
LYMPHOCYTES RELATIVE PERCENT: 11 % (ref 20–42)
LYMPHOCYTES RELATIVE PERCENT: 25 % (ref 20–42)
LYMPHOCYTES RELATIVE PERCENT: NORMAL %
MCH RBC QN AUTO: 28.2 PG (ref 26–35)
MCH RBC QN AUTO: 28.4 PG (ref 26–35)
MCH RBC QN AUTO: NORMAL PG
MCHC RBC AUTO-ENTMCNC: 34.6 G/DL (ref 32–34.5)
MCHC RBC AUTO-ENTMCNC: 34.7 G/DL (ref 32–34.5)
MCHC RBC AUTO-ENTMCNC: NORMAL G/DL
MCV RBC AUTO: 81.4 FL (ref 80–99.9)
MCV RBC AUTO: 81.9 FL (ref 80–99.9)
MCV RBC AUTO: NORMAL FL
METAMYELOCYTES ABSOLUTE COUNT: NORMAL K/UL
METAMYELOCYTES: NORMAL %
MONOCYTES NFR BLD: 0.13 K/UL (ref 0.1–0.95)
MONOCYTES NFR BLD: 0.32 K/UL (ref 0.1–0.95)
MONOCYTES NFR BLD: 2 % (ref 2–12)
MONOCYTES NFR BLD: 7 % (ref 2–12)
MONOCYTES NFR BLD: NORMAL %
MONOCYTES NFR BLD: NORMAL K/UL
MYELOCYTES ABSOLUTE COUNT: NORMAL K/UL
MYELOCYTES: NORMAL %
NEUTROPHILS NFR BLD: 67 % (ref 43–80)
NEUTROPHILS NFR BLD: 84 % (ref 43–80)
NEUTROPHILS NFR BLD: NORMAL %
NEUTS SEG NFR BLD: 3.11 K/UL (ref 1.8–7.3)
NEUTS SEG NFR BLD: 5.95 K/UL (ref 1.8–7.3)
NEUTS SEG NFR BLD: NORMAL K/UL
NRBC BLD-RTO: NORMAL PER 100 WBC
NUCLEATED RED BLOOD CELLS: NORMAL PER 100 WBC
PARTIAL THROMBOPLASTIN TIME: 29.2 SEC (ref 24.5–35.1)
PLASMA CELLS: NORMAL %
PLATELET # BLD AUTO: 194 K/UL (ref 130–450)
PLATELET # BLD AUTO: 204 K/UL (ref 130–450)
PLATELET # BLD AUTO: NORMAL K/UL
PLATELET ESTIMATE: NORMAL
PLATELET, FLUORESCENCE: NORMAL K/UL
PLATELETS.RETICULATED NFR BLD AUTO: NORMAL %
PMV BLD AUTO: 10 FL (ref 7–12)
PMV BLD AUTO: 9.5 FL (ref 7–12)
PMV BLD AUTO: NORMAL FL
POTASSIUM SERPL-SCNC: 3.8 MMOL/L (ref 3.5–5)
PROMYELOCYTES ABSOLUTE COUNT: NORMAL K/UL
PROMYELOCYTES: NORMAL %
PROT SERPL-MCNC: 6.1 G/DL (ref 6.4–8.3)
PROTHROMBIN TIME: 11.8 SEC (ref 9.3–12.4)
RBC # BLD AUTO: 5.36 M/UL (ref 3.8–5.8)
RBC # BLD AUTO: 5.49 M/UL (ref 3.8–5.8)
RBC # BLD AUTO: NORMAL M/UL
RBC # BLD: NORMAL 10*6/UL
SALICYLATES SERPL-MCNC: <0.3 MG/DL (ref 0–30)
SODIUM SERPL-SCNC: 137 MMOL/L (ref 132–146)
TOXIC TRICYCLIC SC,BLOOD: NEGATIVE
TROPONIN I SERPL HS-MCNC: <6 NG/L (ref 0–11)
TROPONIN I SERPL HS-MCNC: <6 NG/L (ref 0–11)
WBC # BLD: NORMAL 10*3/UL
WBC OTHER # BLD: 4.7 K/UL (ref 4.5–11.5)
WBC OTHER # BLD: 7.1 K/UL (ref 4.5–11.5)
WBC OTHER # BLD: NORMAL K/UL

## 2024-07-06 PROCEDURE — 99447 NTRPROF PH1/NTRNET/EHR 11-20: CPT | Performed by: PSYCHIATRY & NEUROLOGY

## 2024-07-06 PROCEDURE — 85027 COMPLETE CBC AUTOMATED: CPT

## 2024-07-06 PROCEDURE — 36415 COLL VENOUS BLD VENIPUNCTURE: CPT

## 2024-07-06 PROCEDURE — 70450 CT HEAD/BRAIN W/O DYE: CPT

## 2024-07-06 PROCEDURE — 2580000003 HC RX 258

## 2024-07-06 PROCEDURE — 80179 DRUG ASSAY SALICYLATE: CPT

## 2024-07-06 PROCEDURE — 96375 TX/PRO/DX INJ NEW DRUG ADDON: CPT

## 2024-07-06 PROCEDURE — 80053 COMPREHEN METABOLIC PANEL: CPT

## 2024-07-06 PROCEDURE — 6360000004 HC RX CONTRAST MEDICATION: Performed by: RADIOLOGY

## 2024-07-06 PROCEDURE — 80307 DRUG TEST PRSMV CHEM ANLYZR: CPT

## 2024-07-06 PROCEDURE — 70498 CT ANGIOGRAPHY NECK: CPT

## 2024-07-06 PROCEDURE — 02HV33Z INSERTION OF INFUSION DEVICE INTO SUPERIOR VENA CAVA, PERCUTANEOUS APPROACH: ICD-10-PCS

## 2024-07-06 PROCEDURE — 6360000002 HC RX W HCPCS

## 2024-07-06 PROCEDURE — 70496 CT ANGIOGRAPHY HEAD: CPT

## 2024-07-06 PROCEDURE — 96374 THER/PROPH/DIAG INJ IV PUSH: CPT

## 2024-07-06 PROCEDURE — 6360000002 HC RX W HCPCS: Performed by: EMERGENCY MEDICINE

## 2024-07-06 PROCEDURE — G0480 DRUG TEST DEF 1-7 CLASSES: HCPCS

## 2024-07-06 PROCEDURE — 93005 ELECTROCARDIOGRAM TRACING: CPT

## 2024-07-06 PROCEDURE — 85025 COMPLETE CBC W/AUTO DIFF WBC: CPT

## 2024-07-06 PROCEDURE — 85730 THROMBOPLASTIN TIME PARTIAL: CPT

## 2024-07-06 PROCEDURE — 99285 EMERGENCY DEPT VISIT HI MDM: CPT

## 2024-07-06 PROCEDURE — 80143 DRUG ASSAY ACETAMINOPHEN: CPT

## 2024-07-06 PROCEDURE — 71275 CT ANGIOGRAPHY CHEST: CPT

## 2024-07-06 PROCEDURE — 36556 INSERT NON-TUNNEL CV CATH: CPT

## 2024-07-06 PROCEDURE — 0042T CT BRAIN PERFUSION: CPT

## 2024-07-06 PROCEDURE — 2580000003 HC RX 258: Performed by: INTERNAL MEDICINE

## 2024-07-06 PROCEDURE — 84484 ASSAY OF TROPONIN QUANT: CPT

## 2024-07-06 PROCEDURE — 6360000002 HC RX W HCPCS: Performed by: INTERNAL MEDICINE

## 2024-07-06 PROCEDURE — 6360000002 HC RX W HCPCS: Performed by: FAMILY MEDICINE

## 2024-07-06 PROCEDURE — 85610 PROTHROMBIN TIME: CPT

## 2024-07-06 PROCEDURE — 2500000003 HC RX 250 WO HCPCS

## 2024-07-06 PROCEDURE — 2060000000 HC ICU INTERMEDIATE R&B

## 2024-07-06 RX ORDER — DIPHENHYDRAMINE HYDROCHLORIDE 50 MG/ML
50 INJECTION INTRAMUSCULAR; INTRAVENOUS ONCE
Status: COMPLETED | OUTPATIENT
Start: 2024-07-06 | End: 2024-07-06

## 2024-07-06 RX ORDER — ASPIRIN 81 MG/1
81 TABLET, CHEWABLE ORAL DAILY
Status: DISCONTINUED | OUTPATIENT
Start: 2024-07-06 | End: 2024-07-07 | Stop reason: HOSPADM

## 2024-07-06 RX ORDER — HYDROXYZINE PAMOATE 25 MG/1
50 CAPSULE ORAL 2 TIMES DAILY
COMMUNITY
Start: 2024-04-08

## 2024-07-06 RX ORDER — SODIUM CHLORIDE 0.9 % (FLUSH) 0.9 %
5-40 SYRINGE (ML) INJECTION EVERY 12 HOURS SCHEDULED
Status: DISCONTINUED | OUTPATIENT
Start: 2024-07-06 | End: 2024-07-07 | Stop reason: HOSPADM

## 2024-07-06 RX ORDER — ACETAMINOPHEN 650 MG/1
650 SUPPOSITORY RECTAL EVERY 4 HOURS PRN
Status: DISCONTINUED | OUTPATIENT
Start: 2024-07-06 | End: 2024-07-07 | Stop reason: HOSPADM

## 2024-07-06 RX ORDER — ONDANSETRON 4 MG/1
4 TABLET, ORALLY DISINTEGRATING ORAL EVERY 8 HOURS PRN
Status: DISCONTINUED | OUTPATIENT
Start: 2024-07-06 | End: 2024-07-07 | Stop reason: HOSPADM

## 2024-07-06 RX ORDER — LEVETIRACETAM 500 MG/1
500 TABLET ORAL 2 TIMES DAILY
COMMUNITY
Start: 2024-06-11

## 2024-07-06 RX ORDER — SODIUM CHLORIDE 9 MG/ML
INJECTION, SOLUTION INTRAVENOUS PRN
Status: DISCONTINUED | OUTPATIENT
Start: 2024-07-06 | End: 2024-07-07 | Stop reason: HOSPADM

## 2024-07-06 RX ORDER — DIPHENHYDRAMINE HYDROCHLORIDE 50 MG/ML
INJECTION INTRAMUSCULAR; INTRAVENOUS
Status: COMPLETED
Start: 2024-07-06 | End: 2024-07-06

## 2024-07-06 RX ORDER — ASPIRIN 81 MG/1
81 TABLET, CHEWABLE ORAL DAILY
COMMUNITY
Start: 2024-02-13

## 2024-07-06 RX ORDER — OMEPRAZOLE 40 MG/1
40 CAPSULE, DELAYED RELEASE ORAL DAILY
Status: ON HOLD | COMMUNITY
Start: 2024-06-12 | End: 2024-07-07

## 2024-07-06 RX ORDER — ATORVASTATIN CALCIUM 20 MG/1
20 TABLET, FILM COATED ORAL DAILY
COMMUNITY
Start: 2024-03-21

## 2024-07-06 RX ORDER — LANOLIN ALCOHOL/MO/W.PET/CERES
1000 CREAM (GRAM) TOPICAL DAILY
COMMUNITY
Start: 2024-05-25

## 2024-07-06 RX ORDER — DIVALPROEX SODIUM 250 MG/1
250 TABLET, DELAYED RELEASE ORAL 2 TIMES DAILY
Status: DISCONTINUED | OUTPATIENT
Start: 2024-07-06 | End: 2024-07-07 | Stop reason: HOSPADM

## 2024-07-06 RX ORDER — ONDANSETRON 2 MG/ML
4 INJECTION INTRAMUSCULAR; INTRAVENOUS EVERY 6 HOURS PRN
Status: DISCONTINUED | OUTPATIENT
Start: 2024-07-06 | End: 2024-07-07 | Stop reason: HOSPADM

## 2024-07-06 RX ORDER — MORPHINE SULFATE 4 MG/ML
4 INJECTION, SOLUTION INTRAMUSCULAR; INTRAVENOUS ONCE
Status: COMPLETED | OUTPATIENT
Start: 2024-07-06 | End: 2024-07-06

## 2024-07-06 RX ORDER — LEVETIRACETAM 500 MG/5ML
500 INJECTION, SOLUTION, CONCENTRATE INTRAVENOUS EVERY 12 HOURS
Status: DISCONTINUED | OUTPATIENT
Start: 2024-07-06 | End: 2024-07-07

## 2024-07-06 RX ORDER — POLYETHYLENE GLYCOL 3350 17 G/17G
17 POWDER, FOR SOLUTION ORAL DAILY PRN
Status: DISCONTINUED | OUTPATIENT
Start: 2024-07-06 | End: 2024-07-07 | Stop reason: HOSPADM

## 2024-07-06 RX ORDER — WARFARIN SODIUM 7.5 MG/1
7.5 TABLET ORAL
Status: ACTIVE | OUTPATIENT
Start: 2024-07-06 | End: 2024-07-07

## 2024-07-06 RX ORDER — SODIUM CHLORIDE 9 MG/ML
INJECTION, SOLUTION INTRAVENOUS CONTINUOUS
Status: DISCONTINUED | OUTPATIENT
Start: 2024-07-06 | End: 2024-07-07 | Stop reason: HOSPADM

## 2024-07-06 RX ORDER — CLOTRIMAZOLE 1 %
5 CREAM (GRAM) TOPICAL 2 TIMES DAILY PRN
COMMUNITY

## 2024-07-06 RX ORDER — LIDOCAINE 4 G/G
1 PATCH TOPICAL DAILY
Status: DISCONTINUED | OUTPATIENT
Start: 2024-07-07 | End: 2024-07-07 | Stop reason: HOSPADM

## 2024-07-06 RX ORDER — DICYCLOMINE HCL 20 MG
20 TABLET ORAL DAILY
COMMUNITY
Start: 2024-04-08

## 2024-07-06 RX ORDER — WARFARIN SODIUM 5 MG/1
5 TABLET ORAL DAILY
Status: DISCONTINUED | OUTPATIENT
Start: 2024-07-06 | End: 2024-07-06

## 2024-07-06 RX ORDER — SODIUM CHLORIDE 0.9 % (FLUSH) 0.9 %
5-40 SYRINGE (ML) INJECTION PRN
Status: DISCONTINUED | OUTPATIENT
Start: 2024-07-06 | End: 2024-07-07 | Stop reason: HOSPADM

## 2024-07-06 RX ORDER — CLOPIDOGREL BISULFATE 75 MG/1
75 TABLET ORAL DAILY
Status: ON HOLD | COMMUNITY
Start: 2023-05-01 | End: 2024-07-07 | Stop reason: HOSPADM

## 2024-07-06 RX ORDER — DIAZEPAM 5 MG/1
5 TABLET ORAL EVERY 6 HOURS PRN
COMMUNITY
Start: 2024-04-01

## 2024-07-06 RX ORDER — OLANZAPINE AND FLUOXETINE 12; 25 MG/1; MG/1
1 CAPSULE ORAL EVERY EVENING
COMMUNITY

## 2024-07-06 RX ORDER — BENZTROPINE MESYLATE 2 MG/1
2 TABLET ORAL 2 TIMES DAILY
COMMUNITY

## 2024-07-06 RX ADMIN — SODIUM CHLORIDE: 9 INJECTION, SOLUTION INTRAVENOUS at 19:10

## 2024-07-06 RX ADMIN — VANCOMYCIN HYDROCHLORIDE 1000 MG: 1 INJECTION, POWDER, LYOPHILIZED, FOR SOLUTION INTRAVENOUS at 21:30

## 2024-07-06 RX ADMIN — WATER 40 MG: 1 INJECTION INTRAMUSCULAR; INTRAVENOUS; SUBCUTANEOUS at 13:20

## 2024-07-06 RX ADMIN — DOXYCYCLINE 100 MG: 100 INJECTION, POWDER, LYOPHILIZED, FOR SOLUTION INTRAVENOUS at 15:58

## 2024-07-06 RX ADMIN — DIPHENHYDRAMINE HYDROCHLORIDE 50 MG: 50 INJECTION INTRAMUSCULAR; INTRAVENOUS at 22:33

## 2024-07-06 RX ADMIN — MORPHINE SULFATE 4 MG: 4 INJECTION, SOLUTION INTRAMUSCULAR; INTRAVENOUS at 13:16

## 2024-07-06 RX ADMIN — PIPERACILLIN AND TAZOBACTAM 4500 MG: 4; .5 INJECTION, POWDER, LYOPHILIZED, FOR SOLUTION INTRAVENOUS at 19:03

## 2024-07-06 RX ADMIN — LEVETIRACETAM 500 MG: 100 INJECTION INTRAVENOUS at 21:26

## 2024-07-06 RX ADMIN — CEFTRIAXONE SODIUM 2000 MG: 2 INJECTION, POWDER, FOR SOLUTION INTRAMUSCULAR; INTRAVENOUS at 15:54

## 2024-07-06 RX ADMIN — MORPHINE SULFATE 4 MG: 4 INJECTION, SOLUTION INTRAMUSCULAR; INTRAVENOUS at 16:50

## 2024-07-06 RX ADMIN — IOPAMIDOL 75 ML: 755 INJECTION, SOLUTION INTRAVENOUS at 13:51

## 2024-07-06 RX ADMIN — DIPHENHYDRAMINE HYDROCHLORIDE 50 MG: 50 INJECTION, SOLUTION INTRAMUSCULAR; INTRAVENOUS at 13:22

## 2024-07-06 ASSESSMENT — PAIN SCALES - GENERAL: PAINLEVEL_OUTOF10: 8

## 2024-07-06 ASSESSMENT — PAIN DESCRIPTION - PAIN TYPE: TYPE: ACUTE PAIN

## 2024-07-06 ASSESSMENT — PAIN DESCRIPTION - ORIENTATION: ORIENTATION: RIGHT

## 2024-07-06 ASSESSMENT — PAIN DESCRIPTION - DESCRIPTORS: DESCRIPTORS: ACHING;DISCOMFORT;SHARP;STABBING

## 2024-07-06 NOTE — PROGRESS NOTES
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       Vitamin K or Blood product  Administration Date                 Hepatic Function Panel:                      Lab Results   Component Value Date/Time    ALKPHOS 73 07/06/2024 12:14 PM    ALT 17 07/06/2024 12:14 PM    AST 14 07/06/2024 12:14 PM    BILITOT 0.4 07/06/2024 12:14 PM    BILIDIR 0.1 11/04/2023 05:17 PM    IBILI 0.2 11/04/2023 05:17 PM       Recent Labs     07/06/24  1214 07/06/24  1807   HGB 15.2 15.5  do    194  do       Date Warfarin Dose INR Heparin or LMWH Comment   7/6 <7.5 mg> 1.1 --                                  Assessment:  Patient is a 49 y.o. male on warfarin for history of CVA.  Patient's last known home warfarin dosing regimen is 7.5 mg on Wed/Thurs and 5 mg all other days.   Goal INR 2.5 - 3.5  INR 1.1 today    Plan:  Warfarin 7.5 mg tonight  Daily PT/INR until the INR is stable within the therapeutic range  Pharmacist will follow and monitor/adjust dosing as necessary    Thank you for this consult,      Jim Cummings, PharmD, BCEMP 7/6/2024 6:57 PM   [phone]

## 2024-07-06 NOTE — PROGRESS NOTES
Pharmacy Consultation Note  (Antibiotic Dosing and Monitoring)    Initial consult date: 7/6/24  Consulting physician/provider: Dr Koenig  Drug: Vancomycin  Indication: Pneumonia    Age/  Gender Height Weight IBW   49 y.o./male 5'9\" 90.7 kg (200 lb)     Ideal body weight: 70.7 kg (155 lb 13.8 oz)  Adjusted ideal body weight: 78.7 kg (173 lb 8.3 oz)      Renal Function:  Recent Labs     07/06/24  1214   BUN 8   CREATININE 0.8       Intake/Output Summary (Last 24 hours) at 7/6/2024 1837  Last data filed at 7/6/2024 1715  Gross per 24 hour   Intake 100 ml   Output --   Net 100 ml       Vancomycin Monitoring:  Trough:  No results for input(s): \"VANCOTROUGH\" in the last 72 hours.  Random:  No results for input(s): \"VANCORANDOM\" in the last 72 hours.      Vancomycin Administration Times:  Recent vancomycin administrations        No vancomycin IV orders with administrations found.               Assessment:  Patient is a 49 y.o. male who has been initiated on vancomycin  Estimated Creatinine Clearance: 123 mL/min (based on SCr of 0.8 mg/dL).  To dose vancomycin, pharmacy will target an AUC of 400-600    Plan:  Vancomycin 1000 mg IV every 8 hours  Check levels when appropriate  Will continue to monitor renal function   Pharmacy to follow      Mal BroussardD, BCEMP 7/6/2024 6:37 PM   [phone]    W: [phone]

## 2024-07-06 NOTE — PROGRESS NOTES
4 Eyes Skin Assessment     NAME:  Pepe Em  YOB: 1975  MEDICAL RECORD NUMBER:  [id number]    The patient is being assessed for  Admission    I agree that at least one RN has performed a thorough Head to Toe Skin Assessment on the patient. ALL assessment sites listed below have been assessed.      Areas assessed by both nurses:    Head, Face, Ears, Shoulders, Back, Chest, Arms, Elbows, Hands, Sacrum. Buttock, Coccyx, Ischium, Legs. Feet and Heels, Under Medical Devices , and Other ***        Does the Patient have a Wound? No noted wound(s)       Cal Prevention initiated by RN: No  Wound Care Orders initiated by RN: No    Pressure Injury (Stage 3,4, Unstageable, DTI, NWPT, and Complex wounds) if present, place Wound referral order by RN under : No    New Ostomies, if present place, Ostomy referral order under : No     Nurse 1 eSignature: Electronically signed by Rivka Valenzuela RN on 7/6/24 at 5:57 PM EDT    **SHARE this note so that the co-signing nurse can place an eSignature**    Nurse 2 eSignature: {Esignature:[id number]}

## 2024-07-06 NOTE — H&P
History & Physicial  Pepe Em  [id number]  1975 07/06/24  Primary Care:  No primary care provider on file.  No primary physician on file.        Chief Complaint   Patient presents with    Aphasia     Pt c/o slured speech, difficulty finding words and left sided numbness that started around 1100 this morning. Pt states he had residual weakness from a prior CVA but symptoms worsened at 1100.       HPI:    Stroke/ Infarct presumed/ Hypercoagulable state (Factor V Leiden and Protein S).    Last known well:last night. Awoke at 11 AM with completed stroke.  who bring food and helps with meds did not come in for 4 days. He was staying at the [facility].    Recent DC from [facility], hospital course in [place] summary states:  Patient presented for left sided weakness and slurred speech. Found to have a subtle right MCA CVA. Received tenecteplase per telestroke neurologist. Patient stable and transferred out of the ICU. Started on heparin with plans to bridge to Warfarin and INR was 1.5 on day patient left AMA. Patient was accepted at SNF. He was reportedly walking to and from the bathroom on the evening prior and day of discharge. Patient is alert and oriented x4. Has appropriate capacity.     Today:  Dysarthria +  Communicative Aphasia partial +  Left Hemiparesis+  Left Lid Lag    Denies alcohol, drug use          Prior to Visit Medications    Medication Sig Taking? Authorizing Provider   divalproex (DEPAKOTE) 250 MG DR tablet Take 1 tablet by mouth in the morning and at bedtime  Maria C Kraus, APRN - CNP   thiamine 100 MG tablet Take 1 tablet by mouth daily  Maria C Kraus APRN - CNP   Multiple Vitamin (MULTIVITAMIN) TABS tablet Take 1 tablet by mouth daily  Maria C Kraus APRN - CNP     Social History     Tobacco Use    Smoking status: Former    Smokeless tobacco: Never   Vaping Use    Vaping Use: Never used   Substance Use Topics    Alcohol use: Yes    Drug use: Yes     Types: Cocaine     No family history

## 2024-07-06 NOTE — ED PROVIDER NOTES
[facility] EMERGENCY DEPARTMENT  EMERGENCY DEPARTMENT ENCOUNTER        Pt Name: Pepe Em  MRN: [id number]  Birthdate 1975  Date of evaluation: 7/6/2024  Provider: Dre Martin MD  PCP: No primary care provider on file.  Note Started: 11:30 AM EDT 7/6/24    CHIEF COMPLAINT       Chief Complaint   Patient presents with    Aphasia     Pt c/o slured speech, difficulty finding words and left sided numbness that started around 1100 this morning. Pt states he had residual weakness from a prior CVA but symptoms worsened at 1100.     HISTORY OF PRESENT ILLNESS: 1 or more Elements   History From: Patient    Limitations to history : Dysarthria    Pepe Em is a 49 y.o. male with past medical history of polysubstance abuse, DVT on Coumadin, medication noncompliance, CVA, diabetes mellitus, IVC filter, hypertension, hyperlipidemia, peripheral malignancy, COPD, chronic pain, hepatitis B, bipolar disorder, alcohol withdrawal syndrome, opioid withdrawal, PE, psychosis, asthma who presents with complaints of left-sided weakness and numbness that began at 11 AM this a.m.  Patient states he has residual weakness but states this is worsened to the point where he cannot use his left arm or left leg.  Patient also endorses word finding aphasia and does have dysarthria on arrival.  Patient denies recent falls or trauma.  Patient does endorse a headache and some neck pain but denies any neck stiffness, blurry vision, numbness or tingling in the right upper and lower extremity, dizziness or lightheadedness.  Patient does endorse chest pain at this time.  Patient states chest pain is generalized, sharp in quality, has no alleviating or any factors, and is currently moderate to severe in intensity.  Patient denies any shortness of breath, abdominal pain, nausea, vomiting or diarrhea, fevers or chills, blurred vision.  Patient denies any tobacco, EtOH, illicit drug use at this time.    Nursing

## 2024-07-06 NOTE — PROGRESS NOTES
Pharmacist Review and Automatic Dose Adjustment of Extended Antibiotic Infusions    The reviewing pharmacist has made an adjustment to the ordered Zosyn dose per the approved [facility] protocol and table as identified below.      Nahum Samuels MD,    Pepe Em is a 49 y.o. male.     Renal Function Assessment:    Date Body Weight IBW  Adjusted BW SCr  CrCl Dialysis status   7/6/2024 90.7 kg (200 lb)  Ideal body weight: 70.7 kg (155 lb 13.8 oz)  Adjusted ideal body weight: 78.7 kg (173 lb 8.3 oz) Serum creatinine: 0.8 mg/dL 07/06/24 1214  Estimated creatinine clearance: 123 mL/min N/a     Estimated Creatinine Clearance: 123 mL/min (based on SCr of 0.8 mg/dL).    Recent Labs     07/06/24  1214   CREATININE 0.8       Height:   Ht Readings from Last 1 Encounters:   04/29/24 1.753 m (5' 9\")     Weight:  Wt Readings from Last 1 Encounters:   07/06/24 90.7 kg (200 lb)             Plan: Based upon the patient's weight, antibiotic indication, and renal function, the ordered Zosyn dose of 3375 mg every 8 hours has been changed/converted to 4500 mg every 8 hours.      Thank you,  Brittany Monroe RPH  7/6/2024   5:51 PM

## 2024-07-07 ENCOUNTER — APPOINTMENT (OUTPATIENT)
Dept: GENERAL RADIOLOGY | Age: 49
DRG: 045 | End: 2024-07-07
Payer: MEDICAID

## 2024-07-07 VITALS
BODY MASS INDEX: 29.53 KG/M2 | DIASTOLIC BLOOD PRESSURE: 84 MMHG | OXYGEN SATURATION: 97 % | WEIGHT: 200 LBS | HEART RATE: 87 BPM | SYSTOLIC BLOOD PRESSURE: 128 MMHG | TEMPERATURE: 97.8 F | RESPIRATION RATE: 18 BRPM

## 2024-07-07 PROBLEM — F69 BEHAVIOR PROBLEM, ADULT: Status: RESOLVED | Noted: 2023-03-24 | Resolved: 2024-07-07

## 2024-07-07 PROBLEM — F31.9 BIPOLAR DISORDER (HCC): Status: RESOLVED | Noted: 2022-03-21 | Resolved: 2024-07-07

## 2024-07-07 LAB
ANION GAP SERPL CALCULATED.3IONS-SCNC: 11 MMOL/L (ref 7–16)
BUN SERPL-MCNC: 14 MG/DL (ref 6–20)
CALCIUM SERPL-MCNC: 8.2 MG/DL (ref 8.6–10.2)
CHLORIDE SERPL-SCNC: 107 MMOL/L (ref 98–107)
CHOLEST SERPL-MCNC: 161 MG/DL
CK SERPL-CCNC: 53 U/L (ref 20–200)
CO2 SERPL-SCNC: 22 MMOL/L (ref 22–29)
CREAT SERPL-MCNC: 0.9 MG/DL (ref 0.7–1.2)
EKG ATRIAL RATE: 68 BPM
EKG P AXIS: 41 DEGREES
EKG P-R INTERVAL: 192 MS
EKG Q-T INTERVAL: 396 MS
EKG QRS DURATION: 84 MS
EKG QTC CALCULATION (BAZETT): 421 MS
EKG R AXIS: -13 DEGREES
EKG T AXIS: 14 DEGREES
EKG VENTRICULAR RATE: 68 BPM
ERYTHROCYTE [DISTWIDTH] IN BLOOD BY AUTOMATED COUNT: 13.5 % (ref 11.5–15)
GFR, ESTIMATED: >90 ML/MIN/1.73M2
GLUCOSE SERPL-MCNC: 109 MG/DL (ref 74–99)
HBA1C MFR BLD: 6.7 % (ref 4–5.6)
HCT VFR BLD AUTO: 37.7 % (ref 37–54)
HDLC SERPL-MCNC: 46 MG/DL
HGB BLD-MCNC: 13.1 G/DL (ref 12.5–16.5)
INR PPP: 1.2
LDLC SERPL CALC-MCNC: 100 MG/DL
MCH RBC QN AUTO: 28.4 PG (ref 26–35)
MCHC RBC AUTO-ENTMCNC: 34.7 G/DL (ref 32–34.5)
MCV RBC AUTO: 81.6 FL (ref 80–99.9)
PLATELET # BLD AUTO: 175 K/UL (ref 130–450)
PMV BLD AUTO: 9.8 FL (ref 7–12)
POTASSIUM SERPL-SCNC: 3.6 MMOL/L (ref 3.5–5)
PROTHROMBIN TIME: 13.4 SEC (ref 9.3–12.4)
RBC # BLD AUTO: 4.62 M/UL (ref 3.8–5.8)
SODIUM SERPL-SCNC: 140 MMOL/L (ref 132–146)
TRIGL SERPL-MCNC: 76 MG/DL
VLDLC SERPL CALC-MCNC: 15 MG/DL
WBC OTHER # BLD: 5.9 K/UL (ref 4.5–11.5)

## 2024-07-07 PROCEDURE — 82550 ASSAY OF CK (CPK): CPT

## 2024-07-07 PROCEDURE — 93010 ELECTROCARDIOGRAM REPORT: CPT | Performed by: INTERNAL MEDICINE

## 2024-07-07 PROCEDURE — 6370000000 HC RX 637 (ALT 250 FOR IP): Performed by: FAMILY MEDICINE

## 2024-07-07 PROCEDURE — 36592 COLLECT BLOOD FROM PICC: CPT

## 2024-07-07 PROCEDURE — 85027 COMPLETE CBC AUTOMATED: CPT

## 2024-07-07 PROCEDURE — 2580000003 HC RX 258: Performed by: INTERNAL MEDICINE

## 2024-07-07 PROCEDURE — 85610 PROTHROMBIN TIME: CPT

## 2024-07-07 PROCEDURE — 80061 LIPID PANEL: CPT

## 2024-07-07 PROCEDURE — 6360000002 HC RX W HCPCS: Performed by: FAMILY MEDICINE

## 2024-07-07 PROCEDURE — 99239 HOSP IP/OBS DSCHRG MGMT >30: CPT | Performed by: INTERNAL MEDICINE

## 2024-07-07 PROCEDURE — 6370000000 HC RX 637 (ALT 250 FOR IP): Performed by: INTERNAL MEDICINE

## 2024-07-07 PROCEDURE — 73502 X-RAY EXAM HIP UNI 2-3 VIEWS: CPT

## 2024-07-07 PROCEDURE — 80048 BASIC METABOLIC PNL TOTAL CA: CPT

## 2024-07-07 PROCEDURE — 83036 HEMOGLOBIN GLYCOSYLATED A1C: CPT

## 2024-07-07 PROCEDURE — 6360000002 HC RX W HCPCS: Performed by: INTERNAL MEDICINE

## 2024-07-07 RX ORDER — AMOXICILLIN AND CLAVULANATE POTASSIUM 875; 125 MG/1; MG/1
1 TABLET, FILM COATED ORAL 2 TIMES DAILY
Qty: 14 TABLET | Refills: 0 | Status: SHIPPED | OUTPATIENT
Start: 2024-07-07 | End: 2024-07-14

## 2024-07-07 RX ORDER — FLUOXETINE HYDROCHLORIDE 20 MG/1
20 CAPSULE ORAL DAILY
Status: DISCONTINUED | OUTPATIENT
Start: 2024-07-07 | End: 2024-07-07 | Stop reason: HOSPADM

## 2024-07-07 RX ORDER — DIAZEPAM 5 MG/1
5 TABLET ORAL EVERY 6 HOURS PRN
Status: DISCONTINUED | OUTPATIENT
Start: 2024-07-07 | End: 2024-07-07 | Stop reason: HOSPADM

## 2024-07-07 RX ORDER — ATORVASTATIN CALCIUM 10 MG/1
20 TABLET, FILM COATED ORAL DAILY
Status: DISCONTINUED | OUTPATIENT
Start: 2024-07-07 | End: 2024-07-07 | Stop reason: HOSPADM

## 2024-07-07 RX ORDER — DIPHENHYDRAMINE HCL 25 MG
50 TABLET ORAL EVERY 6 HOURS
Status: DISCONTINUED | OUTPATIENT
Start: 2024-07-07 | End: 2024-07-07 | Stop reason: HOSPADM

## 2024-07-07 RX ORDER — LEVETIRACETAM 500 MG/1
500 TABLET ORAL 2 TIMES DAILY
Status: DISCONTINUED | OUTPATIENT
Start: 2024-07-07 | End: 2024-07-07 | Stop reason: HOSPADM

## 2024-07-07 RX ORDER — HYDROXYZINE PAMOATE 25 MG/1
50 CAPSULE ORAL 2 TIMES DAILY
Status: DISCONTINUED | OUTPATIENT
Start: 2024-07-07 | End: 2024-07-07 | Stop reason: HOSPADM

## 2024-07-07 RX ORDER — ASPIRIN 81 MG/1
81 TABLET, CHEWABLE ORAL DAILY
Status: DISCONTINUED | OUTPATIENT
Start: 2024-07-08 | End: 2024-07-07 | Stop reason: HOSPADM

## 2024-07-07 RX ORDER — DIPHENHYDRAMINE HYDROCHLORIDE 50 MG/ML
25 INJECTION INTRAMUSCULAR; INTRAVENOUS EVERY 6 HOURS PRN
Status: DISCONTINUED | OUTPATIENT
Start: 2024-07-07 | End: 2024-07-07 | Stop reason: HOSPADM

## 2024-07-07 RX ORDER — LANOLIN ALCOHOL/MO/W.PET/CERES
1000 CREAM (GRAM) TOPICAL DAILY
Status: DISCONTINUED | OUTPATIENT
Start: 2024-07-07 | End: 2024-07-07 | Stop reason: HOSPADM

## 2024-07-07 RX ORDER — WARFARIN SODIUM 7.5 MG/1
7.5 TABLET ORAL
Status: DISCONTINUED | OUTPATIENT
Start: 2024-07-07 | End: 2024-07-07 | Stop reason: HOSPADM

## 2024-07-07 RX ORDER — OLANZAPINE 5 MG/1
10 TABLET ORAL NIGHTLY
Status: DISCONTINUED | OUTPATIENT
Start: 2024-07-07 | End: 2024-07-07 | Stop reason: HOSPADM

## 2024-07-07 RX ORDER — CLOTRIMAZOLE 1 %
5 CREAM (GRAM) TOPICAL 2 TIMES DAILY PRN
Status: DISCONTINUED | OUTPATIENT
Start: 2024-07-07 | End: 2024-07-07 | Stop reason: HOSPADM

## 2024-07-07 RX ORDER — BENZTROPINE MESYLATE 1 MG/1
2 TABLET ORAL 2 TIMES DAILY
Status: DISCONTINUED | OUTPATIENT
Start: 2024-07-07 | End: 2024-07-07 | Stop reason: HOSPADM

## 2024-07-07 RX ADMIN — PIPERACILLIN AND TAZOBACTAM 4500 MG: 4; .5 INJECTION, POWDER, LYOPHILIZED, FOR SOLUTION INTRAVENOUS at 16:17

## 2024-07-07 RX ADMIN — ASPIRIN 81 MG 81 MG: 81 TABLET ORAL at 10:54

## 2024-07-07 RX ADMIN — FLUOXETINE HYDROCHLORIDE 20 MG: 20 CAPSULE ORAL at 16:07

## 2024-07-07 RX ADMIN — SODIUM CHLORIDE: 9 INJECTION, SOLUTION INTRAVENOUS at 16:05

## 2024-07-07 RX ADMIN — VANCOMYCIN HYDROCHLORIDE 1000 MG: 1 INJECTION, POWDER, LYOPHILIZED, FOR SOLUTION INTRAVENOUS at 05:51

## 2024-07-07 RX ADMIN — DIVALPROEX SODIUM 250 MG: 250 TABLET, DELAYED RELEASE ORAL at 16:06

## 2024-07-07 RX ADMIN — DIPHENHYDRAMINE HYDROCHLORIDE 25 MG: 50 INJECTION INTRAMUSCULAR; INTRAVENOUS at 10:54

## 2024-07-07 RX ADMIN — HYDROMORPHONE HYDROCHLORIDE 0.5 MG: 1 INJECTION, SOLUTION INTRAMUSCULAR; INTRAVENOUS; SUBCUTANEOUS at 11:06

## 2024-07-07 RX ADMIN — CYANOCOBALAMIN TAB 1000 MCG 1000 MCG: 1000 TAB at 16:06

## 2024-07-07 RX ADMIN — VANCOMYCIN HYDROCHLORIDE 1000 MG: 1 INJECTION, POWDER, LYOPHILIZED, FOR SOLUTION INTRAVENOUS at 16:16

## 2024-07-07 RX ADMIN — PIPERACILLIN AND TAZOBACTAM 4500 MG: 4; .5 INJECTION, POWDER, LYOPHILIZED, FOR SOLUTION INTRAVENOUS at 03:59

## 2024-07-07 RX ADMIN — LEVETIRACETAM 500 MG: 100 INJECTION INTRAVENOUS at 10:55

## 2024-07-07 RX ADMIN — HYDROMORPHONE HYDROCHLORIDE 0.5 MG: 1 INJECTION, SOLUTION INTRAMUSCULAR; INTRAVENOUS; SUBCUTANEOUS at 16:07

## 2024-07-07 RX ADMIN — ATORVASTATIN CALCIUM 20 MG: 10 TABLET, FILM COATED ORAL at 16:08

## 2024-07-07 RX ADMIN — DIPHENHYDRAMINE HYDROCHLORIDE 25 MG: 50 INJECTION INTRAMUSCULAR; INTRAVENOUS at 16:06

## 2024-07-07 ASSESSMENT — PAIN SCALES - GENERAL
PAINLEVEL_OUTOF10: 9
PAINLEVEL_OUTOF10: 10

## 2024-07-07 NOTE — CONSULTS
History Of Present Illness:    Pepe Em is a 49 y.o. male with past medical history of hypertension, diabetes, DVT on Coumadin, recent CVA in April presented with left-sided weakness onset around 11 AM.  Symptoms are persistent.  Patient reports noncompliance with Coumadin.  Patient has allergy to contrast but did receive CTA and CT perfusion in April.  Recommended pretreatment with steroids and counseling patient for the risk and benefit of vessel imaging.        LKW: 11 am  NIH:  6  As above per teleneurology.    The patient is a 49 y.o. male with significant past medical history of stroke who presents with above.      The patient has the following symptoms:    Change in level of consciousness: alert    New Weakness: no    Numbness or Tingling: no    Difficulty Swallowing: no    Current Medications:   Scheduled Meds:   divalproex  250 mg Oral BID    sodium chloride flush  5-40 mL IntraVENous 2 times per day    piperacillin-tazobactam  4,500 mg IntraVENous Q8H    vancomycin  1,000 mg IntraVENous Q8H    warfarin placeholder: dosing by pharmacy   Other RX Placeholder    levETIRAcetam  500 mg IntraVENous Q12H    aspirin  81 mg Oral Daily    lidocaine  1 patch TransDERmal Daily     Continuous Infusions:   sodium chloride      sodium chloride 100 mL/hr at 07/06/24 1910     PRN Meds:sodium chloride flush, sodium chloride, ondansetron **OR** ondansetron, polyethylene glycol, perflutren lipid microspheres, acetaminophen    Allergies:  Apixaban, Bupranolol, Carrot, Citalopram, Dabigatran, Dalteparin, Daucus carota, Enoxaparin, Fentanyl, Fluphenazine, Fluphenazine hcl, Fondaparinux, Gramineae pollens, Haloperidol, Haloperidol lactate, Ibuprofen, Iodides, Iodinated contrast media, Iodine, Iodine i 131 tositumomab, Ipratropium, Ipratropium bromide hfa, Ketorolac, Ketorolac tromethamine, Lanolin, Lovenox [enoxaparin sodium], Peanut oil, Pork-derived products, Rivaroxaban, Sulfa antibiotics, Tiotropium bromide monohydrate,

## 2024-07-07 NOTE — PROGRESS NOTES
Pt stated he is leaving AMA. RN tried to educate the patient about the importantance f staying and receiving treatment, especially considering he just left [facility] AMA. Pt verbalizes understanding, but is still requesting to leave AMA. Dr. Koenig notified and is en route to see patient.

## 2024-07-07 NOTE — PROGRESS NOTES
1949 Home medication list reviewed  2209 Patient is screaming out and scratching himself stating \"you gave me keppra and no benedryl. My skin is itching everywhere!\" As documented, IV keppra was administered as ordered at 2126. Sudden onset reaction  reported immediately to attending, Dr. Syed, at this time. See orders. Please note, during presentation of adverse reaction reported by patient, movement of all extremities witnessed by this nurse and other nurse Jaylen Han RN .

## 2024-07-07 NOTE — PROGRESS NOTES
Progress  Note  Chief Complaint   Patient presents with    Aphasia     Pt c/o slured speech, difficulty finding words and left sided numbness that started around 1100 this morning. Pt states he had residual weakness from a prior CVA but symptoms worsened at 1100.     At the end of June, Pepe left [facility] AGAINST MEDICAL ADVICE.  He had received TN K for a right-sided middle cerebral artery thrombosis.  They felt the findings were subtle but he had speech and left-sided weakness.  He presented to the hospital with a last known well of the night prior.  He awoke at 11:00 on Saturday with a completed stroke.  He has dense motor findings on the left side.  Facial asymmetry is easing up.  Dysarthria is vastly improved.  There is no longer evidence of any communicative aphasia.  Pepe appears to have impulsiveness.  He makes bad decisions quickly.  He may show insight when redirected.    Current Facility-Administered Medications   Medication Dose Route Frequency Provider Last Rate Last Admin    diphenhydrAMINE (BENADRYL) injection 25 mg  25 mg IntraVENous Q6H PRN Nahum Koenig MD   25 mg at 07/07/24 1054    HYDROmorphone (DILAUDID) injection 0.5 mg  0.5 mg IntraVENous Q3H PRN Nahum Koenig MD   0.5 mg at 07/07/24 1106    divalproex (DEPAKOTE) DR tablet 250 mg  250 mg Oral BID Nahum Koenig MD        sodium chloride flush 0.9 % injection 5-40 mL  5-40 mL IntraVENous 2 times per day Nahum Koenig MD        sodium chloride flush 0.9 % injection 5-40 mL  5-40 mL IntraVENous PRN Nahum Koenig MD        0.9 % sodium chloride infusion   IntraVENous PRN Nahum Koenig MD        ondansetron (ZOFRAN-ODT) disintegrating tablet 4 mg  4 mg Oral Q8H PRN Nahum Koenig MD        Or    ondansetron (ZOFRAN) injection 4 mg  4 mg IntraVENous Q6H PRN Nahum Koenig MD        polyethylene glycol (GLYCOLAX) packet 17 g  17 g Oral Daily PRN Nahum Koenig MD        perflutren lipid microspheres (DEFINITY) injection 1.5 mL

## 2024-07-07 NOTE — PROGRESS NOTES
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       Vitamin K or Blood product  Administration Date                 Hepatic Function Panel:                      Lab Results   Component Value Date/Time    ALKPHOS 73 07/06/2024 12:14 PM    ALT 17 07/06/2024 12:14 PM    AST 14 07/06/2024 12:14 PM    BILITOT 0.4 07/06/2024 12:14 PM    BILIDIR 0.1 11/04/2023 05:17 PM    IBILI 0.2 11/04/2023 05:17 PM       Recent Labs     07/06/24  1214 07/06/24  1807   HGB 15.2 15.5  do    194  do         Date Warfarin Dose INR Heparin or LMWH Comment   7/6 1.1 -- Needs swallow study   7/7 <7.5 mg> 1.2 --                           Assessment:  Patient is a 49 y.o. male on warfarin for history of CVA.  Patient's last known home warfarin dosing regimen is 7.5 mg on Wed/Thurs and 5 mg all other days.   Goal INR 2.5 - 3.5  INR 1.1 today    Plan:  Warfarin 7.5 mg tonight  No bridge therapy due to extensive history allergies  Daily PT/INR until the INR is stable within the therapeutic range  Pharmacist will follow and monitor/adjust dosing as necessary    Thank you for this consult,    Jim Cummings, PharmD, BCEMP 7/7/2024 3:11 PM   [phone]

## 2024-07-07 NOTE — PROGRESS NOTES
Pt is refusing to go down for his XRAY. Pt stated \"these people are racist, I'm sick of this shit I'm not getting it done.\" RN notified Dr. Koenig.
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
## 2024-07-07 NOTE — PROGRESS NOTES
Pt called RN to the room from the call light, shouting its an emergency. Upon arrival in the room.. Pt was bleeding from his femoral site and the line was completely out. RN called for additional assistance. While RN was applying pressure, VS were completed and normal. Called Hospitalist to come assess patient. Dr. Anne is covering for Dr. Koenig. Dr. Anne came to the bedside and removed sutures from the right femoral site. Dressing applied. Pt is stating he is leaving AMA and that's the real reason why he pulled his line out. Dr. Anne attempted to educate pt about the importance of staying and receiving treatment. Pt is alert and oriented and still refusing to stay. Pt dressed himself, RN provided pt with discharge paperwork.

## 2024-07-07 NOTE — SIGNIFICANT EVENT
Called to bedside as patient pulled out his right femoral triple-lumen catheter.  On arrival to bedside, hemostasis had been achieved.  The triple-lumen catheter was still sutured to the skin.  I cleansed the area with chlorhexidine prep, removed the sutures, then placed sterile pressure dressing over the entry point.    Patient decided he is leaving AGAINST MEDICAL ADVICE.  I asked him to explain to me why he is currently in the hospital, and he is able to give me details regarding his diagnoses and treatment in the hospital.  He understands and states back that if he leaves AGAINST MEDICAL ADVICE he could have worsening condition and suffer complications including death.  He denies any thoughts of self-harm, and appears to be in sound mind at this time.  Sent prescription for Augmentin to his pharmacy of choice.       NOTE: Portions of this report were transcribed using voice recognition software. Every effort was made to ensure accuracy; however, inadvertent computerized transcription errors may be present.

## 2024-07-07 NOTE — PLAN OF CARE
Problem: Discharge Planning  Goal: Discharge to home or other facility with appropriate resources  Outcome: Progressing     Problem: Risk for Elopement  Goal: Patient will not exit the unit/facility without proper excort  Outcome: Progressing     Problem: Safety - Adult  Goal: Free from fall injury  Outcome: Progressing     Problem: Pain  Goal: Verbalizes/displays adequate comfort level or baseline comfort level  Outcome: Progressing

## 2024-07-07 NOTE — PROGRESS NOTES
Pharmacy Consultation Note  (Antibiotic Dosing and Monitoring)    Initial consult date: 7/6/24  Consulting physician/provider: Dr Koenig  Drug: Vancomycin  Indication: Pneumonia    Age/  Gender Height Weight IBW   49 y.o./male 5'9\" 90.7 kg (200 lb)     Ideal body weight: 70.7 kg (155 lb 13.8 oz)  Adjusted ideal body weight: 78.7 kg (173 lb 8.3 oz)      Renal Function:  Recent Labs     07/06/24  1214 07/07/24  0603   BUN 8 14   CREATININE 0.8 0.9         Intake/Output Summary (Last 24 hours) at 7/7/2024 1155  Last data filed at 7/6/2024 1715  Gross per 24 hour   Intake 100 ml   Output --   Net 100 ml         Vancomycin Monitoring:  Trough:  No results for input(s): \"VANCOTROUGH\" in the last 72 hours.  Random:  No results for input(s): \"VANCORANDOM\" in the last 72 hours.      Vancomycin Administration Times:  Recent vancomycin administrations                     vancomycin (VANCOCIN) 1,000 mg in sodium chloride 0.9 % 250 mL IVPB ([id number]) (mg) 1,000 mg New Bag 07/07/24 0551     1,000 mg New Bag 07/06/24 2130               Assessment:  Patient is a 49 y.o. male who has been initiated on vancomycin  Estimated Creatinine Clearance: 109 mL/min (based on SCr of 0.9 mg/dL).  To dose vancomycin, pharmacy will target an AUC of 400-600    Plan:  Vancomycin 1000 mg IV every 8 hours  Trough tomorrow @ 0430; Hold if > 20 mcg/mL  Will continue to monitor renal function   Pharmacy to follow      Jim Cummings PharmD, BCEMP 7/7/2024 11:55 AM   [phone]    [facility]: [phone]

## 2024-07-08 PROBLEM — I63.311 CEREBROVASCULAR ACCIDENT (CVA) DUE TO THROMBOSIS OF RIGHT MIDDLE CEREBRAL ARTERY (HCC): Status: ACTIVE | Noted: 2024-07-08

## 2024-07-08 NOTE — DISCHARGE SUMMARY
Discharge Summary  Patient ID:  Pepe Em  [id number]  49 y.o. 1975 male  No primary care provider on file.        Admit date: 7/6/2024    Discharge date and time:  7/7/2024      Activity level: as tolerated, NEVER DRIVING  Diet:regular  Labs:  Disposition:AMA  Condition on Discharge:Stable  DME:    Admit Diagnoses:   Patient Active Problem List   Diagnosis    DVT, lower extremity, recurrent, right (HCC)    Chest pain    Acute pulmonary embolism with acute cor pulmonale (HCC)    Gastroenteritis    Hypercoagulable state (HCC)    Acute deep vein thrombosis (DVT) of brachial vein of right upper extremity (HCC)    Medically noncompliant    Acute CVA (cerebrovascular accident) (HCC)    Subtherapeutic anticoagulation    Subtherapeutic international normalized ratio (INR)    Popliteal DVT (deep venous thrombosis) (HCC)    Unspecified fracture of right lower leg, subsequent encounter for closed fracture with routine healing    Diabetes mellitus type 2 in obese    Rectal bleeding    Protein S deficiency (HCC)    Presence of IVC filter    Hypertensive disorder    Hypercholesterolemia    HLD (hyperlipidemia)    Hemiparesis of left nondominant side (HCC)    Factor 5 Leiden mutation, heterozygous (HCC)    Chronic obstructive pulmonary disease with (acute) exacerbation (HCC)    Chronic pain due to trauma    Chronic hepatitis B without hepatic coma (HCC)    Benign neoplasm of heart    Atherosclerotic heart disease of native coronary artery without angina pectoris    Ankle pain    Polysubstance use disorder    Polysubstance abuse (HCC)    Alcohol withdrawal syndrome without complication (HCC)    Opioid withdrawal (HCC)    Leg wound, left, initial encounter    Left Thigh abscess    Drug-seeking behavior    Hypertriglyceridemia    Vitamin D deficiency    Acute psychosis (HCC)    Stroke-like symptoms    Cellulitis of left toe    Paronychia of great toe    Conversion disorder    Pneumonia due to organism       Discharge

## 2024-07-22 ENCOUNTER — TELEPHONE (OUTPATIENT)
Dept: FAMILY MEDICINE CLINIC | Age: 49
End: 2024-07-22

## 2024-07-22 NOTE — TELEPHONE ENCOUNTER
Appt scheduled 10/11/24.    ----- Message from iSndi Albrecht Dotnayakhris sent at 7/22/2024 11:13 AM EDT -----  Regarding: ECC Appointment Request  ECC Appointment Request    Patient needs appointment for ECC Appointment Type: New Patient.    Patient Requested Dates(s): WITHIN THIS WEEK OR MAYBE TOMORROW  Patient Requested Time: MORNING  Provider Name:    Reason for Appointment Request: New Patient - Requested Provider unavailable- PT REQUESTING FOR ONE OF THE DRs. HERE AT [facility]. HE WANTS TO BOOK AN APPT.   --------------------------------------------------------------------------------------------------------------------------    Relationship to Patient: Self     Call Back Information: OK to leave message on [employer]  Preferred Call Back Number: [phone]

## 2024-12-01 ENCOUNTER — HOSPITAL ENCOUNTER (EMERGENCY)
Age: 49
Discharge: ANOTHER HOSPITAL | End: 2024-12-02
Attending: EMERGENCY MEDICINE
Payer: MEDICAID

## 2024-12-01 VITALS
DIASTOLIC BLOOD PRESSURE: 82 MMHG | OXYGEN SATURATION: 96 % | TEMPERATURE: 98.3 F | RESPIRATION RATE: 16 BRPM | SYSTOLIC BLOOD PRESSURE: 139 MMHG | HEART RATE: 75 BPM

## 2024-12-01 DIAGNOSIS — R45.850 HOMICIDAL IDEATION: ICD-10-CM

## 2024-12-01 DIAGNOSIS — F14.10 COCAINE ABUSE (HCC): ICD-10-CM

## 2024-12-01 DIAGNOSIS — R45.851 SUICIDAL IDEATION: Primary | ICD-10-CM

## 2024-12-01 LAB
ALBUMIN SERPL-MCNC: 3.5 G/DL (ref 3.5–5.2)
ALP SERPL-CCNC: 89 U/L (ref 40–129)
ALT SERPL-CCNC: 22 U/L (ref 0–40)
AMPHET UR QL SCN: NEGATIVE
ANION GAP SERPL CALCULATED.3IONS-SCNC: 8 MMOL/L (ref 7–16)
APAP SERPL-MCNC: <5 UG/ML (ref 10–30)
AST SERPL-CCNC: 18 U/L (ref 0–39)
BARBITURATES UR QL SCN: NEGATIVE
BASOPHILS # BLD: 0.02 K/UL (ref 0–0.2)
BASOPHILS NFR BLD: 1 % (ref 0–2)
BENZODIAZ UR QL: NEGATIVE
BILIRUB SERPL-MCNC: 0.3 MG/DL (ref 0–1.2)
BILIRUB UR QL STRIP: NEGATIVE
BUN SERPL-MCNC: 13 MG/DL (ref 6–20)
BUPRENORPHINE UR QL: NEGATIVE
CALCIUM SERPL-MCNC: 8.7 MG/DL (ref 8.6–10.2)
CANNABINOIDS UR QL SCN: NEGATIVE
CHLORIDE SERPL-SCNC: 107 MMOL/L (ref 98–107)
CK SERPL-CCNC: 131 U/L (ref 20–200)
CLARITY UR: CLEAR
CO2 SERPL-SCNC: 22 MMOL/L (ref 22–29)
COCAINE UR QL SCN: POSITIVE
COLOR UR: YELLOW
COMMENT: ABNORMAL
CREAT SERPL-MCNC: 0.7 MG/DL (ref 0.7–1.2)
DATE LAST DOSE: ABNORMAL
EOSINOPHIL # BLD: 0.08 K/UL (ref 0.05–0.5)
EOSINOPHILS RELATIVE PERCENT: 2 % (ref 0–6)
ERYTHROCYTE [DISTWIDTH] IN BLOOD BY AUTOMATED COUNT: 13.1 % (ref 11.5–15)
ETHANOLAMINE SERPL-MCNC: <10 MG/DL (ref 0–0.08)
FENTANYL UR QL: NEGATIVE
GFR, ESTIMATED: >90 ML/MIN/1.73M2
GLUCOSE SERPL-MCNC: 269 MG/DL (ref 74–99)
GLUCOSE UR STRIP-MCNC: >=1000 MG/DL
HCT VFR BLD AUTO: 42.1 % (ref 37–54)
HGB BLD-MCNC: 13.8 G/DL (ref 12.5–16.5)
HGB UR QL STRIP.AUTO: NEGATIVE
IMM GRANULOCYTES # BLD AUTO: <0.03 K/UL (ref 0–0.58)
IMM GRANULOCYTES NFR BLD: 1 % (ref 0–5)
INR PPP: 1
KETONES UR STRIP-MCNC: NEGATIVE MG/DL
LEUKOCYTE ESTERASE UR QL STRIP: NEGATIVE
LYMPHOCYTES NFR BLD: 0.9 K/UL (ref 1.5–4)
LYMPHOCYTES RELATIVE PERCENT: 27 % (ref 20–42)
MCH RBC QN AUTO: 28.1 PG (ref 26–35)
MCHC RBC AUTO-ENTMCNC: 32.8 G/DL (ref 32–34.5)
MCV RBC AUTO: 85.7 FL (ref 80–99.9)
METHADONE UR QL: NEGATIVE
MONOCYTES NFR BLD: 0.35 K/UL (ref 0.1–0.95)
MONOCYTES NFR BLD: 10 % (ref 2–12)
NEUTROPHILS NFR BLD: 60 % (ref 43–80)
NEUTS SEG NFR BLD: 2.02 K/UL (ref 1.8–7.3)
NITRITE UR QL STRIP: NEGATIVE
OPIATES UR QL SCN: NEGATIVE
OXYCODONE UR QL SCN: NEGATIVE
PCP UR QL SCN: NEGATIVE
PH UR STRIP: 6 [PH] (ref 5–9)
PLATELET # BLD AUTO: 182 K/UL (ref 130–450)
PMV BLD AUTO: 10.2 FL (ref 7–12)
POTASSIUM SERPL-SCNC: 3.8 MMOL/L (ref 3.5–5)
PROT SERPL-MCNC: 6.1 G/DL (ref 6.4–8.3)
PROT UR STRIP-MCNC: NEGATIVE MG/DL
PROTHROMBIN TIME: 10.6 SEC (ref 9.3–12.4)
RBC # BLD AUTO: 4.91 M/UL (ref 3.8–5.8)
SALICYLATES SERPL-MCNC: <0.3 MG/DL (ref 0–30)
SODIUM SERPL-SCNC: 137 MMOL/L (ref 132–146)
SP GR UR STRIP: 1.02 (ref 1–1.03)
TEST INFORMATION: ABNORMAL
TME LAST DOSE: ABNORMAL H
TOXIC TRICYCLIC SC,BLOOD: NEGATIVE
UROBILINOGEN UR STRIP-ACNC: 0.2 EU/DL (ref 0–1)
VALPROATE SERPL-MCNC: <3 UG/ML (ref 50–100)
VANCOMYCIN DOSE: ABNORMAL MG
WBC OTHER # BLD: 3.4 K/UL (ref 4.5–11.5)

## 2024-12-01 PROCEDURE — 85610 PROTHROMBIN TIME: CPT

## 2024-12-01 PROCEDURE — 82550 ASSAY OF CK (CPK): CPT

## 2024-12-01 PROCEDURE — G0480 DRUG TEST DEF 1-7 CLASSES: HCPCS

## 2024-12-01 PROCEDURE — 81003 URINALYSIS AUTO W/O SCOPE: CPT

## 2024-12-01 PROCEDURE — 80307 DRUG TEST PRSMV CHEM ANLYZR: CPT

## 2024-12-01 PROCEDURE — 85025 COMPLETE CBC W/AUTO DIFF WBC: CPT

## 2024-12-01 PROCEDURE — 80164 ASSAY DIPROPYLACETIC ACD TOT: CPT

## 2024-12-01 PROCEDURE — 80143 DRUG ASSAY ACETAMINOPHEN: CPT

## 2024-12-01 PROCEDURE — 99284 EMERGENCY DEPT VISIT MOD MDM: CPT

## 2024-12-01 PROCEDURE — 80179 DRUG ASSAY SALICYLATE: CPT

## 2024-12-01 PROCEDURE — 93005 ELECTROCARDIOGRAM TRACING: CPT | Performed by: EMERGENCY MEDICINE

## 2024-12-01 PROCEDURE — 80053 COMPREHEN METABOLIC PANEL: CPT

## 2024-12-01 RX ORDER — FLUTICASONE PROPIONATE AND SALMETEROL 250; 50 UG/1; UG/1
1 POWDER RESPIRATORY (INHALATION) EVERY 12 HOURS
COMMUNITY

## 2024-12-01 RX ORDER — WARFARIN SODIUM 5 MG/1
5 TABLET ORAL
COMMUNITY

## 2024-12-01 RX ORDER — INSULIN LISPRO 100 [IU]/ML
INJECTION, SOLUTION INTRAVENOUS; SUBCUTANEOUS
COMMUNITY

## 2024-12-01 ASSESSMENT — PAIN - FUNCTIONAL ASSESSMENT: PAIN_FUNCTIONAL_ASSESSMENT: NONE - DENIES PAIN

## 2024-12-01 NOTE — ED NOTES
1 large garbage bag in Locker [address], 1 black suitcase in Locker 30. also has valuables locked with [place] [employer]. [phone]

## 2024-12-01 NOTE — ED NOTES
The patient was accepted to [facility] .  Report Number: [phone] x 2     Accepting MD: Dr Markos Villa.  Lowry slip faxed to [facility]

## 2024-12-01 NOTE — ED NOTES
Pt is laying in bed and has been requesting to speak with the doctor. He has stated that he has not seen one yet. Pt was notified that he did see the doctor upon arrival. SW observed pt to become irritable, heightened tone and saying racial slurs to section G staff.

## 2024-12-01 NOTE — ED NOTES
Received call from katlyn Abbasi Chiquita @ [phone] inquiring about pts plan of care.  Stated pt was sent in for a mental health evaluation.  Informed will let tele psych know of this call back number if they need to call for collateral information.

## 2024-12-01 NOTE — ED NOTES
Notified Dr Augustin/ Deandre NP of need for admission to [facility] answering machine received, message left will await return call

## 2024-12-01 NOTE — VIRTUAL HEALTH
Pepe Em  [id number]  1975     EMERGENCY DEPARTMENT TELEPSYCHIATRY EVALUATION    12/01/24    Chief Complaint:  “psychiatric evaluation”  HPI: Patient is a 49 y.o.  male who presents for psychiatric evaluation after threatening staff at detox facility saying that he would break a window and cut his neck.  He reports doing this because he wanted to leave the facility and they wouldn't let him leave and give him back his belongings for 72 hours after arrival. Patient presented to the ED on 12/01/24 from [facility]hab [place].  He is currently denying suicidal or homicidal ideation or AVH and states that he was just acting this way in order to leave the rehab center.  He did not like not having his belongings and he states that the heat went out last night which made him very upset to be admitted there.  This statement was incongruent with collateral information that was obtained from nursing staff at [facility].  See assessment section below for this collateral.     Past Psychiatric History:  Previous Diagnoses/symptoms: alcohol use disorder  Previous suicide attempts/self-harm: Denies  Inpatient psychiatric hospitalizations: yes - after death of multiple family members  Current outpatient psychiatric provider: Denies  Current therapist: States not in therapy  Previous psychiatric medication trials: none current  Current psychiatric medications: No current psychiatric medications  History of ECT: no  Family Psychiatric History: Denies    Substance Abuse History:  Tobacco: Denies  Alcohol: Endorses daily drinking  Marijuana: Denies  Stimulant: Endorses occasional cocaine  Opiates: Denies  Benzodiazepine: Denies  Other illicit drug usage: Denies  History of substance/alcohol abuse treatment: Yes    Social History:  Education: GED  Living Situation/Interest: alone  Marital/Committed relationship and parenting hx:   Occupation: retired  Legal History/Hx of Violence: Denies  Spiritual History:

## 2024-12-01 NOTE — ED NOTES
Pt presents with wheelchair states he has a wheel chair accessible house and a maid and will need help with his adls. Pt is observed standing and pivoting to his wheel chair

## 2024-12-01 NOTE — ED NOTES
Received call back per Dr Augustin/ Deandre RN please refer this pt this to an appropriate psychiatric medical bed facility that can properly take care of his needs.  Informed Rosalva REARDON of this information

## 2024-12-01 NOTE — ED NOTES
BRIE spoke to Claribel at [facility] and inquired about pt belongings. BRIE was informed she will contact the RA supervisor about someone bringing pt belonging to the ED. Claribel was asked to call BRIE back if they are able to do so.

## 2024-12-01 NOTE — ED NOTES
BRIE spoke with Danielle at [facility] [phone] and completed referral to find inpatient admission.

## 2024-12-01 NOTE — ED NOTES
Pt is very argumentative, belligerent and loud when speaking with this RN. Also is a poor historian.

## 2024-12-01 NOTE — ED NOTES
SW spoke to nurse from [facility] and was informed that a , Shyam will be bringing pt's belonging to the ED.

## 2024-12-01 NOTE — ED PROVIDER NOTES
hallucinations.  Denies any current drug or alcohol use.    Laboratory evaluation was notable for a glucose of 269, slight leukopenia at 3.4 as well as cocaine positive.  Patient was evaluated by telepsych and they agree with admission.  He was pink slipped    The patient has been medically cleared for any acute or serious medical emergency. Therefore,  the patient is medically stable for inpatient psychiatric care.    EKG ordered to evaluate patient's current cardiac rate, rhythm, and QT interval. CBC was ordered as part of my assessment for possible infection, anemia or thrombocytopenia. CMP to assess electrolytes, kidney function, liver function or any metabolic derangements. Urinalysis to evaluate for a UTI. Protime-INR to assess liver's synthetic function, and to ensure INR is within goal range. Drugs of abuse studies to evaluate for signs of toxicity and overdose.    CONSULTS:   IP CONSULT TO TELE-PSYCH (SOCIAL WORK ONLY)        PROCEDURES   Unless otherwise noted below, none       CRITICAL CARE TIME (.cct)   na        I am the Primary Clinician of Record.    FINAL IMPRESSION      1. Suicidal ideation    2. Homicidal ideation    3. Cocaine abuse (HCC)          DISPOSITION/PLAN     DISPOSITION [facility] 12/01/2024 11:44:25 AM      PATIENT REFERRED TO:  No follow-up provider specified.    DISCHARGE MEDICATIONS:  New Prescriptions    No medications on file       DISCONTINUED MEDICATIONS:  Discontinued Medications    No medications on file              (Please note that portions of this note were completed with a voice recognition program.  Efforts were made to edit the dictations but occasionally words are mis-transcribed.)    Xochitl Gutierrez DO (electronically signed)           Xochitl Gutierrez DO  12/01/24 1145

## 2024-12-02 NOTE — ED NOTES
Patient arguing and accusing staff of stealing his personal belongings, [employer] came to assist, patient belongings not in locker or closet located 1 trash bag 1 xl suitcase, patient screaming and irate, PCA opened patients suitcase and his missing items were located with in his own possession.. physicians proceeded to leave with patient.

## 2024-12-02 NOTE — ED NOTES
Pt tells this rn he is not taking any insulin reports last dose of keppra was the holiday as well as his coumadin. States I don't know anything about Depakote never heard of it

## 2024-12-03 LAB
EKG ATRIAL RATE: 71 BPM
EKG P AXIS: 68 DEGREES
EKG P-R INTERVAL: 190 MS
EKG Q-T INTERVAL: 392 MS
EKG QRS DURATION: 80 MS
EKG QTC CALCULATION (BAZETT): 425 MS
EKG R AXIS: 22 DEGREES
EKG T AXIS: 24 DEGREES
EKG VENTRICULAR RATE: 71 BPM

## 2024-12-03 PROCEDURE — 93010 ELECTROCARDIOGRAM REPORT: CPT | Performed by: INTERNAL MEDICINE

## 2025-01-16 ENCOUNTER — APPOINTMENT (OUTPATIENT)
Dept: CT IMAGING | Age: 50
End: 2025-01-16
Payer: MEDICAID

## 2025-01-16 ENCOUNTER — HOSPITAL ENCOUNTER (EMERGENCY)
Age: 50
Discharge: ANOTHER HOSPITAL | End: 2025-01-18
Attending: STUDENT IN AN ORGANIZED HEALTH CARE EDUCATION/TRAINING PROGRAM
Payer: MEDICAID

## 2025-01-16 DIAGNOSIS — T78.40XA ALLERGIC REACTION, INITIAL ENCOUNTER: ICD-10-CM

## 2025-01-16 DIAGNOSIS — R29.90 STROKE-LIKE SYMPTOMS: Primary | ICD-10-CM

## 2025-01-16 DIAGNOSIS — Z91.148 NON COMPLIANCE W MEDICATION REGIMEN: ICD-10-CM

## 2025-01-16 LAB
ALBUMIN SERPL-MCNC: 4.3 G/DL (ref 3.5–5.2)
ALP SERPL-CCNC: 92 U/L (ref 40–129)
ALT SERPL-CCNC: 25 U/L (ref 0–40)
ANION GAP SERPL CALCULATED.3IONS-SCNC: 15 MMOL/L (ref 7–16)
AST SERPL-CCNC: 27 U/L (ref 0–39)
BASOPHILS # BLD: 0.01 K/UL (ref 0–0.2)
BASOPHILS NFR BLD: 0 % (ref 0–2)
BILIRUB SERPL-MCNC: 0.3 MG/DL (ref 0–1.2)
BUN SERPL-MCNC: 17 MG/DL (ref 6–20)
CALCIUM SERPL-MCNC: 9 MG/DL (ref 8.6–10.2)
CHLORIDE SERPL-SCNC: 103 MMOL/L (ref 98–107)
CHP ED QC CHECK: NORMAL
CO2 SERPL-SCNC: 23 MMOL/L (ref 22–29)
CREAT SERPL-MCNC: 0.8 MG/DL (ref 0.7–1.2)
EOSINOPHIL # BLD: 0.09 K/UL (ref 0.05–0.5)
EOSINOPHILS RELATIVE PERCENT: 2 % (ref 0–6)
ERYTHROCYTE [DISTWIDTH] IN BLOOD BY AUTOMATED COUNT: 13 % (ref 11.5–15)
GFR, ESTIMATED: >90 ML/MIN/1.73M2
GLUCOSE BLD-MCNC: 180 MG/DL (ref 74–99)
GLUCOSE BLD-MCNC: 188 MG/DL
GLUCOSE SERPL-MCNC: 182 MG/DL (ref 74–99)
HCT VFR BLD AUTO: 43.1 % (ref 37–54)
HGB BLD-MCNC: 15.7 G/DL (ref 12.5–16.5)
IMM GRANULOCYTES # BLD AUTO: 0.04 K/UL (ref 0–0.58)
IMM GRANULOCYTES NFR BLD: 1 % (ref 0–5)
INR PPP: 1
LYMPHOCYTES NFR BLD: 1.52 K/UL (ref 1.5–4)
LYMPHOCYTES RELATIVE PERCENT: 27 % (ref 20–42)
MCH RBC QN AUTO: 29.3 PG (ref 26–35)
MCHC RBC AUTO-ENTMCNC: 36.4 G/DL (ref 32–34.5)
MCV RBC AUTO: 80.6 FL (ref 80–99.9)
MONOCYTES NFR BLD: 0.47 K/UL (ref 0.1–0.95)
MONOCYTES NFR BLD: 8 % (ref 2–12)
NEUTROPHILS NFR BLD: 62 % (ref 43–80)
NEUTS SEG NFR BLD: 3.45 K/UL (ref 1.8–7.3)
PARTIAL THROMBOPLASTIN TIME: 26.2 SEC (ref 24.5–35.1)
PLATELET # BLD AUTO: 215 K/UL (ref 130–450)
PMV BLD AUTO: 9.7 FL (ref 7–12)
POTASSIUM SERPL-SCNC: 4.3 MMOL/L (ref 3.5–5)
PROT SERPL-MCNC: 7.2 G/DL (ref 6.4–8.3)
PROTHROMBIN TIME: 11.2 SEC (ref 9.3–12.4)
RBC # BLD AUTO: 5.35 M/UL (ref 3.8–5.8)
SODIUM SERPL-SCNC: 141 MMOL/L (ref 132–146)
TROPONIN I SERPL HS-MCNC: <6 NG/L (ref 0–11)
TROPONIN I SERPL HS-MCNC: NORMAL NG/L (ref 0–22)
WBC OTHER # BLD: 5.6 K/UL (ref 4.5–11.5)

## 2025-01-16 PROCEDURE — 82962 GLUCOSE BLOOD TEST: CPT

## 2025-01-16 PROCEDURE — 2500000003 HC RX 250 WO HCPCS: Performed by: STUDENT IN AN ORGANIZED HEALTH CARE EDUCATION/TRAINING PROGRAM

## 2025-01-16 PROCEDURE — 80053 COMPREHEN METABOLIC PANEL: CPT

## 2025-01-16 PROCEDURE — 70498 CT ANGIOGRAPHY NECK: CPT

## 2025-01-16 PROCEDURE — 93005 ELECTROCARDIOGRAM TRACING: CPT | Performed by: STUDENT IN AN ORGANIZED HEALTH CARE EDUCATION/TRAINING PROGRAM

## 2025-01-16 PROCEDURE — 96375 TX/PRO/DX INJ NEW DRUG ADDON: CPT

## 2025-01-16 PROCEDURE — 6360000002 HC RX W HCPCS: Performed by: STUDENT IN AN ORGANIZED HEALTH CARE EDUCATION/TRAINING PROGRAM

## 2025-01-16 PROCEDURE — 96372 THER/PROPH/DIAG INJ SC/IM: CPT

## 2025-01-16 PROCEDURE — 85730 THROMBOPLASTIN TIME PARTIAL: CPT

## 2025-01-16 PROCEDURE — G0508 CRIT CARE TELEHEA CONSULT 60: HCPCS | Performed by: STUDENT IN AN ORGANIZED HEALTH CARE EDUCATION/TRAINING PROGRAM

## 2025-01-16 PROCEDURE — 84484 ASSAY OF TROPONIN QUANT: CPT

## 2025-01-16 PROCEDURE — 85025 COMPLETE CBC W/AUTO DIFF WBC: CPT

## 2025-01-16 PROCEDURE — 70496 CT ANGIOGRAPHY HEAD: CPT

## 2025-01-16 PROCEDURE — 85610 PROTHROMBIN TIME: CPT

## 2025-01-16 PROCEDURE — 96365 THER/PROPH/DIAG IV INF INIT: CPT

## 2025-01-16 PROCEDURE — 6360000004 HC RX CONTRAST MEDICATION: Performed by: RADIOLOGY

## 2025-01-16 PROCEDURE — 99285 EMERGENCY DEPT VISIT HI MDM: CPT

## 2025-01-16 PROCEDURE — 70450 CT HEAD/BRAIN W/O DYE: CPT

## 2025-01-16 RX ORDER — IOPAMIDOL 755 MG/ML
75 INJECTION, SOLUTION INTRAVASCULAR
Status: COMPLETED | OUTPATIENT
Start: 2025-01-16 | End: 2025-01-16

## 2025-01-16 RX ORDER — SODIUM CHLORIDE 0.9 % (FLUSH) 0.9 %
SYRINGE (ML) INJECTION
Status: COMPLETED
Start: 2025-01-16 | End: 2025-01-17

## 2025-01-16 RX ORDER — HEPARIN SODIUM 10000 [USP'U]/100ML
5-30 INJECTION, SOLUTION INTRAVENOUS CONTINUOUS
Status: DISCONTINUED | OUTPATIENT
Start: 2025-01-16 | End: 2025-01-18 | Stop reason: HOSPADM

## 2025-01-16 RX ORDER — HEPARIN SODIUM 1000 [USP'U]/ML
4000 INJECTION, SOLUTION INTRAVENOUS; SUBCUTANEOUS ONCE
Status: COMPLETED | OUTPATIENT
Start: 2025-01-16 | End: 2025-01-16

## 2025-01-16 RX ORDER — HEPARIN SODIUM 1000 [USP'U]/ML
4000 INJECTION, SOLUTION INTRAVENOUS; SUBCUTANEOUS PRN
Status: DISCONTINUED | OUTPATIENT
Start: 2025-01-16 | End: 2025-01-18 | Stop reason: HOSPADM

## 2025-01-16 RX ORDER — EPINEPHRINE 1 MG/ML
0.3 INJECTION, SOLUTION INTRAMUSCULAR; SUBCUTANEOUS ONCE
Status: COMPLETED | OUTPATIENT
Start: 2025-01-16 | End: 2025-01-16

## 2025-01-16 RX ORDER — ASPIRIN 81 MG/1
TABLET, CHEWABLE ORAL
Status: DISPENSED
Start: 2025-01-16 | End: 2025-01-17

## 2025-01-16 RX ORDER — HEPARIN SODIUM 1000 [USP'U]/ML
2000 INJECTION, SOLUTION INTRAVENOUS; SUBCUTANEOUS PRN
Status: DISCONTINUED | OUTPATIENT
Start: 2025-01-16 | End: 2025-01-18 | Stop reason: HOSPADM

## 2025-01-16 RX ORDER — DIPHENHYDRAMINE HYDROCHLORIDE 50 MG/ML
50 INJECTION INTRAMUSCULAR; INTRAVENOUS ONCE
Status: COMPLETED | OUTPATIENT
Start: 2025-01-16 | End: 2025-01-16

## 2025-01-16 RX ORDER — ASPIRIN 81 MG/1
324 TABLET, CHEWABLE ORAL ONCE
Status: DISCONTINUED | OUTPATIENT
Start: 2025-01-16 | End: 2025-01-18 | Stop reason: HOSPADM

## 2025-01-16 RX ADMIN — EPINEPHRINE 0.3 MG: 1 INJECTION INTRAMUSCULAR; INTRAVENOUS; SUBCUTANEOUS at 21:11

## 2025-01-16 RX ADMIN — HEPARIN SODIUM AND DEXTROSE 11 UNITS/KG/HR: 10000; 5 INJECTION INTRAVENOUS at 22:36

## 2025-01-16 RX ADMIN — WATER 125 MG: 1 INJECTION INTRAMUSCULAR; INTRAVENOUS; SUBCUTANEOUS at 21:10

## 2025-01-16 RX ADMIN — DIPHENHYDRAMINE HYDROCHLORIDE 50 MG: 50 INJECTION INTRAMUSCULAR; INTRAVENOUS at 21:12

## 2025-01-16 RX ADMIN — HEPARIN SODIUM 4000 UNITS: 1000 INJECTION INTRAVENOUS; SUBCUTANEOUS at 22:37

## 2025-01-16 RX ADMIN — IOPAMIDOL 75 ML: 755 INJECTION, SOLUTION INTRAVENOUS at 21:06

## 2025-01-17 LAB
GLUCOSE BLD-MCNC: 173 MG/DL (ref 74–99)
GLUCOSE BLD-MCNC: 173 MG/DL (ref 74–99)
GLUCOSE BLD-MCNC: 183 MG/DL (ref 74–99)
GLUCOSE BLD-MCNC: 256 MG/DL (ref 74–99)
GLUCOSE BLD-MCNC: 365 MG/DL (ref 74–99)
PARTIAL THROMBOPLASTIN TIME: 101.2 SEC (ref 24.5–35.1)
PARTIAL THROMBOPLASTIN TIME: 37.2 SEC (ref 24.5–35.1)
PARTIAL THROMBOPLASTIN TIME: 83.3 SEC (ref 24.5–35.1)

## 2025-01-17 PROCEDURE — 85730 THROMBOPLASTIN TIME PARTIAL: CPT

## 2025-01-17 PROCEDURE — 6360000002 HC RX W HCPCS: Performed by: EMERGENCY MEDICINE

## 2025-01-17 PROCEDURE — 96375 TX/PRO/DX INJ NEW DRUG ADDON: CPT

## 2025-01-17 PROCEDURE — 96366 THER/PROPH/DIAG IV INF ADDON: CPT

## 2025-01-17 PROCEDURE — 2500000003 HC RX 250 WO HCPCS

## 2025-01-17 PROCEDURE — 82962 GLUCOSE BLOOD TEST: CPT

## 2025-01-17 PROCEDURE — 6360000002 HC RX W HCPCS

## 2025-01-17 PROCEDURE — 96376 TX/PRO/DX INJ SAME DRUG ADON: CPT

## 2025-01-17 PROCEDURE — 6360000002 HC RX W HCPCS: Performed by: STUDENT IN AN ORGANIZED HEALTH CARE EDUCATION/TRAINING PROGRAM

## 2025-01-17 PROCEDURE — 6370000000 HC RX 637 (ALT 250 FOR IP): Performed by: EMERGENCY MEDICINE

## 2025-01-17 RX ORDER — DIPHENHYDRAMINE HYDROCHLORIDE 50 MG/ML
25 INJECTION INTRAMUSCULAR; INTRAVENOUS EVERY 6 HOURS PRN
Status: DISCONTINUED | OUTPATIENT
Start: 2025-01-17 | End: 2025-01-18 | Stop reason: HOSPADM

## 2025-01-17 RX ORDER — DIPHENHYDRAMINE HYDROCHLORIDE 50 MG/ML
INJECTION INTRAMUSCULAR; INTRAVENOUS
Status: COMPLETED
Start: 2025-01-17 | End: 2025-01-17

## 2025-01-17 RX ORDER — DEXTROSE MONOHYDRATE 100 MG/ML
INJECTION, SOLUTION INTRAVENOUS CONTINUOUS PRN
Status: DISCONTINUED | OUTPATIENT
Start: 2025-01-17 | End: 2025-01-18 | Stop reason: HOSPADM

## 2025-01-17 RX ORDER — MIDAZOLAM HYDROCHLORIDE 1 MG/ML
1 INJECTION, SOLUTION INTRAMUSCULAR; INTRAVENOUS EVERY 4 HOURS PRN
Status: DISCONTINUED | OUTPATIENT
Start: 2025-01-17 | End: 2025-01-18 | Stop reason: HOSPADM

## 2025-01-17 RX ORDER — DIPHENHYDRAMINE HYDROCHLORIDE 50 MG/ML
25 INJECTION INTRAMUSCULAR; INTRAVENOUS ONCE
Status: COMPLETED | OUTPATIENT
Start: 2025-01-17 | End: 2025-01-17

## 2025-01-17 RX ORDER — INSULIN LISPRO 100 [IU]/ML
0-8 INJECTION, SOLUTION INTRAVENOUS; SUBCUTANEOUS
Status: DISCONTINUED | OUTPATIENT
Start: 2025-01-17 | End: 2025-01-18 | Stop reason: HOSPADM

## 2025-01-17 RX ORDER — GLUCAGON 1 MG/ML
1 KIT INJECTION PRN
Status: DISCONTINUED | OUTPATIENT
Start: 2025-01-17 | End: 2025-01-18 | Stop reason: HOSPADM

## 2025-01-17 RX ADMIN — DIPHENHYDRAMINE HYDROCHLORIDE 25 MG: 50 INJECTION INTRAMUSCULAR; INTRAVENOUS at 00:55

## 2025-01-17 RX ADMIN — Medication: at 05:30

## 2025-01-17 RX ADMIN — HYDROMORPHONE HYDROCHLORIDE 0.5 MG: 1 INJECTION, SOLUTION INTRAMUSCULAR; INTRAVENOUS; SUBCUTANEOUS at 10:17

## 2025-01-17 RX ADMIN — HYDROMORPHONE HYDROCHLORIDE 0.25 MG: 1 INJECTION, SOLUTION INTRAMUSCULAR; INTRAVENOUS; SUBCUTANEOUS at 00:57

## 2025-01-17 RX ADMIN — INSULIN LISPRO 4 UNITS: 100 INJECTION, SOLUTION INTRAVENOUS; SUBCUTANEOUS at 11:03

## 2025-01-17 RX ADMIN — HYDROMORPHONE HYDROCHLORIDE 0.5 MG: 1 INJECTION, SOLUTION INTRAMUSCULAR; INTRAVENOUS; SUBCUTANEOUS at 21:25

## 2025-01-17 RX ADMIN — DIPHENHYDRAMINE HYDROCHLORIDE 25 MG: 50 INJECTION INTRAMUSCULAR; INTRAVENOUS at 10:17

## 2025-01-17 RX ADMIN — HEPARIN SODIUM 4000 UNITS: 1000 INJECTION INTRAVENOUS; SUBCUTANEOUS at 06:00

## 2025-01-17 RX ADMIN — DIPHENHYDRAMINE HYDROCHLORIDE 25 MG: 50 INJECTION INTRAMUSCULAR; INTRAVENOUS at 05:35

## 2025-01-17 RX ADMIN — INSULIN LISPRO 2 UNITS: 100 INJECTION, SOLUTION INTRAVENOUS; SUBCUTANEOUS at 17:26

## 2025-01-17 RX ADMIN — DIPHENHYDRAMINE HYDROCHLORIDE 25 MG: 50 INJECTION INTRAMUSCULAR; INTRAVENOUS at 17:27

## 2025-01-17 RX ADMIN — HYDROMORPHONE HYDROCHLORIDE 0.5 MG: 1 INJECTION, SOLUTION INTRAMUSCULAR; INTRAVENOUS; SUBCUTANEOUS at 14:32

## 2025-01-17 RX ADMIN — INSULIN LISPRO 8 UNITS: 100 INJECTION, SOLUTION INTRAVENOUS; SUBCUTANEOUS at 07:23

## 2025-01-17 RX ADMIN — HYDROMORPHONE HYDROCHLORIDE 0.5 MG: 1 INJECTION, SOLUTION INTRAMUSCULAR; INTRAVENOUS; SUBCUTANEOUS at 17:27

## 2025-01-17 RX ADMIN — HYDROMORPHONE HYDROCHLORIDE 0.5 MG: 1 INJECTION, SOLUTION INTRAMUSCULAR; INTRAVENOUS; SUBCUTANEOUS at 05:25

## 2025-01-17 ASSESSMENT — PAIN DESCRIPTION - DESCRIPTORS: DESCRIPTORS: SHARP;DISCOMFORT

## 2025-01-17 ASSESSMENT — PAIN DESCRIPTION - LOCATION
LOCATION: GENERALIZED
LOCATION: CHEST
LOCATION: BACK
LOCATION: BACK
LOCATION: GENERALIZED

## 2025-01-17 ASSESSMENT — PAIN SCALES - GENERAL
PAINLEVEL_OUTOF10: 10
PAINLEVEL_OUTOF10: 9
PAINLEVEL_OUTOF10: 10
PAINLEVEL_OUTOF10: 9
PAINLEVEL_OUTOF10: 10
PAINLEVEL_OUTOF10: 9

## 2025-01-17 NOTE — ED PROVIDER NOTES
[facility] EMERGENCY DEPARTMENT  EMERGENCY DEPARTMENT ENCOUNTER    Pt Name: Pepe Em  MRN: [id number]  Birthdate 1975  Date of evaluation: 1/16/2025  Provider: Yousif Guerra MD  PCP: No primary care provider on file.  Note Started: 12:06 AM EST 1/17/25    HPI     Patient is a 49 y.o. male presents with a chief complaint of   Chief Complaint   Patient presents with    Aphasia     Pt presents with aphasia, dysphasia, L sided facial numbness, chest pain, pounding HA. Pt states he has not taken plavix or coumadin for 2 days. LKW 45 min ago   .    Patient presented for acute onset of left-sided weakness and aphasia.  Patient stated that he has been not taking his Plavix and his Coumadin for the past 3 days.  Patient does have aphasia.  Difficulty getting a good history.  Patient was dropped off by his alcohol.  Patient currently denies any chest pain or shortness of breath.  Denies any fevers or chills.  Denies any fall.    Nursing Notes were all reviewed and agreed with or any disagreements were addressed in the HPI.    History From: patient    Review of Systems   Pertinent positives and negatives as per HPI.     Physical Exam  Vitals and nursing note reviewed.   Constitutional:       Appearance: He is well-developed.   HENT:      Head: Normocephalic and atraumatic.   Eyes:      Conjunctiva/sclera: Conjunctivae normal.   Cardiovascular:      Rate and Rhythm: Normal rate and regular rhythm.      Heart sounds: Normal heart sounds. No murmur heard.  Pulmonary:      Effort: Pulmonary effort is normal. No respiratory distress.      Breath sounds: Normal breath sounds. No wheezing or rales.   Abdominal:      General: Bowel sounds are normal.      Palpations: Abdomen is soft.      Tenderness: There is no abdominal tenderness. There is no guarding or rebound.   Musculoskeletal:         General: No tenderness or deformity.      Cervical back: Normal range of motion and neck supple.   Skin:      -- --   01/16/25 2032 -- 97.6 °F (36.4 °C) 98 -- 97 % --       Oxygen Saturation Interpretation: Normal      ------------------------------------------ PROGRESS NOTES ------------------------------------------  Re-evaluation(s):   Patient’s symptoms are improving  Repeat physical examination is improved      I have spoken with the patient and discussed today’s results, in addition to providing specific details for the plan of care and counseling regarding the diagnosis and prognosis.  Their questions are answered at this time and they are agreeable with the plan.  I have discussed the risks and benefits of transfer and they wish to proceed with the transfer.      --------------------------------- ADDITIONAL PROVIDER NOTES ---------------------------------  Consultations:  Spoke with Dr. Funk (Medicine).  Discussed case.  They will admit this patient.      Reason for transfer: services not supported.    This patient's ED course included: a personal history and physicial examination, re-evaluation prior to disposition, multiple bedside re-evaluations, IV medications, cardiac monitoring, continuous pulse oximetry, and complex medical decision making and emergency management    This patient has remained hemodynamically stable during their ED course.    Please note that the withdrawal or failure to initiate urgent interventions for this patient would likely result in a life threatening deterioration or permanent disability.      Accordingly this patient received 45 minutes of critical care time, excluding separately billable procedures.    Clinical Impression  1. Stroke-like symptoms    2. Allergic reaction, initial encounter    3. Non compliance w medication regimen          Disposition  Patient's disposition: Transfer to [facility].  Transferred by: ground.  Patient's condition is stable.                                     Yousif Guerra MD  01/17/25 0113

## 2025-01-17 NOTE — ED PROVIDER NOTES
[facility] EMERGENCY DEPARTMENT  EMERGENCY DEPARTMENT ENCOUNTER    Pt Name: Pepe Em  MRN: [id number]  Birthdate 1975  Date of evaluation: 1/16/2025  Provider: Yousif Guerra MD  PCP: No primary care provider on file.  Note Started: 8:51 PM EST 1/16/25    HPI     Patient is a 49 y.o. male presents with a chief complaint of   Chief Complaint   Patient presents with   • Aphasia     Pt presents with aphasia, dysphasia, L sided facial numbness, chest pain, pounding HA. Pt states he has not taken plavix or coumadin for 2 days. LKW 45 min ago   .    ***    Nursing Notes were all reviewed and agreed with or any disagreements were addressed in the HPI.    History From: ***    Review of Systems   Pertinent positives and negatives as per HPI.     Physical Exam     Procedures       MDM           Patient is a 49 y.o. male presenting with ***.    Patient's differential includes but is not limited to ***.  Tests reviewed: ***    NIH Stroke Scale/Score at time of initial evaluation:  1A: Level of Consciousness 0 - alert; keenly responsive   1B: Ask Month and Age 2 - answers neither question correctly   1C: Tell Patient To Open and Close Eyes, then Hand  Squeeze 2 - performs neither task correctly   2: Test Horizontal Extraocular Movements 1 - partial gaze palsy   3: Test Visual Fields 1 - partial hemianopia   4: Test Facial Palsy 2 - partial paralysis (total or near total paralysis of the lower face)   5A: Test Left Arm Motor Drift 4 - no movement   5B: Test Right Arm Motor Drift 0 - no drift, limb holds 90 (or 45) degrees for full 10 seconds   6A: Test Left Leg Motor Drift 4 - no movement   6B: Test Right Leg Motor Drift 0 - no drift; leg holds 30 degree position for full 5 seconds   7: Test Limb Ataxia   (FNF/Heel-Shin) 1 - present in one limb   8: Test Sensation 1 - mild to moderate sensory loss; patient feels pinprick is less sharp or is dull on the affected side; there is a loss of    CTA HEAD W CONTRAST    (Results Pending)   CTA NECK W CONTRAST    (Results Pending)     No results found.    No results found.  Yousif Guerra MD

## 2025-01-17 NOTE — ED NOTES
Per attending ER physician, pt ok to eat if swallow screen is passed. Swallow screen was passed and documented and pt given dinner tray.

## 2025-01-17 NOTE — VIRTUAL HEALTH
[facility] Stroke and Telestroke Consult for  [facility] Stroke Alert through [facility] @ 8:54 PM  1/16/2025 8:56 PM    Pt Name: Pepe Em  MRN: [id number]  YOB: 1975  Date of evaluation: 1/16/2025  Primary Care Physician: No primary care provider on file.  Reason for Evaluation: Stroke Evaluation with Discussion with Ed or primary team with Telemedicine and stroke evaluation with Review of imaging and labs    Pepe Em is a 49 y.o. male who presents with acute onset left sided weakness and aphasia with left facial weakness. Patient has extensive neurologic history highly concerning for functional neurologic disorder with repeat episodes of left sided weakness, aphasia and AMS most recently 7/6/24 again at [facility] 6/19/24 similar event and STRONG DOCUMENTATION then of repeat multiple similar events and \"clearly recurrent Conversion disorder and decompensated bipolar affective disorder\" and psych eval benefit.  Patient was seen and examined with telemetry but given his hyperacute symptoms subtherapeutic INR of 1.0 and potential TNK candidate.  On my exam it was fairly clear that patient had significantly inconsistent neurologic exam.  At times patient was forcefully depressing left face and closing eye tight and at other times opening his eye without difficulty moving face without difficulty.  Similarly patient forcefully slurring his speech and stammering as if aphasic however at times very briskly following commands and also noted to have extremely clear speech when distracted.  Patient refusing to move left arm or leg however on distraction noted multiple times to be moving left arm and left leg furthermore inconsistent with his history and when deep nailbed pain was applied by ER physician noted brisk withdrawal in left upper and lower extremity.  I had extensive discussion with patient regarding risks benefits of IV TNK and given his multiple previous similar

## 2025-01-17 NOTE — PROGRESS NOTES
Case discussed with Dr. Oseas Guerra at [facility] ER, patient is admitted for strokelike symptoms concern for functional symptoms versus true acute CVA.  Has been off his Coumadin for 1 day started heparin by weight in the ER given multiple allergies.  Also required epinephrine and Solu-Medrol after contrast for CTA's.  Concerning the benefit from psychiatry consultation as patient has follow-up with [facility] and multiple specialties with no true underlying reason for his recurrent strokes.  He is to be transferred here SEY admitted to stepdown intermediate floor.

## 2025-01-18 ENCOUNTER — HOSPITAL ENCOUNTER (EMERGENCY)
Age: 50
Discharge: LEFT BEFORE DISCHARGE | DRG: 045 | End: 2025-01-18
Attending: FAMILY MEDICINE | Admitting: FAMILY MEDICINE
Payer: MEDICAID

## 2025-01-18 VITALS
OXYGEN SATURATION: 94 % | HEART RATE: 67 BPM | SYSTOLIC BLOOD PRESSURE: 134 MMHG | TEMPERATURE: 98.1 F | WEIGHT: 190 LBS | BODY MASS INDEX: 28.06 KG/M2 | RESPIRATION RATE: 12 BRPM | DIASTOLIC BLOOD PRESSURE: 82 MMHG

## 2025-01-18 VITALS
SYSTOLIC BLOOD PRESSURE: 104 MMHG | BODY MASS INDEX: 31.58 KG/M2 | WEIGHT: 213.19 LBS | HEART RATE: 74 BPM | TEMPERATURE: 97.1 F | HEIGHT: 69 IN | OXYGEN SATURATION: 94 % | RESPIRATION RATE: 15 BRPM | DIASTOLIC BLOOD PRESSURE: 64 MMHG

## 2025-01-18 LAB
ERYTHROCYTE [DISTWIDTH] IN BLOOD BY AUTOMATED COUNT: 13.1 % (ref 11.5–15)
GLUCOSE BLD-MCNC: 189 MG/DL (ref 74–99)
HCT VFR BLD AUTO: 39.8 % (ref 37–54)
HGB BLD-MCNC: 14 G/DL (ref 12.5–16.5)
MCH RBC QN AUTO: 29.4 PG (ref 26–35)
MCHC RBC AUTO-ENTMCNC: 35.2 G/DL (ref 32–34.5)
MCV RBC AUTO: 83.6 FL (ref 80–99.9)
PARTIAL THROMBOPLASTIN TIME: 70.2 SEC (ref 24.5–35.1)
PARTIAL THROMBOPLASTIN TIME: 81.2 SEC (ref 24.5–35.1)
PLATELET # BLD AUTO: 188 K/UL (ref 130–450)
PMV BLD AUTO: 9.9 FL (ref 7–12)
RBC # BLD AUTO: 4.76 M/UL (ref 3.8–5.8)
WBC OTHER # BLD: 8 K/UL (ref 4.5–11.5)

## 2025-01-18 PROCEDURE — 85027 COMPLETE CBC AUTOMATED: CPT

## 2025-01-18 PROCEDURE — 96366 THER/PROPH/DIAG IV INF ADDON: CPT

## 2025-01-18 PROCEDURE — 6360000002 HC RX W HCPCS: Performed by: EMERGENCY MEDICINE

## 2025-01-18 PROCEDURE — 2060000000 HC ICU INTERMEDIATE R&B

## 2025-01-18 PROCEDURE — 85730 THROMBOPLASTIN TIME PARTIAL: CPT

## 2025-01-18 PROCEDURE — 96376 TX/PRO/DX INJ SAME DRUG ADON: CPT

## 2025-01-18 PROCEDURE — 6370000000 HC RX 637 (ALT 250 FOR IP): Performed by: EMERGENCY MEDICINE

## 2025-01-18 PROCEDURE — 82962 GLUCOSE BLOOD TEST: CPT

## 2025-01-18 RX ORDER — HYDRALAZINE HYDROCHLORIDE 20 MG/ML
10 INJECTION INTRAMUSCULAR; INTRAVENOUS EVERY 6 HOURS PRN
Status: DISCONTINUED | OUTPATIENT
Start: 2025-01-18 | End: 2025-01-18 | Stop reason: HOSPADM

## 2025-01-18 RX ORDER — DIPHENHYDRAMINE HYDROCHLORIDE 50 MG/ML
25 INJECTION INTRAMUSCULAR; INTRAVENOUS EVERY 6 HOURS PRN
Status: DISCONTINUED | OUTPATIENT
Start: 2025-01-18 | End: 2025-01-18

## 2025-01-18 RX ORDER — CALCIUM CARBONATE 500 MG/1
500 TABLET, CHEWABLE ORAL 3 TIMES DAILY PRN
Status: DISCONTINUED | OUTPATIENT
Start: 2025-01-18 | End: 2025-01-18 | Stop reason: HOSPADM

## 2025-01-18 RX ORDER — BENZONATATE 100 MG/1
100 CAPSULE ORAL 3 TIMES DAILY PRN
Status: DISCONTINUED | OUTPATIENT
Start: 2025-01-18 | End: 2025-01-18 | Stop reason: HOSPADM

## 2025-01-18 RX ORDER — CLOPIDOGREL BISULFATE 75 MG/1
75 TABLET ORAL DAILY
COMMUNITY

## 2025-01-18 RX ADMIN — INSULIN LISPRO 2 UNITS: 100 INJECTION, SOLUTION INTRAVENOUS; SUBCUTANEOUS at 07:38

## 2025-01-18 RX ADMIN — HYDROMORPHONE HYDROCHLORIDE 0.5 MG: 1 INJECTION, SOLUTION INTRAMUSCULAR; INTRAVENOUS; SUBCUTANEOUS at 04:08

## 2025-01-18 RX ADMIN — DIPHENHYDRAMINE HYDROCHLORIDE 25 MG: 50 INJECTION INTRAMUSCULAR; INTRAVENOUS at 00:51

## 2025-01-18 RX ADMIN — HYDROMORPHONE HYDROCHLORIDE 0.5 MG: 1 INJECTION, SOLUTION INTRAMUSCULAR; INTRAVENOUS; SUBCUTANEOUS at 00:51

## 2025-01-18 RX ADMIN — HYDROMORPHONE HYDROCHLORIDE 0.5 MG: 1 INJECTION, SOLUTION INTRAMUSCULAR; INTRAVENOUS; SUBCUTANEOUS at 07:36

## 2025-01-18 RX ADMIN — DIPHENHYDRAMINE HYDROCHLORIDE 25 MG: 50 INJECTION INTRAMUSCULAR; INTRAVENOUS at 07:34

## 2025-01-18 ASSESSMENT — PAIN DESCRIPTION - DESCRIPTORS
DESCRIPTORS: ACHING;THROBBING;TIGHTNESS
DESCRIPTORS: THROBBING;ACHING

## 2025-01-18 ASSESSMENT — PAIN DESCRIPTION - LOCATION
LOCATION: OTHER (COMMENT)
LOCATION: GENERALIZED
LOCATION: OTHER (COMMENT)

## 2025-01-18 ASSESSMENT — PAIN SCALES - GENERAL
PAINLEVEL_OUTOF10: 9

## 2025-01-18 NOTE — ED NOTES
Nurse to nurse called  [phone] spoke with Ernesto Lindsay. No further questions at this time.

## 2025-01-18 NOTE — PROGRESS NOTES
Perfect serve Dr. Funk, pt arrived to unit and for admission orders.    Resent message to Dr. Gray.

## 2025-01-18 NOTE — ED NOTES
Spoke to access center. Patient accepted at [facility] bed # 1302. Report number is [phone].

## 2025-01-18 NOTE — DISCHARGE SUMMARY
Upon arrival to [facility] patient left AMA.    Electronically signed by Meggan Gray DO on 1/18/2025 at 2:44 PM

## 2025-01-19 LAB
EKG ATRIAL RATE: 84 BPM
EKG P AXIS: 56 DEGREES
EKG P-R INTERVAL: 172 MS
EKG Q-T INTERVAL: 366 MS
EKG QRS DURATION: 76 MS
EKG QTC CALCULATION (BAZETT): 432 MS
EKG R AXIS: -6 DEGREES
EKG T AXIS: 25 DEGREES
EKG VENTRICULAR RATE: 84 BPM

## 2025-01-19 PROCEDURE — 93010 ELECTROCARDIOGRAM REPORT: CPT | Performed by: INTERNAL MEDICINE

## 2025-01-19 NOTE — PROGRESS NOTES
1100 1/18/2025 patient arrived via stretcher heparin drip infusing. Patient removed heparin infusion. Patient stated that the medicine was making him itch. Patient made aware that admitting doctor was made aware that he arrived and will be in to examine him and write orders. Shortly after patient put himself in his personal wheelchair and attempted to toilet himself. This writer assessed that patient not safe to transfer or propel himself due to left arm weakness/contracture and lle weakness. Patient demanding to go into bathroom in his wheelchair , this writer offered to put patient back to bed and give bed pan. Patient refused and toileted him self . When coming out of bathroom patient ramming wheelchair into various surfaces and furniture of patient in the next bed . When asked that he allow us to help him and to just hold on while we move thing s aside to made things safer patient escalated refused that we help him in any way and called for a ride to leave AMA. Staff explained that he should stay due to current health conditions and that we only wanted to help him do things safely. Patient wouldn't consider staying . Dressed himself and gather his belongings  and propelled himself off unit in personal wheelchair. Dr. Rdz notified of event.

## (undated) DEVICE — GLOVE ORANGE PI 7 1/2   MSG9075

## (undated) DEVICE — GLOVE SURG SZ 8 L12IN FNGR THK79MIL GRN LTX FREE

## (undated) DEVICE — PAD,ABDOMINAL,5"X9",ST,LF,25/BX: Brand: MEDLINE INDUSTRIES, INC.

## (undated) DEVICE — GAUZE,PACKING STRIP,IODOFORM,1/2"X5YD,ST: Brand: CURAD

## (undated) DEVICE — BLADE,STAINLESS-STEEL,15,STRL,DISPOSABLE: Brand: MEDLINE

## (undated) DEVICE — UNIVERSAL DRAPE: Brand: MEDLINE INDUSTRIES, INC.

## (undated) DEVICE — GAUZE,SPONGE,AVANT,4"X4",4PLY,STRL,10/TR: Brand: MEDLINE

## (undated) DEVICE — SYRINGE MED 10ML TRNSLUC BRL PLUNG BLK MRK POLYPR CTRL

## (undated) DEVICE — STANDARD HYPODERMIC NEEDLE,ALUMINUM HUB: Brand: MONOJECT